# Patient Record
Sex: MALE | Race: WHITE | NOT HISPANIC OR LATINO | Employment: OTHER | URBAN - METROPOLITAN AREA
[De-identification: names, ages, dates, MRNs, and addresses within clinical notes are randomized per-mention and may not be internally consistent; named-entity substitution may affect disease eponyms.]

---

## 2017-01-13 ENCOUNTER — OFFICE VISIT (OUTPATIENT)
Dept: CARDIOLOGY | Facility: CLINIC | Age: 77
End: 2017-01-13
Payer: MEDICARE

## 2017-01-13 VITALS
OXYGEN SATURATION: 97 % | DIASTOLIC BLOOD PRESSURE: 75 MMHG | SYSTOLIC BLOOD PRESSURE: 146 MMHG | BODY MASS INDEX: 25.06 KG/M2 | HEART RATE: 79 BPM | WEIGHT: 150.63 LBS

## 2017-01-13 DIAGNOSIS — I25.10 CORONARY ARTERY DISEASE INVOLVING NATIVE CORONARY ARTERY OF NATIVE HEART WITHOUT ANGINA PECTORIS: ICD-10-CM

## 2017-01-13 DIAGNOSIS — R55 SYNCOPE AND COLLAPSE: Primary | ICD-10-CM

## 2017-01-13 DIAGNOSIS — Z95.0 PACEMAKER: ICD-10-CM

## 2017-01-13 DIAGNOSIS — R13.12 OROPHARYNGEAL DYSPHAGIA: ICD-10-CM

## 2017-01-13 DIAGNOSIS — Z95.1 HX OF CABG: ICD-10-CM

## 2017-01-13 DIAGNOSIS — E78.2 MIXED HYPERLIPIDEMIA: ICD-10-CM

## 2017-01-13 DIAGNOSIS — Z79.899 POLYPHARMACY: ICD-10-CM

## 2017-01-13 PROCEDURE — 1157F ADVNC CARE PLAN IN RCRD: CPT | Mod: S$GLB,,, | Performed by: INTERNAL MEDICINE

## 2017-01-13 PROCEDURE — 99214 OFFICE O/P EST MOD 30 MIN: CPT | Mod: S$GLB,,, | Performed by: INTERNAL MEDICINE

## 2017-01-13 PROCEDURE — 99999 PR PBB SHADOW E&M-EST. PATIENT-LVL III: CPT | Mod: PBBFAC,,, | Performed by: INTERNAL MEDICINE

## 2017-01-13 PROCEDURE — 1125F AMNT PAIN NOTED PAIN PRSNT: CPT | Mod: S$GLB,,, | Performed by: INTERNAL MEDICINE

## 2017-01-13 PROCEDURE — 3078F DIAST BP <80 MM HG: CPT | Mod: S$GLB,,, | Performed by: INTERNAL MEDICINE

## 2017-01-13 PROCEDURE — 1159F MED LIST DOCD IN RCRD: CPT | Mod: S$GLB,,, | Performed by: INTERNAL MEDICINE

## 2017-01-13 PROCEDURE — 3077F SYST BP >= 140 MM HG: CPT | Mod: S$GLB,,, | Performed by: INTERNAL MEDICINE

## 2017-01-13 PROCEDURE — 1160F RVW MEDS BY RX/DR IN RCRD: CPT | Mod: S$GLB,,, | Performed by: INTERNAL MEDICINE

## 2017-01-13 NOTE — PROGRESS NOTES
Subjective:   Patient ID:  Venu Lau is a 76 y.o. male who presents for follow-up of Follow-up      Problem List Items Addressed This Visit        Pulmonary    Oropharyngeal dysphagia       Cardiac    CAD (coronary artery disease)       Fluids/Electrolytes/Nutrition/GI    Mixed hyperlipidemia       Other    Hx of CABG    Pacemaker    Polypharmacy      Other Visit Diagnoses     Syncope and collapse    -  Primary          HPI: Patient with PMH of CAD with CABG, chronic pain on pain meds, Afib not on anticoagulation and SSS with a pacemaker. He is here secondary to recurrent LOC with injury. LOC is for 2-3 minutes. He is oriented and alert when he regains consciousness. He has had syncopal episodes in the past. He is uncertain as to when pacemaker was last interrogated.     Of note ER note mentioned patient tripped but he is now saying he lost consciousness.     Review of Systems   Constitution: Negative.   HENT: Negative.    Eyes: Negative.    Cardiovascular: Negative.    Respiratory: Negative.    Endocrine: Negative.    Hematologic/Lymphatic: Negative.    Skin: Negative.    Musculoskeletal: Negative.    Gastrointestinal: Negative.    Neurological: Negative.        Patient's Medications   New Prescriptions    No medications on file   Previous Medications    ASPIRIN (ECOTRIN) 81 MG EC TABLET    Take 81 mg by mouth once daily.    ATORVASTATIN (LIPITOR) 20 MG TABLET    Take 1 tablet by mouth Daily.    DULOXETINE 40 MG CPDR    Take 40 mg by mouth once daily.    GEMFIBROZIL (LOPID) 600 MG TABLET    Take 600 mg by mouth once daily.      METOPROLOL TARTRATE (LOPRESSOR) 25 MG TABLET    Take 0.5 tablets (12.5 mg total) by mouth 2 (two) times daily.    OXYCODONE (OXYCONTIN) 15 MG TR12 12 HR TABLET    Take 15 mg by mouth every 12 (twelve) hours as needed.    QUETIAPINE (SEROQUEL) 200 MG TAB    Take 1 tablet by mouth once daily.    SOLUS V2 LANCETS 30 GAUGE MISC        SOLUS V2 TEST STRIPS STRP        SUCRALFATE (CARAFATE)  1 GRAM TABLET    Take 1 g by mouth once daily.      TAMSULOSIN (FLOMAX) 0.4 MG CP24    Take 1 capsule (0.4 mg total) by mouth once daily.   Modified Medications    No medications on file   Discontinued Medications    No medications on file       Objective:   Physical Exam   Constitutional: He is oriented to person, place, and time. He appears well-developed and well-nourished. No distress.   Examination of the digits showed no clubbing or cyanosis   HENT:   Head: Normocephalic and atraumatic.   Eyes: Conjunctivae are normal. Pupils are equal, round, and reactive to light. Right eye exhibits no discharge.   Neck: Normal range of motion. Neck supple. No JVD present. No thyromegaly present.   No carotid bruits   Cardiovascular: Normal rate, regular rhythm, S1 normal, S2 normal, normal heart sounds, intact distal pulses and normal pulses.  PMI is not displaced.  Exam reveals no gallop, no friction rub and no opening snap.    No murmur heard.  Pulmonary/Chest: Effort normal and breath sounds normal. No respiratory distress. He has no wheezes. He has no rales. He exhibits no tenderness.   Abdominal: Soft. Bowel sounds are normal. He exhibits no distension and no mass. There is no tenderness. There is no guarding.   No hepatosplenomegaly   Musculoskeletal: Normal range of motion. He exhibits no edema or tenderness.   Lymphadenopathy:     He has no cervical adenopathy.   Neurological: He is alert and oriented to person, place, and time.   Skin: Skin is warm. No rash noted. He is not diaphoretic. No erythema.   Psychiatric: He has a normal mood and affect.   Nursing note and vitals reviewed.      ECGs reviewed- atrial paced and V paced rhythm  LABS reviewed  Imaging including Echoes reviewed- normal ef 60% with DD    Assessment:     1. Syncope and collapse    2. Hx of CABG    3. Pacemaker    4. Polypharmacy    5. Mixed hyperlipidemia    6. Coronary artery disease involving native coronary artery of native heart without  angina pectoris    7. Oropharyngeal dysphagia        Plan:     Syncope vs poly pharmacy and fall.  Will get Pacemaker interrogated  Continue current medications  F/u in 3 months

## 2017-01-13 NOTE — MR AVS SNAPSHOT
[facility] - Cardiology  [address] [place] [address] [address], [address]  [place] [address]  Phone: [phone]  Fax: [phone]                  Venu Lau   2017 3:00 PM   Office Visit    Description:  Male : 1940   Provider:  Breanna Tracy MD   Department:  [facility] - Cardiology           Reason for Visit     Follow-up           Diagnoses this Visit        Comments    Syncope and collapse    -  Primary     Hx of CABG         Pacemaker         Polypharmacy         Mixed hyperlipidemia         Coronary artery disease involving native coronary artery of native heart without angina pectoris         Oropharyngeal dysphagia                To Do List           Future Appointments        Provider Department Dept Phone    2017 10:15 [facility] Cardiology [phone]      Goals (5 Years of Data)     None      [facility]     [facility] Care [facility] -  Assistance  Registered nurses in the [facility] provide clinical advisement, health education, appointment booking, and other advisory services.  Call for this free service at [phone].             Medications                Verify that the below list of medications is an accurate representation of the medications you are currently taking.  If none reported, the list may be blank. If incorrect, please contact your healthcare provider. Carry this list with you in case of emergency.           Current Medications     aspirin (ECOTRIN) 81 MG EC tablet Take 81 mg by mouth once daily.    atorvastatin (LIPITOR) 20 MG tablet Take 1 tablet by mouth Daily.    duloxetine 40 mg CpDR Take 40 mg by mouth once daily.    gemfibrozil (LOPID) 600 MG tablet Take 600 mg by mouth once daily.      metoprolol tartrate (LOPRESSOR) 25 MG tablet Take 0.5 tablets (12.5 mg total) by mouth 2 (two) times daily.    oxycodone (OXYCONTIN) 15 mg TR12 12 hr tablet Take 15 mg by mouth every 12 (twelve) hours as needed.     quetiapine (SEROQUEL) 200 MG Tab Take 1 tablet by mouth once daily.    SOLUS V2 LANCETS 30 gauge Misc     SOLUS V2 TEST STRIPS Strp     sucralfate (CARAFATE) 1 gram tablet Take 1 g by mouth once daily.      tamsulosin (FLOMAX) 0.4 mg Cp24 Take 1 capsule (0.4 mg total) by mouth once daily.           Clinical Reference Information           Vital Signs - Last Recorded  Most recent update: 1/13/2017  2:57 PM by Adriane M Toussaint, LPN    BP Pulse Wt SpO2 BMI    (!) 146/75 79 68.3 kg (150 lb 9.6 oz) 97% 25.06 kg/m2      Blood Pressure          Most Recent Value    BP  (!)  146/75      Allergies as of 1/13/2017     No Known Allergies      Immunizations Administered on Date of Encounter - 1/13/2017     None      [facility] Sign-Up     Activating your [facility] account is as easy as 1-2-3!     1) Visit [email or web address], select Sign Up Now, enter this activation code and your date of birth, then select Next.  [email or web address]  Expires: 2/7/2017  3:17 PM      2) Create a username and password to use when you visit [facility] in the future and select a security question in case you lose your password and select Next.    3) Enter your e-mail address and click Sign Up!    Additional Information  If you have questions, please e-mail [email or web address] or call [phone] to talk to our [facility] staff. Remember, [facility] is NOT to be used for urgent needs. For medical emergencies, dial 911.

## 2017-02-07 ENCOUNTER — CLINICAL SUPPORT (OUTPATIENT)
Dept: CARDIOLOGY | Facility: CLINIC | Age: 77
End: 2017-02-07
Payer: MEDICARE

## 2017-02-07 DIAGNOSIS — Z95.0 PACEMAKER: Primary | ICD-10-CM

## 2017-02-07 PROCEDURE — 93288 INTERROG EVL PM/LDLS PM IP: CPT | Mod: S$GLB,,, | Performed by: INTERNAL MEDICINE

## 2017-02-08 ENCOUNTER — HOSPITAL ENCOUNTER (EMERGENCY)
Facility: HOSPITAL | Age: 77
Discharge: HOME | End: 2017-02-09
Attending: EMERGENCY MEDICINE
Payer: MEDICARE

## 2017-02-08 DIAGNOSIS — E87.1 HYPONATREMIA: ICD-10-CM

## 2017-02-08 DIAGNOSIS — F13.939 BENZODIAZEPINE WITHDRAWAL, WITH UNSPECIFIED COMPLICATION: Primary | ICD-10-CM

## 2017-02-08 DIAGNOSIS — E86.0 DEHYDRATION: ICD-10-CM

## 2017-02-08 DIAGNOSIS — R11.10 VOMITING, INTRACTABILITY OF VOMITING NOT SPECIFIED, PRESENCE OF NAUSEA NOT SPECIFIED, UNSPECIFIED VOMITING TYPE: ICD-10-CM

## 2017-02-08 LAB
ALBUMIN SERPL BCP-MCNC: 4.5 G/DL
ALP SERPL-CCNC: 166 U/L
ALT SERPL W/O P-5'-P-CCNC: 28 U/L
ANION GAP SERPL CALC-SCNC: 17 MMOL/L
ANISOCYTOSIS BLD QL SMEAR: SLIGHT
AST SERPL-CCNC: 44 U/L
BASOPHILS # BLD AUTO: ABNORMAL K/UL
BASOPHILS NFR BLD: 0 %
BILIRUB SERPL-MCNC: 2.1 MG/DL
BILIRUB UR QL STRIP: NEGATIVE
BUN SERPL-MCNC: 12 MG/DL
CALCIUM SERPL-MCNC: 9.4 MG/DL
CHLORIDE SERPL-SCNC: 98 MMOL/L
CLARITY UR: CLEAR
CO2 SERPL-SCNC: 14 MMOL/L
COLOR UR: YELLOW
CREAT SERPL-MCNC: 0.9 MG/DL
DACRYOCYTES BLD QL SMEAR: ABNORMAL
DIFFERENTIAL METHOD: ABNORMAL
EOSINOPHIL # BLD AUTO: ABNORMAL K/UL
EOSINOPHIL NFR BLD: 0 %
ERYTHROCYTE [DISTWIDTH] IN BLOOD BY AUTOMATED COUNT: 14.3 %
EST. GFR  (AFRICAN AMERICAN): >60 ML/MIN/1.73 M^2
EST. GFR  (NON AFRICAN AMERICAN): >60 ML/MIN/1.73 M^2
GLUCOSE SERPL-MCNC: 143 MG/DL
GLUCOSE UR QL STRIP: NEGATIVE
HCT VFR BLD AUTO: 41.3 %
HGB BLD-MCNC: 14.7 G/DL
HGB UR QL STRIP: ABNORMAL
KETONES UR QL STRIP: ABNORMAL
LEUKOCYTE ESTERASE UR QL STRIP: NEGATIVE
LYMPHOCYTES # BLD AUTO: ABNORMAL K/UL
LYMPHOCYTES NFR BLD: 25 %
MCH RBC QN AUTO: 30.2 PG
MCHC RBC AUTO-ENTMCNC: 35.6 %
MCV RBC AUTO: 85 FL
MONOCYTES # BLD AUTO: ABNORMAL K/UL
MONOCYTES NFR BLD: 2 %
NEUTROPHILS NFR BLD: 73 %
NITRITE UR QL STRIP: NEGATIVE
OVALOCYTES BLD QL SMEAR: ABNORMAL
PH UR STRIP: 7 [PH] (ref 5–8)
PLATELET # BLD AUTO: 137 K/UL
PLATELET BLD QL SMEAR: ABNORMAL
PMV BLD AUTO: 10 FL
POIKILOCYTOSIS BLD QL SMEAR: SLIGHT
POTASSIUM SERPL-SCNC: 3.7 MMOL/L
PROT SERPL-MCNC: 7.8 G/DL
PROT UR QL STRIP: NEGATIVE
RBC # BLD AUTO: 4.87 M/UL
SODIUM SERPL-SCNC: 129 MMOL/L
SP GR UR STRIP: <=1.005 (ref 1–1.03)
TROPONIN I SERPL DL<=0.01 NG/ML-MCNC: 0.07 NG/ML
URN SPEC COLLECT METH UR: ABNORMAL
UROBILINOGEN UR STRIP-ACNC: NEGATIVE EU/DL
WBC # BLD AUTO: 5.65 K/UL

## 2017-02-08 PROCEDURE — 85007 BL SMEAR W/DIFF WBC COUNT: CPT

## 2017-02-08 PROCEDURE — 99285 EMERGENCY DEPT VISIT HI MDM: CPT | Mod: 25

## 2017-02-08 PROCEDURE — 63600175 PHARM REV CODE 636 W HCPCS: Performed by: EMERGENCY MEDICINE

## 2017-02-08 PROCEDURE — 93010 ELECTROCARDIOGRAM REPORT: CPT | Mod: ,,, | Performed by: INTERNAL MEDICINE

## 2017-02-08 PROCEDURE — 96375 TX/PRO/DX INJ NEW DRUG ADDON: CPT

## 2017-02-08 PROCEDURE — 84484 ASSAY OF TROPONIN QUANT: CPT

## 2017-02-08 PROCEDURE — 93005 ELECTROCARDIOGRAM TRACING: CPT

## 2017-02-08 PROCEDURE — 80053 COMPREHEN METABOLIC PANEL: CPT

## 2017-02-08 PROCEDURE — 96374 THER/PROPH/DIAG INJ IV PUSH: CPT

## 2017-02-08 PROCEDURE — 81003 URINALYSIS AUTO W/O SCOPE: CPT

## 2017-02-08 PROCEDURE — 96361 HYDRATE IV INFUSION ADD-ON: CPT

## 2017-02-08 PROCEDURE — 85027 COMPLETE CBC AUTOMATED: CPT

## 2017-02-08 RX ORDER — ONDANSETRON 2 MG/ML
4 INJECTION INTRAMUSCULAR; INTRAVENOUS
Status: COMPLETED | OUTPATIENT
Start: 2017-02-08 | End: 2017-02-08

## 2017-02-08 RX ORDER — LORAZEPAM 2 MG/ML
1 INJECTION INTRAMUSCULAR
Status: COMPLETED | OUTPATIENT
Start: 2017-02-08 | End: 2017-02-08

## 2017-02-08 RX ADMIN — LORAZEPAM 1 MG: 2 INJECTION, SOLUTION INTRAMUSCULAR; INTRAVENOUS at 09:02

## 2017-02-08 RX ADMIN — ONDANSETRON 4 MG: 2 INJECTION INTRAMUSCULAR; INTRAVENOUS at 09:02

## 2017-02-08 NOTE — PROGRESS NOTES
Pacemaker Evaluation Report      Primary MD: Dr. Terrazas  Primary Cardiologist: Dr. Tracy     : Biotronik Model: Evia DR/Serial No.  [id number]  Implant date: Jan 13, 2011    Reason for evaluation:routine  Indication for pacemaker: unknown    Measurements  Atrial sensing - P wave: 1.7 mV  Atrial capture: Maintained: Afib  Atrial lead impedance: 429 ohms  Ventricular sensing - R wave: 11.1 mV  Ventricular capture: RV Maintained: 0.9 V @ 0.4 ms   Ventricular lead impedance: right - 487 ohms left -  ohms  Underlying rhythm: Aflutter      Diagnostic Data  Atrial paced: 50 % Ventricular paced: 30 %  Mode switch: on  Other: 10% Afib burden  Sensor response: CLS  Battery status: satisfactory Magnet rate: 90 bpm   55% remaining capacity  Est. Longevity 4.4 years      Final Parameters  Mode: DDD-CLS Lower rate: 60 bpm Upper rate: 130 bpm  Atrial - Amplitude: 4.0 V Pulse width: 0.5 ms   Polarity: Bipolar  Ventricular - Amplitude: 3.0 V Pulse width: 0.4 ms   Polarity: Bipolar  Changes made: NONE  Conclusions: normal pacemaker function and Afib burden of 10%, longest episode 12 hours    Follow up: 6 months        Colton Oh MD, FACC, CCDS  Interventional Cardiology  [facility]

## 2017-02-08 NOTE — ED AVS SNAPSHOT
[facility]  [address] [place] [address]               Venu Lau   2017  7:13 PM   ED    Description:  Male : 1940   Department:  [facility]           Your Care was Coordinated By:     Provider Role From To    Cruz Hanks MD Attending Provider 17 [id number] --      Reason for Visit     near syncope           Diagnoses this Visit        Comments    Benzodiazepine withdrawal, with unspecified complication    -  Primary     Vomiting, intractability of vomiting not specified, presence of nausea not specified, unspecified vomiting type         Dehydration         Hyponatremia           ED Disposition     None           To Do List           Follow-up Information     Follow up with [facility]. Call in 1 day.    Specialty:  Emergency Medicine    Contact information:    [address] [place] [address] [phone]      [facility]     [facility] Care [facility] -  Assistance  Registered nurses in the [facility] provide clinical advisement, health education, appointment booking, and other advisory services.  Call for this free service at [phone].             Medications           These medications were administered today        Dose Freq    lorazepam injection 1 mg 1 mg ED 1 Time    Sig: Inject 0.5 mLs (1 mg total) into the vein ED 1 Time.    Class: Normal    Route: Intravenous    ondansetron injection 4 mg 4 mg ED 1 Time    Sig: Inject 4 mg into the vein ED 1 Time.    Class: Normal    Route: Intravenous    sodium chloride 0.9% bolus 1,000 mL 1,000 mL ED 1 Time    Sig: Inject 1,000 mLs into the vein ED 1 Time.    Class: Normal    Route: Intravenous           Verify that the below list of medications is an accurate representation of the medications you are currently taking.  If none reported, the list may be blank. If incorrect, please contact your healthcare provider.  "Carry this list with you in case of emergency.           Current Medications     aspirin (ECOTRIN) 81 MG EC tablet Take 81 mg by mouth once daily.    atorvastatin (LIPITOR) 20 MG tablet Take 1 tablet by mouth Daily.    duloxetine 40 mg CpDR Take 40 mg by mouth once daily.    gemfibrozil (LOPID) 600 MG tablet Take 600 mg by mouth once daily.      metoprolol tartrate (LOPRESSOR) 25 MG tablet Take 0.5 tablets (12.5 mg total) by mouth 2 (two) times daily.    oxycodone (OXYCONTIN) 15 mg TR12 12 hr tablet Take 15 mg by mouth every 12 (twelve) hours as needed.    quetiapine (SEROQUEL) 200 MG Tab Take 1 tablet by mouth once daily.    SOLUS V2 LANCETS 30 gauge Misc     SOLUS V2 TEST STRIPS Strp     sucralfate (CARAFATE) 1 gram tablet Take 1 g by mouth once daily.      tamsulosin (FLOMAX) 0.4 mg Cp24 Take 1 capsule (0.4 mg total) by mouth once daily.           Clinical Reference Information           Your Vitals Were     BP Pulse Temp Resp Height Weight    167/92 (BP Location: Right arm, Patient Position: Lying, BP Method: Automatic) 103 98.9 °F (37.2 °C) (Oral) 18 5' 5" (1.651 m) 68 kg (150 lb)    SpO2 BMI             97% 24.96 kg/m2         Allergies as of 2/9/2017     No Known Allergies      Immunizations Administered on Date of Encounter - 2/9/2017     None      ED Micro, Lab, POCT     Start Ordered       Status Ordering Provider    02/08/17 2056 02/08/17 2055  Troponin I  STAT      Final result     02/08/17 2055 02/08/17 2055  Urinalysis  STAT      Final result     02/08/17 2054 02/08/17 2055  CBC auto differential  STAT      Final result     02/08/17 2054 02/08/17 2055  Comprehensive metabolic panel  STAT      Final result       ED Imaging Orders     Start Ordered       Status Ordering Provider    02/08/17 2041 02/08/17 2055  CT Head Without Contrast  1 time imaging      Final result       Discharge References/Attachments     DEHYDRATION (ADULT) (ENGLISH)    HYPONATREMIA (ENGLISH)    BENZODIAZEPINE WITHDRAWAL (ENGLISH) "      Your Scheduled Appointments     Feb 13, 2017  2:00 PM CST   Established Patient Visit with Breanna Tracy MD   [facility] - Cardiology ([place])    [address], [address]  [place] [address] [phone]            Apr 10, 2017  1:20 PM CDT   Established Patient Visit with MD [facility] - Cardiology ([place])    [address], [address]  [place] [id number] [phone]              [facility] Sign-Up     Activating your [facility] account is as easy as 1-2-3!     1) Visit [email or web address], select Sign Up Now, enter this activation code and your date of birth, then select Next.  [email or web address]  Expires: 3/26/2017 12:29 AM      2) Create a username and password to use when you visit [facility] in the future and select a security question in case you lose your password and select Next.    3) Enter your e-mail address and click Sign Up!    Additional Information  If you have questions, please e-mail [email or web address] or call [phone] to talk to our [facility] staff. Remember, [facility] is NOT to be used for urgent needs. For medical emergencies, dial 911.         Smoking Cessation     If you would like to quit smoking:   You may be eligible for free services if you are a [place] resident and started smoking cigarettes before September 1, 1988.  Call the Smoking Cessation Trust ([facility]) toll free at (765) 911-4908 or (475) 448-6075.   Call 8-800-QUIT-NOW if you do not meet the above criteria.             [facility] complies with applicable Federal civil rights laws and does not discriminate on the basis of race, color, national origin, age, disability, or sex.        Language Assistance Services     ATTENTION: Language assistance services are available, free of charge. Please call [phone].      ATENCIÓN: Si habla español, tiene a rojas disposición servicios gratuitos de asistencia lingüística. Llame al [phone].     CHÚ Ý: N?u b?n nói Ti?ng Vi?t, có  các d?ch v? h? tr? rashi ng? mi?n phí dành cho b?n. G?i s? [phone].

## 2017-02-09 VITALS
WEIGHT: 150 LBS | OXYGEN SATURATION: 100 % | HEART RATE: 98 BPM | HEIGHT: 65 IN | TEMPERATURE: 99 F | SYSTOLIC BLOOD PRESSURE: 148 MMHG | BODY MASS INDEX: 24.99 KG/M2 | RESPIRATION RATE: 18 BRPM | DIASTOLIC BLOOD PRESSURE: 99 MMHG

## 2017-02-09 PROCEDURE — 25000003 PHARM REV CODE 250: Performed by: EMERGENCY MEDICINE

## 2017-02-09 RX ADMIN — SODIUM CHLORIDE 1000 ML: 0.9 INJECTION, SOLUTION INTRAVENOUS at 12:02

## 2017-02-09 NOTE — ED TRIAGE NOTES
Pt. Care assumed, pt. Resting comfortably in bed, denies c/o pain or discomfort. Cardiac monitor shows NSR with HR-76. Skin PWD. Daughter at bedside.

## 2017-02-09 NOTE — ED NOTES
Pt presents to ED via EMS with c/o near syncopal episode with chronic anxiety. Dr. Hanks at bedside for initial assessment. Pt states he has been out of his xanax for a few days and has not gotten any sleep. Pt also c/o chronic back pain. Pt has multiple complaints on arrival but does not present in acute distress. Pt states he fell 2 weeks ago and hit his head. Pt placed on cardiac monitor, bp cuff and continuous pulse ox. Pt has pacemaker in place. Family at beside.

## 2017-02-09 NOTE — ED PROVIDER NOTES
Encounter Date: 2/8/2017       History     Chief Complaint   Patient presents with    near syncope     pt reports generalized weakness x 2 days with near syncopal episodes x months     Review of patient's allergies indicates:  No Known Allergies  HPI Comments: Patient is a 76-year-old male with a past medical history of tachybradycardia syndrome, sick sinus syndrome, GI bleed, gout, diabetes, hypertension, a flutter, liver cancer, coronary artery disease who presents to emergency room for evaluation of overall anxiety and nervousness for the past day.  The patient has been out of his Xanax for 3 days for which he normally takes 1 pill of unknown dosage in the morning and evening.  Patient states he wasn't able to eat yesterday and was very nauseous. .  He is a prior poor historian, but is able to deny any current chest pain palpitation shortness of breath abdominal pain focal weakness or numbness or dysuria.  The patient does have a slight headache and fell 2 weeks ago and hit his head.  He has no vision changes or syncope.  He states he's been nauseous today and when he tries to eat he vomits.  He has no other complaints at this time.  Apparently, family members called 911.  Unfortunately, I did not receive report from EMS.  I'm asking nurses for further information at this time.  Patient is awake alert and oriented    Past Medical History   Diagnosis Date    *Atrial flutter     Anemia     Coronary artery disease     Diabetes mellitus     Gout, unspecified     H/O: GI bleed     Hypertension     Liver cancer     PVD (peripheral vascular disease)     Sciatica     SSS (sick sinus syndrome)     Tachy-valentine syndrome      No past medical history pertinent negatives.  Past Surgical History   Procedure Laterality Date    Lumbar disc surgery      Coronary artery bypass graft      Insert / replace / remove pacemaker       Family History   Problem Relation Age of Onset    Heart disease Mother     Heart  disease Father      Social History   Substance Use Topics    Smoking status: Former Smoker     Types: Cigarettes     Quit date: 11/20/1992    Smokeless tobacco: Never Used    Alcohol use 1.2 oz/week     0 Standard drinks or equivalent, 2 Cans of beer per week      Comment: 2 cans of beer per day     Review of Systems   Constitutional: Positive for fatigue. Negative for activity change, appetite change, chills, diaphoresis and fever.   HENT: Negative for congestion and sore throat.    Respiratory: Negative for cough and shortness of breath.    Cardiovascular: Negative for chest pain, palpitations and leg swelling.   Gastrointestinal: Positive for nausea and vomiting. Negative for abdominal pain, blood in stool and diarrhea.   Genitourinary: Negative for dysuria and hematuria.   Musculoskeletal: Positive for arthralgias and myalgias.   Skin: Negative for rash.   Neurological: Positive for headaches. Negative for syncope, weakness and numbness.   Psychiatric/Behavioral: Negative for agitation and confusion.       Physical Exam   Initial Vitals   BP Pulse Resp Temp SpO2   02/08/17 1819 02/08/17 1819 02/08/17 1819 02/08/17 1819 02/08/17 1819   163/109 98 19 97.1 °F (36.2 °C) 99 %     Physical Exam    Vitals reviewed.  Constitutional: He appears well-developed and well-nourished. No distress.   HENT:   Head: Normocephalic and atraumatic.   Right Ear: External ear normal.   Left Ear: External ear normal.   Mouth/Throat: Oropharynx is clear and moist.   Eyes: Conjunctivae and EOM are normal. Pupils are equal, round, and reactive to light.   Neck: Normal range of motion. Neck supple. No JVD present.   Cardiovascular: Normal rate, regular rhythm and normal heart sounds. Exam reveals no gallop and no friction rub.    No murmur heard.  Pulmonary/Chest: Breath sounds normal. He has no wheezes. He has no rhonchi. He has no rales.   Prior cabg scar     Abdominal: Soft. There is no tenderness. There is no rebound and no  guarding.   Musculoskeletal: Normal range of motion. He exhibits no edema.   Neurological: He is alert and oriented to person, place, and time. He has normal strength. No sensory deficit.   Skin: Skin is warm and dry.   Psychiatric:   Appears somewhat tremulous and nervous         ED Course   Procedures  Labs Reviewed   CBC W/ AUTO DIFFERENTIAL   COMPREHENSIVE METABOLIC PANEL   URINALYSIS   TROPONIN I     EKG Readings: (Independently Interpreted)   Other EKG Interpretations: Rate 100, electronic ventricular pacemaker with no signs of acute ischemia and unchanged from prior.          Medical Decision Making:   Patient has mild hyponatremia on exam with dehydration.  I will hydrate him here in the ED.  He has no complaints other than feeling anxious because he doesn't have his Xanax.  He was Ativan and feels much better at this time.  No family members are here.    12:24 AM family members are here and can stay with the patient.  He feels much better after Ativan here in emergency room.  I will give him Xanax 0.5 mg twice a day and typically can follow-up with his primary care physician.  His family concur that he ran out.  The patient is able to tolerate by mouth.  He has no chest pain shortness breath abdominal pain or headache.  CT the head shows nothing acute.                       ED Course     Clinical Impression:   The primary encounter diagnosis was Benzodiazepine withdrawal, with unspecified complication. Diagnoses of Vomiting, intractability of vomiting not specified, presence of nausea not specified, unspecified vomiting type, Dehydration, and Hyponatremia were also pertinent to this visit.          Cruz Hanks MD  02/09/17 0030

## 2017-02-13 ENCOUNTER — OFFICE VISIT (OUTPATIENT)
Dept: CARDIOLOGY | Facility: CLINIC | Age: 77
End: 2017-02-13
Payer: MEDICARE

## 2017-02-13 VITALS
DIASTOLIC BLOOD PRESSURE: 65 MMHG | OXYGEN SATURATION: 97 % | HEART RATE: 69 BPM | BODY MASS INDEX: 24.5 KG/M2 | SYSTOLIC BLOOD PRESSURE: 112 MMHG | WEIGHT: 147.19 LBS

## 2017-02-13 DIAGNOSIS — I10 ESSENTIAL HYPERTENSION: Primary | ICD-10-CM

## 2017-02-13 DIAGNOSIS — Z95.1 HX OF CABG: ICD-10-CM

## 2017-02-13 DIAGNOSIS — Z95.0 PACEMAKER: ICD-10-CM

## 2017-02-13 DIAGNOSIS — E78.2 MIXED HYPERLIPIDEMIA: ICD-10-CM

## 2017-02-13 DIAGNOSIS — I48.0 PAF (PAROXYSMAL ATRIAL FIBRILLATION): ICD-10-CM

## 2017-02-13 DIAGNOSIS — I25.10 CORONARY ARTERY DISEASE INVOLVING NATIVE CORONARY ARTERY OF NATIVE HEART WITHOUT ANGINA PECTORIS: ICD-10-CM

## 2017-02-13 PROCEDURE — 99999 PR PBB SHADOW E&M-EST. PATIENT-LVL III: CPT | Mod: PBBFAC,,, | Performed by: INTERNAL MEDICINE

## 2017-02-13 PROCEDURE — 3074F SYST BP LT 130 MM HG: CPT | Mod: S$GLB,,, | Performed by: INTERNAL MEDICINE

## 2017-02-13 PROCEDURE — 1157F ADVNC CARE PLAN IN RCRD: CPT | Mod: S$GLB,,, | Performed by: INTERNAL MEDICINE

## 2017-02-13 PROCEDURE — 1160F RVW MEDS BY RX/DR IN RCRD: CPT | Mod: S$GLB,,, | Performed by: INTERNAL MEDICINE

## 2017-02-13 PROCEDURE — 1159F MED LIST DOCD IN RCRD: CPT | Mod: S$GLB,,, | Performed by: INTERNAL MEDICINE

## 2017-02-13 PROCEDURE — 1126F AMNT PAIN NOTED NONE PRSNT: CPT | Mod: S$GLB,,, | Performed by: INTERNAL MEDICINE

## 2017-02-13 PROCEDURE — 3078F DIAST BP <80 MM HG: CPT | Mod: S$GLB,,, | Performed by: INTERNAL MEDICINE

## 2017-02-13 PROCEDURE — 99214 OFFICE O/P EST MOD 30 MIN: CPT | Mod: S$GLB,,, | Performed by: INTERNAL MEDICINE

## 2017-02-13 NOTE — MR AVS SNAPSHOT
[facility] - Cardiology  [address] [place], [place] [address], [address]  [place] [address]  Phone: [phone]  Fax: [phone]                  Venu Lau   2017 2:00 PM   Office Visit    Description:  Male : 1940   Provider:  Breanna Tracy MD   Department:  [facility]           Reason for Visit     Follow-up           Diagnoses this Visit        Comments    Essential hypertension    -  Primary     PAF (paroxysmal atrial fibrillation)         Coronary artery disease involving native coronary artery of native heart without angina pectoris         Mixed hyperlipidemia         Pacemaker         Hx of CABG                To Do List           Future Appointments        Provider Department Dept Phone    4/10/2017 1:20 PM Breanna Tracy MD [facility] Cardiology [phone]      Goals (5 Years of Data)     None      [facility]     [facility] Line -  Assistance  Registered nurses in the [facility] Call Center provide clinical advisement, health education, appointment booking, and other advisory services.  Call for this free service at [phone].             Medications                Verify that the below list of medications is an accurate representation of the medications you are currently taking.  If none reported, the list may be blank. If incorrect, please contact your healthcare provider. Carry this list with you in case of emergency.           Current Medications     aspirin (ECOTRIN) 81 MG EC tablet Take 81 mg by mouth once daily.    atorvastatin (LIPITOR) 20 MG tablet Take 1 tablet by mouth Daily.    duloxetine 40 mg CpDR Take 40 mg by mouth once daily.    gemfibrozil (LOPID) 600 MG tablet Take 600 mg by mouth once daily.      metoprolol tartrate (LOPRESSOR) 25 MG tablet Take 0.5 tablets (12.5 mg total) by mouth 2 (two) times daily.    oxycodone (OXYCONTIN) 15 mg TR12 12 hr tablet Take 15 mg by mouth every 12 (twelve) hours as needed.    quetiapine (SEROQUEL) 200  MG Tab Take 1 tablet by mouth once daily.    SOLUS V2 LANCETS [facility] gauge [facility]     SOLUS V2 TEST STRIPS Strp     sucralfate (CARAFATE) 1 gram tablet Take 1 g by mouth once daily.      tamsulosin (FLOMAX) 0.4 mg Cp24 Take 1 capsule (0.4 mg total) by mouth once daily.           Clinical Reference Information           Your Vitals Were     BP Pulse Weight SpO2 BMI    112/65 69 66.8 kg (147 lb 3.2 oz) 97% 24.5 kg/m2      Blood Pressure          Most Recent Value    BP  112/65      Allergies as of 2/13/2017     No Known Allergies      Immunizations Administered on Date of Encounter - 2/13/2017     None      [facility] Sign-Up     Activating your [facility] account is as easy as 1-2-3!     1) Visit [email or web address], select Sign Up Now, enter this activation code and your date of birth, then select Next.  [email or web address]  Expires: 3/26/2017 12:29 AM      2) Create a username and password to use when you visit [facility] in the future and select a security question in case you lose your password and select Next.    3) Enter your e-mail address and click Sign Up!    Additional Information  If you have questions, please e-mail [email or web address] or call [phone] to talk to our [facility] staff. Remember, [facility] is NOT to be used for urgent needs. For medical emergencies, dial 911.         Language Assistance Services     ATTENTION: Language assistance services are available, free of charge. Please call [phone].      ATENCIÓN: Si habla español, tiene a rojas disposición servicios gratuitos de asistencia lingüística. Llame al [phone].     CHÚ Ý: N?u b?n nói Ti?ng Vi?t, có các d?ch v? h? tr? ngôn ng? mi?n phí dành cho b?n. G?i s? [phone].         [facility] complies with applicable Federal civil rights laws and does not discriminate on the basis of race, color, national origin, age, disability, or sex.

## 2017-02-13 NOTE — PROGRESS NOTES
Subjective:   Patient ID:  Venu Lau is a 76 y.o. male who presents for follow-up of Follow-up      Problem List Items Addressed This Visit        Cardiac    Coronary artery disease involving native coronary artery of native heart without angina pectoris    Essential hypertension - Primary    PAF (paroxysmal atrial fibrillation)       Fluids/Electrolytes/Nutrition/GI    Mixed hyperlipidemia       Other    Hx of CABG    Pacemaker          HPI: Patient is here for f/u of syncope. He is having pain in multiple joints. He had pacemaker interrogated and was found to have PAF. Daughter is concerned about occasion slurred speech and facial changes. He does have history of GI bleed but at the time he had colon cancer and liver cancer treated with surgery with no chemotherapy or radiation. No chest pain or SOB. No palpitations.     He has chronic pain and is seeing a pain specialist.     Review of Systems   Constitution: Negative.   HENT: Negative.    Eyes: Negative.    Cardiovascular: Negative.    Respiratory: Negative.    Endocrine: Negative.    Hematologic/Lymphatic: Negative.    Skin: Negative.    Musculoskeletal: Negative.    Gastrointestinal: Negative.    Neurological: Negative.    Psychiatric/Behavioral: Negative.        Patient's Medications   New Prescriptions    No medications on file   Previous Medications    ASPIRIN (ECOTRIN) 81 MG EC TABLET    Take 81 mg by mouth once daily.    ATORVASTATIN (LIPITOR) 20 MG TABLET    Take 1 tablet by mouth Daily.    DULOXETINE 40 MG CPDR    Take 40 mg by mouth once daily.    GEMFIBROZIL (LOPID) 600 MG TABLET    Take 600 mg by mouth once daily.      METOPROLOL TARTRATE (LOPRESSOR) 25 MG TABLET    Take 0.5 tablets (12.5 mg total) by mouth 2 (two) times daily.    OXYCODONE (OXYCONTIN) 15 MG TR12 12 HR TABLET    Take 15 mg by mouth every 12 (twelve) hours as needed.    QUETIAPINE (SEROQUEL) 200 MG TAB    Take 1 tablet by mouth once daily.    SOLUS V2 LANCETS 30 GAUGE [facility]         SOLUS V2 TEST STRIPS STRP        SUCRALFATE (CARAFATE) 1 GRAM TABLET    Take 1 g by mouth once daily.      TAMSULOSIN (FLOMAX) 0.4 MG CP24    Take 1 capsule (0.4 mg total) by mouth once daily.   Modified Medications    No medications on file   Discontinued Medications    No medications on file       Objective:   Physical Exam   Constitutional: He is oriented to person, place, and time. He appears well-developed and well-nourished. No distress.   Examination of the digits showed no clubbing or cyanosis   HENT:   Head: Normocephalic and atraumatic.   Eyes: Conjunctivae are normal. Pupils are equal, round, and reactive to light. Right eye exhibits no discharge.   Neck: Normal range of motion. Neck supple. No JVD present. No thyromegaly present.   No carotid bruits   Cardiovascular: Normal rate, regular rhythm, S1 normal, S2 normal, normal heart sounds, intact distal pulses and normal pulses.  PMI is not displaced.  Exam reveals no gallop, no friction rub and no opening snap.    No murmur heard.  Pulmonary/Chest: Effort normal and breath sounds normal. No respiratory distress. He has no wheezes. He has no rales. He exhibits no tenderness.   Abdominal: Soft. Bowel sounds are normal. He exhibits no distension and no mass. There is no tenderness. There is no guarding.   No hepatosplenomegaly   Musculoskeletal: Normal range of motion. He exhibits no edema or tenderness.   Lymphadenopathy:     He has no cervical adenopathy.   Neurological: He is alert and oriented to person, place, and time.   Skin: Skin is warm. No rash noted. He is not diaphoretic. No erythema.   Psychiatric: He has a normal mood and affect.   Nursing note and vitals reviewed.      ECGs reviewed-Ventricular paced rhythm  LABS reviewed  Imaging including Echoes reviewed    Assessment:     1. Essential hypertension    2. PAF (paroxysmal atrial fibrillation)    3. Coronary artery disease involving native coronary artery of native heart without angina  pectoris    4. Mixed hyperlipidemia    5. Pacemaker    6. Hx of CABG        Plan:      He is asymptomatic to PAF and most of his symptoms are secondary to pain or pain meds or pain meds withdrawal.  ChadsVasc=5 (age, HTN, DM, PAD). He has history of GI bleed and is having recurrent falls and recent syncope. He is always drousy on his pain meds. Will have him take 325 mg po daily  Continue current medications  Activity as tolerate  F/u with pain specialist  F/u in 4 months        Clinic time spent with this patient is 20 minutes.

## 2017-04-28 ENCOUNTER — OFFICE VISIT (OUTPATIENT)
Dept: CARDIOLOGY | Facility: CLINIC | Age: 77
End: 2017-04-28
Payer: MEDICARE

## 2017-04-28 VITALS
HEIGHT: 65 IN | WEIGHT: 154 LBS | DIASTOLIC BLOOD PRESSURE: 75 MMHG | OXYGEN SATURATION: 96 % | BODY MASS INDEX: 25.66 KG/M2 | SYSTOLIC BLOOD PRESSURE: 118 MMHG

## 2017-04-28 DIAGNOSIS — I48.92 ATRIAL FLUTTER, UNSPECIFIED TYPE: ICD-10-CM

## 2017-04-28 DIAGNOSIS — Z79.899 POLYPHARMACY: ICD-10-CM

## 2017-04-28 DIAGNOSIS — Z95.0 PACEMAKER: ICD-10-CM

## 2017-04-28 DIAGNOSIS — E78.2 MIXED HYPERLIPIDEMIA: ICD-10-CM

## 2017-04-28 DIAGNOSIS — Z95.1 HX OF CABG: ICD-10-CM

## 2017-04-28 DIAGNOSIS — I10 ESSENTIAL HYPERTENSION: ICD-10-CM

## 2017-04-28 DIAGNOSIS — I25.10 CORONARY ARTERY DISEASE INVOLVING NATIVE CORONARY ARTERY OF NATIVE HEART WITHOUT ANGINA PECTORIS: ICD-10-CM

## 2017-04-28 DIAGNOSIS — I48.0 PAF (PAROXYSMAL ATRIAL FIBRILLATION): Primary | ICD-10-CM

## 2017-04-28 PROCEDURE — 1160F RVW MEDS BY RX/DR IN RCRD: CPT | Mod: S$GLB,,, | Performed by: INTERNAL MEDICINE

## 2017-04-28 PROCEDURE — 99213 OFFICE O/P EST LOW 20 MIN: CPT | Mod: S$GLB,,, | Performed by: INTERNAL MEDICINE

## 2017-04-28 PROCEDURE — 3074F SYST BP LT 130 MM HG: CPT | Mod: S$GLB,,, | Performed by: INTERNAL MEDICINE

## 2017-04-28 PROCEDURE — 3078F DIAST BP <80 MM HG: CPT | Mod: S$GLB,,, | Performed by: INTERNAL MEDICINE

## 2017-04-28 PROCEDURE — 1159F MED LIST DOCD IN RCRD: CPT | Mod: S$GLB,,, | Performed by: INTERNAL MEDICINE

## 2017-04-28 PROCEDURE — 99999 PR PBB SHADOW E&M-EST. PATIENT-LVL III: CPT | Mod: PBBFAC,,, | Performed by: INTERNAL MEDICINE

## 2017-04-28 PROCEDURE — 1125F AMNT PAIN NOTED PAIN PRSNT: CPT | Mod: S$GLB,,, | Performed by: INTERNAL MEDICINE

## 2017-04-28 NOTE — PROGRESS NOTES
Subjective:   Patient ID:  Venu Lau is a 77 y.o. male who presents for follow-up of Follow-up      Problem List Items Addressed This Visit        Cardiac    Atrial flutter    Coronary artery disease involving native coronary artery of native heart without angina pectoris    Essential hypertension    PAF (paroxysmal atrial fibrillation) - Primary       Fluids/Electrolytes/Nutrition/GI    Mixed hyperlipidemia       Other    Hx of CABG    Pacemaker    Polypharmacy          HPI: Patient is here for f/u of PAF. No anticoagulation secondary to fall and polypharmacy. He denies chest pain or dyspnea. His only complaint is insufficient control of pain.     Review of Systems   Constitution: Negative.   HENT: Negative.    Eyes: Negative.    Cardiovascular: Negative.    Respiratory: Negative.    Endocrine: Negative.    Hematologic/Lymphatic: Negative.    Skin: Negative.    Musculoskeletal: Negative.    Gastrointestinal: Negative.    Neurological: Negative.    Psychiatric/Behavioral: Negative.        Patient's Medications   New Prescriptions    No medications on file   Previous Medications    ASPIRIN (ECOTRIN) 81 MG EC TABLET    Take 81 mg by mouth once daily.    ATORVASTATIN (LIPITOR) 20 MG TABLET    Take 1 tablet by mouth Daily.    DULOXETINE 40 MG CPDR    Take 40 mg by mouth once daily.    GEMFIBROZIL (LOPID) 600 MG TABLET    Take 600 mg by mouth once daily.      METOPROLOL TARTRATE (LOPRESSOR) 25 MG TABLET    Take 0.5 tablets (12.5 mg total) by mouth 2 (two) times daily.    OXYCODONE (OXYCONTIN) 15 MG TR12 12 HR TABLET    Take 15 mg by mouth every 12 (twelve) hours as needed.    QUETIAPINE (SEROQUEL) 200 MG TAB    Take 1 tablet by mouth once daily.    SOLUS V2 LANCETS 30 GAUGE MISC        SOLUS V2 TEST STRIPS STRP        SUCRALFATE (CARAFATE) 1 GRAM TABLET    Take 1 g by mouth once daily.      TAMSULOSIN (FLOMAX) 0.4 MG CP24    Take 1 capsule (0.4 mg total) by mouth once daily.   Modified Medications    No  medications on file   Discontinued Medications    No medications on file       Objective:   Physical Exam   Constitutional: He is oriented to person, place, and time. He appears well-developed and well-nourished. No distress.   Examination of the digits showed no clubbing or cyanosis   HENT:   Head: Normocephalic and atraumatic.   Eyes: Conjunctivae are normal. Pupils are equal, round, and reactive to light. Right eye exhibits no discharge.   Neck: Normal range of motion. Neck supple. No JVD present. No thyromegaly present.   No carotid bruits   Cardiovascular: Normal rate, S1 normal, S2 normal, normal heart sounds, intact distal pulses and normal pulses.  An irregularly irregular rhythm present. PMI is not displaced.  Exam reveals no gallop, no friction rub and no opening snap.    No murmur heard.  Pulmonary/Chest: Effort normal and breath sounds normal. No respiratory distress. He has no wheezes. He has no rales. He exhibits no tenderness.   Abdominal: Soft. Bowel sounds are normal. He exhibits no distension and no mass. There is no tenderness. There is no guarding.   No hepatosplenomegaly   Musculoskeletal: Normal range of motion. He exhibits no edema or tenderness.   Lymphadenopathy:     He has no cervical adenopathy.   Neurological: He is alert and oriented to person, place, and time.   Skin: Skin is warm. No rash noted. He is not diaphoretic. No erythema.   Psychiatric: He has a normal mood and affect.   Nursing note and vitals reviewed.      ECGs reviewed-Ventricular paced rhythm        Assessment:     1. PAF (paroxysmal atrial fibrillation)    2. Essential hypertension    3. Coronary artery disease involving native coronary artery of native heart without angina pectoris    4. Atrial flutter, unspecified type    5. Mixed hyperlipidemia    6. Hx of CABG    7. Pacemaker    8. Polypharmacy        Plan:      He is asymptomatic to PAF and most of his symptoms are secondary to pain or pain meds or pain meds  withdrawal.  ChadsVasc=5 (age, HTN, DM, PAD). He has history of GI bleed and is having recurrent falls and recent syncope. He is always drousy on his pain meds. Will have him take 325 mg po daily  Continue current medications  Activity as tolerate  F/u with pain specialist  F/u in 6 months

## 2017-05-04 DIAGNOSIS — M47.816 FACET HYPERTROPHY OF LUMBAR REGION: Primary | ICD-10-CM

## 2017-06-09 ENCOUNTER — HOSPITAL ENCOUNTER (OUTPATIENT)
Dept: RADIOLOGY | Facility: HOSPITAL | Age: 77
Discharge: HOME | End: 2017-06-09
Attending: ANESTHESIOLOGY
Payer: MEDICARE

## 2017-06-09 DIAGNOSIS — M47.816 FACET HYPERTROPHY OF LUMBAR REGION: ICD-10-CM

## 2017-06-09 PROCEDURE — 72131 CT LUMBAR SPINE W/O DYE: CPT | Mod: TC,PO

## 2017-07-29 ENCOUNTER — HOSPITAL ENCOUNTER (EMERGENCY)
Facility: HOSPITAL | Age: 77
Discharge: HOME | End: 2017-07-29
Attending: FAMILY MEDICINE
Payer: MEDICARE

## 2017-07-29 VITALS
OXYGEN SATURATION: 97 % | RESPIRATION RATE: 18 BRPM | HEIGHT: 65 IN | TEMPERATURE: 98 F | BODY MASS INDEX: 24.16 KG/M2 | HEART RATE: 78 BPM | SYSTOLIC BLOOD PRESSURE: 189 MMHG | WEIGHT: 145 LBS | DIASTOLIC BLOOD PRESSURE: 88 MMHG

## 2017-07-29 DIAGNOSIS — G89.29 CHRONIC BILATERAL BACK PAIN, UNSPECIFIED BACK LOCATION: Primary | ICD-10-CM

## 2017-07-29 DIAGNOSIS — M54.9 CHRONIC BILATERAL BACK PAIN, UNSPECIFIED BACK LOCATION: Primary | ICD-10-CM

## 2017-07-29 LAB
BILIRUB UR QL STRIP: NEGATIVE
CLARITY UR REFRACT.AUTO: CLEAR
COLOR UR AUTO: NORMAL
GLUCOSE UR QL STRIP: NEGATIVE
HGB UR QL STRIP: NEGATIVE
KETONES UR QL STRIP: NEGATIVE
LEUKOCYTE ESTERASE UR QL STRIP: NEGATIVE
NITRITE UR QL STRIP: NEGATIVE
PH UR STRIP: 7 [PH] (ref 5–8)
PROT UR QL STRIP: NEGATIVE
SP GR UR STRIP: 1.01 (ref 1–1.03)
URN SPEC COLLECT METH UR: NORMAL
UROBILINOGEN UR STRIP-ACNC: NEGATIVE EU/DL

## 2017-07-29 PROCEDURE — 99283 EMERGENCY DEPT VISIT LOW MDM: CPT | Mod: 25

## 2017-07-29 PROCEDURE — 96372 THER/PROPH/DIAG INJ SC/IM: CPT

## 2017-07-29 PROCEDURE — 25000003 PHARM REV CODE 250: Performed by: FAMILY MEDICINE

## 2017-07-29 PROCEDURE — 63600175 PHARM REV CODE 636 W HCPCS: Performed by: FAMILY MEDICINE

## 2017-07-29 PROCEDURE — 81003 URINALYSIS AUTO W/O SCOPE: CPT

## 2017-07-29 RX ORDER — ONDANSETRON 4 MG/1
4 TABLET, ORALLY DISINTEGRATING ORAL
Status: COMPLETED | OUTPATIENT
Start: 2017-07-29 | End: 2017-07-29

## 2017-07-29 RX ORDER — MORPHINE SULFATE 2 MG/ML
4 INJECTION, SOLUTION INTRAMUSCULAR; INTRAVENOUS
Status: COMPLETED | OUTPATIENT
Start: 2017-07-29 | End: 2017-07-29

## 2017-07-29 RX ORDER — OXYCODONE AND ACETAMINOPHEN 10; 325 MG/1; MG/1
1 TABLET ORAL EVERY 4 HOURS PRN
Status: ON HOLD | COMMUNITY
End: 2020-06-15 | Stop reason: SDUPTHER

## 2017-07-29 RX ORDER — OXYCODONE AND ACETAMINOPHEN 10; 325 MG/1; MG/1
1 TABLET ORAL EVERY 12 HOURS PRN
Qty: 10 TABLET | Refills: 0 | Status: SHIPPED | OUTPATIENT
Start: 2017-07-29 | End: 2020-02-03 | Stop reason: SDUPTHER

## 2017-07-29 RX ADMIN — MORPHINE SULFATE 4 MG: 2 INJECTION, SOLUTION INTRAMUSCULAR; INTRAVENOUS at 10:07

## 2017-07-29 RX ADMIN — ONDANSETRON 4 MG: 4 TABLET, ORALLY DISINTEGRATING ORAL at 10:07

## 2017-07-30 NOTE — ED TRIAGE NOTES
"Pt states "My back hurts on the left side. I think it's my kidney's. It started on Friday. I haven't been sleeping."   "
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
## 2017-08-08 ENCOUNTER — CLINICAL SUPPORT (OUTPATIENT)
Dept: CARDIOLOGY | Facility: CLINIC | Age: 77
End: 2017-08-08
Payer: MEDICARE

## 2017-08-08 PROCEDURE — 93288 INTERROG EVL PM/LDLS PM IP: CPT | Mod: S$GLB,,, | Performed by: INTERNAL MEDICINE

## 2017-10-16 RX ORDER — METOPROLOL TARTRATE 25 MG/1
12.5 TABLET, FILM COATED ORAL 2 TIMES DAILY
Qty: 90 TABLET | Refills: 3 | Status: SHIPPED | OUTPATIENT
Start: 2017-10-16 | End: 2017-10-17 | Stop reason: SDUPTHER

## 2017-10-16 RX ORDER — METOPROLOL TARTRATE 25 MG/1
12.5 TABLET, FILM COATED ORAL 2 TIMES DAILY
Qty: 90 TABLET | Refills: 3 | Status: SHIPPED | OUTPATIENT
Start: 2017-10-16 | End: 2017-10-16 | Stop reason: SDUPTHER

## 2017-10-17 RX ORDER — METOPROLOL TARTRATE 25 MG/1
12.5 TABLET, FILM COATED ORAL 2 TIMES DAILY
Qty: 90 TABLET | Refills: 3 | Status: CANCELLED | OUTPATIENT
Start: 2017-10-17 | End: 2018-10-17

## 2017-10-17 RX ORDER — METOPROLOL TARTRATE 25 MG/1
12.5 TABLET, FILM COATED ORAL 2 TIMES DAILY
Qty: 90 TABLET | Refills: 3 | Status: SHIPPED | OUTPATIENT
Start: 2017-10-17 | End: 2017-11-06 | Stop reason: SDUPTHER

## 2017-11-06 ENCOUNTER — OFFICE VISIT (OUTPATIENT)
Dept: CARDIOLOGY | Facility: CLINIC | Age: 77
End: 2017-11-06
Payer: MEDICARE

## 2017-11-06 VITALS
OXYGEN SATURATION: 98 % | SYSTOLIC BLOOD PRESSURE: 172 MMHG | BODY MASS INDEX: 26.04 KG/M2 | WEIGHT: 156.31 LBS | HEART RATE: 96 BPM | DIASTOLIC BLOOD PRESSURE: 93 MMHG | HEIGHT: 65 IN

## 2017-11-06 DIAGNOSIS — I25.10 CORONARY ARTERY DISEASE INVOLVING NATIVE CORONARY ARTERY OF NATIVE HEART WITHOUT ANGINA PECTORIS: ICD-10-CM

## 2017-11-06 DIAGNOSIS — E78.2 MIXED HYPERLIPIDEMIA: ICD-10-CM

## 2017-11-06 DIAGNOSIS — Z95.0 PACEMAKER: ICD-10-CM

## 2017-11-06 DIAGNOSIS — Z95.1 HX OF CABG: ICD-10-CM

## 2017-11-06 DIAGNOSIS — I48.0 PAF (PAROXYSMAL ATRIAL FIBRILLATION): Primary | ICD-10-CM

## 2017-11-06 DIAGNOSIS — I10 ESSENTIAL HYPERTENSION: ICD-10-CM

## 2017-11-06 PROCEDURE — 99999 PR PBB SHADOW E&M-EST. PATIENT-LVL III: CPT | Mod: PBBFAC,,, | Performed by: INTERNAL MEDICINE

## 2017-11-06 PROCEDURE — 99214 OFFICE O/P EST MOD 30 MIN: CPT | Mod: S$GLB,,, | Performed by: INTERNAL MEDICINE

## 2017-11-06 RX ORDER — METOPROLOL TARTRATE 25 MG/1
12.5 TABLET, FILM COATED ORAL 2 TIMES DAILY
Qty: 90 TABLET | Refills: 3 | Status: SHIPPED | OUTPATIENT
Start: 2017-11-06 | End: 2020-02-03

## 2017-11-06 NOTE — PROGRESS NOTES
Subjective:   Patient ID:  Venu Lau is a 77 y.o. male who presents for follow-up of Follow-up      Problem List Items Addressed This Visit        Cardiac/Vascular    CAD (coronary artery disease)    Hx of CABG    Pacemaker    Essential hypertension    Relevant Medications    metoprolol tartrate (LOPRESSOR) 25 MG tablet    Mixed hyperlipidemia    PAF (paroxysmal atrial fibrillation) - Primary    Relevant Medications    metoprolol tartrate (LOPRESSOR) 25 MG tablet          HPI: Patient with PMH of PAF, CAD s/p CABG, HTN, HLD and chronic pain present for f/u. His complaints today is pain, weakness and lack of sleep. His BP is elevated secondary to not taking medications for BP. He is unable to say when he had ran out. He denies chest pain or dyspnea.   No anticoagulation secondary to fall and polypharmacy.   Repeat /82    Review of Systems   Constitution: Negative.   HENT: Negative.    Eyes: Negative.    Cardiovascular: Negative.    Respiratory: Negative.    Endocrine: Negative.    Hematologic/Lymphatic: Negative.    Skin: Negative.    Musculoskeletal: Negative.    Gastrointestinal: Negative.    Neurological: Negative.    Psychiatric/Behavioral: Negative.        Patient's Medications   New Prescriptions    No medications on file   Previous Medications    ALPRAZOLAM (XANAX ORAL)    Take by mouth.    ASPIRIN (ECOTRIN) 81 MG EC TABLET    Take 81 mg by mouth once daily.    ATORVASTATIN (LIPITOR) 20 MG TABLET    Take 1 tablet by mouth Daily.    DULOXETINE 40 MG CPDR    Take 40 mg by mouth once daily.    GEMFIBROZIL (LOPID) 600 MG TABLET    Take 600 mg by mouth once daily.      OXYCODONE (OXYCONTIN) 15 MG TR12 12 HR TABLET    Take 15 mg by mouth every 12 (twelve) hours as needed.    OXYCODONE-ACETAMINOPHEN (PERCOCET)  MG PER TABLET    Take 1 tablet by mouth every 4 (four) hours as needed for Pain.    OXYCODONE-ACETAMINOPHEN (PERCOCET)  MG PER TABLET    Take 1 tablet by mouth every 12 (twelve) hours  as needed for Pain.    QUETIAPINE (SEROQUEL) 200 MG TAB    Take 1 tablet by mouth once daily.    SOLUS V2 LANCETS 30 GAUGE MISC        SOLUS V2 TEST STRIPS STRP        SUCRALFATE (CARAFATE) 1 GRAM TABLET    Take 1 g by mouth once daily.      TAMSULOSIN (FLOMAX) 0.4 MG CP24    Take 1 capsule (0.4 mg total) by mouth once daily.   Modified Medications    Modified Medication Previous Medication    METOPROLOL TARTRATE (LOPRESSOR) 25 MG TABLET metoprolol tartrate (LOPRESSOR) 25 MG tablet       Take 0.5 tablets (12.5 mg total) by mouth 2 (two) times daily.    Take 0.5 tablets (12.5 mg total) by mouth 2 (two) times daily.   Discontinued Medications    No medications on file       Objective:   Physical Exam   Constitutional: He is oriented to person, place, and time. He appears well-developed and well-nourished. No distress.   Examination of the digits showed no clubbing or cyanosis   HENT:   Head: Normocephalic and atraumatic.   Eyes: Conjunctivae are normal. Pupils are equal, round, and reactive to light. Right eye exhibits no discharge.   Neck: Normal range of motion. Neck supple. No JVD present. No thyromegaly present.   No carotid bruits   Cardiovascular: Normal rate, S1 normal, S2 normal, normal heart sounds, intact distal pulses and normal pulses.  An irregularly irregular rhythm present. PMI is not displaced.  Exam reveals no gallop, no friction rub and no opening snap.    No murmur heard.  Pulmonary/Chest: Effort normal and breath sounds normal. No respiratory distress. He has no wheezes. He has no rales. He exhibits no tenderness.   Abdominal: Soft. Bowel sounds are normal. He exhibits no distension and no mass. There is no tenderness. There is no guarding.   No hepatosplenomegaly   Musculoskeletal: Normal range of motion. He exhibits no edema or tenderness.   Lymphadenopathy:     He has no cervical adenopathy.   Neurological: He is alert and oriented to person, place, and time.   Skin: Skin is warm. No rash noted.  He is not diaphoretic. No erythema.   Psychiatric: He has a normal mood and affect.   Nursing note and vitals reviewed.      ECGs reviewed-Ventricular paced rhythm  Echo: normal ef with DD      Assessment:     1. PAF (paroxysmal atrial fibrillation)    2. Coronary artery disease involving native coronary artery of native heart without angina pectoris    3. Hx of CABG    4. Pacemaker    5. Essential hypertension    6. Mixed hyperlipidemia        Plan:      He is asymptomatic to PAF and most of his symptoms are secondary to pain or pain meds or pain meds withdrawal.  ChadsVasc=5 (age, HTN, DM, PAD). He has history of GI bleed and is having recurrent falls and recent syncope. He is always drousy on his pain meds. Will have him take 325 mg po daily  Continue current medications  Activity as tolerate  Low salt diet  F/u in 6 months

## 2017-12-16 ENCOUNTER — HOSPITAL ENCOUNTER (EMERGENCY)
Facility: HOSPITAL | Age: 77
Discharge: PSYCHIATRIC OR SUBSTANCE USE FACILITY | End: 2017-12-16
Attending: EMERGENCY MEDICINE
Payer: MEDICARE

## 2017-12-16 VITALS
DIASTOLIC BLOOD PRESSURE: 59 MMHG | WEIGHT: 160 LBS | OXYGEN SATURATION: 94 % | TEMPERATURE: 98 F | HEART RATE: 69 BPM | RESPIRATION RATE: 17 BRPM | BODY MASS INDEX: 26.66 KG/M2 | HEIGHT: 65 IN | SYSTOLIC BLOOD PRESSURE: 113 MMHG

## 2017-12-16 DIAGNOSIS — F99 PSYCHIATRIC DIAGNOSIS: ICD-10-CM

## 2017-12-16 DIAGNOSIS — R45.851 SUICIDAL IDEATION: Primary | ICD-10-CM

## 2017-12-16 DIAGNOSIS — F13.90 BENZODIAZEPINE MISUSE: ICD-10-CM

## 2017-12-16 LAB
ALBUMIN SERPL BCP-MCNC: 4.2 G/DL
ALP SERPL-CCNC: 127 U/L
ALT SERPL W/O P-5'-P-CCNC: 48 U/L
AMPHET+METHAMPHET UR QL: NEGATIVE
ANION GAP SERPL CALC-SCNC: 11 MMOL/L
APAP SERPL-MCNC: <10 UG/ML
AST SERPL-CCNC: 55 U/L
BARBITURATES UR QL SCN>200 NG/ML: NEGATIVE
BASOPHILS # BLD AUTO: 0.02 K/UL
BASOPHILS NFR BLD: 0.4 %
BENZODIAZ UR QL SCN>200 NG/ML: NORMAL
BILIRUB SERPL-MCNC: 0.8 MG/DL
BILIRUB UR QL STRIP: NEGATIVE
BUN SERPL-MCNC: 22 MG/DL
BZE UR QL SCN: NEGATIVE
CALCIUM SERPL-MCNC: 9 MG/DL
CANNABINOIDS UR QL SCN: NEGATIVE
CHLORIDE SERPL-SCNC: 110 MMOL/L
CLARITY UR REFRACT.AUTO: CLEAR
CO2 SERPL-SCNC: 20 MMOL/L
COLOR UR AUTO: YELLOW
CREAT SERPL-MCNC: 0.85 MG/DL
CREAT UR-MCNC: 64.6 MG/DL
DIFFERENTIAL METHOD: ABNORMAL
EOSINOPHIL # BLD AUTO: 0 K/UL
EOSINOPHIL NFR BLD: 0.4 %
ERYTHROCYTE [DISTWIDTH] IN BLOOD BY AUTOMATED COUNT: 14.1 %
EST. GFR  (AFRICAN AMERICAN): >60 ML/MIN/1.73 M^2
EST. GFR  (NON AFRICAN AMERICAN): >60 ML/MIN/1.73 M^2
ETHANOL SERPL-MCNC: <10 MG/DL
GLUCOSE SERPL-MCNC: 187 MG/DL
GLUCOSE UR QL STRIP: NEGATIVE
HCT VFR BLD AUTO: 38.9 %
HGB BLD-MCNC: 12.8 G/DL
HGB UR QL STRIP: NEGATIVE
KETONES UR QL STRIP: NEGATIVE
LEUKOCYTE ESTERASE UR QL STRIP: NEGATIVE
LYMPHOCYTES # BLD AUTO: 0.8 K/UL
LYMPHOCYTES NFR BLD: 16.8 %
MCH RBC QN AUTO: 30.3 PG
MCHC RBC AUTO-ENTMCNC: 32.9 G/DL
MCV RBC AUTO: 92 FL
METHADONE UR QL SCN>300 NG/ML: NEGATIVE
MONOCYTES # BLD AUTO: 0.6 K/UL
MONOCYTES NFR BLD: 13.1 %
NEUTROPHILS # BLD AUTO: 3.2 K/UL
NEUTROPHILS NFR BLD: 68.9 %
NITRITE UR QL STRIP: NEGATIVE
OPIATES UR QL SCN: NORMAL
PCP UR QL SCN>25 NG/ML: NEGATIVE
PH UR STRIP: 6 [PH] (ref 5–8)
PLATELET # BLD AUTO: 138 K/UL
PMV BLD AUTO: 10.3 FL
POTASSIUM SERPL-SCNC: 3.8 MMOL/L
PROT SERPL-MCNC: 7.4 G/DL
PROT UR QL STRIP: NEGATIVE
RBC # BLD AUTO: 4.22 M/UL
SODIUM SERPL-SCNC: 141 MMOL/L
SP GR UR STRIP: 1.01 (ref 1–1.03)
TOXICOLOGY INFORMATION: NORMAL
URN SPEC COLLECT METH UR: NORMAL
UROBILINOGEN UR STRIP-ACNC: NEGATIVE EU/DL
WBC # BLD AUTO: 4.65 K/UL

## 2017-12-16 PROCEDURE — 80053 COMPREHEN METABOLIC PANEL: CPT

## 2017-12-16 PROCEDURE — 85025 COMPLETE CBC W/AUTO DIFF WBC: CPT

## 2017-12-16 PROCEDURE — 80307 DRUG TEST PRSMV CHEM ANLYZR: CPT

## 2017-12-16 PROCEDURE — G0425 INPT/ED TELECONSULT30: HCPCS | Mod: GT,,, | Performed by: PSYCHIATRY & NEUROLOGY

## 2017-12-16 PROCEDURE — 81003 URINALYSIS AUTO W/O SCOPE: CPT

## 2017-12-16 PROCEDURE — 93005 ELECTROCARDIOGRAM TRACING: CPT

## 2017-12-16 PROCEDURE — 25000003 PHARM REV CODE 250: Performed by: EMERGENCY MEDICINE

## 2017-12-16 PROCEDURE — 80329 ANALGESICS NON-OPIOID 1 OR 2: CPT

## 2017-12-16 PROCEDURE — 93010 ELECTROCARDIOGRAM REPORT: CPT | Mod: ,,, | Performed by: INTERNAL MEDICINE

## 2017-12-16 PROCEDURE — 80320 DRUG SCREEN QUANTALCOHOLS: CPT

## 2017-12-16 PROCEDURE — 99285 EMERGENCY DEPT VISIT HI MDM: CPT | Mod: 25

## 2017-12-16 RX ORDER — HYDROCODONE BITARTRATE AND ACETAMINOPHEN 10; 325 MG/1; MG/1
1 TABLET ORAL
Status: COMPLETED | OUTPATIENT
Start: 2017-12-16 | End: 2017-12-16

## 2017-12-16 RX ADMIN — HYDROCODONE BITARTRATE AND ACETAMINOPHEN 1 TABLET: 10; 325 TABLET ORAL at 03:12

## 2017-12-16 NOTE — ED NOTES
Pt reports to RN that he took valium to relive pain because he is out of pain medicine. Pt denies attempting to commit suicide.

## 2017-12-16 NOTE — ED NOTES
Talia from [facility] called states pt accepted for transfer to [facility] by Dr Saeed Hoover.

## 2017-12-16 NOTE — ED NOTES
Pt belongings include one gold colored chain, $11.00, beige jacket , tan pants , one pair of shoes, keys,shirt, some home meds.. All stored in locked cabinet out of room. Pt in blue paper scrubs.

## 2017-12-16 NOTE — ED PROVIDER NOTES
"Encounter Date: 12/16/2017       History     Chief Complaint   Patient presents with    Drug Overdose     Pt states took "handfull of xanax" approx 2-3 hours ago.  Pt tearful, states "my wife left Sunday and she never came back, we been ......"  Pt presents with adult female friend, states "he was trying to do that."  Pt denies attempting to harm self.  Pt states "it was my nerves, I can't sleep or eat so I took them for my nerves."  Pt alert and cooperative during triage.      Pt presents with friend, who states patient has verbalized SI on several occasions to her and patient's exwife, by taking a bunch (20-30) of xanax.  Pt acknoweldges taking extra (2-3) but just to help calm down.  He is recently  from wife.  He endorses depression to friends.      The history is provided by the patient and a friend.   Mental Health Problem   The primary symptoms include dysphoric mood. The current episode started several weeks ago. This is a recurrent problem.   The onset of the illness is precipitated by stressful event and drug abuse. The degree of incapacity that he is experiencing as a consequence of his illness is severe. Sequelae of the illness include harmed interpersonal relations. Additional symptoms of the illness include fatigue. Associated symptoms comments: Suicidal ideation  . He admits to suicidal ideas. He does have a plan to commit suicide. He contemplates harming himself. He does not contemplate injuring another person. Risk factors that are present for mental illness include substance abuse.     Review of patient's allergies indicates:  No Known Allergies  Past Medical History:   Diagnosis Date    *Atrial flutter     Anemia     Bipolar 1 disorder     Coronary artery disease     Diabetes mellitus     Gout, unspecified     H/O: GI bleed     Hypertension     Liver cancer     PVD (peripheral vascular disease)     Sciatica     SSS (sick sinus syndrome)     Tachy-valentine syndrome  "     Past Surgical History:   Procedure Laterality Date    CORONARY ARTERY BYPASS GRAFT      INSERT / REPLACE / REMOVE PACEMAKER      LUMBAR DISC SURGERY       Family History   Problem Relation Age of Onset    Heart disease Mother     Heart disease Father      Social History   Substance Use Topics    Smoking status: Former Smoker     Types: Cigarettes     Quit date: 11/20/1992    Smokeless tobacco: Never Used    Alcohol use 1.2 oz/week     2 Cans of beer per week      Comment: 2 cans of beer per day     Review of Systems   Constitutional: Positive for activity change and fatigue. Negative for fever.   Respiratory: Negative for shortness of breath.    Cardiovascular: Negative for chest pain.   Psychiatric/Behavioral: Positive for dysphoric mood, sleep disturbance and suicidal ideas.   All other systems reviewed and are negative.      Physical Exam     Initial Vitals [12/16/17 1339]   BP Pulse Resp Temp SpO2   120/62 72 20 97.8 °F (36.6 °C) 98 %      MAP       81.33         Physical Exam    Nursing note and vitals reviewed.  Constitutional: He appears well-developed and well-nourished.   HENT:   Head: Normocephalic.   Mouth/Throat: Oropharynx is clear and moist.   Eyes: Conjunctivae and EOM are normal.   Neck: Normal range of motion.   Cardiovascular: Normal rate, regular rhythm and intact distal pulses.   Pulmonary/Chest: No respiratory distress.   Abdominal: There is no tenderness.   Musculoskeletal: Normal range of motion.   Neurological: He is alert and oriented to person, place, and time. He has normal strength.   Skin: Skin is warm and dry. Capillary refill takes less than 2 seconds.   Psychiatric: His speech is normal and behavior is normal. Cognition and memory are normal. He exhibits a depressed mood. He expresses suicidal ideation. He expresses suicidal plans.         ED Course   Procedures  Labs Reviewed   CBC W/ AUTO DIFFERENTIAL - Abnormal; Notable for the following:        Result Value    RBC 4.22  (*)     Hemoglobin 12.8 (*)     Hematocrit 38.9 (*)     Platelets 138 (*)     Lymph # 0.8 (*)     Lymph% 16.8 (*)     All other components within normal limits   COMPREHENSIVE METABOLIC PANEL   URINALYSIS   DRUG SCREEN PANEL, URINE EMERGENCY   ALCOHOL,MEDICAL (ETHANOL)   ACETAMINOPHEN LEVEL     EKG Readings: (Independently Interpreted)   Initial Reading: No STEMI. Rhythm: Paced Rhythm. Heart Rate: 69. Ectopy: Frequent PVCs.       X-Rays:   Independently Interpreted Readings:   Chest X-Ray: No acute abnormalities.     Medical Decision Making:   ED Management:  Patient does not appear to be overly sedated by BZD.  Will medically clear and place on PEC for psychiatric placement.                   ED Course      Clinical Impression:   The primary encounter diagnosis was Suicidal ideation. A diagnosis of Benzodiazepine misuse was also pertinent to this visit.    Disposition:   Disposition: Transferred  Condition: Stable                        Guy J. Lefort, MD  12/16/17 0970

## 2017-12-16 NOTE — CONSULTS
Consults                      Tele-Consultation to Emergency Department from Psychiatry    Please see previous notes:    Patient agreeable to consultation via telepsychiatry.    Consultation started: 12/16/2017 at 440 pm est  The chief complaint leading to psychiatric consultation is: overdose with intent  This consultation was requested by ed attending, the Emergency Department attending physician.  The location of the consulting psychiatrist is [facility] [place].  The patient location is [facility] ED.  The patient arrived at the ED at: this afternoon    Also present with the patient at the time of the consultation: alone    Patient Identification:  Venu Lau is a 77 y.o. male.    Patient information was obtained from patient.  Patient presented voluntarily to the Emergency Department by ambulance where the patient received unknown prior to arrival.    History of Present Illness:  Patient is a 76-year-old male with a past medical history of tachybradycardia syndrome, sick sinus syndrome, GI bleed, gout, diabetes, hypertension, a flutter, liver cancer, coronary artery disease who presents to emergency room for overdose.  Also noted is bipolar disorder listed in problem list.  Past medications listed include cymbalta 40mg daily, seroquel 200mg qhs and unknown dose of xanax.  On interview tita in on a gurney in hospital scrubs eating from a lunch tray.  States that his wife left him on Sunday.  'I can't take care of myself.  I need help.  I cna't even get food there'.  Wife is in [place].  Asked the first time why he took an overdose he states 'i was all by myself.  No one'.  He took xanax earlier today,  'quite a few'  'quite a bit more than 5'.  1 mg tabs.  Then states about 20.  Difficult to understand.  Refers to someone giving him a 5 mg and 1mg tablet.  (maybe 0.5mg tablet).  Gets side tracked talking about hospital bills.  Usually takes 1mg bid of xanax.  Also takes cymbalta.  Doesn't answer  question about whether or not he takes seroquel which is on the hisotric med list.  The nurse on site went through the rx he brought from home for me and there are no psych meds in his bad.  He later in the intervew states that he wasn't trying to kill himself by taking 20 tabs of xanxa but was trying to sleep and doesn't sleep at all.  Then talks about being irritable about various doctors providing poor care.  He states that It is good to be alive 'except all the politics'.  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  He doesn't know if his wife is returning and I could understand his answer to my question about why she left.        Psychiatric History:   Hospitalization: No  Medication Trials: yes but unknown  Suicide Attempts: denies  Violence: unknown  Depression: states bipolar  Gardenia: states bipolar.    AH's: not responding  Delusions: nothing overt    Review of Systems:  Basically negative but unreliable    Past Medical History:   Past Medical History:   Diagnosis Date    *Atrial flutter     Anemia     Bipolar 1 disorder     Coronary artery disease     Diabetes mellitus     Gout, unspecified     H/O: GI bleed     Hypertension     Liver cancer     PVD (peripheral vascular disease)     Sciatica     SSS (sick sinus syndrome)     Tachy-valentine syndrome         Seizures: unknown  Head trauma/l.o.c.: unknown  Wish to become pregnant[if female of childbearing age]: na    Allergies:reviewed in epic  Review of patient's allergies indicates:  No Known Allergies    Medications in ER:   Medications   hydrocodone-acetaminophen 10-325mg per tablet 1 tablet (1 tablet Oral Given 12/16/17 1517)       Medications at home: unknown     Substance Abuse History:   Alchohol:  3-4 beers not everyday.    Drug: denies     Legal History:   Past charges/incarcerations: unknwon  Pending charges: unknown    Family Psychiatric History: unknown    Social History:   Living alone for the last 6 days since wife left and stating he can't care  "for himself    Current Evaluation:     Constitutional  Vitals:  Vitals:    12/16/17 1339 12/16/17 1456   BP: 120/62 124/66   Pulse: 72 74   Resp: 20 18   Temp: 97.8 °F (36.6 °C) 97.5 °F (36.4 °C)   TempSrc: Oral Oral   SpO2: 98% 98%   Weight: 72.6 kg (160 lb)    Height: 5' 5" (1.651 m)       General:  unremarkable, age appropriate     Musculoskeletal  Muscle Strength/Tone:   moving arms normally   Gait & Station:   sitting on stretcher     Psychiatric  Level of Consciousness: alert  Orientation: oriented to person, place and time  Grooming: in hospital gown, clean appearing but mildly disheveled facial hair  Psychomotor Behavior: no agitation  Speech: normal in rate, rhythm and volume  Language: uses words appropriately  Mood: irritable  Affect: irritable  Thought Process: linear and organized and illogical  Associations: intact  Thought Content: denying SI unconvicingly, no HI, nothing overtly paranoid   Memory: poor for content of interview hisotry  Attention: distractable  Fund of Knowledge: appears adequate  Insight: very poor  Judgement:  Help seeking    Relevant Elements of Neurological Exam: no abnormality of posture noted    Assessment - Diagnosis - Goals:     Diagnosis/Impression: mood disorder present with suicidality in the context of life stressor - relationship loss.  Appears very likely intentional and if not regardless reflects dangerousness d/t lack of insight.  Not safe for discharge d/t dangerousness to self.  Once past overdose will need vital signs monitoring in case of xanax withdrawal    Rec:  Recommend involuntary hospitalization for dangerousness to self.  Low dose haldol if needed for agitation    Time with patient:35 minutes    More than 50% of the time was spent counseling/coordinating care    Laboratory Data:   Labs Reviewed   CBC W/ AUTO DIFFERENTIAL - Abnormal; Notable for the following:        Result Value    RBC 4.22 (*)     Hemoglobin 12.8 (*)     Hematocrit 38.9 (*)     Platelets " 138 (*)     Lymph # 0.8 (*)     Lymph% 16.8 (*)     All other components within normal limits   COMPREHENSIVE METABOLIC PANEL - Abnormal; Notable for the following:     CO2 20 (*)     Glucose 187 (*)     BUN, Bld 22 (*)     Alkaline Phosphatase 127 (*)     AST 55 (*)     ALT 48 (*)     All other components within normal limits   URINALYSIS   DRUG SCREEN PANEL, URINE EMERGENCY   ALCOHOL,MEDICAL (ETHANOL)   ACETAMINOPHEN LEVEL         Consulting clinician was informed of the encounter and consult note.    Consultation ended: 12/16/2017 at  514 pm est

## 2017-12-16 NOTE — ED NOTES
Pt has been accepted to [facility] via [facility]. Accepting MD is Dr. Tipton. Call report [phone].

## 2018-05-16 ENCOUNTER — TELEPHONE (OUTPATIENT)
Dept: CARDIOLOGY | Facility: CLINIC | Age: 78
End: 2018-05-16

## 2018-06-27 ENCOUNTER — CLINICAL SUPPORT (OUTPATIENT)
Dept: CARDIOLOGY | Facility: CLINIC | Age: 78
End: 2018-06-27
Payer: MEDICARE

## 2018-06-27 DIAGNOSIS — Z95.0 PACEMAKER: Primary | ICD-10-CM

## 2018-06-27 PROCEDURE — 93288 INTERROG EVL PM/LDLS PM IP: CPT | Mod: 26,,, | Performed by: INTERNAL MEDICINE

## 2018-06-27 PROCEDURE — 99212 OFFICE O/P EST SF 10 MIN: CPT | Mod: S$GLB,,, | Performed by: INTERNAL MEDICINE

## 2018-06-27 PROCEDURE — 99999 PR PBB SHADOW E&M-EST. PATIENT-LVL I: CPT | Mod: PBBFAC,,,

## 2018-11-29 NOTE — PROGRESS NOTES
Pacemaker Evaluation Report      Primary MD: Dr. Terrazas  Primary Cardiologist: Dr. Tracy     : Biotronik Model: Evia DR/Serial No.  [id number]  Implant date: Jan 13, 2011    Reason for evaluation:routine  Indication for pacemaker: unknown    Measurements  Atrial sensing - P wave: 1.8 mV  Atrial capture: Maintained: 0.5 V @ 0.5 msec  Atrial lead impedance: 429 ohms  Ventricular sensing - R wave: 11.1mV  Ventricular capture: RV Maintained: 1.1 V @ 0.4 ms   Ventricular lead impedance: right - 468 ohms   Underlying rhythm: Aflutter      Diagnostic Data  Atrial paced: 29 % Ventricular paced: 27 %  Mode switch: on  Other: 47% Afib burden - 80 days in MS  Sensor response: CLS  Battery status: satisfactory Magnet rate: 90 bpm   50% remaining capacity  Est. Longevity 5 years      Final Parameters  Mode: DDD-CLS Lower rate: 60 bpm Upper rate: 120 bpm  Atrial - Amplitude: 4.0 V Pulse width: 0.5 ms   Polarity: Bipolar  Ventricular - Amplitude: 1.9 V Pulse width: 0.4 ms   Polarity: Bipolar  Changes made: NONE  Conclusions: Normal device function.  Afib burden 47%.    Follow up: 6 months      Carlos Quiroga

## 2019-01-21 ENCOUNTER — TELEPHONE (OUTPATIENT)
Dept: CARDIOLOGY | Facility: CLINIC | Age: 79
End: 2019-01-21

## 2019-03-02 ENCOUNTER — HOSPITAL ENCOUNTER (EMERGENCY)
Facility: HOSPITAL | Age: 79
Discharge: HOME | End: 2019-03-02
Attending: SURGERY
Payer: MEDICARE

## 2019-03-02 VITALS
OXYGEN SATURATION: 99 % | BODY MASS INDEX: 27.66 KG/M2 | WEIGHT: 166 LBS | HEIGHT: 65 IN | SYSTOLIC BLOOD PRESSURE: 160 MMHG | RESPIRATION RATE: 17 BRPM | DIASTOLIC BLOOD PRESSURE: 80 MMHG | TEMPERATURE: 99 F | HEART RATE: 85 BPM

## 2019-03-02 DIAGNOSIS — S13.9XXA NECK SPRAIN, INITIAL ENCOUNTER: Primary | ICD-10-CM

## 2019-03-02 DIAGNOSIS — S33.5XXA LUMBAR SPRAIN, INITIAL ENCOUNTER: ICD-10-CM
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
## 2019-03-02 DIAGNOSIS — R51.9 ACUTE NONINTRACTABLE HEADACHE, UNSPECIFIED HEADACHE TYPE: ICD-10-CM

## 2019-03-02 LAB
AMPHET+METHAMPHET UR QL: NEGATIVE
BARBITURATES UR QL SCN>200 NG/ML: NEGATIVE
BENZODIAZ UR QL SCN>200 NG/ML: NEGATIVE
BILIRUB UR QL STRIP: NEGATIVE
BZE UR QL SCN: NEGATIVE
CANNABINOIDS UR QL SCN: NEGATIVE
CLARITY UR REFRACT.AUTO: CLEAR
COLOR UR AUTO: YELLOW
CREAT UR-MCNC: 16.2 MG/DL
GLUCOSE UR QL STRIP: NEGATIVE
HGB UR QL STRIP: NEGATIVE
KETONES UR QL STRIP: NEGATIVE
LEUKOCYTE ESTERASE UR QL STRIP: NEGATIVE
METHADONE UR QL SCN>300 NG/ML: NEGATIVE
NITRITE UR QL STRIP: NEGATIVE
OPIATES UR QL SCN: NEGATIVE
PCP UR QL SCN>25 NG/ML: NEGATIVE
PH UR STRIP: 6 [PH] (ref 5–8)
PROT UR QL STRIP: NEGATIVE
SP GR UR STRIP: 1.01 (ref 1–1.03)
TOXICOLOGY INFORMATION: ABNORMAL
URN SPEC COLLECT METH UR: NORMAL
UROBILINOGEN UR STRIP-ACNC: NEGATIVE EU/DL

## 2019-03-02 PROCEDURE — 80307 DRUG TEST PRSMV CHEM ANLYZR: CPT | Mod: ER

## 2019-03-02 PROCEDURE — 25000003 PHARM REV CODE 250: Mod: ER | Performed by: PHYSICIAN ASSISTANT

## 2019-03-02 PROCEDURE — 81003 URINALYSIS AUTO W/O SCOPE: CPT | Mod: ER,59

## 2019-03-02 PROCEDURE — 93010 ELECTROCARDIOGRAM REPORT: CPT | Mod: ,,, | Performed by: INTERNAL MEDICINE

## 2019-03-02 PROCEDURE — 99284 EMERGENCY DEPT VISIT MOD MDM: CPT | Mod: ER

## 2019-03-02 PROCEDURE — 93010 EKG 12-LEAD: ICD-10-PCS | Mod: ,,, | Performed by: INTERNAL MEDICINE

## 2019-03-02 PROCEDURE — 93005 ELECTROCARDIOGRAM TRACING: CPT | Mod: ER

## 2019-03-02 RX ORDER — OXYCODONE AND ACETAMINOPHEN 5; 325 MG/1; MG/1
2 TABLET ORAL
Status: COMPLETED | OUTPATIENT
Start: 2019-03-02 | End: 2019-03-02

## 2019-03-02 RX ORDER — METHOCARBAMOL 500 MG/1
1000 TABLET, FILM COATED ORAL
Status: COMPLETED | OUTPATIENT
Start: 2019-03-02 | End: 2019-03-02

## 2019-03-02 RX ORDER — METHOCARBAMOL 500 MG/1
500 TABLET, FILM COATED ORAL 3 TIMES DAILY
Qty: 30 TABLET | Refills: 0 | Status: SHIPPED | OUTPATIENT
Start: 2019-03-02 | End: 2019-03-12

## 2019-03-02 RX ADMIN — METHOCARBAMOL TABLETS 1000 MG: 500 TABLET, COATED ORAL at 06:03

## 2019-03-02 RX ADMIN — OXYCODONE HYDROCHLORIDE AND ACETAMINOPHEN 2 TABLET: 5; 325 TABLET ORAL at 06:03

## 2019-03-02 NOTE — ED PROVIDER NOTES
Encounter Date: 3/2/2019       History     Chief Complaint   Patient presents with    Fall     slipped on wet tiled floor at [employer]. Fell backwards hitting back of head on floor. Denies LOC, +headache and neck pain. Had two beers this evening and takes baby aspirin daily.      slipped on wet tiled floor at [employer]. Fell backwards hitting back of head on floor. Denies LOC, +headache and neck pain. Minimal lower back pain. Had two beers this evening and takes baby aspirin daily.          Review of patient's allergies indicates:  No Known Allergies  Past Medical History:   Diagnosis Date    *Atrial flutter     Anemia     Bipolar 1 disorder     Coronary artery disease     Diabetes mellitus     Gout, unspecified     H/O: GI bleed     Hypertension     Liver cancer     PVD (peripheral vascular disease)     Sciatica     SSS (sick sinus syndrome)     Tachy-valentine syndrome      Past Surgical History:   Procedure Laterality Date    CORONARY ARTERY BYPASS GRAFT      INSERT / REPLACE / REMOVE PACEMAKER      LUMBAR DISC SURGERY       Family History   Problem Relation Age of Onset    Heart disease Mother     Heart disease Father      Social History     Tobacco Use    Smoking status: Former Smoker     Types: Cigarettes     Last attempt to quit: 1992     Years since quittin.2    Smokeless tobacco: Never Used   Substance Use Topics    Alcohol use: Yes     Alcohol/week: 1.2 oz     Types: 2 Cans of beer per week     Comment: 2 cans of beer per day    Drug use: No     Review of Systems   Constitutional: Negative for diaphoresis, fatigue and fever.        Fourteen point review of systems completed and negative with the exception HPI and below.   HENT: Negative for congestion, postnasal drip, sore throat and trouble swallowing.    Eyes: Negative for pain, discharge and itching.   Respiratory: Negative for cough, chest tightness, shortness of breath and wheezing.    Cardiovascular:  Negative for chest pain, palpitations and leg swelling.   Gastrointestinal: Negative for abdominal distention, diarrhea, nausea and vomiting.   Endocrine: Negative for cold intolerance and heat intolerance.   Genitourinary: Negative for dysuria, enuresis and flank pain.   Musculoskeletal: Positive for back pain and neck pain. Negative for joint swelling.   Skin: Negative for color change, rash and wound.   Neurological: Positive for headaches. Negative for dizziness, facial asymmetry, weakness and light-headedness.   Hematological: Does not bruise/bleed easily.   Psychiatric/Behavioral: Negative for agitation, behavioral problems and confusion.   All other systems reviewed and are negative.      Physical Exam     Initial Vitals   BP Pulse Resp Temp SpO2   -- -- -- -- --      MAP       --         Physical Exam    Constitutional: He appears well-developed and well-nourished. He is not diaphoretic.   HENT:   Head: Normocephalic and atraumatic.   Nose: Nose normal.   Mouth/Throat: No oropharyngeal exudate.   Eyes: Conjunctivae and EOM are normal. Pupils are equal, round, and reactive to light. Right eye exhibits no discharge. Left eye exhibits no discharge.   Neck: Normal range of motion. Neck supple. No thyromegaly present. No tracheal deviation present. No JVD present.   Cardiovascular: Normal rate, regular rhythm, normal heart sounds and intact distal pulses.   No murmur heard.  Pulmonary/Chest: Breath sounds normal. No stridor. No respiratory distress. He has no wheezes. He has no rales.   Abdominal: Soft. Bowel sounds are normal. He exhibits no distension. There is no tenderness. There is no rebound.   Musculoskeletal: Normal range of motion. He exhibits tenderness. He exhibits no edema.   Tenderness noted to the back of his head.  The cervical spine there is pain on flexion and extension as well as side-to-side bending with tenderness noted to the cervical midline, the paraspinous musculature and the posterior  trapezius muscles.   Lumbar spine has mild tenderness midline in the paraspinous musculature   Neurological: He is alert and oriented to person, place, and time. He has normal strength. He displays normal reflexes. No cranial nerve deficit.   Skin: Skin is warm and dry. Capillary refill takes less than 2 seconds.   Psychiatric: He has a normal mood and affect. His behavior is normal. Thought content normal.         ED Course   Procedures  Labs Reviewed   URINALYSIS   DRUG SCREEN PANEL, URINE EMERGENCY     EKG Readings: (Independently Interpreted)   Heart Rate: 76. Other Impression: Per Dr. Dover   Atrial paced rhythm with premature supraventricular complexes.       Imaging Results    None          Medical Decision Making:   Initial Assessment:   Slipped on wet tiled floor at [employer]. Fell backwards hitting back of head on floor. Denies LOC, +headache and neck pain. Minimal lower back pain. Had two beers this evening and takes baby aspirin daily.    Tenderness noted to the back of his head.  The cervical spine there is pain on flexion and extension as well as side-to-side bending with tenderness noted to the cervical midline, the paraspinous musculature and the posterior trapezius muscles.   Lumbar spine has mild tenderness midline in the paraspinous musculature  Differential Diagnosis:   Cerebral bleed, concussion, cervical fracture, muscles sprain    ED Management:  CT head and neck, x-ray lumbar, UDS, no acute findings found on CT of head and neck.  He does have an old intact fusion and cervical spine and degenerative changes but no fracture.  Lumbar x-ray showed no acute findings.  Pain medication and anti-inflammatories given for comfort.  Patient does report feeling better and is ready for discharge home.  On reexamination his range of motion is much improved.  Denies any nausea, vomiting, dizziness or paresthesias.  Denies any bowel or bladder dysfunction.                      Clinical  Impression:       ICD-10-CM ICD-9-CM   1. Neck sprain, initial encounter S13.9XXA 847.0   2. Fall W19.XXXA E888.9   3. Lumbar sprain, initial encounter S33.5XXA 847.2   4. Acute nonintractable headache, unspecified headache type R51 784.0                                Guillermo Brian PA-C  03/02/19 1905

## 2019-03-03 NOTE — DISCHARGE INSTRUCTIONS
Patient verbalized understanding all discharge instructions including returning to the ED for any continued, returning, or worsening symptoms.  
negative...

## 2019-03-11 ENCOUNTER — OFFICE VISIT (OUTPATIENT)
Dept: CARDIOLOGY | Facility: CLINIC | Age: 79
End: 2019-03-11
Payer: MEDICARE

## 2019-03-11 VITALS
HEIGHT: 65 IN | HEART RATE: 76 BPM | DIASTOLIC BLOOD PRESSURE: 73 MMHG | SYSTOLIC BLOOD PRESSURE: 135 MMHG | WEIGHT: 159.88 LBS | OXYGEN SATURATION: 96 % | BODY MASS INDEX: 26.64 KG/M2

## 2019-03-11 DIAGNOSIS — I48.0 PAF (PAROXYSMAL ATRIAL FIBRILLATION): ICD-10-CM

## 2019-03-11 DIAGNOSIS — E78.2 MIXED HYPERLIPIDEMIA: ICD-10-CM

## 2019-03-11 DIAGNOSIS — Z95.0 PACEMAKER: ICD-10-CM

## 2019-03-11 DIAGNOSIS — Z95.1 HX OF CABG: ICD-10-CM

## 2019-03-11 DIAGNOSIS — I25.10 CORONARY ARTERY DISEASE INVOLVING NATIVE CORONARY ARTERY OF NATIVE HEART WITHOUT ANGINA PECTORIS: Primary | ICD-10-CM

## 2019-03-11 DIAGNOSIS — I48.92 ATRIAL FLUTTER, UNSPECIFIED TYPE: ICD-10-CM

## 2019-03-11 DIAGNOSIS — I10 ESSENTIAL HYPERTENSION: ICD-10-CM

## 2019-03-11 PROCEDURE — 3075F PR MOST RECENT SYSTOLIC BLOOD PRESS GE 130-139MM HG: ICD-10-PCS | Mod: CPTII,S$GLB,, | Performed by: INTERNAL MEDICINE

## 2019-03-11 PROCEDURE — 99214 PR OFFICE/OUTPT VISIT, EST, LEVL IV, 30-39 MIN: ICD-10-PCS | Mod: S$GLB,,, | Performed by: INTERNAL MEDICINE

## 2019-03-11 PROCEDURE — 3075F SYST BP GE 130 - 139MM HG: CPT | Mod: CPTII,S$GLB,, | Performed by: INTERNAL MEDICINE

## 2019-03-11 PROCEDURE — 1101F PT FALLS ASSESS-DOCD LE1/YR: CPT | Mod: CPTII,S$GLB,, | Performed by: INTERNAL MEDICINE

## 2019-03-11 PROCEDURE — 3078F DIAST BP <80 MM HG: CPT | Mod: CPTII,S$GLB,, | Performed by: INTERNAL MEDICINE

## 2019-03-11 PROCEDURE — 99999 PR PBB SHADOW E&M-EST. PATIENT-LVL III: ICD-10-PCS | Mod: PBBFAC,,, | Performed by: INTERNAL MEDICINE

## 2019-03-11 PROCEDURE — 3078F PR MOST RECENT DIASTOLIC BLOOD PRESSURE < 80 MM HG: ICD-10-PCS | Mod: CPTII,S$GLB,, | Performed by: INTERNAL MEDICINE

## 2019-03-11 PROCEDURE — 1101F PR PT FALLS ASSESS DOC 0-1 FALLS W/OUT INJ PAST YR: ICD-10-PCS | Mod: CPTII,S$GLB,, | Performed by: INTERNAL MEDICINE

## 2019-03-11 PROCEDURE — 99214 OFFICE O/P EST MOD 30 MIN: CPT | Mod: S$GLB,,, | Performed by: INTERNAL MEDICINE

## 2019-03-11 PROCEDURE — 99999 PR PBB SHADOW E&M-EST. PATIENT-LVL III: CPT | Mod: PBBFAC,,, | Performed by: INTERNAL MEDICINE

## 2019-03-11 NOTE — PROGRESS NOTES
Subjective:   Patient ID:  Venu Lau is a 79 y.o. male who presents for follow-up of No chief complaint on file.      Problem List Items Addressed This Visit        Cardiac/Vascular    CAD (coronary artery disease) - Primary    Hx of CABG    Atrial flutter    Pacemaker    Essential hypertension    Mixed hyperlipidemia    PAF (paroxysmal atrial fibrillation)          HPI: Patient with PMH of PAF, CAD s/p CABG, HTN, HLD and chronic pain present for f/u. He has been doing well except for recent fall after slipping and hitting his head. He went to ER and CT of head showed no acute pathology. No chest pain or dyspnea. No orthopnea or PND. BP is controlled.   Pain is controlled.     Review of Systems   Constitution: Negative.   HENT: Negative.    Eyes: Negative.    Cardiovascular: Negative.    Respiratory: Negative.    Endocrine: Negative.    Hematologic/Lymphatic: Negative.    Skin: Negative.    Musculoskeletal: Negative.    Gastrointestinal: Negative.    Neurological: Negative.    Psychiatric/Behavioral: Negative.        Patient's Medications   New Prescriptions    No medications on file   Previous Medications    ALPRAZOLAM (XANAX ORAL)    Take by mouth.    ASPIRIN (ECOTRIN) 81 MG EC TABLET    Take 81 mg by mouth once daily.    ATORVASTATIN (LIPITOR) 20 MG TABLET    Take 1 tablet by mouth Daily.    DULOXETINE 40 MG CPDR    Take 40 mg by mouth once daily.    GEMFIBROZIL (LOPID) 600 MG TABLET    Take 600 mg by mouth once daily.      METHOCARBAMOL (ROBAXIN) 500 MG TAB    Take 1 tablet (500 mg total) by mouth 3 (three) times daily. for 10 days    METOPROLOL TARTRATE (LOPRESSOR) 25 MG TABLET    Take 0.5 tablets (12.5 mg total) by mouth 2 (two) times daily.    OXYCODONE (OXYCONTIN) 15 MG TR12 12 HR TABLET    Take 15 mg by mouth every 12 (twelve) hours as needed.    OXYCODONE-ACETAMINOPHEN (PERCOCET)  MG PER TABLET    Take 1 tablet by mouth every 4 (four) hours as needed for Pain.    OXYCODONE-ACETAMINOPHEN  (PERCOCET)  MG PER TABLET    Take 1 tablet by mouth every 12 (twelve) hours as needed for Pain.    QUETIAPINE (SEROQUEL) 200 MG TAB    Take 1 tablet by mouth once daily.    SOLUS V2 LANCETS 30 GAUGE MISC        SOLUS V2 TEST STRIPS STRP        SUCRALFATE (CARAFATE) 1 GRAM TABLET    Take 1 g by mouth once daily.      TAMSULOSIN (FLOMAX) 0.4 MG CP24    Take 1 capsule (0.4 mg total) by mouth once daily.   Modified Medications    No medications on file   Discontinued Medications    No medications on file       Objective:   Physical Exam   Constitutional: He is oriented to person, place, and time. He appears well-developed and well-nourished. No distress.   Examination of the digits showed no clubbing or cyanosis   HENT:   Head: Normocephalic and atraumatic.   Eyes: Conjunctivae are normal. Pupils are equal, round, and reactive to light. Right eye exhibits no discharge.   Neck: Normal range of motion. Neck supple. No JVD present. No thyromegaly present.   No carotid bruits   Cardiovascular: Normal rate, S1 normal, S2 normal, normal heart sounds, intact distal pulses and normal pulses. An irregularly irregular rhythm present. PMI is not displaced. Exam reveals no gallop, no friction rub and no opening snap.   No murmur heard.  Pulmonary/Chest: Effort normal and breath sounds normal. No respiratory distress. He has no wheezes. He has no rales. He exhibits no tenderness.   Abdominal: Soft. Bowel sounds are normal. He exhibits no distension and no mass. There is no tenderness. There is no guarding.   No hepatosplenomegaly   Musculoskeletal: Normal range of motion. He exhibits no edema or tenderness.   Lymphadenopathy:     He has no cervical adenopathy.   Neurological: He is alert and oriented to person, place, and time.   Skin: Skin is warm. No rash noted. He is not diaphoretic. No erythema.   Psychiatric: He has a normal mood and affect.   Nursing note and vitals reviewed.      ECGs reviewed-Ventricular paced  rhythm  Echo: normal ef with DD      Assessment:     1. Coronary artery disease involving native coronary artery of native heart without angina pectoris    2. Hx of CABG    3. Atrial flutter, unspecified type    4. Pacemaker    5. Essential hypertension    6. Mixed hyperlipidemia    7. PAF (paroxysmal atrial fibrillation)        Plan:      Patient doing well with no complaints.   ChadsVasc=5 (age, HTN, DM, PAD). He has history of GI bleed and is having recurrent falls and recent syncope. He is always drousy on his pain meds. Will have him take 325 mg po daily  Continue current medications  Activity as tolerate  Low salt diet  F/u in 1 year

## 2019-04-10 ENCOUNTER — CLINICAL SUPPORT (OUTPATIENT)
Dept: CARDIOLOGY | Facility: CLINIC | Age: 79
End: 2019-04-10
Payer: MEDICARE

## 2019-04-10 DIAGNOSIS — Z95.0 PACEMAKER: Primary | ICD-10-CM

## 2019-04-10 PROCEDURE — 93288 INTERROG EVL PM/LDLS PM IP: CPT | Mod: 26,,, | Performed by: INTERNAL MEDICINE

## 2019-04-10 PROCEDURE — 93288 PR INTERROG EVAL, IN PERSON,PACEMAKER: ICD-10-PCS | Mod: 26,,, | Performed by: INTERNAL MEDICINE

## 2019-04-11 NOTE — PROGRESS NOTES
Pacemaker Evaluation Report      4/10/2019     Primary MD: Dr. Terrazas  Primary Cardiologist: Dr. Tracy     : IPexpertroniVisible Measures Model: Evia DR/Serial No.  [id number]  Implant date: Jan 13, 2011    Reason for evaluation:routine  Indication for pacemaker: unknown    Measurements  Atrial sensing - P wave: 1.7 mV  Atrial capture: Maintained: 0.5 V @ 0.5 msec  Atrial lead impedance: 429 ohms  Ventricular sensing - R wave: 10 mV  Ventricular capture: RV Maintained: 0.7 V @ 0.4 ms   Ventricular lead impedance: right - 507 ohms   Underlying rhythm: Aflutter      Diagnostic Data  Atrial paced: 22 % Ventricular paced: 46 %  Mode switch: on  Other: 34% Afib burden - 80 days in MS  Sensor response: CLS  Battery status: satisfactory Magnet rate: 90 bpm   50% remaining capacity  Est. Longevity 4 years      Final Parameters  Mode: DDD-CLS Lower rate: 60 bpm Upper rate: 130 bpm  Atrial - Amplitude: 1.6 V Pulse width: 0.5 ms sensitivity 0.8 ms  Polarity: Bipolar  Ventricular - Amplitude: 2.5 V Pulse width: 0.5 ms sensitivity 2.5 ms  Polarity: Bipolar  Changes made: NONE  Conclusions: Normal device function.  Afib burden 34 %.    Follow up: 6 months      Carlos Quiroga

## 2019-07-12 NOTE — ED NOTES
Pharmacy did not received Levothyroxine.  Advised we have confirmed receipt on 7/10.  Requesting to be resubmitted by pharmacy.   Pt. Resting comfortably. Denies c/o pain or discomfort.

## 2019-08-23 ENCOUNTER — OFFICE VISIT (OUTPATIENT)
Dept: CARDIOLOGY | Facility: CLINIC | Age: 79
End: 2019-08-23
Payer: MEDICARE

## 2019-08-23 VITALS
HEART RATE: 74 BPM | WEIGHT: 168 LBS | BODY MASS INDEX: 28.68 KG/M2 | HEIGHT: 64 IN | SYSTOLIC BLOOD PRESSURE: 130 MMHG | DIASTOLIC BLOOD PRESSURE: 70 MMHG

## 2019-08-23 DIAGNOSIS — Z72.0 TOBACCO ABUSE: ICD-10-CM

## 2019-08-23 DIAGNOSIS — I10 ESSENTIAL HYPERTENSION: ICD-10-CM

## 2019-08-23 DIAGNOSIS — Z95.1 HX OF CABG: ICD-10-CM

## 2019-08-23 DIAGNOSIS — I48.0 PAF (PAROXYSMAL ATRIAL FIBRILLATION): ICD-10-CM

## 2019-08-23 DIAGNOSIS — I25.10 CORONARY ARTERY DISEASE INVOLVING NATIVE CORONARY ARTERY OF NATIVE HEART WITHOUT ANGINA PECTORIS: Primary | ICD-10-CM

## 2019-08-23 DIAGNOSIS — E78.2 MIXED HYPERLIPIDEMIA: ICD-10-CM

## 2019-08-23 DIAGNOSIS — Z95.0 PACEMAKER: ICD-10-CM

## 2019-08-23 PROCEDURE — 99999 PR PBB SHADOW E&M-EST. PATIENT-LVL III: CPT | Mod: PBBFAC,,, | Performed by: INTERNAL MEDICINE

## 2019-08-23 PROCEDURE — 99214 OFFICE O/P EST MOD 30 MIN: CPT | Mod: S$GLB,,, | Performed by: INTERNAL MEDICINE

## 2019-08-23 PROCEDURE — 99214 PR OFFICE/OUTPT VISIT, EST, LEVL IV, 30-39 MIN: ICD-10-PCS | Mod: S$GLB,,, | Performed by: INTERNAL MEDICINE

## 2019-08-23 PROCEDURE — 1101F PR PT FALLS ASSESS DOC 0-1 FALLS W/OUT INJ PAST YR: ICD-10-PCS | Mod: CPTII,S$GLB,, | Performed by: INTERNAL MEDICINE

## 2019-08-23 PROCEDURE — 99999 PR PBB SHADOW E&M-EST. PATIENT-LVL III: ICD-10-PCS | Mod: PBBFAC,,, | Performed by: INTERNAL MEDICINE

## 2019-08-23 PROCEDURE — 3075F SYST BP GE 130 - 139MM HG: CPT | Mod: CPTII,S$GLB,, | Performed by: INTERNAL MEDICINE

## 2019-08-23 PROCEDURE — 3075F PR MOST RECENT SYSTOLIC BLOOD PRESS GE 130-139MM HG: ICD-10-PCS | Mod: CPTII,S$GLB,, | Performed by: INTERNAL MEDICINE

## 2019-08-23 PROCEDURE — 3078F DIAST BP <80 MM HG: CPT | Mod: CPTII,S$GLB,, | Performed by: INTERNAL MEDICINE

## 2019-08-23 PROCEDURE — 1101F PT FALLS ASSESS-DOCD LE1/YR: CPT | Mod: CPTII,S$GLB,, | Performed by: INTERNAL MEDICINE

## 2019-08-23 PROCEDURE — 3078F PR MOST RECENT DIASTOLIC BLOOD PRESSURE < 80 MM HG: ICD-10-PCS | Mod: CPTII,S$GLB,, | Performed by: INTERNAL MEDICINE

## 2019-08-23 NOTE — PROGRESS NOTES
Subjective:   Patient ID:  Venu Lau is a 79 y.o. male who presents for follow-up of No chief complaint on file.      Problem List Items Addressed This Visit        Cardiac/Vascular    CAD (coronary artery disease) - Primary    Hx of CABG    Pacemaker    Essential hypertension    Mixed hyperlipidemia    PAF (paroxysmal atrial fibrillation)      Other Visit Diagnoses     Tobacco abuse              HPI: Patient with PMH of PAF, CAD s/p CABG, HTN, HLD and chronic pain present for f/u.   Since previous visit he has been feeling better with no complaints. He denies chest pain or dyspnea. No orthopnea or PND. BP is controlled. He is compliant with and tolerating medications. He does have a pacemaker and PAF. CHadsVasc 5 but unable to place on blood thinners because of history of GI bleeding and high risk of falling. Currently advice to take Asa 324 mg po daily. Pain is controlled. He has started smoking again.     Review of Systems   Constitution: Negative.   HENT: Negative.    Eyes: Negative.    Cardiovascular: Negative.    Respiratory: Negative.    Endocrine: Negative.    Hematologic/Lymphatic: Negative.    Skin: Negative.    Musculoskeletal: Negative.    Gastrointestinal: Negative.    Neurological: Negative.    Psychiatric/Behavioral: Negative.        Patient's Medications   New Prescriptions    No medications on file   Previous Medications    ALPRAZOLAM (XANAX ORAL)    Take by mouth.    ASPIRIN (ECOTRIN) 81 MG EC TABLET    Take 81 mg by mouth once daily.    ATORVASTATIN (LIPITOR) 20 MG TABLET    Take 1 tablet by mouth Daily.    DULOXETINE 40 MG CPDR    Take 40 mg by mouth once daily.    GEMFIBROZIL (LOPID) 600 MG TABLET    Take 600 mg by mouth once daily.      METOPROLOL TARTRATE (LOPRESSOR) 25 MG TABLET    Take 0.5 tablets (12.5 mg total) by mouth 2 (two) times daily.    OXYCODONE (OXYCONTIN) 15 MG TR12 12 HR TABLET    Take 15 mg by mouth every 12 (twelve) hours as needed.    OXYCODONE-ACETAMINOPHEN (PERCOCET)   MG PER TABLET    Take 1 tablet by mouth every 4 (four) hours as needed for Pain.    OXYCODONE-ACETAMINOPHEN (PERCOCET)  MG PER TABLET    Take 1 tablet by mouth every 12 (twelve) hours as needed for Pain.    QUETIAPINE (SEROQUEL) 200 MG TAB    Take 1 tablet by mouth once daily.    SOLUS V2 LANCETS 30 GAUGE MISC        SOLUS V2 TEST STRIPS STRP        SUCRALFATE (CARAFATE) 1 GRAM TABLET    Take 1 g by mouth once daily.      TAMSULOSIN (FLOMAX) 0.4 MG CP24    Take 1 capsule (0.4 mg total) by mouth once daily.   Modified Medications    No medications on file   Discontinued Medications    No medications on file       Objective:   Physical Exam   Constitutional: He is oriented to person, place, and time. He appears well-developed and well-nourished. No distress.   Examination of the digits showed no clubbing or cyanosis   HENT:   Head: Normocephalic and atraumatic.   Eyes: Pupils are equal, round, and reactive to light. Conjunctivae are normal. Right eye exhibits no discharge.   Neck: Normal range of motion. Neck supple. No JVD present. No thyromegaly present.   No carotid bruits   Cardiovascular: Normal rate, S1 normal, S2 normal, normal heart sounds, intact distal pulses and normal pulses. An irregularly irregular rhythm present. PMI is not displaced. Exam reveals no gallop, no friction rub and no opening snap.   No murmur heard.  Pulmonary/Chest: Effort normal and breath sounds normal. No respiratory distress. He has no wheezes. He has no rales. He exhibits no tenderness.   Abdominal: Soft. Bowel sounds are normal. He exhibits no distension and no mass. There is no tenderness. There is no guarding.   No hepatosplenomegaly   Musculoskeletal: Normal range of motion. He exhibits no edema or tenderness.   Lymphadenopathy:     He has no cervical adenopathy.   Neurological: He is alert and oriented to person, place, and time.   Skin: Skin is warm. No rash noted. He is not diaphoretic. No erythema.   Psychiatric:  He has a normal mood and affect.   Nursing note and vitals reviewed.      ECGs reviewed-Ventricular paced rhythm  Echo: normal ef with DD      Assessment:     1. Coronary artery disease involving native coronary artery of native heart without angina pectoris    2. Hx of CABG    3. Essential hypertension    4. Mixed hyperlipidemia    5. Pacemaker    6. PAF (paroxysmal atrial fibrillation)    7. Tobacco abuse        Plan:      Patient doing well with no complaints.   ChadsVasc=5 (age, HTN, DM, PAD). He has history of GI bleed and is having recurrent falls and recent syncope. He is always drousy on his pain meds. Will have him take 325 mg po daily  Continue current medications  Activity as tolerate  Low salt diet  Smoking cessation  F/u in 1 year

## 2019-10-30 ENCOUNTER — HOSPITAL ENCOUNTER (EMERGENCY)
Facility: HOSPITAL | Age: 79
Discharge: HOME | End: 2019-10-30
Attending: FAMILY MEDICINE
Payer: MEDICARE

## 2019-10-30 VITALS
DIASTOLIC BLOOD PRESSURE: 74 MMHG | BODY MASS INDEX: 26.66 KG/M2 | RESPIRATION RATE: 20 BRPM | HEART RATE: 75 BPM | HEIGHT: 65 IN | SYSTOLIC BLOOD PRESSURE: 164 MMHG | OXYGEN SATURATION: 98 % | WEIGHT: 160 LBS | TEMPERATURE: 98 F

## 2019-10-30 DIAGNOSIS — R03.0 ELEVATED BLOOD PRESSURE READING: ICD-10-CM

## 2019-10-30 DIAGNOSIS — M10.9 ACUTE GOUT OF RIGHT ANKLE, UNSPECIFIED CAUSE: Primary | ICD-10-CM

## 2019-10-30 PROCEDURE — 99284 EMERGENCY DEPT VISIT MOD MDM: CPT | Mod: 25,ER

## 2019-10-30 PROCEDURE — 96372 THER/PROPH/DIAG INJ SC/IM: CPT | Mod: ER

## 2019-10-30 PROCEDURE — 63600175 PHARM REV CODE 636 W HCPCS: Mod: ER | Performed by: FAMILY MEDICINE

## 2019-10-30 RX ORDER — COLCHICINE 0.6 MG/1
0.6 TABLET ORAL DAILY
Qty: 20 TABLET | Refills: 0 | Status: SHIPPED | OUTPATIENT
Start: 2019-10-30 | End: 2020-02-03

## 2019-10-30 RX ORDER — KETOROLAC TROMETHAMINE 30 MG/ML
30 INJECTION, SOLUTION INTRAMUSCULAR; INTRAVENOUS
Status: COMPLETED | OUTPATIENT
Start: 2019-10-30 | End: 2019-10-30

## 2019-10-30 RX ADMIN — KETOROLAC TROMETHAMINE 30 MG: 30 INJECTION, SOLUTION INTRAMUSCULAR at 09:10

## 2019-10-30 NOTE — ED PROVIDER NOTES
Encounter Date: 10/30/2019       History     Chief Complaint   Patient presents with    Gout     Pt reports left ankle pain that started yesterday. Pt reports he thinks he having a gout attack     A 79-year-old male presents with chief complaint left ankle gout does started on yesterday.  Patient reports has long history of gout in the past but has not been taking any medication because after he moved here.  Having the frequent episodes.  Patient reports believes the symptoms related to the spaghetti he a on yesterday.  Patient denies any fever chills. Does have a history of diabetes does have a history of coronary artery disease.  Reports his primary care physician is Dr. Mcgrath in [place].  Patient does report took his blood pressure medication on today prior to coming in to be seen.         Review of patient's allergies indicates:  No Known Allergies  Past Medical History:   Diagnosis Date    *Atrial flutter     Anemia     Bipolar 1 disorder     Coronary artery disease     Diabetes mellitus     Gout, unspecified     H/O: GI bleed     Hypertension     Liver cancer     PVD (peripheral vascular disease)     Sciatica     SSS (sick sinus syndrome)     Tachy-valentine syndrome      Past Surgical History:   Procedure Laterality Date    CORONARY ARTERY BYPASS GRAFT      INSERT / REPLACE / REMOVE PACEMAKER      LUMBAR DISC SURGERY       Family History   Problem Relation Age of Onset    Heart disease Mother     Heart disease Father      Social History     Tobacco Use    Smoking status: Former Smoker     Types: Cigarettes     Last attempt to quit: 1992     Years since quittin.9    Smokeless tobacco: Never Used   Substance Use Topics    Alcohol use: Yes     Alcohol/week: 2.0 standard drinks     Types: 2 Cans of beer per week     Comment: 2 cans of beer per day    Drug use: No     Review of Systems   Constitutional: Negative for chills and fever.   Musculoskeletal: Positive for arthralgias. Negative  for back pain.   All other systems reviewed and are negative.      Physical Exam     Initial Vitals [10/30/19 0850]   BP Pulse Resp Temp SpO2   (!) 192/89 75 20 97.9 °F (36.6 °C) 98 %      MAP       --         Physical Exam    Nursing note and vitals reviewed.  Constitutional: He appears well-developed and well-nourished.   HENT:   Head: Normocephalic and atraumatic.   Eyes: EOM are normal. Pupils are equal, round, and reactive to light.   Neck: Normal range of motion. Neck supple.   Cardiovascular: Normal rate, regular rhythm and normal heart sounds.   Pulmonary/Chest: Breath sounds normal.   Abdominal: Soft.   Musculoskeletal: Normal range of motion.        Left ankle: He exhibits swelling.   Neurological: He is alert and oriented to person, place, and time. He has normal reflexes. GCS score is 15. GCS eye subscore is 4. GCS verbal subscore is 5. GCS motor subscore is 6.   Skin: Skin is warm.         ED Course   Procedures  Labs Reviewed - No data to display       Imaging Results    None                               Clinical Impression:       ICD-10-CM ICD-9-CM   1. Acute gout of right ankle, unspecified cause M10.9 274.01   2. Elevated blood pressure reading R03.0 796.2                                Devante Barker MD  10/30/19 1163

## 2020-01-16 ENCOUNTER — TELEPHONE (OUTPATIENT)
Dept: CARDIOLOGY | Facility: CLINIC | Age: 80
End: 2020-01-16

## 2020-01-21 ENCOUNTER — TELEPHONE (OUTPATIENT)
Dept: CARDIOLOGY | Facility: CLINIC | Age: 80
End: 2020-01-21

## 2020-01-21 DIAGNOSIS — I25.10 CORONARY ARTERY DISEASE, ANGINA PRESENCE UNSPECIFIED, UNSPECIFIED VESSEL OR LESION TYPE, UNSPECIFIED WHETHER NATIVE OR TRANSPLANTED HEART: ICD-10-CM

## 2020-01-21 DIAGNOSIS — E78.2 MIXED HYPERLIPIDEMIA: ICD-10-CM

## 2020-01-21 DIAGNOSIS — I48.0 PAF (PAROXYSMAL ATRIAL FIBRILLATION): Primary | ICD-10-CM

## 2020-01-27 ENCOUNTER — HOSPITAL ENCOUNTER (OUTPATIENT)
Dept: CARDIOLOGY | Facility: HOSPITAL | Age: 80
Discharge: HOME | End: 2020-01-27
Attending: INTERNAL MEDICINE
Payer: MEDICARE

## 2020-01-27 DIAGNOSIS — E78.2 MIXED HYPERLIPIDEMIA: ICD-10-CM

## 2020-01-27 DIAGNOSIS — I25.10 CORONARY ARTERY DISEASE, ANGINA PRESENCE UNSPECIFIED, UNSPECIFIED VESSEL OR LESION TYPE, UNSPECIFIED WHETHER NATIVE OR TRANSPLANTED HEART: ICD-10-CM

## 2020-01-27 DIAGNOSIS — I48.0 PAF (PAROXYSMAL ATRIAL FIBRILLATION): ICD-10-CM

## 2020-01-27 PROCEDURE — 93306 TTE W/DOPPLER COMPLETE: CPT | Mod: 26,,, | Performed by: STUDENT IN AN ORGANIZED HEALTH CARE EDUCATION/TRAINING PROGRAM

## 2020-01-27 PROCEDURE — 93306 TTE W/DOPPLER COMPLETE: CPT | Mod: PO

## 2020-01-27 PROCEDURE — 93306 ECHO (CUPID ONLY): ICD-10-PCS | Mod: 26,,, | Performed by: STUDENT IN AN ORGANIZED HEALTH CARE EDUCATION/TRAINING PROGRAM

## 2020-01-28 LAB
AORTIC ROOT ANNULUS: 2.57 CM
AORTIC VALVE CUSP SEPERATION: 0.97 CM
AV INDEX (PROSTH): 0.39
AV MEAN GRADIENT: 9 MMHG
AV PEAK GRADIENT: 14 MMHG
AV VALVE AREA: 1.68 CM2
AV VELOCITY RATIO: 0.38
CV ECHO LV RWT: 0.56 CM
DOP CALC AO PEAK VEL: 1.9 M/S
DOP CALC AO VTI: 49.06 CM
DOP CALC LVOT AREA: 4.3 CM2
DOP CALC LVOT DIAMETER: 2.35 CM
DOP CALC LVOT PEAK VEL: 0.72 M/S
DOP CALC LVOT STROKE VOLUME: 82.54 CM3
DOP CALC MV VTI: 35 CM
DOP CALCLVOT PEAK VEL VTI: 19.04 CM
E/E' RATIO: 10.21 M/S
ECHO LV POSTERIOR WALL: 1.26 CM (ref 0.6–1.1)
FRACTIONAL SHORTENING: 21 % (ref 28–44)
INTERVENTRICULAR SEPTUM: 1.25 CM (ref 0.6–1.1)
IVC PROX: 1.2 CM
LA MAJOR: 5.22 CM
LA MINOR: 5.07 CM
LA WIDTH: 4.1 CM
LEFT ATRIUM SIZE: 3.68 CM
LEFT ATRIUM VOLUME: 65.97 CM3
LEFT INTERNAL DIMENSION IN SYSTOLE: 3.57 CM (ref 2.1–4)
LEFT VENTRICLE DIASTOLIC VOLUME: 93.47 ML
LEFT VENTRICLE SYSTOLIC VOLUME: 53.18 ML
LEFT VENTRICULAR INTERNAL DIMENSION IN DIASTOLE: 4.52 CM (ref 3.5–6)
LEFT VENTRICULAR MASS: 212.83 G
LV LATERAL E/E' RATIO: 8.82 M/S
LV SEPTAL E/E' RATIO: 12.13 M/S
MV PEAK E VEL: 0.97 M/S
MV VALVE AREA BY CONTINUITY EQUATION: 2.36 CM2
PISA TR MAX VEL: 2.67 M/S
PULM VEIN S/D RATIO: 0.32
PV PEAK D VEL: 1.06 M/S
PV PEAK S VEL: 0.34 M/S
PV PEAK VELOCITY: 0.86 CM/S
RA MAJOR: 4.75 CM
RA PRESSURE: 3 MMHG
RA WIDTH: 3.62 CM
RIGHT VENTRICULAR END-DIASTOLIC DIMENSION: 3.06 CM
SINUS: 3.18 CM
TDI LATERAL: 0.11 M/S
TDI SEPTAL: 0.08 M/S
TDI: 0.1 M/S
TR MAX PG: 29 MMHG
TRICUSPID ANNULAR PLANE SYSTOLIC EXCURSION: 1.87 CM
TV REST PULMONARY ARTERY PRESSURE: 32 MMHG

## 2020-01-29 ENCOUNTER — CLINICAL SUPPORT (OUTPATIENT)
Dept: CARDIOLOGY | Facility: CLINIC | Age: 80
End: 2020-01-29
Payer: MEDICARE

## 2020-01-29 ENCOUNTER — OFFICE VISIT (OUTPATIENT)
Dept: CARDIOLOGY | Facility: CLINIC | Age: 80
End: 2020-01-29
Payer: MEDICARE

## 2020-01-29 VITALS
SYSTOLIC BLOOD PRESSURE: 144 MMHG | WEIGHT: 163 LBS | HEART RATE: 120 BPM | BODY MASS INDEX: 27.16 KG/M2 | HEIGHT: 65 IN | DIASTOLIC BLOOD PRESSURE: 94 MMHG

## 2020-01-29 DIAGNOSIS — Z72.0 TOBACCO ABUSE: Primary | ICD-10-CM

## 2020-01-29 DIAGNOSIS — I48.0 PAROXYSMAL ATRIAL FIBRILLATION: ICD-10-CM

## 2020-01-29 DIAGNOSIS — I25.10 CORONARY ARTERY DISEASE INVOLVING NATIVE CORONARY ARTERY OF NATIVE HEART WITHOUT ANGINA PECTORIS: ICD-10-CM

## 2020-01-29 DIAGNOSIS — Z95.0 PACEMAKER: ICD-10-CM

## 2020-01-29 DIAGNOSIS — I48.0 PAF (PAROXYSMAL ATRIAL FIBRILLATION): ICD-10-CM

## 2020-01-29 DIAGNOSIS — Z95.0 PACEMAKER: Primary | ICD-10-CM

## 2020-01-29 DIAGNOSIS — E78.2 MIXED HYPERLIPIDEMIA: ICD-10-CM

## 2020-01-29 DIAGNOSIS — I10 ESSENTIAL HYPERTENSION: ICD-10-CM

## 2020-01-29 PROCEDURE — 3077F SYST BP >= 140 MM HG: CPT | Mod: CPTII,S$GLB,, | Performed by: INTERNAL MEDICINE

## 2020-01-29 PROCEDURE — 1101F PR PT FALLS ASSESS DOC 0-1 FALLS W/OUT INJ PAST YR: ICD-10-PCS | Mod: CPTII,S$GLB,, | Performed by: INTERNAL MEDICINE

## 2020-01-29 PROCEDURE — 99999 PR PBB SHADOW E&M-EST. PATIENT-LVL IV: ICD-10-PCS | Mod: PBBFAC,,, | Performed by: INTERNAL MEDICINE

## 2020-01-29 PROCEDURE — 93281 PR PROGRAM EVAL (IN PERSON) IMPLANT DEVICE,PACEMAKER,MULT LEAD: ICD-10-PCS | Mod: S$GLB,,, | Performed by: INTERNAL MEDICINE

## 2020-01-29 PROCEDURE — 1126F PR PAIN SEVERITY QUANTIFIED, NO PAIN PRESENT: ICD-10-PCS | Mod: S$GLB,,, | Performed by: INTERNAL MEDICINE

## 2020-01-29 PROCEDURE — 93281 PM DEVICE PROGR EVAL MULTI: CPT | Mod: S$GLB,,, | Performed by: INTERNAL MEDICINE

## 2020-01-29 PROCEDURE — 99215 OFFICE O/P EST HI 40 MIN: CPT | Mod: S$GLB,,, | Performed by: INTERNAL MEDICINE

## 2020-01-29 PROCEDURE — 1159F MED LIST DOCD IN RCRD: CPT | Mod: S$GLB,,, | Performed by: INTERNAL MEDICINE

## 2020-01-29 PROCEDURE — 93000 EKG 12-LEAD: ICD-10-PCS | Mod: S$GLB,,, | Performed by: INTERNAL MEDICINE

## 2020-01-29 PROCEDURE — 1126F AMNT PAIN NOTED NONE PRSNT: CPT | Mod: S$GLB,,, | Performed by: INTERNAL MEDICINE

## 2020-01-29 PROCEDURE — 3077F PR MOST RECENT SYSTOLIC BLOOD PRESSURE >= 140 MM HG: ICD-10-PCS | Mod: CPTII,S$GLB,, | Performed by: INTERNAL MEDICINE

## 2020-01-29 PROCEDURE — 93000 ELECTROCARDIOGRAM COMPLETE: CPT | Mod: S$GLB,,, | Performed by: INTERNAL MEDICINE

## 2020-01-29 PROCEDURE — 99999 PR PBB SHADOW E&M-EST. PATIENT-LVL IV: CPT | Mod: PBBFAC,,, | Performed by: INTERNAL MEDICINE

## 2020-01-29 PROCEDURE — 1101F PT FALLS ASSESS-DOCD LE1/YR: CPT | Mod: CPTII,S$GLB,, | Performed by: INTERNAL MEDICINE

## 2020-01-29 PROCEDURE — 99215 PR OFFICE/OUTPT VISIT, EST, LEVL V, 40-54 MIN: ICD-10-PCS | Mod: S$GLB,,, | Performed by: INTERNAL MEDICINE

## 2020-01-29 PROCEDURE — 3080F DIAST BP >= 90 MM HG: CPT | Mod: CPTII,S$GLB,, | Performed by: INTERNAL MEDICINE

## 2020-01-29 PROCEDURE — 3080F PR MOST RECENT DIASTOLIC BLOOD PRESSURE >= 90 MM HG: ICD-10-PCS | Mod: CPTII,S$GLB,, | Performed by: INTERNAL MEDICINE

## 2020-01-29 PROCEDURE — 1159F PR MEDICATION LIST DOCUMENTED IN MEDICAL RECORD: ICD-10-PCS | Mod: S$GLB,,, | Performed by: INTERNAL MEDICINE

## 2020-01-29 RX ORDER — OLANZAPINE 2.5 MG/1
2.5 TABLET ORAL DAILY
Status: ON HOLD | COMMUNITY
Start: 2019-12-30 | End: 2023-05-30 | Stop reason: HOSPADM

## 2020-01-29 RX ORDER — GLIMEPIRIDE 2 MG/1
1 TABLET ORAL EVERY MORNING
COMMUNITY
Start: 2020-01-02

## 2020-01-29 RX ORDER — MIRTAZAPINE 15 MG/1
15 TABLET, FILM COATED ORAL DAILY
COMMUNITY
Start: 2020-01-02

## 2020-01-29 NOTE — PROGRESS NOTES
Subjective:   Patient ID:  Venu Lau is a 79 y.o. male who presents for follow up of Atrial Fibrillation; Coronary Artery Disease; Hyperlipidemia; Hypertension; and Nicotine Dependence      HPI:       Venu Lau 79 y.o. male is here follow up and feeling well without any new complaints. He has PMH of PAF, CAD s/p CABG, HTN, HLD and chronic pain. He is compliant with and tolerating medications. He does have a pacemaker and PAF. CHadsVasc 5 but unable to place on blood thinners because of history of GI bleeding and high risk of falling. He has started smoking again.      Echo 2020     Normal EF   Mild AS with MG 9 mmHg   PASP 32 mmHg    Mild LAE       ECG 2020:     AV paced with HR 94 bpm      PPM interrogation 2020     Normal functioning device   PAF with 21% burden   Longest episode 32 hours                         Patient Active Problem List    Diagnosis Date Noted    Tobacco abuse 2020    PAF (paroxysmal atrial fibrillation) 2017    Hypokalemia 10/31/2016    Oropharyngeal dysphagia 10/30/2016    Physical deconditioning 10/30/2016    Anemia, chronic disease 10/30/2016    Acute renal failure 10/29/2016    Hyperkalemia 10/29/2016    Metabolic acidosis 10/29/2016    Acute metabolic encephalopathy 10/29/2016    Uremia 10/29/2016    Polypharmacy 10/29/2016    Normocytic anemia 10/29/2016    Mixed hyperlipidemia 10/29/2016    AKSHAT (acute kidney injury) 10/29/2016    Toxic encephalopathy 10/29/2016    Coronary artery disease involving native coronary artery of native heart without angina pectoris     Diabetes     Essential hypertension     *Atrial flutter     CAD (coronary artery disease) 2012    Hx of CABG 2012    Atrial flutter 2012    Pacemaker 2012    Liver cancer 2012    Villous adenoma 2012           Right Arm BP - Sittin/90  Left Arm BP - Sittin/94        LABS    LAST HbA1c  Lab Results   Component Value  Date    HGBA1C 5.4 10/30/2016       Lipid panel  Lab Results   Component Value Date    CHOL 119 (L) 10/29/2016     Lab Results   Component Value Date    HDL 27 (L) 10/29/2016     Lab Results   Component Value Date    LDLCALC 73.0 10/29/2016     Lab Results   Component Value Date    TRIG 95 10/29/2016     Lab Results   Component Value Date    CHOLHDL 22.7 10/29/2016            Review of Systems   Constitution: Negative for diaphoresis, night sweats, weight gain and weight loss.   HENT: Negative for congestion.    Eyes: Negative for blurred vision, discharge and double vision.   Cardiovascular: Negative for chest pain, claudication, cyanosis, dyspnea on exertion, irregular heartbeat, leg swelling, near-syncope, orthopnea, palpitations, paroxysmal nocturnal dyspnea and syncope.   Respiratory: Negative for cough, shortness of breath and wheezing.    Endocrine: Negative for cold intolerance, heat intolerance and polyphagia.   Hematologic/Lymphatic: Negative for adenopathy and bleeding problem. Does not bruise/bleed easily.   Skin: Negative for dry skin and nail changes.   Musculoskeletal: Negative for arthritis, back pain, falls, joint pain, myalgias and neck pain.   Gastrointestinal: Negative for bloating, abdominal pain, change in bowel habit and constipation.   Genitourinary: Negative for bladder incontinence, dysuria, flank pain, genital sores and missed menses.   Neurological: Negative for aphonia, brief paralysis, difficulty with concentration, dizziness and weakness.   Psychiatric/Behavioral: Negative for altered mental status and memory loss. The patient does not have insomnia.    Allergic/Immunologic: Negative for environmental allergies.       Objective:   Physical Exam   Constitutional: He is oriented to person, place, and time. He appears well-developed and well-nourished. He is not intubated.   HENT:   Head: Normocephalic and atraumatic.   Right Ear: External ear normal.   Left Ear: External ear normal.    Mouth/Throat: Oropharynx is clear and moist.   Eyes: Pupils are equal, round, and reactive to light. Conjunctivae and EOM are normal. Right eye exhibits no discharge. Left eye exhibits no discharge. No scleral icterus.   Neck: Normal range of motion. Neck supple. Normal carotid pulses, no hepatojugular reflux and no JVD present. Carotid bruit is not present. No tracheal deviation present. No thyromegaly present.   Cardiovascular: Normal rate, regular rhythm, S1 normal and S2 normal.  No extrasystoles are present. PMI is not displaced. Exam reveals no gallop, no S3, no distant heart sounds, no friction rub and no midsystolic click.   No murmur heard.  Pulses:       Carotid pulses are 2+ on the right side, and 2+ on the left side.       Radial pulses are 2+ on the right side, and 2+ on the left side.        Femoral pulses are 2+ on the right side, and 2+ on the left side.       Popliteal pulses are 2+ on the right side, and 2+ on the left side.        Dorsalis pedis pulses are 2+ on the right side, and 2+ on the left side.        Posterior tibial pulses are 2+ on the right side, and 2+ on the left side.   Pulmonary/Chest: Effort normal and breath sounds normal. No accessory muscle usage or stridor. No apnea, no tachypnea and no bradypnea. He is not intubated. No respiratory distress. He has no decreased breath sounds. He has no wheezes. He has no rales. He exhibits no tenderness and no bony tenderness.   Abdominal: He exhibits no distension, no pulsatile liver, no abdominal bruit, no ascites, no pulsatile midline mass and no mass. There is no tenderness. There is no rebound and no guarding.   Musculoskeletal: Normal range of motion. He exhibits no edema or tenderness.   Lymphadenopathy:     He has no cervical adenopathy.   Neurological: He is alert and oriented to person, place, and time. He has normal reflexes. No cranial nerve deficit. Coordination normal.   Skin: Skin is warm. No rash noted. No erythema. No  pallor.   Psychiatric: He has a normal mood and affect. His behavior is normal. Judgment and thought content normal.       Assessment:     1. Tobacco abuse    2. Paroxysmal atrial fibrillation    3. Coronary artery disease involving native coronary artery of native heart without angina pectoris    4. Pacemaker    5. Essential hypertension    6. Mixed hyperlipidemia    7. PAF (paroxysmal atrial fibrillation)        Plan:     Smoking cessation  Target BP < 130/80 mmHg  Target LDL < 70  If he truly cannot tolerate anti-coagulation he will be evaluated for ANA LUISA closure        Restart lopressor for HTN + rate control             Continue with current medical plan and lifestyle changes.  Return sooner for concerns or questions. If symptoms persist go to the ED  I have reviewed all pertinent data on this patient       I have reviewed the patient's medical history in detail and updated the computerized patient record.    Orders Placed This Encounter   Procedures    Ambulatory Referral to Electrophysiology     Referral Priority:   Routine     Referral Type:   Consultation     Referral Reason:   Specialty Services Required     Referred to Provider:   Sung Briggs MD     Requested Specialty:   Electrophysiology     Number of Visits Requested:   1    Ambulatory referral to Smoking Cessation Program     Referral Priority:   Routine     Referral Type:   Consultation     Referral Reason:   Specialty Services Required     Requested Specialty:   CTTS     Number of Visits Requested:   1    IN OFFICE EKG 12-LEAD (to [facility])     Order Specific Question:   Diagnosis     Answer:   A-fib [[id number]]       Follow up as scheduled. Return sooner for concerns or questions            He expressed verbal understanding and agreed with the plan          Patient's Medications   New Prescriptions    APIXABAN (ELIQUIS) 5 MG TAB    Take 1 tablet (5 mg total) by mouth 2 (two) times daily.    METOPROLOL TARTRATE (LOPRESSOR) 50 MG TABLET    Take 0.5  tablets (25 mg total) by mouth 2 (two) times daily.    ROSUVASTATIN (CRESTOR) 40 MG TAB    Take 1 tablet (40 mg total) by mouth every evening.   Previous Medications    ASPIRIN (ECOTRIN) 81 MG EC TABLET    Take 81 mg by mouth once daily.    GLIMEPIRIDE (AMARYL) 2 MG TABLET    Take 2 mg by mouth every morning.    MIRTAZAPINE (REMERON) 15 MG TABLET    15 mg once daily.    OLANZAPINE (ZYPREXA) 2.5 MG TABLET    2.5 mg once daily.    OXYCODONE-ACETAMINOPHEN (PERCOCET)  MG PER TABLET    Take 1 tablet by mouth every 4 (four) hours as needed for Pain.    SOLUS V2 LANCETS 30 GAUGE MISC        SOLUS V2 TEST STRIPS STRP       Modified Medications    No medications on file   Discontinued Medications

## 2020-01-29 NOTE — PATIENT INSTRUCTIONS
Heart Disease Education    The heart beats 60 to 100 times per minute, 24 hours a day. This equals almost 1000,000 times a day. It pumps blood with oxygen and nutrients to the tissues and organs of the body. But the heart is a muscle and needs its own supply of blood. Blood flow to the heart is supplied by the coronary arteries. Coronary artery disease (atherosclerosis) is a result of cholesterol, saturated fat, and calcium deposits (plaques) that build up inside the walls. This causes inflammation within the coronary arteries. These plaques narrow the artery and reduce blood flow to the heart muscle. The reduction in blood flow to the heart muscle decreases oxygen supply to the heart. If the narrowing is significant enough, the oxygen supply to one or more regions of the heart can be temporarily or permanently shut down. This can cause chest pain, and possibly death of heart tissue (heart attack).  Types of chest pain  Angina is the name for pain in the heart muscle. Angina is a warning sign of serious heart disease. When untreated it can lead to a heart attack, also known as acute myocardial infarction, or AMI. Angina occurs when there is not enough blood and oxygen flowing to the heart for the amount of work it is doing. This most often happens during physical exertion, when the heart is working hardest. It is usually relieved by rest or nitroglycerin. Angina may also occur after a large meal when extra blood is sent to the digestive organs and less goes to the heart. In the case of advanced or unstable heart disease, angina can occur at rest or awaken you from sleep. Angina usually lasts from a few minutes up to 20 minutes or more. When treated early, the effects of angina can be reversed without permanent damage to the heart. Angina is a serious condition and needs to be evaluated by a medical professional immediately.  There are two types of angina -- stable and unstable:  · Stable angina usually occurs  with a predictable level of activity. Being stable, its character, severity, and occurrence do not change much over time. It usually starts with activity, and resolves with rest or taking your medicine as instructed by your doctor. The symptoms usually do not last long.  · Unstable angina changes or gets worse over time. It is different from whatever you are used to. It may feel different or worse, begin without cause, occur with exercise or exertion, wake you up from sleep, and last longer. It may not respond in the same way as it does when you take your usual medicines for an attack. This type of angina can be a warning sign of an impending heart attack.     A heart attack is usually the result of a blood clot that suddenly forms in a coronary artery that has been narrowed with plaque. When this occurs, blood flow may be cut off to a part of the heart muscle, causing the cells to die. This weakens the pumping action of the heart, which affects the delivery of blood to all the other organs in the body including the brain. This damage is not reversible. However, early treatment can limit the amount of damage.  The pain you feel with angina and a heart attack may have a similar quality. However, it is usually different in intensity and duration. Here are some typical descriptions of a heart attack:  · It is most often experienced as a squeezing, crushing, pressure-like sensation in the center of the chest.  · It is sometimes described as something heavy sitting on my chest.  · It may feel more like a bad case of indigestion.  · The pain may spread from the chest to the arm, shoulder, throat or jaw.  · Sometimes the pain is not felt in the chest at all, but only in the arm, shoulder, throat or jaw.  · There may also be nausea, vomiting, dizziness or light-headedness, sweating and trouble breathing.  · Palpitations, or your heart beating rapidly  · A new, irregular heart beat  · Unexplained weakness  You may not be  "able to tell the difference between "bad" angina and a heart attack at home. Seek help if your symptoms are different than usual. Do not be in denial or just try to "tough it out."  Call 911  This is the fastest and safest way to get to the emergency department. The paramedics can also start treatment on the way to the hospital, saving valuable time for your heart.  · If the angina gets worse, if it continues, or if it stops and returns, call 911 immediately. Do not delay. You may be having a heart attack.  · After you call 911, take a second tablet or spray unless instructed otherwise. When repeating doses, sit down if possible, because it can make you feel lightheaded or dizzy. Wait another 5 minutes. If the angina still does not go away, take a third tablet or spray. Do not take more than 3 tablets or sprays within 15 minutes. Stay on the phone with 911 for further instruction.  · Your healthcare provider may give you slightly different instructions than those above. If so, follow them carefully.  Do not wait until symptoms become severe to call 911.  Other reasons to call 911 include:  · Trouble breathing  · Feeling lightheaded, faint, or dizzy  · Rapid heart beat  · Slower than usual heart rate compared to your normal  · Angina with weakness, dizziness, fainting, heavy sweating, nausea, or vomiting  · Extreme drowsiness, confusion  · Weakness of an arm or leg or one side of the face  · Difficulty with speech or vision  When to seek medical care  Remember, the signs and symptoms of a heart attack are not always like they are on TV. Sometimes they are not so obvious. You may only feel weak, or just not right. If it is not clear or if you have any doubt, call for advice.  · Seek help if there is a change in the type of pain, if it feels different, or if your symptoms are mild.  · Do not drive yourself. Have someone else drive you. If no one can drive, call 911.  · Do not delay. Fast diagnosis and treatment can " "prevent or limit the amount of heart damage during a heart attack.  · Do not go to your doctor's office or a clinic as they may not be able to provide all the testing and treatment required for this condition.  · If your doctor has given you medicine to take when symptoms occur, take them but don't delay getting help trying to locate medicines.  What happens in the emergency department  The emergency department is connected to your local emergency medical system (EMS) through 911. That's why during a cardiac emergency, calling 911 is the fastest way to get help. The goal of the emergency department is to rapidly screen, evaluate, and treat people.  Once you are there, an electrocardiogram (ECG or heart tracing) will be done. Blood samples may be taken to look for the presence of heart enzymes that leak from damaged heart cells and show if a heart attack is occurring. You will often be evaluated by a heart specialist (cardiologist) who decides the best course of action. In the case of severe angina or early heart attack, and depending on the circumstances, powerful "clot busting" medicines can be used to dissolve blood clots in the coronary artery. In other cases, you may be taken to a cardiac catheterization lab. Here, a tiny balloon-tipped catheter is advanced through blood vessels to the heart. There the balloon is inflated pushing open the blood vessel restoring blood flow.  Risk factors for heart disease  Risk factors for heart disease are a combination of genetic and lifestyle. Many risk factors work by either directly or indirectly damaging the blood vessels of the heart, or by increasing the risk of forming blood or cholesterol clots, which then clog up and block the arteries.     Examples of physical lifestyle risk factors:  · Cigarette smoking  · High blood pressure  · High blood cholesterol  · Use of stimulant drugs such as cocaine, crack, and amphetamines  · Eating a high-fat, high-cholesterol " meal  · Diabetes   · Obesity which increases risk for diabetes and high blood pressure  · Lack of regular physical activity     Examples of emotional lifestyle factors:  · Chronic high stress levels release stress hormones. These raise blood pressure and cholesterol level and makes blood clot more easily.  · Held-in anger, hostile or cynical attitude  · Social and emotional isolation, lack of intimacy  · Loss of relationship  · Depression  Other factors that increase the risk of heart attack that you cannot control :  · Age. The older you get beyond 40, the greater is your risk of significant coronary artery disease.  · Gender. More men than women get heart disease; but once past menopause, women who are not taking estrogen replacement have the same risk as men for a heart attack.  · Family history. If your mother, father, brother or sister has coronary artery disease, your risk of having it is higher than a person your age without this family history.  What can you do to decrease your risk  To reduce your risk of heart disease:  · Get regular checkups with your doctor.  · Take your medicines for blood pressure, cholesterol or diabetes as directed.  · Watch your diet. Eat a heart healthy diet choosing fresh foods, less salt, cholesterol, and fat  · Stop smoking. Get help if needed.  · Get regular exercise.  · Manage stress.  · Carry a list of medicines and doses in your wallet.  Date Last Reviewed: 12/30/2015  © 3059-9582 [employer]. [address] [place] [address], [place] [address]. All rights reserved. This information is not intended as a substitute for professional medical care. Always follow your healthcare professional's instructions.

## 2020-01-30 ENCOUNTER — TELEPHONE (OUTPATIENT)
Dept: CARDIOLOGY | Facility: CLINIC | Age: 80
End: 2020-01-30

## 2020-01-31 ENCOUNTER — TELEPHONE (OUTPATIENT)
Dept: CARDIOLOGY | Facility: CLINIC | Age: 80
End: 2020-01-31

## 2020-01-31 NOTE — TELEPHONE ENCOUNTER
Current device MELA CAAL S/N [id number] not compatible with remote monitoring.  Needs every six month in office device checks.

## 2020-02-03 RX ORDER — METOPROLOL TARTRATE 50 MG/1
25 TABLET ORAL 2 TIMES DAILY
Qty: 180 TABLET | Refills: 3 | Status: SHIPPED | OUTPATIENT
Start: 2020-02-03

## 2020-02-03 RX ORDER — ROSUVASTATIN CALCIUM 40 MG/1
40 TABLET, COATED ORAL NIGHTLY
Qty: 90 TABLET | Refills: 3 | Status: SHIPPED | OUTPATIENT
Start: 2020-02-03 | End: 2020-10-04

## 2020-02-11 ENCOUNTER — TELEPHONE (OUTPATIENT)
Dept: CARDIOLOGY | Facility: CLINIC | Age: 80
End: 2020-02-11

## 2020-02-11 NOTE — TELEPHONE ENCOUNTER
MD Elli Correa MA                 Please       He can start eliquis, lopressor, and crestor       Thanks       ZN                 I called and spoke to patient about the 3 medications to start ,    Eliquis    Lopressor,    Crestor.    Patient stated he had already  [facility].      elli Dejesus (rita).

## 2020-03-12 NOTE — PROGRESS NOTES
Pacemaker Evaluation Report      1/29/2020    Primary MD: Dr. Terrazas  Primary Cardiologist: Dr. Tracy     : [employer] Model: Evia DR/Serial No.  [id number]  Implant date: Jan 13, 2011    Reason for evaluation:routine  Indication for pacemaker: unknown    Measurements  Atrial sensing - P wave: 1.7 mV  Atrial capture: Maintained: 0.7 V @ 0.5 msec  Atrial lead impedance: 429 ohms  Ventricular sensing - R wave: 10 mV  Ventricular capture: RV Maintained: 1.1 V @ 0.4 ms   Ventricular lead impedance: right - 448 ohms   Underlying rhythm: pacer dependent       Diagnostic Data  Atrial paced: 22 % Ventricular paced: 46 %  Mode switch: on  Other: 21% Afib burden - longest episode 32 days   Sensor response: CLS  Battery status: satisfactory Magnet rate: 90 bpm   50% remaining capacity  Est. Longevity 4 years      Final Parameters  Mode: DDD-CLS Lower rate: 60 bpm Upper rate: 130 bpm  Atrial - Amplitude: 1.6 V Pulse width: 0.5 ms sensitivity 0.8 ms  Polarity: Bipolar  Ventricular - Amplitude: 2.5 V Pulse width: 0.5 ms sensitivity 2.5 ms  Polarity: Bipolar  Changes made:  Restart statistics   Conclusions: Normal device function.  Afib burden 21%.    Follow up: 6 months      Carlos Quiroga

## 2020-06-12 ENCOUNTER — HOSPITAL ENCOUNTER (INPATIENT)
Facility: HOSPITAL | Age: 80
LOS: 3 days | Discharge: HOME | DRG: 438 | End: 2020-06-15
Attending: EMERGENCY MEDICINE | Admitting: INTERNAL MEDICINE
Payer: MEDICARE

## 2020-06-12 DIAGNOSIS — E83.39 HYPERPHOSPHATEMIA: ICD-10-CM

## 2020-06-12 DIAGNOSIS — R93.2 ABNORMAL FINDINGS ON IMAGING OF BILIARY TRACT: ICD-10-CM

## 2020-06-12 DIAGNOSIS — K85.10 ACUTE BILIARY PANCREATITIS WITHOUT INFECTION OR NECROSIS: Primary | ICD-10-CM

## 2020-06-12 DIAGNOSIS — R07.9 CHEST PAIN: ICD-10-CM

## 2020-06-12 DIAGNOSIS — E87.5 HYPERKALEMIA: ICD-10-CM

## 2020-06-12 DIAGNOSIS — R74.01 TRANSAMINITIS: ICD-10-CM

## 2020-06-12 DIAGNOSIS — E11.9 TYPE 2 DIABETES MELLITUS WITHOUT COMPLICATION, UNSPECIFIED WHETHER LONG TERM INSULIN USE: ICD-10-CM

## 2020-06-12 DIAGNOSIS — D69.6 THROMBOCYTOPENIA: ICD-10-CM

## 2020-06-12 DIAGNOSIS — R10.13 EPIGASTRIC PAIN: ICD-10-CM

## 2020-06-12 PROBLEM — E03.8 SUBCLINICAL HYPOTHYROIDISM: Status: ACTIVE | Noted: 2020-06-12

## 2020-06-12 LAB
ALBUMIN SERPL BCP-MCNC: 4.4 G/DL (ref 3.5–5.2)
ALP SERPL-CCNC: 151 U/L (ref 38–126)
ALT SERPL W/O P-5'-P-CCNC: 169 U/L (ref 10–44)
ANION GAP SERPL CALC-SCNC: 9 MMOL/L (ref 8–16)
AST SERPL-CCNC: 176 U/L (ref 15–46)
BASOPHILS # BLD AUTO: 0.01 K/UL (ref 0–0.2)
BASOPHILS # BLD AUTO: 0.02 K/UL (ref 0–0.2)
BASOPHILS NFR BLD: 0.2 % (ref 0–1.9)
BASOPHILS NFR BLD: 0.6 % (ref 0–1.9)
BILIRUB SERPL-MCNC: 2.6 MG/DL (ref 0.1–1)
BUN SERPL-MCNC: 20 MG/DL (ref 2–20)
CALCIUM SERPL-MCNC: 9.5 MG/DL (ref 8.7–10.5)
CHLORIDE SERPL-SCNC: 108 MMOL/L (ref 95–110)
CO2 SERPL-SCNC: 21 MMOL/L (ref 23–29)
CREAT SERPL-MCNC: 0.89 MG/DL (ref 0.5–1.4)
DIFFERENTIAL METHOD: ABNORMAL
DIFFERENTIAL METHOD: ABNORMAL
EOSINOPHIL # BLD AUTO: 0 K/UL (ref 0–0.5)
EOSINOPHIL # BLD AUTO: 0 K/UL (ref 0–0.5)
EOSINOPHIL NFR BLD: 0.2 % (ref 0–8)
EOSINOPHIL NFR BLD: 0.3 % (ref 0–8)
ERYTHROCYTE [DISTWIDTH] IN BLOOD BY AUTOMATED COUNT: 13.8 % (ref 11.5–14.5)
ERYTHROCYTE [DISTWIDTH] IN BLOOD BY AUTOMATED COUNT: 13.8 % (ref 11.5–14.5)
EST. GFR  (AFRICAN AMERICAN): >60 ML/MIN/1.73 M^2
EST. GFR  (NON AFRICAN AMERICAN): >60 ML/MIN/1.73 M^2
ESTIMATED AVG GLUCOSE: 114 MG/DL (ref 68–131)
GLUCOSE SERPL-MCNC: 177 MG/DL (ref 70–110)
HBA1C MFR BLD HPLC: 5.6 % (ref 4–5.6)
HCT VFR BLD AUTO: 40 % (ref 40–54)
HCT VFR BLD AUTO: 45.1 % (ref 40–54)
HGB BLD-MCNC: 13.2 G/DL (ref 14–18)
HGB BLD-MCNC: 14.3 G/DL (ref 14–18)
IMM GRANULOCYTES # BLD AUTO: 0.03 K/UL (ref 0–0.04)
IMM GRANULOCYTES # BLD AUTO: 0.05 K/UL (ref 0–0.04)
IMM GRANULOCYTES NFR BLD AUTO: 0.9 % (ref 0–0.5)
IMM GRANULOCYTES NFR BLD AUTO: 1 % (ref 0–0.5)
LIPASE SERPL-CCNC: 3398 U/L (ref 23–300)
LYMPHOCYTES # BLD AUTO: 0.6 K/UL (ref 1–4.8)
LYMPHOCYTES # BLD AUTO: 0.7 K/UL (ref 1–4.8)
LYMPHOCYTES NFR BLD: 13.4 % (ref 18–48)
LYMPHOCYTES NFR BLD: 17.6 % (ref 18–48)
MAGNESIUM SERPL-MCNC: 2.1 MG/DL (ref 1.6–2.6)
MCH RBC QN AUTO: 28.1 PG (ref 27–31)
MCH RBC QN AUTO: 28.8 PG (ref 27–31)
MCHC RBC AUTO-ENTMCNC: 31.7 G/DL (ref 32–36)
MCHC RBC AUTO-ENTMCNC: 33 G/DL (ref 32–36)
MCV RBC AUTO: 87 FL (ref 82–98)
MCV RBC AUTO: 89 FL (ref 82–98)
MONOCYTES # BLD AUTO: 0.4 K/UL (ref 0.3–1)
MONOCYTES # BLD AUTO: 0.6 K/UL (ref 0.3–1)
MONOCYTES NFR BLD: 10.9 % (ref 4–15)
MONOCYTES NFR BLD: 12.8 % (ref 4–15)
NEUTROPHILS # BLD AUTO: 2.3 K/UL (ref 1.8–7.7)
NEUTROPHILS # BLD AUTO: 3.6 K/UL (ref 1.8–7.7)
NEUTROPHILS NFR BLD: 69.7 % (ref 38–73)
NEUTROPHILS NFR BLD: 72.4 % (ref 38–73)
NRBC BLD-RTO: 0 /100 WBC
NRBC BLD-RTO: 0 /100 WBC
NT-PROBNP: 985 PG/ML (ref 5–1800)
PLATELET # BLD AUTO: 102 K/UL (ref 150–350)
PLATELET # BLD AUTO: 94 K/UL (ref 150–350)
PMV BLD AUTO: 9.7 FL (ref 9.2–12.9)
PMV BLD AUTO: 9.9 FL (ref 9.2–12.9)
POCT GLUCOSE: 76 MG/DL (ref 70–110)
POTASSIUM SERPL-SCNC: 4.2 MMOL/L (ref 3.5–5.1)
PROT SERPL-MCNC: 7.7 G/DL (ref 6–8.4)
RBC # BLD AUTO: 4.59 M/UL (ref 4.6–6.2)
RBC # BLD AUTO: 5.09 M/UL (ref 4.6–6.2)
SARS-COV-2 RDRP RESP QL NAA+PROBE: NEGATIVE
SODIUM SERPL-SCNC: 138 MMOL/L (ref 136–145)
T4 FREE SERPL-MCNC: 0.9 NG/DL (ref 0.71–1.51)
TROPONIN I SERPL DL<=0.01 NG/ML-MCNC: <0.012 NG/ML (ref 0.01–0.03)
TROPONIN I SERPL DL<=0.01 NG/ML-MCNC: <0.012 NG/ML (ref 0.01–0.03)
TSH SERPL DL<=0.005 MIU/L-ACNC: 7.32 UIU/ML (ref 0.4–4)
WBC # BLD AUTO: 3.3 K/UL (ref 3.9–12.7)
WBC # BLD AUTO: 5 K/UL (ref 3.9–12.7)

## 2020-06-12 PROCEDURE — 93010 EKG 12-LEAD: ICD-10-PCS | Mod: ,,, | Performed by: INTERNAL MEDICINE

## 2020-06-12 PROCEDURE — 25000003 PHARM REV CODE 250: Performed by: STUDENT IN AN ORGANIZED HEALTH CARE EDUCATION/TRAINING PROGRAM

## 2020-06-12 PROCEDURE — U0002 COVID-19 LAB TEST NON-CDC: HCPCS | Mod: ER

## 2020-06-12 PROCEDURE — 63600175 PHARM REV CODE 636 W HCPCS: Mod: ER | Performed by: EMERGENCY MEDICINE

## 2020-06-12 PROCEDURE — 96361 HYDRATE IV INFUSION ADD-ON: CPT | Mod: ER

## 2020-06-12 PROCEDURE — 93005 ELECTROCARDIOGRAM TRACING: CPT | Mod: ER

## 2020-06-12 PROCEDURE — 85025 COMPLETE CBC W/AUTO DIFF WBC: CPT | Mod: ER

## 2020-06-12 PROCEDURE — 83880 ASSAY OF NATRIURETIC PEPTIDE: CPT | Mod: ER

## 2020-06-12 PROCEDURE — 83735 ASSAY OF MAGNESIUM: CPT | Mod: ER

## 2020-06-12 PROCEDURE — 80053 COMPREHEN METABOLIC PANEL: CPT | Mod: ER

## 2020-06-12 PROCEDURE — 11000001 HC ACUTE MED/SURG PRIVATE ROOM

## 2020-06-12 PROCEDURE — 25000003 PHARM REV CODE 250: Mod: ER | Performed by: EMERGENCY MEDICINE

## 2020-06-12 PROCEDURE — 83690 ASSAY OF LIPASE: CPT | Mod: ER

## 2020-06-12 PROCEDURE — 96374 THER/PROPH/DIAG INJ IV PUSH: CPT | Mod: ER

## 2020-06-12 PROCEDURE — 99285 EMERGENCY DEPT VISIT HI MDM: CPT | Mod: 25,ER

## 2020-06-12 PROCEDURE — 83036 HEMOGLOBIN GLYCOSYLATED A1C: CPT

## 2020-06-12 PROCEDURE — 36415 COLL VENOUS BLD VENIPUNCTURE: CPT

## 2020-06-12 PROCEDURE — 84484 ASSAY OF TROPONIN QUANT: CPT | Mod: ER

## 2020-06-12 PROCEDURE — 63600175 PHARM REV CODE 636 W HCPCS: Performed by: STUDENT IN AN ORGANIZED HEALTH CARE EDUCATION/TRAINING PROGRAM

## 2020-06-12 PROCEDURE — 84443 ASSAY THYROID STIM HORMONE: CPT

## 2020-06-12 PROCEDURE — 93010 ELECTROCARDIOGRAM REPORT: CPT | Mod: ,,, | Performed by: INTERNAL MEDICINE

## 2020-06-12 PROCEDURE — 96376 TX/PRO/DX INJ SAME DRUG ADON: CPT | Mod: ER

## 2020-06-12 PROCEDURE — 85025 COMPLETE CBC W/AUTO DIFF WBC: CPT | Mod: 91

## 2020-06-12 PROCEDURE — 84439 ASSAY OF FREE THYROXINE: CPT

## 2020-06-12 PROCEDURE — 96375 TX/PRO/DX INJ NEW DRUG ADDON: CPT | Mod: ER

## 2020-06-12 PROCEDURE — 80307 DRUG TEST PRSMV CHEM ANLYZR: CPT

## 2020-06-12 RX ORDER — ASPIRIN 325 MG
325 TABLET ORAL
Status: COMPLETED | OUTPATIENT
Start: 2020-06-12 | End: 2020-06-12

## 2020-06-12 RX ORDER — GABAPENTIN 300 MG/1
300 CAPSULE ORAL 2 TIMES DAILY
Status: DISCONTINUED | OUTPATIENT
Start: 2020-06-12 | End: 2020-06-15 | Stop reason: HOSPADM

## 2020-06-12 RX ORDER — TALC
9 POWDER (GRAM) TOPICAL NIGHTLY
Status: DISCONTINUED | OUTPATIENT
Start: 2020-06-12 | End: 2020-06-15 | Stop reason: HOSPADM

## 2020-06-12 RX ORDER — MIRTAZAPINE 15 MG/1
15 TABLET, FILM COATED ORAL NIGHTLY
Status: DISCONTINUED | OUTPATIENT
Start: 2020-06-12 | End: 2020-06-12

## 2020-06-12 RX ORDER — GABAPENTIN 300 MG/1
300 CAPSULE ORAL 2 TIMES DAILY
COMMUNITY
End: 2022-10-19

## 2020-06-12 RX ORDER — KETOROLAC TROMETHAMINE 30 MG/ML
15 INJECTION, SOLUTION INTRAMUSCULAR; INTRAVENOUS
Status: COMPLETED | OUTPATIENT
Start: 2020-06-12 | End: 2020-06-12

## 2020-06-12 RX ORDER — METOPROLOL TARTRATE 25 MG/1
25 TABLET, FILM COATED ORAL 2 TIMES DAILY
Status: DISCONTINUED | OUTPATIENT
Start: 2020-06-12 | End: 2020-06-15 | Stop reason: HOSPADM

## 2020-06-12 RX ORDER — OLANZAPINE 2.5 MG/1
2.5 TABLET ORAL NIGHTLY
Status: DISCONTINUED | OUTPATIENT
Start: 2020-06-12 | End: 2020-06-15 | Stop reason: HOSPADM

## 2020-06-12 RX ORDER — ACETAMINOPHEN 325 MG/1
650 TABLET ORAL EVERY 6 HOURS
Status: DISCONTINUED | OUTPATIENT
Start: 2020-06-13 | End: 2020-06-15 | Stop reason: HOSPADM

## 2020-06-12 RX ORDER — MORPHINE SULFATE 4 MG/ML
4 INJECTION, SOLUTION INTRAMUSCULAR; INTRAVENOUS
Status: COMPLETED | OUTPATIENT
Start: 2020-06-12 | End: 2020-06-12

## 2020-06-12 RX ORDER — METOPROLOL TARTRATE 25 MG/1
25 TABLET, FILM COATED ORAL
Status: COMPLETED | OUTPATIENT
Start: 2020-06-12 | End: 2020-06-12

## 2020-06-12 RX ORDER — ASPIRIN 81 MG/1
81 TABLET ORAL DAILY
Status: DISCONTINUED | OUTPATIENT
Start: 2020-06-13 | End: 2020-06-15 | Stop reason: HOSPADM

## 2020-06-12 RX ORDER — PROCHLORPERAZINE EDISYLATE 5 MG/ML
10 INJECTION INTRAMUSCULAR; INTRAVENOUS
Status: COMPLETED | OUTPATIENT
Start: 2020-06-12 | End: 2020-06-12

## 2020-06-12 RX ORDER — MORPHINE SULFATE 2 MG/ML
2 INJECTION, SOLUTION INTRAMUSCULAR; INTRAVENOUS EVERY 4 HOURS PRN
Status: DISCONTINUED | OUTPATIENT
Start: 2020-06-12 | End: 2020-06-15 | Stop reason: HOSPADM

## 2020-06-12 RX ORDER — SODIUM CHLORIDE, SODIUM LACTATE, POTASSIUM CHLORIDE, CALCIUM CHLORIDE 600; 310; 30; 20 MG/100ML; MG/100ML; MG/100ML; MG/100ML
INJECTION, SOLUTION INTRAVENOUS CONTINUOUS
Status: DISCONTINUED | OUTPATIENT
Start: 2020-06-12 | End: 2020-06-14

## 2020-06-12 RX ADMIN — PROCHLORPERAZINE EDISYLATE 10 MG: 5 INJECTION INTRAMUSCULAR; INTRAVENOUS at 12:06

## 2020-06-12 RX ADMIN — ASPIRIN 325 MG ORAL TABLET 325 MG: 325 PILL ORAL at 11:06

## 2020-06-12 RX ADMIN — KETOROLAC TROMETHAMINE 15 MG: 30 INJECTION, SOLUTION INTRAMUSCULAR at 02:06

## 2020-06-12 RX ADMIN — GABAPENTIN 300 MG: 300 CAPSULE ORAL at 09:06

## 2020-06-12 RX ADMIN — MORPHINE SULFATE 2 MG: 2 INJECTION, SOLUTION INTRAMUSCULAR; INTRAVENOUS at 09:06

## 2020-06-12 RX ADMIN — SODIUM CHLORIDE 500 ML: 0.9 INJECTION, SOLUTION INTRAVENOUS at 12:06

## 2020-06-12 RX ADMIN — METOPROLOL TARTRATE 25 MG: 25 TABLET, FILM COATED ORAL at 06:06

## 2020-06-12 RX ADMIN — MORPHINE SULFATE 4 MG: 4 INJECTION INTRAVENOUS at 03:06

## 2020-06-12 RX ADMIN — SODIUM CHLORIDE, SODIUM LACTATE, POTASSIUM CHLORIDE, AND CALCIUM CHLORIDE: .6; .31; .03; .02 INJECTION, SOLUTION INTRAVENOUS at 09:06

## 2020-06-12 RX ADMIN — MORPHINE SULFATE 4 MG: 4 INJECTION INTRAVENOUS at 06:06

## 2020-06-12 RX ADMIN — OLANZAPINE 2.5 MG: 2.5 TABLET, FILM COATED ORAL at 09:06

## 2020-06-12 RX ADMIN — Medication 9 MG: at 09:06

## 2020-06-12 RX ADMIN — METOPROLOL TARTRATE 25 MG: 25 TABLET ORAL at 09:06

## 2020-06-12 RX ADMIN — APIXABAN 5 MG: 5 TABLET, FILM COATED ORAL at 09:06

## 2020-06-12 NOTE — ED NOTES
Pt sitting up on side of bed, no distress noted; resp even and unlabored; reports improvement of pain at this time; will continue to monitor closely.   narrow base of support/increased time in double stance/decreased henry/decreased step length/decreased weight-shifting ability

## 2020-06-12 NOTE — H&P
[facility] History and Physical - Resident Note    Admitting Team: [facility] Team B  Attending Physician: Brian  Resident: Ezequiel/ Shahida  Interns: Joseph/ Britta     Date of Admit: 6/12/2020    Chief Complaint     Chest Pain, midsternal chest pain, diaphoresis since this morning.     Subjective:      History of Present Illness:  Venu Lau is a 80 y.o. male who  has a past medical history of *Atrial flutter, Anemia, Bipolar 1 disorder, Coronary artery disease, Diabetes mellitus, Diabetes mellitus, type 2, GERD (gastroesophageal reflux disease), Gout, unspecified, H/O: GI bleed, Hypertension, Liver cancer, PVD (peripheral vascular disease), Sciatica, SSS (sick sinus syndrome), and Tachy-valentine syndrome.. The patient presented to [facility] on 6/12/2020 with a primary complaint of Chest Pain (Pt c/o midsternal chest pain, diaphoresis and productive cough with brown mucous since this morning. Deneis SOB, body aches or sore throat. )    Mr. Lau was in his normal state of health until this morning. He reports waking up feeling normal but after his morning cup of coffee he developed a constant low chest to epigastric aching pain. He waited for his pain to resolve but it worsened after a 2nd cup of coffee. At the time of evaluation it was 8/10, constant, and worsened with movement. He denies ever having any pain like this before. He denies any nausea, vomiting, diarrhea, constipation, SOB, fever. He recently had attempted to drink some Gatorade which made the pain worse. Pain improved with morphine in ED.     He denies any history of pancreas issues but does have a history of liver cancer s/p resection about 10 years ago which he is followed closely for. He has a significant cardiac history but this felt different than his normal cardiac pain. He has previous imaging showing biliary dilation.     Pertinent positives and negatives are listed in HPI.  All other systems are reviewed and  are negative.    Past Medical History:  Past Medical History:   Diagnosis Date    *Atrial flutter     Anemia     Bipolar 1 disorder     Coronary artery disease     Diabetes mellitus     Diabetes mellitus, type 2     GERD (gastroesophageal reflux disease)     Gout, unspecified     H/O: GI bleed     Hypertension     Liver cancer     PVD (peripheral vascular disease)     Sciatica     SSS (sick sinus syndrome)     Tachy-valentine syndrome        Past Surgical History:  Past Surgical History:   Procedure Laterality Date    APPENDECTOMY      CHOLECYSTECTOMY      CORONARY ARTERY BYPASS GRAFT      INSERT / REPLACE / REMOVE PACEMAKER      LIVER RESECTION      LUMBAR DISC SURGERY         Allergies:  Review of patient's allergies indicates:  No Known Allergies    Home Medications:  Prior to Admission medications    Medication Sig Start Date End Date Taking? Authorizing Provider   apixaban (ELIQUIS) 5 mg Tab Take 1 tablet (5 mg total) by mouth 2 (two) times daily. 2/3/20   Carlos Quiroga MD   aspirin (ECOTRIN) 81 MG EC tablet Take 81 mg by mouth once daily.    Historical Provider, MD   glimepiride (AMARYL) 2 MG tablet Take 2 mg by mouth every morning. 1/2/20   Historical Provider, MD   metoprolol tartrate (LOPRESSOR) 50 MG tablet Take 0.5 tablets (25 mg total) by mouth 2 (two) times daily. 2/3/20   Carlos Quiroga MD   mirtazapine (REMERON) 15 MG tablet 15 mg once daily. 1/2/20   Historical Provider, MD   OLANZapine (ZYPREXA) 2.5 MG tablet 2.5 mg once daily. 12/30/19   Historical Provider, MD   oxycodone-acetaminophen (PERCOCET)  mg per tablet Take 1 tablet by mouth every 4 (four) hours as needed for Pain.    Historical Provider, MD   rosuvastatin (CRESTOR) 40 MG Tab Take 1 tablet (40 mg total) by mouth every evening. 2/3/20 2/2/21  Carlos Quiroga MD   SOLUS V2 LANCETS 30 gauge Misc  11/6/13   Historical Provider, MD ROONEY V2 TEST STRIPS Strp  11/30/13   Historical Provider, MD       Family  "History:  Family History   Problem Relation Age of Onset    Heart disease Mother     Pancreatic cancer Mother     Heart disease Father        Social History:  Social History     Tobacco Use    Smoking status: Current Some Day Smoker     Packs/day: 0.25     Types: Cigarettes     Last attempt to quit: 1992     Years since quittin.5    Smokeless tobacco: Never Used   Substance Use Topics    Alcohol use: Yes     Alcohol/week: 5.0 standard drinks     Types: 5 Cans of beer per week    Drug use: No       Review of Systems:  Pertinent items are noted in HPI. All other systems are reviewed and are negative.    Health Maintaince :   Primary Care Physician: Dr. Miller   Immunizations:     TDap is up to date  Influenza is up to date  Pneumovax is up to date  Cancer Screening:  Colonoscopy: is not up to date. Never had.      Objective:   Last 24 Hour Vital Signs:  BP  Min: 137/77  Max: 202/98  Temp  Av.5 °F (36.9 °C)  Min: 98.3 °F (36.8 °C)  Max: 98.7 °F (37.1 °C)  Pulse  Av.3  Min: 69  Max: 71  Resp  Av.3  Min: 16  Max: 20  SpO2  Av.4 %  Min: 99 %  Max: 100 %  Height  Av' 5" (165.1 cm)  Min: 5' 5" (165.1 cm)  Max: 5' 5" (165.1 cm)  Weight  Av.8 kg (165 lb)  Min: 74.8 kg (165 lb)  Max: 74.8 kg (165 lb)  Body mass index is 27.46 kg/m².  I/O last 3 completed shifts:  In: 500 [IV Piggyback:500]  Out: -     Physical Examination:  BP (!) 189/98   Pulse 71   Temp 98.7 °F (37.1 °C)   Resp 20   Ht 5' 5" (1.651 m)   Wt 74.8 kg (165 lb)   SpO2 99%   BMI 27.46 kg/m²   General appearance: A&OX3, appears stated age and cooperative  HEENT: conjunctivae/corneas clear. L pupil oval in shape, pupils reactive to light, EOM's intact.   Neck: supple, no adenopathy, no carotid bruit, unable to appreciate JVP, trachea midline  Lungs: clear to auscultation bilaterally.No wheezes or crackles.   Heart: RRR, S1, S2 normal, 1/6 systolic mumur best heart at L upper sternal border   Abdomen: soft, TTP " diffusely in upper abdomen and periumbilical area; bowel sounds normal, transverse upper abdominal surgical scar, multiple laparoscopic insertion sites  Neuro: CN II-XII grossly normal. No focal motor or sensory deficit.   Extremities: Appears normal. 2+ pulse. FROM. No edema.  Skin: Normal turgor and color. No rashes or lesions      Laboratory:  Most Recent Data:  CBC:   Lab Results   Component Value Date    WBC 3.30 (L) 06/12/2020    HGB 13.2 (L) 06/12/2020    HCT 40.0 06/12/2020    PLT 94 (L) 06/12/2020    MCV 87 06/12/2020    RDW 13.8 06/12/2020     BMP:   Lab Results   Component Value Date     06/12/2020    K 4.2 06/12/2020     06/12/2020    CO2 21 (L) 06/12/2020    BUN 20 06/12/2020    CREATININE 0.89 06/12/2020     (H) 06/12/2020    CALCIUM 9.5 06/12/2020    MG 2.1 06/12/2020     LFTs:   Lab Results   Component Value Date    PROT 7.7 06/12/2020    ALBUMIN 4.4 06/12/2020    BILITOT 2.6 (H) 06/12/2020     (H) 06/12/2020    ALKPHOS 151 (H) 06/12/2020     (H) 06/12/2020     Coags:   Lab Results   Component Value Date    INR 1.0 10/29/2016     FLP:   Lab Results   Component Value Date    CHOL 119 (L) 10/29/2016    HDL 27 (L) 10/29/2016    LDLCALC 73.0 10/29/2016    TRIG 95 10/29/2016    CHOLHDL 22.7 10/29/2016     DM:   Lab Results   Component Value Date    HGBA1C 5.6 06/12/2020    HGBA1C 5.4 10/30/2016    HGBA1C 5.4 10/29/2016    LDLCALC 73.0 10/29/2016    CREATININE 0.89 06/12/2020     Thyroid:   Lab Results   Component Value Date    TSH 2.147 10/29/2016     Anemia:   Lab Results   Component Value Date    IRON 42 (L) 10/29/2016    TIBC 275 10/29/2016    FERRITIN 2,035 (H) 10/29/2016    ABMLSOXN60 610 10/29/2016    FOLATE 5.8 10/29/2016     Cardiac:   Lab Results   Component Value Date    TROPONINI <0.012 06/12/2020     (H) 10/29/2016     Urinalysis:   Lab Results   Component Value Date    LABURIN No growth 10/29/2016    COLORU Yellow 03/02/2019    SPECGRAV 1.010 03/02/2019     NITRITE Negative 03/02/2019    KETONESU Negative 03/02/2019    UROBILINOGEN Negative 03/02/2019       Trended Lab Data:  Recent Labs   Lab 06/12/20  1206   WBC 3.30*   HGB 13.2*   HCT 40.0   PLT 94*   MCV 87   RDW 13.8      K 4.2      CO2 21*   BUN 20   CREATININE 0.89   *   PROT 7.7   ALBUMIN 4.4   BILITOT 2.6*   *   ALKPHOS 151*   *       Trended Cardiac Data:  Recent Labs   Lab 06/12/20  1206 06/12/20  1407   TROPONINI <0.012 <0.012       Microbiology Data:  None    Other Laboratory Data:  Lipase 3398    Other Results:  EKG (my interpretation): Ventricular paced rhythm, L axis deviation -90, no significant ST changes     Radiology:  Imaging Results          CT Abdomen Pelvis  Without Contrast (Final result)  Result time 06/12/20 14:28:43    Final result by Kiran Paez MD (06/12/20 14:28:43)                 Impression:      Suggestion of mild intrahepatic biliary ductal dilation.  Recommend correlation with serum analysis.    Moderate large volume stool throughout the colon. Sigmoid diverticulosis without evidence of diverticulitis.      Electronically signed by: Kiran Paez  Date:    06/12/2020  Time:    14:28             Narrative:    EXAMINATION:  CT ABDOMEN PELVIS WITHOUT CONTRAST    CLINICAL HISTORY:  LLQ pain, suspect diverticulitis;    TECHNIQUE:  Low dose axial images, sagittal and coronal reformations were obtained from the lung bases to the pubic symphysis.  Contrast was not administered.    All CT scans at this location are performed using dose modulation techniques as appropriate to a performed exam including the following: Automated exposure control; adjustment of the mA and/or kV according to patient size.    COMPARISON:  10/29/2016    FINDINGS:  Heart: Normal in size. No pericardial effusion.    Lung Bases: Well aerated, without consolidation or pleural fluid.    Liver: Evidence of prior left hepatic resection.    Gallbladder: Surgically absent.    Bile Ducts:  Suggestion of mild intrahepatic biliary ductal dilation.  Recommend correlation with serum analysis.    Pancreas: No mass or peripancreatic fat stranding.    Spleen: Unremarkable.    Adrenals: Unremarkable.    Kidneys/ Ureters: Chronic perinephric fat stranding similar to prior exams.  No hydronephrosis.  No renal, ureteral, or bladder stones.    Bladder: Mild distention of the bladder with urine.    Reproductive organs: Unremarkable.    GI Tract/Mesentery: Distal esophagus, stomach, duodenum, small bowel unremarkable.  Evidence of prior appendectomy.  Moderate large volume stool throughout the colon.  Sigmoid diverticulosis without evidence of diverticulitis.    Peritoneal Space: No ascites. No free air.    Retroperitoneum: No significant adenopathy.    Abdominal wall: Small fat containing uncomplicated left inguinal hernia.    Vasculature: Aortoiliac  atherosclerotic calcification. No aneurysm.    Bones: Moderate severity multilevel discogenic degenerative change of the lumbar spine.                               US Abdomen Limited (Final result)  Result time 06/12/20 13:56:42    Final result by Josh Thacker MD (06/12/20 13:56:42)                 Impression:      Cholecystectomy with stable common bile duct dilatation, up to 1.1 cm, no change compared with October 2016.  No intrahepatic ductal dilatation.      Electronically signed by: Josh Thacker MD  Date:    06/12/2020  Time:    13:56             Narrative:    EXAMINATION:  US ABDOMEN LIMITED    CLINICAL HISTORY:  Generalized abdominal pain    FINDINGS:  Correlation exams abdominal and pelvic CT without contrast, renal ultrasound 10/29/2016.  The pancreas is unremarkable.  Liver measures 15 cm with normal echogenicity and echotexture.  There is a history of previous hepatectomy.  Cholecystectomy.  Again, the common bile duct is dilated, up to 1.1 cm, same as remote exam.  Right kidney, 9.6 cm, with stable hypoechoic mid pole pyramid.                                X-Ray Chest AP Portable (Final result)  Result time 06/12/20 12:09:29    Final result by Josh Thacker MD (06/12/20 12:09:29)                 Impression:      Stable chest x-ray.      Electronically signed by: Josh Thacker MD  Date:    06/12/2020  Time:    12:09             Narrative:    EXAMINATION:  XR CHEST AP PORTABLE    CLINICAL HISTORY:  Chest Pain;    FINDINGS:  Comparison study 12/16/2017.  No change.  Normal size heart status post CABG.  Left chest wall cardiac pacemaker with intact leads.  Aortic arch atherosclerosis.  No congestion.  Lungs are clear.                                 Assessment:     Venu Lau is a 80 y.o. male with PMHx of CAD s/p CABG, paroxismal a fib/flutter s/p 2 lead pacemaker in DDD-CLS mode, Biplar 1, HTN, HLD, chronic pain, GERD, and a history of GI bleed. He presented to [facility] ED on 6/12 with chest pain, abdominal pain which he noted after waking up the morning of admission. He was found to have biliary dialation on abd US as well as CT AP. Lipase was elevated to 3300 consistent with pancreatitis.     Patient Active Problem List    Diagnosis Date Noted    Acute biliary pancreatitis without infection or necrosis 06/12/2020    Tobacco abuse 01/29/2020    PAF (paroxysmal atrial fibrillation) 02/13/2017    Hypokalemia 10/31/2016    Oropharyngeal dysphagia 10/30/2016    Physical deconditioning 10/30/2016    Anemia, chronic disease 10/30/2016    Acute renal failure 10/29/2016    Hyperkalemia 10/29/2016    Metabolic acidosis 10/29/2016    Acute metabolic encephalopathy 10/29/2016    Uremia 10/29/2016    Polypharmacy 10/29/2016    Normocytic anemia 10/29/2016    Mixed hyperlipidemia 10/29/2016    AKSHAT (acute kidney injury) 10/29/2016    Toxic encephalopathy 10/29/2016    Coronary artery disease involving native coronary artery of native heart without angina pectoris     Diabetes     Essential hypertension     *Atrial flutter     CAD (coronary artery  disease) 11/20/2012    Hx of CABG 11/20/2012    Atrial flutter 11/20/2012    Pacemaker 11/20/2012    Liver cancer 11/20/2012    Villous adenoma 11/20/2012        Plan:     Pancreatitis  -Patient admitted after 1 day of consistent lower chest upper abdominal pain   -US and CT scan with evidence of biliary dilation, s/p cholecystectomy and appendectomy, diverticulosis    -Lipase elevated to 3300   -ALT, AST, alk phos, t bili elevated, consistent with obstructive picture  -ED discussed with GI plan for EUS tomorrow  -Will monitor overnight, plan to administer generous IVF, feed when able to tolerate   -Will check lipid panel, IgG4  -Will hold home meds associated with pancreatitis until more likely cause discovered mirtazipine (class 3), rosuvastatin (class 4)    Obstructive hepatology   -Imaging with biliary dilation, appears chronic  -ALT, AST, alk phos, t bili elevated, consistent with obstructive picture  -ED discussed with GI plan for EUS tomorrow  -Will monitor overnight, plan to administer generous IVF, feed when able to tolerate   -Will obtain acute hepatitis panel, HIV    Normocytic anemia  -H/H 13.2/40, MCV 87  -Will obtain iron panel  -Recommend outpatient colonoscopy but patient has previously refused     Thrombocytopenia  -Plt 94 on admission  -Chart review shows chronic low platelets but lower than normal   -Liver US shows normal hepatic texture, echogenicity, s/p partial hepatectomy   -Will repeat CBC in morning, obtain peripheral smear     CAD  -History of CAD s/p CABG  -TTE 1/27/2020 with LVEF 55%, LVH, mild LAE, mild AS, mild tricuspid regurg  -Continue home ASA    DM2  -On glimepiride at home  -Will order SSI while inpatient    Paroxysmal atrial flutter/ fib, sick sinus syndrome, tachy-valentine arrythmia  -S/p 2 lead pacemaker   -Recently seen in clinic, pacemaker interrogated DDD-CLS mode with normal function  -EKG with ventricular paced rhythm  -Continue home eliquis    HLD  -Patient on  rosuvastatin at home, will hold until more likely cause of pancreatitis    HCM  -HbA1c 5.6  -Hep panel in process  -HIV in process     DVT:Eliquis  Diet:Full liquids if able to tolerate NPO at midnight for procedure   Dispo: Pening GI evaluation EUS     Code Status:     Full     Gus Ruiz MD  [facility] HO-1  [facility]       \[facility]\"" Medicine Hospitalist Pager numbers:   \[facility]\"" Hospitalist Medicine Team A (Tereza/Flakita):          [phone]  \[facility]\"" Hospitalist Medicine Team B (Brian/Michaela):        [phone]

## 2020-06-12 NOTE — ED NOTES
Pt requesting something to eat or some milk, notified Dr Bruce.   Diet cranberry juice given to pt per MD neff. Pt instructed on need for clear liquids only at this time per Dr Bruce, pt verbalized understanding.

## 2020-06-13 LAB
25(OH)D3+25(OH)D2 SERPL-MCNC: 22 NG/ML (ref 30–96)
ALBUMIN SERPL BCP-MCNC: 3.5 G/DL (ref 3.5–5.2)
ALP SERPL-CCNC: 149 U/L (ref 55–135)
ALT SERPL W/O P-5'-P-CCNC: 149 U/L (ref 10–44)
AMPHET+METHAMPHET UR QL: NEGATIVE
ANION GAP SERPL CALC-SCNC: 8 MMOL/L (ref 8–16)
AST SERPL-CCNC: 195 U/L (ref 10–40)
BARBITURATES UR QL SCN>200 NG/ML: NEGATIVE
BASOPHILS # BLD AUTO: 0.01 K/UL (ref 0–0.2)
BASOPHILS NFR BLD: 0.2 % (ref 0–1.9)
BENZODIAZ UR QL SCN>200 NG/ML: NEGATIVE
BILIRUB SERPL-MCNC: 4.2 MG/DL (ref 0.1–1)
BILIRUB UR QL STRIP: ABNORMAL
BUN SERPL-MCNC: 13 MG/DL (ref 8–23)
BZE UR QL SCN: NEGATIVE
CALCIUM SERPL-MCNC: 8.7 MG/DL (ref 8.7–10.5)
CANNABINOIDS UR QL SCN: NORMAL
CHLORIDE SERPL-SCNC: 109 MMOL/L (ref 95–110)
CHOLEST SERPL-MCNC: 63 MG/DL (ref 120–199)
CHOLEST/HDLC SERPL: 2.5 {RATIO} (ref 2–5)
CLARITY UR: CLEAR
CO2 SERPL-SCNC: 20 MMOL/L (ref 23–29)
COLOR UR: YELLOW
CREAT SERPL-MCNC: 0.7 MG/DL (ref 0.5–1.4)
CREAT UR-MCNC: 97 MG/DL (ref 23–375)
DIFFERENTIAL METHOD: ABNORMAL
EOSINOPHIL # BLD AUTO: 0 K/UL (ref 0–0.5)
EOSINOPHIL NFR BLD: 0.2 % (ref 0–8)
ERYTHROCYTE [DISTWIDTH] IN BLOOD BY AUTOMATED COUNT: 13.9 % (ref 11.5–14.5)
EST. GFR  (AFRICAN AMERICAN): >60 ML/MIN/1.73 M^2
EST. GFR  (NON AFRICAN AMERICAN): >60 ML/MIN/1.73 M^2
ETHANOL SERPL-MCNC: <10 MG/DL
ETHANOL UR-MCNC: <10 MG/DL
FERRITIN SERPL-MCNC: 364 NG/ML (ref 20–300)
FOLATE SERPL-MCNC: 12.4 NG/ML (ref 4–24)
GLUCOSE SERPL-MCNC: 69 MG/DL (ref 70–110)
GLUCOSE UR QL STRIP: NEGATIVE
HCT VFR BLD AUTO: 37.8 % (ref 40–54)
HDLC SERPL-MCNC: 25 MG/DL (ref 40–75)
HDLC SERPL: 39.7 % (ref 20–50)
HGB BLD-MCNC: 12.2 G/DL (ref 14–18)
HGB UR QL STRIP: NEGATIVE
IMM GRANULOCYTES # BLD AUTO: 0.06 K/UL (ref 0–0.04)
IMM GRANULOCYTES NFR BLD AUTO: 1.4 % (ref 0–0.5)
IRON SERPL-MCNC: 41 UG/DL (ref 45–160)
KETONES UR QL STRIP: ABNORMAL
LDLC SERPL CALC-MCNC: 25.8 MG/DL (ref 63–159)
LEUKOCYTE ESTERASE UR QL STRIP: NEGATIVE
LIPASE SERPL-CCNC: 119 U/L (ref 4–60)
LYMPHOCYTES # BLD AUTO: 0.6 K/UL (ref 1–4.8)
LYMPHOCYTES NFR BLD: 14 % (ref 18–48)
MAGNESIUM SERPL-MCNC: 2.6 MG/DL (ref 1.6–2.6)
MCH RBC QN AUTO: 28.2 PG (ref 27–31)
MCHC RBC AUTO-ENTMCNC: 32.3 G/DL (ref 32–36)
MCV RBC AUTO: 88 FL (ref 82–98)
METHADONE UR QL SCN>300 NG/ML: NEGATIVE
MONOCYTES # BLD AUTO: 0.7 K/UL (ref 0.3–1)
MONOCYTES NFR BLD: 17.5 % (ref 4–15)
NEUTROPHILS # BLD AUTO: 2.8 K/UL (ref 1.8–7.7)
NEUTROPHILS NFR BLD: 66.7 % (ref 38–73)
NITRITE UR QL STRIP: NEGATIVE
NONHDLC SERPL-MCNC: 38 MG/DL
NRBC BLD-RTO: 0 /100 WBC
OPIATES UR QL SCN: NORMAL
PCP UR QL SCN>25 NG/ML: NEGATIVE
PH UR STRIP: 7 [PH] (ref 5–8)
PHOSPHATE SERPL-MCNC: 11.8 MG/DL (ref 2.7–4.5)
PLATELET # BLD AUTO: 81 K/UL (ref 150–350)
PMV BLD AUTO: 9.8 FL (ref 9.2–12.9)
POCT GLUCOSE: 102 MG/DL (ref 70–110)
POCT GLUCOSE: 140 MG/DL (ref 70–110)
POCT GLUCOSE: 155 MG/DL (ref 70–110)
POCT GLUCOSE: 75 MG/DL (ref 70–110)
POTASSIUM SERPL-SCNC: 4.1 MMOL/L (ref 3.5–5.1)
PROT SERPL-MCNC: 6.7 G/DL (ref 6–8.4)
PROT UR QL STRIP: NEGATIVE
PTH-INTACT SERPL-MCNC: 70.5 PG/ML (ref 9–77)
PTH-INTACT SERPL-MCNC: 90.3 PG/ML (ref 9–77)
RBC # BLD AUTO: 4.32 M/UL (ref 4.6–6.2)
SATURATED IRON: 12 % (ref 20–50)
SODIUM SERPL-SCNC: 137 MMOL/L (ref 136–145)
SP GR UR STRIP: 1.01 (ref 1–1.03)
TOTAL IRON BINDING CAPACITY: 337 UG/DL (ref 250–450)
TOXICOLOGY INFORMATION: NORMAL
TRANSFERRIN SERPL-MCNC: 228 MG/DL (ref 200–375)
TRIGL SERPL-MCNC: 61 MG/DL (ref 30–150)
URATE SERPL-MCNC: 4.3 MG/DL (ref 3.4–7)
URATE SERPL-MCNC: 4.4 MG/DL (ref 3.4–7)
URN SPEC COLLECT METH UR: ABNORMAL
UROBILINOGEN UR STRIP-ACNC: ABNORMAL EU/DL
VIT B12 SERPL-MCNC: 872 PG/ML (ref 210–950)
WBC # BLD AUTO: 4.22 K/UL (ref 3.9–12.7)

## 2020-06-13 PROCEDURE — 82746 ASSAY OF FOLIC ACID SERUM: CPT

## 2020-06-13 PROCEDURE — 84100 ASSAY OF PHOSPHORUS: CPT

## 2020-06-13 PROCEDURE — 82306 VITAMIN D 25 HYDROXY: CPT

## 2020-06-13 PROCEDURE — 85025 COMPLETE CBC W/AUTO DIFF WBC: CPT

## 2020-06-13 PROCEDURE — 80053 COMPREHEN METABOLIC PANEL: CPT

## 2020-06-13 PROCEDURE — 11000001 HC ACUTE MED/SURG PRIVATE ROOM

## 2020-06-13 PROCEDURE — 83540 ASSAY OF IRON: CPT

## 2020-06-13 PROCEDURE — 63600175 PHARM REV CODE 636 W HCPCS: Performed by: STUDENT IN AN ORGANIZED HEALTH CARE EDUCATION/TRAINING PROGRAM

## 2020-06-13 PROCEDURE — 80074 ACUTE HEPATITIS PANEL: CPT

## 2020-06-13 PROCEDURE — 82787 IGG 1 2 3 OR 4 EACH: CPT

## 2020-06-13 PROCEDURE — 25000003 PHARM REV CODE 250: Performed by: STUDENT IN AN ORGANIZED HEALTH CARE EDUCATION/TRAINING PROGRAM

## 2020-06-13 PROCEDURE — 84550 ASSAY OF BLOOD/URIC ACID: CPT | Mod: 91

## 2020-06-13 PROCEDURE — 36415 COLL VENOUS BLD VENIPUNCTURE: CPT

## 2020-06-13 PROCEDURE — 94761 N-INVAS EAR/PLS OXIMETRY MLT: CPT

## 2020-06-13 PROCEDURE — 83735 ASSAY OF MAGNESIUM: CPT

## 2020-06-13 PROCEDURE — 83690 ASSAY OF LIPASE: CPT

## 2020-06-13 PROCEDURE — 80320 DRUG SCREEN QUANTALCOHOLS: CPT

## 2020-06-13 PROCEDURE — 81003 URINALYSIS AUTO W/O SCOPE: CPT

## 2020-06-13 PROCEDURE — 86703 HIV-1/HIV-2 1 RESULT ANTBDY: CPT

## 2020-06-13 PROCEDURE — 84550 ASSAY OF BLOOD/URIC ACID: CPT

## 2020-06-13 PROCEDURE — 83970 ASSAY OF PARATHORMONE: CPT

## 2020-06-13 PROCEDURE — 82607 VITAMIN B-12: CPT

## 2020-06-13 PROCEDURE — 82728 ASSAY OF FERRITIN: CPT

## 2020-06-13 PROCEDURE — 80061 LIPID PANEL: CPT

## 2020-06-13 PROCEDURE — 83970 ASSAY OF PARATHORMONE: CPT | Mod: 91

## 2020-06-13 PROCEDURE — 99900035 HC TECH TIME PER 15 MIN (STAT)

## 2020-06-13 RX ORDER — DEXTROSE 50 % IN WATER (D50W) INTRAVENOUS SYRINGE
12.5
Status: DISCONTINUED | OUTPATIENT
Start: 2020-06-13 | End: 2020-06-15 | Stop reason: HOSPADM

## 2020-06-13 RX ORDER — INSULIN ASPART 100 [IU]/ML
0-5 INJECTION, SOLUTION INTRAVENOUS; SUBCUTANEOUS EVERY 6 HOURS PRN
Status: DISCONTINUED | OUTPATIENT
Start: 2020-06-13 | End: 2020-06-15 | Stop reason: HOSPADM

## 2020-06-13 RX ORDER — ERGOCALCIFEROL 1.25 MG/1
50000 CAPSULE ORAL
Status: DISCONTINUED | OUTPATIENT
Start: 2020-06-14 | End: 2020-06-15 | Stop reason: HOSPADM

## 2020-06-13 RX ORDER — GLUCAGON 1 MG
1 KIT INJECTION
Status: DISCONTINUED | OUTPATIENT
Start: 2020-06-13 | End: 2020-06-15 | Stop reason: HOSPADM

## 2020-06-13 RX ADMIN — ACETAMINOPHEN 650 MG: 325 TABLET ORAL at 06:06

## 2020-06-13 RX ADMIN — ACETAMINOPHEN 650 MG: 325 TABLET ORAL at 12:06

## 2020-06-13 RX ADMIN — MORPHINE SULFATE 2 MG: 2 INJECTION, SOLUTION INTRAMUSCULAR; INTRAVENOUS at 07:06

## 2020-06-13 RX ADMIN — APIXABAN 5 MG: 5 TABLET, FILM COATED ORAL at 08:06

## 2020-06-13 RX ADMIN — OLANZAPINE 2.5 MG: 2.5 TABLET, FILM COATED ORAL at 08:06

## 2020-06-13 RX ADMIN — APIXABAN 5 MG: 5 TABLET, FILM COATED ORAL at 10:06

## 2020-06-13 RX ADMIN — GABAPENTIN 300 MG: 300 CAPSULE ORAL at 10:06

## 2020-06-13 RX ADMIN — SODIUM CHLORIDE, SODIUM LACTATE, POTASSIUM CHLORIDE, AND CALCIUM CHLORIDE: .6; .31; .03; .02 INJECTION, SOLUTION INTRAVENOUS at 06:06

## 2020-06-13 RX ADMIN — PIPERACILLIN AND TAZOBACTAM 4.5 G: 4; .5 INJECTION, POWDER, LYOPHILIZED, FOR SOLUTION INTRAVENOUS; PARENTERAL at 06:06

## 2020-06-13 RX ADMIN — METOPROLOL TARTRATE 25 MG: 25 TABLET ORAL at 10:06

## 2020-06-13 RX ADMIN — Medication 9 MG: at 08:06

## 2020-06-13 RX ADMIN — SODIUM CHLORIDE, SODIUM LACTATE, POTASSIUM CHLORIDE, AND CALCIUM CHLORIDE: .6; .31; .03; .02 INJECTION, SOLUTION INTRAVENOUS at 12:06

## 2020-06-13 RX ADMIN — GABAPENTIN 300 MG: 300 CAPSULE ORAL at 08:06

## 2020-06-13 RX ADMIN — METOPROLOL TARTRATE 25 MG: 25 TABLET ORAL at 08:06

## 2020-06-13 RX ADMIN — ASPIRIN 81 MG: 81 TABLET, COATED ORAL at 10:06

## 2020-06-13 NOTE — PLAN OF CARE
[facility] Plan of Care  -GI recommendations noted. Started on Zosyn for abx coverage. Unable to get MRCP because of pacemaker. Plan for EUS on Monday. Will continue IVF until tomorrow morning and evaluate need at that time.    Justen Nieves, DO  [facility] HO-2  06/13/2020       Low sodium, low cholesterol

## 2020-06-13 NOTE — PROGRESS NOTES
"[facility] Res[facility] Progress Note    Subjective:      Venu Lau is a 80 y.o.  male who is being followed by the [facility] service at [facility] for acute pancreatitis.     Mr. Lau reports doing well overnight. He states his abdominal pain is significantly improved and only bothers him now occasionally. He denies any nausea, vomiting, chest pain, SOB, diarrhea at this time.      Objective:   Last 24 Hour Vital Signs:  BP  Min: 112/57  Max: 202/98  Temp  Av.7 °F (37.1 °C)  Min: 97.8 °F (36.6 °C)  Max: 99.5 °F (37.5 °C)  Pulse  Av.4  Min: 68  Max: 73  Resp  Av.6  Min: 16  Max: 20  SpO2  Av.2 %  Min: 95 %  Max: 100 %  Height  Av' 5" (165.1 cm)  Min: 5' 5" (165.1 cm)  Max: 5' 5" (165.1 cm)  Weight  Av.5 kg (164 lb 5.6 oz)  Min: 74 kg (163 lb 2.3 oz)  Max: 74.8 kg (165 lb)  I/O last 3 completed shifts:  In: 2446.7 [P.O.:150; I.V.:1796.7; IV Piggyback:500]  Out: 240 [Urine:240]    Physical Examination:  BP (!) 108/55 (BP Location: Right arm, Patient Position: Lying)   Pulse 70   Temp 98.5 °F (36.9 °C) (Oral)   Resp 20   Ht 5' 5" (1.651 m)   Wt 74 kg (163 lb 2.3 oz)   SpO2 95%   BMI 27.15 kg/m²   General appearance: A&OX3, appears stated age and cooperative  HEENT: conjunctivae/corneas clear. L pupil oval in shape, pupils reactive to light, EOM's intact.   Neck: supple, no adenopathy, no carotid bruit, unable to appreciate JVP, trachea midline  Lungs: clear to auscultation bilaterally.No wheezes or crackles.   Heart: RRR, S1, S2 normal, 1/6 systolic mumur best heart at L upper sternal border   Abdomen: soft, nontender to light and deep palpation; bowel sounds normal, transverse upper abdominal surgical scar, multiple laparoscopic insertion sites  Neuro: CN II-XII grossly normal. No focal motor or sensory deficit.   Extremities: Appears normal. 2+ pulse. FROM. No edema.  Skin: Normal turgor and color. No rashes or lesions        Laboratory:  Laboratory Data " Reviewed: yes  Pertinent Findings:  Phos 11.8     Microbiology Data Reviewed: yes  Pertinent Findings:  Reviewed     Other Results:  EKG (my interpretation): Ventricular paced rhythm, axis -90, wide QRS, no significant ST changes .    Radiology Data Reviewed: yes  Pertinent Findings:  X-ray Chest Ap Portable    Result Date: 6/12/2020  EXAMINATION: XR CHEST AP PORTABLE CLINICAL HISTORY: Chest Pain; FINDINGS: Comparison study 12/16/2017.  No change.  Normal size heart status post CABG.  Left chest wall cardiac pacemaker with intact leads.  Aortic arch atherosclerosis.  No congestion.  Lungs are clear.     Stable chest x-ray. Electronically signed by: Josh Thacker MD Date:    06/12/2020 Time:    12:09    Us Abdomen Limited    Result Date: 6/12/2020  EXAMINATION: US ABDOMEN LIMITED CLINICAL HISTORY: Generalized abdominal pain FINDINGS: Correlation exams abdominal and pelvic CT without contrast, renal ultrasound 10/29/2016.  The pancreas is unremarkable.  Liver measures 15 cm with normal echogenicity and echotexture.  There is a history of previous hepatectomy.  Cholecystectomy.  Again, the common bile duct is dilated, up to 1.1 cm, same as remote exam.  Right kidney, 9.6 cm, with stable hypoechoic mid pole pyramid.     Cholecystectomy with stable common bile duct dilatation, up to 1.1 cm, no change compared with October 2016.  No intrahepatic ductal dilatation. Electronically signed by: Josh Thacker MD Date:    06/12/2020 Time:    13:56    Ct Abdomen Pelvis  Without Contrast    Result Date: 6/12/2020  EXAMINATION: CT ABDOMEN PELVIS WITHOUT CONTRAST CLINICAL HISTORY: LLQ pain, suspect diverticulitis; TECHNIQUE: Low dose axial images, sagittal and coronal reformations were obtained from the lung bases to the pubic symphysis.  Contrast was not administered. All CT scans at this location are performed using dose modulation techniques as appropriate to a performed exam including the following: Automated exposure control;  adjustment of the mA and/or kV according to patient size. COMPARISON: 10/29/2016 FINDINGS: Heart: Normal in size. No pericardial effusion. Lung Bases: Well aerated, without consolidation or pleural fluid. Liver: Evidence of prior left hepatic resection. Gallbladder: Surgically absent. Bile Ducts: Suggestion of mild intrahepatic biliary ductal dilation.  Recommend correlation with serum analysis. Pancreas: No mass or peripancreatic fat stranding. Spleen: Unremarkable. Adrenals: Unremarkable. Kidneys/ Ureters: Chronic perinephric fat stranding similar to prior exams.  No hydronephrosis.  No renal, ureteral, or bladder stones. Bladder: Mild distention of the bladder with urine. Reproductive organs: Unremarkable. GI Tract/Mesentery: Distal esophagus, stomach, duodenum, small bowel unremarkable.  Evidence of prior appendectomy.  Moderate large volume stool throughout the colon.  Sigmoid diverticulosis without evidence of diverticulitis. Peritoneal Space: No ascites. No free air. Retroperitoneum: No significant adenopathy. Abdominal wall: Small fat containing uncomplicated left inguinal hernia. Vasculature: Aortoiliac  atherosclerotic calcification. No aneurysm. Bones: Moderate severity multilevel discogenic degenerative change of the lumbar spine.     Suggestion of mild intrahepatic biliary ductal dilation.  Recommend correlation with serum analysis. Moderate large volume stool throughout the colon. Sigmoid diverticulosis without evidence of diverticulitis. Electronically signed by: Kiran Paez Date:    06/12/2020 Time:    14:28      Current Medications:     Infusions:   lactated ringers 200 mL/hr at 06/13/20 0612        Scheduled:   acetaminophen  650 mg Oral Q6H    apixaban  5 mg Oral BID    aspirin  81 mg Oral Daily    gabapentin  300 mg Oral BID    melatonin  9 mg Oral Nightly    metoprolol tartrate  25 mg Oral BID    OLANZapine  2.5 mg Oral QHS        PRN:  dextrose 50%, glucagon (human recombinant), insulin  aspart U-100, morphine    Antibiotics and Day Number of Therapy:  None    Lines and Day Number of Therapy:  PIV     Assessment:     Venu Lau is a 80 y.o. male with PMHx of CAD s/p CABG, paroxismal a fib/flutter s/p 2 lead pacemaker in DDD-CLS mode, Biplar 1, HTN, HLD, chronic pain, GERD, and a history of GI bleed. He presented to [facility] ED on 6/12 with chest pain, abdominal pain which he noted after waking up the morning of admission. He was found to have biliary dialation on abd US as well as CT AP. Lipase was elevated to 3300 consistent with pancreatitis. LFTs consistent with obstructive pattern. GI consulted for pancreatitis due to suspected obstruction.     Patient Active Problem List    Diagnosis Date Noted    Acute biliary pancreatitis without infection or necrosis 06/12/2020    Subclinical hypothyroidism 06/12/2020    Tobacco abuse 01/29/2020    PAF (paroxysmal atrial fibrillation) 02/13/2017    Hypokalemia 10/31/2016    Oropharyngeal dysphagia 10/30/2016    Physical deconditioning 10/30/2016    Anemia, chronic disease 10/30/2016    Acute renal failure 10/29/2016    Hyperkalemia 10/29/2016    Metabolic acidosis 10/29/2016    Acute metabolic encephalopathy 10/29/2016    Uremia 10/29/2016    Polypharmacy 10/29/2016    Normocytic anemia 10/29/2016    Mixed hyperlipidemia 10/29/2016    AKSHAT (acute kidney injury) 10/29/2016    Toxic encephalopathy 10/29/2016    Coronary artery disease involving native coronary artery of native heart without angina pectoris     Diabetes     Essential hypertension     *Atrial flutter     CAD (coronary artery disease) 11/20/2012    Hx of CABG 11/20/2012    Atrial flutter 11/20/2012    Pacemaker 11/20/2012    Liver cancer 11/20/2012    Villous adenoma 11/20/2012        Plan:     Pancreatitis  -Patient admitted after 1 day of consistent lower chest upper abdominal pain   -US and CT scan with evidence of biliary dilation, s/p cholecystectomy and  appendectomy, diverticulosis    -Lipase elevated to 3300   -ALT, AST, alk phos, t bili elevated, consistent with obstructive picture  -ED discussed with GI plan for EUS tomorrow  -Will monitor overnight, plan to administer generous IVF, feed when able to tolerate   -Will check IgG4  -Will hold home meds associated with pancreatitis until more likely cause discovered mirtazipine (class 3), rosuvastatin (class 4)  -Triglycerides wnl      Obstructive hepatology   -Imaging with biliary dilation, appears chronic  -ALT, AST, alk phos, t bili elevated, consistent with obstructive picture  -ED discussed with GI plan for EUS tomorrow  -Will continue to monitor, continue IV LR at 200mL/hr, diabetic diet when able to tolerate   -GI consulted, planning EGD with EUS on Monday   -Will obtain acute hepatitis panel, HIV     Normocytic anemia  -H/H 13.2/40, MCV 87  -Will obtain iron panel  -Recommend outpatient colonoscopy but patient has previously refused      Thrombocytopenia  -Plt 94 on admission  -Chart review shows chronic low platelets but lower than normal   -Liver US shows normal hepatic texture, echogenicity, s/p partial hepatectomy   -Will repeat CBC in morning, obtain peripheral smear      CAD  -History of CAD s/p CABG  -TTE 1/27/2020 with LVEF 55%, LVH, mild LAE, mild AS, mild tricuspid regurg  -Continue home ASA     DM2  -On glimepiride at home  -Will order SSI while inpatient     Paroxysmal atrial flutter/ fib, sick sinus syndrome, tachy-valentine arrythmia  -S/p 2 lead pacemaker   -Recently seen in clinic, pacemaker interrogated DDD-CLS mode with normal function  -EKG with ventricular paced rhythm  -Continue home eliquis     HLD  -Patient on rosuvastatin at home, will hold until more likely cause of pancreatitis     HCM  -HbA1c 5.6  -Hep panel in process  -HIV in process      DVT:Eliquis  Diet:Diabetic diet, NPO midnight on 6/14  Dispo: Pening GI evaluation, possible EUS     Gus Ruiz  [facility]  IM Service Team B     [facility] [place] [facility] Hospitalist Pager numbers:   [facility] [place] [facility] Hospitalist Medicine Team A (Tereza/Flakita): [phone]  [facility] [place] Hospitalist Medicine Team B (Brian/Michaela):  [phone]

## 2020-06-13 NOTE — CONSULTS
[facility]    CC: abdominal pain    HPI 80 y.o. man with new, severe, diffuse, resolved abdominal pain not associated with nausea or vomiting. Pain started yesterday and he denies having this pain before. He still has his gallbladder. He occasionally smokes and does drink on the weekends. He reports that he has a history of liver cancer with resection about 10 years ago. He follows up for that every 6 months. His pain is resolved now with medications at the hospital. He denies weight loss or fever. He is on blood thinners for his bypass but he doesn't know the name of that.     Collateral obtained from nurse. Chart reviewed and summarized here.    Past Medical History   has a past medical history of *Atrial flutter, Anemia, Anticoagulant long-term use, Arthritis, Bipolar 1 disorder, Coronary artery disease, Diabetes mellitus, Diabetes mellitus, type 2, GERD (gastroesophageal reflux disease), Gout, unspecified, H/O: GI bleed, Hypertension, Liver cancer, PVD (peripheral vascular disease), Sciatica, SSS (sick sinus syndrome), Tachy-valentine syndrome, and Thyroid disease.    Past Surgical History   has a past surgical history that includes Lumbar disc surgery; Coronary artery bypass graft; Insert / replace / remove pacemaker; Appendectomy; Cholecystectomy; and Liver resection.    Social History  Social History     Tobacco Use    Smoking status: Current Some Day Smoker     Packs/day: 0.25     Types: Cigarettes     Last attempt to quit: 1992     Years since quittin.5    Smokeless tobacco: Never Used   Substance Use Topics    Alcohol use: Yes     Alcohol/week: 5.0 standard drinks     Types: 5 Cans of beer per week    Drug use: No       Family History  Mother with pancreatic cancer    Review of Systems  General ROS: negative for chills, fever or weight loss  Psychological ROS: negative for hallucination, depression or suicidal ideation  Ophthalmic ROS: negative for blurry vision, photophobia or eye  "pain  ENT ROS: negative for epistaxis, sore throat or rhinorrhea  Respiratory ROS: no cough, shortness of breath, or wheezing  Cardiovascular ROS: no chest pain or dyspnea on exertion  Gastrointestinal ROS: Positive for abdominal pain, no vomiting or bloody stools  Genito-Urinary ROS: no dysuria, trouble voiding, or hematuria  Musculoskeletal ROS: negative for gait disturbance or muscular weakness  Neurological ROS: no syncope or seizures; no ataxia  Dermatological ROS: negative for pruritis, rash and jaundice    Physical Examination  BP (!) 108/55 (BP Location: Right arm, Patient Position: Lying)   Pulse 70   Temp 98.5 °F (36.9 °C) (Oral)   Resp 20   Ht 5' 5" (1.651 m)   Wt 74 kg (163 lb 2.3 oz)   SpO2 95%   BMI 27.15 kg/m²   General appearance: alert, cooperative, no distress, overweight  HENT: Normocephalic, atraumatic, neck symmetrical, no nasal discharge   Eyes: conjunctivae/corneas clear, EOM's intact  Lungs: clear to auscultation in all fields, no dullness to percussion bilaterally, no wheeze  Heart: normal rate, regular rhythm without rub; palpable peripheral pulses  Abdomen: soft, NT, ND  Extremities: extremities symmetric; no clubbing, cyanosis, or edema  Integument: Skin color, texture, turgor normal; no rashes; hair distrubution normal  Neurologic: Alert and oriented X 3, normal strength, normal coordination  Psychiatric: no pressured speech; normal affect; no evidence of impaired cognition     Labs:  Lipase 119  Cr 0.7  TB 2.6 -> 4.2        WBC 4.2    Imaging:  CTAP with liver resection and cholecystectomy noted    Abd US with CBD 1.1cm    I have personally reviewed these images    Assessment:   80 year old man with ductal dilation and acute pancreatitis suspected to be from choledocholithiasis s/p cholecystectomy and partial liver resection for cancer 10 years prior. Does drink and smoke but ductal dilation is suspicious for primary ductal choledocholithiasis or sludge vs " pancreatic cancer with family history in mother.    Plan:  - can get MRCP over the weekend if available  - EUS +/- ERCP on Monday  - NPO at midnight on Sunday      Jeana Caro MD MPH  [facility] PGY 5  [phone] cell  [phone] pager

## 2020-06-13 NOTE — PLAN OF CARE
Patient is AAO X 4. Vital signs stable. Kept NPO with ice chips as per order and on IV fluids. Denies pain or nausea at present. Safety measures maintained. On tele monitor with paced Rhythm. Slept fairly well during night. Will continue to monitor patient.

## 2020-06-14 LAB
ALBUMIN SERPL BCP-MCNC: 3.1 G/DL (ref 3.5–5.2)
ALP SERPL-CCNC: 137 U/L (ref 55–135)
ALT SERPL W/O P-5'-P-CCNC: 89 U/L (ref 10–44)
ANION GAP SERPL CALC-SCNC: 8 MMOL/L (ref 8–16)
AST SERPL-CCNC: 98 U/L (ref 10–40)
BASOPHILS # BLD AUTO: 0.01 K/UL (ref 0–0.2)
BASOPHILS NFR BLD: 0.3 % (ref 0–1.9)
BILIRUB SERPL-MCNC: 3.6 MG/DL (ref 0.1–1)
BUN SERPL-MCNC: 14 MG/DL (ref 8–23)
CALCIUM SERPL-MCNC: 8.6 MG/DL (ref 8.7–10.5)
CHLORIDE SERPL-SCNC: 110 MMOL/L (ref 95–110)
CO2 SERPL-SCNC: 20 MMOL/L (ref 23–29)
CREAT SERPL-MCNC: 1.1 MG/DL (ref 0.5–1.4)
DIFFERENTIAL METHOD: ABNORMAL
EOSINOPHIL # BLD AUTO: 0 K/UL (ref 0–0.5)
EOSINOPHIL NFR BLD: 0.3 % (ref 0–8)
ERYTHROCYTE [DISTWIDTH] IN BLOOD BY AUTOMATED COUNT: 14.5 % (ref 11.5–14.5)
EST. GFR  (AFRICAN AMERICAN): >60 ML/MIN/1.73 M^2
EST. GFR  (NON AFRICAN AMERICAN): >60 ML/MIN/1.73 M^2
GLUCOSE SERPL-MCNC: 117 MG/DL (ref 70–110)
HCT VFR BLD AUTO: 34.6 % (ref 40–54)
HGB BLD-MCNC: 11 G/DL (ref 14–18)
IMM GRANULOCYTES # BLD AUTO: 0.04 K/UL (ref 0–0.04)
IMM GRANULOCYTES NFR BLD AUTO: 1.2 % (ref 0–0.5)
LYMPHOCYTES # BLD AUTO: 0.6 K/UL (ref 1–4.8)
LYMPHOCYTES NFR BLD: 17 % (ref 18–48)
MAGNESIUM SERPL-MCNC: 1.8 MG/DL (ref 1.6–2.6)
MCH RBC QN AUTO: 28.2 PG (ref 27–31)
MCHC RBC AUTO-ENTMCNC: 31.8 G/DL (ref 32–36)
MCV RBC AUTO: 89 FL (ref 82–98)
MONOCYTES # BLD AUTO: 0.5 K/UL (ref 0.3–1)
MONOCYTES NFR BLD: 15.4 % (ref 4–15)
NEUTROPHILS # BLD AUTO: 2.1 K/UL (ref 1.8–7.7)
NEUTROPHILS NFR BLD: 65.8 % (ref 38–73)
NRBC BLD-RTO: 0 /100 WBC
PHOSPHATE SERPL-MCNC: 2.5 MG/DL (ref 2.7–4.5)
PLATELET # BLD AUTO: 68 K/UL (ref 150–350)
PMV BLD AUTO: 10.1 FL (ref 9.2–12.9)
POCT GLUCOSE: 104 MG/DL (ref 70–110)
POCT GLUCOSE: 107 MG/DL (ref 70–110)
POCT GLUCOSE: 113 MG/DL (ref 70–110)
POCT GLUCOSE: 129 MG/DL (ref 70–110)
POTASSIUM SERPL-SCNC: 4.5 MMOL/L (ref 3.5–5.1)
PROT SERPL-MCNC: 6.3 G/DL (ref 6–8.4)
RBC # BLD AUTO: 3.9 M/UL (ref 4.6–6.2)
SODIUM SERPL-SCNC: 138 MMOL/L (ref 136–145)
WBC # BLD AUTO: 3.24 K/UL (ref 3.9–12.7)

## 2020-06-14 PROCEDURE — 63600175 PHARM REV CODE 636 W HCPCS: Performed by: STUDENT IN AN ORGANIZED HEALTH CARE EDUCATION/TRAINING PROGRAM

## 2020-06-14 PROCEDURE — 83735 ASSAY OF MAGNESIUM: CPT

## 2020-06-14 PROCEDURE — 25000003 PHARM REV CODE 250: Performed by: STUDENT IN AN ORGANIZED HEALTH CARE EDUCATION/TRAINING PROGRAM

## 2020-06-14 PROCEDURE — 84100 ASSAY OF PHOSPHORUS: CPT

## 2020-06-14 PROCEDURE — 11000001 HC ACUTE MED/SURG PRIVATE ROOM

## 2020-06-14 PROCEDURE — 94761 N-INVAS EAR/PLS OXIMETRY MLT: CPT

## 2020-06-14 PROCEDURE — 85025 COMPLETE CBC W/AUTO DIFF WBC: CPT

## 2020-06-14 PROCEDURE — 80053 COMPREHEN METABOLIC PANEL: CPT

## 2020-06-14 PROCEDURE — 99900035 HC TECH TIME PER 15 MIN (STAT)

## 2020-06-14 PROCEDURE — 36415 COLL VENOUS BLD VENIPUNCTURE: CPT

## 2020-06-14 RX ORDER — SODIUM CHLORIDE, SODIUM LACTATE, POTASSIUM CHLORIDE, CALCIUM CHLORIDE 600; 310; 30; 20 MG/100ML; MG/100ML; MG/100ML; MG/100ML
INJECTION, SOLUTION INTRAVENOUS CONTINUOUS
Status: ACTIVE | OUTPATIENT
Start: 2020-06-14 | End: 2020-06-14

## 2020-06-14 RX ADMIN — PIPERACILLIN AND TAZOBACTAM 4.5 G: 4; .5 INJECTION, POWDER, LYOPHILIZED, FOR SOLUTION INTRAVENOUS; PARENTERAL at 05:06

## 2020-06-14 RX ADMIN — ERGOCALCIFEROL 50000 UNITS: 1.25 CAPSULE ORAL at 08:06

## 2020-06-14 RX ADMIN — APIXABAN 5 MG: 5 TABLET, FILM COATED ORAL at 08:06

## 2020-06-14 RX ADMIN — PIPERACILLIN AND TAZOBACTAM 4.5 G: 4; .5 INJECTION, POWDER, LYOPHILIZED, FOR SOLUTION INTRAVENOUS; PARENTERAL at 02:06

## 2020-06-14 RX ADMIN — Medication 9 MG: at 08:06

## 2020-06-14 RX ADMIN — METOPROLOL TARTRATE 25 MG: 25 TABLET ORAL at 08:06

## 2020-06-14 RX ADMIN — ASPIRIN 81 MG: 81 TABLET, COATED ORAL at 08:06

## 2020-06-14 RX ADMIN — GABAPENTIN 300 MG: 300 CAPSULE ORAL at 08:06

## 2020-06-14 RX ADMIN — MORPHINE SULFATE 2 MG: 2 INJECTION, SOLUTION INTRAMUSCULAR; INTRAVENOUS at 03:06

## 2020-06-14 RX ADMIN — ACETAMINOPHEN 650 MG: 325 TABLET ORAL at 05:06

## 2020-06-14 RX ADMIN — OLANZAPINE 2.5 MG: 2.5 TABLET, FILM COATED ORAL at 08:06

## 2020-06-14 RX ADMIN — ACETAMINOPHEN 650 MG: 325 TABLET ORAL at 11:06

## 2020-06-14 RX ADMIN — SODIUM CHLORIDE, SODIUM LACTATE, POTASSIUM CHLORIDE, AND CALCIUM CHLORIDE: .6; .31; .03; .02 INJECTION, SOLUTION INTRAVENOUS at 12:06

## 2020-06-14 RX ADMIN — MORPHINE SULFATE 2 MG: 2 INJECTION, SOLUTION INTRAMUSCULAR; INTRAVENOUS at 09:06

## 2020-06-14 RX ADMIN — MORPHINE SULFATE 2 MG: 2 INJECTION, SOLUTION INTRAMUSCULAR; INTRAVENOUS at 08:06

## 2020-06-14 RX ADMIN — MORPHINE SULFATE 2 MG: 2 INJECTION, SOLUTION INTRAMUSCULAR; INTRAVENOUS at 04:06

## 2020-06-14 RX ADMIN — PIPERACILLIN AND TAZOBACTAM 4.5 G: 4; .5 INJECTION, POWDER, LYOPHILIZED, FOR SOLUTION INTRAVENOUS; PARENTERAL at 09:06

## 2020-06-14 NOTE — PLAN OF CARE
Pt received all medications per MAR. Pt pain managed with prn pain medications. Pt blood glucose monitored.   Vitals stable and safety maintained. Will continue monitored.

## 2020-06-14 NOTE — PROGRESS NOTES
"[facility]    CC: abdominal pain     HPI 80 y.o. man with new, severe, diffuse, resolved abdominal pain not associated with nausea or vomiting. Pain started yesterday and he denies having this pain before. He still has his gallbladder. He occasionally smokes and does drink on the weekends. He reports that he has a history of liver cancer with resection about 10 years ago. He follows up for that every 6 months. His pain is resolved now with medications at the hospital. He denies weight loss or fever. He is on blood thinners for his bypass but he doesn't know the name of that.     Interval History:  Overnight he had some mild abdominal pain in the upper abdomen that resolved this morning. He denies fever, nausea, vomiting, or bloody stools. He had two formed stools overnight.     Past Medical History    has a past medical history of *Atrial flutter, Anemia, Anticoagulant long-term use, Arthritis, Bipolar 1 disorder, Coronary artery disease, Diabetes mellitus, Diabetes mellitus, type 2, GERD (gastroesophageal reflux disease), Gout, unspecified, H/O: GI bleed, Hypertension, Liver cancer, PVD (peripheral vascular disease), Sciatica, SSS (sick sinus syndrome), Tachy-valentine syndrome, and Thyroid disease.      Review of Systems  General ROS: negative for - chills, fever or weight loss  Cardiovascular ROS: no chest pain or dyspnea on exertion  Gastrointestinal ROS: Positive for mild abdominal pain, no change in bowel habits, or black/ bloody stools    Physical Examination  BP (!) 153/67 (BP Location: Right arm, Patient Position: Lying)   Pulse 69   Temp 98 °F (36.7 °C) (Oral)   Resp 20   Ht 5' 5" (1.651 m)   Wt 78.7 kg (173 lb 8 oz)   SpO2 96%   BMI 28.87 kg/m²   General appearance: alert, cooperative, no distress, overweight  HENT: Normocephalic, atraumatic, neck symmetrical, no nasal discharge   Eyes: conjunctivae/corneas clear, EOM's intact  Lungs: clear to auscultation in all fields, no dullness to " percussion bilaterally, no wheeze  Heart: normal rate, regular rhythm without rub; palpable peripheral pulses  Abdomen: soft, NT, ND  Extremities: extremities symmetric; no clubbing, cyanosis, or edema  Integument: Skin color, texture, turgor normal; no rashes; hair distrubution normal  Neurologic: Alert and oriented X 3, normal strength, normal coordination  Psychiatric: no pressured speech; normal affect; no evidence of impaired cognition     Labs:  TB 2.6 -> 4.2 -> 3.6   -> 149 -> 137  AST 98  ALT 89    WBC 3.2  Hg 11    Imaging:  CTAP with liver resection and cholecystectomy noted     Abd US with CBD 1.1cm     I have personally reviewed these images    Assessment:   80 year old man with ductal dilation and acute pancreatitis suspected to be from choledocholithiasis s/p cholecystectomy and partial liver resection for cancer 10 years prior. Does drink and smoke but ductal dilation is suspicious for primary ductal choledocholithiasis or sludge vs pancreatic cancer with family history in mother.    Plan:  - MRCP today if available  - NPO at midnight for EUS +/- ERCP planned for hyperbilirubinemia and dilated CBD 1.1cm  - antibiotics to cover for biliary obstruction  - trend LFTs      Jeana Caro MD MPH  [facility] PGY 5  [phone] cell  [phone] pager

## 2020-06-14 NOTE — PROGRESS NOTES
[facility] Resident [facility]ALIA Progress Note    Subjective:      Venu Lau is a 80 y.o. male who is being followed by the [facility] service at [facility] for acute pancreatitis.     No acute events overnight. Reports some epigastric abdominal pain, but it has definitely improved since his admission. Denies F/C/N/V. EGD w U/S scheduled for Monday with GI.      Objective:   Last 24 Hour Vital Signs:  BP  Min: 89/50  Max: 153/67  Temp  Av.2 °F (36.8 °C)  Min: 97.9 °F (36.6 °C)  Max: 98.4 °F (36.9 °C)  Pulse  Av.1  Min: 68  Max: 70  Resp  Av.2  Min: 16  Max: 20  SpO2  Av.3 %  Min: 95 %  Max: 98 %  Weight  Av.7 kg (173 lb 8 oz)  Min: 78.7 kg (173 lb 8 oz)  Max: 78.7 kg (173 lb 8 oz)  I/O last 3 completed shifts:  In: 6.7 [P.O.:250; I.V.:1796.7]  Out: 1540 [Urine:1540]    Physical Examination:  General appearance: A&OX3, appears stated age and cooperative  HEENT: conjunctivae/corneas clear. L pupil oval in shape, pupils reactive to light, EOM's intact.   Neck: supple, no adenopathy, no carotid bruit, unable to appreciate JVP, trachea midline  Lungs: clear to auscultation bilaterally.No wheezes or crackles.   Heart: RRR, S1, S2 normal, 1/6 systolic mumur best heart at L upper sternal border   Abdomen: soft, nontender to light and deep palpation; bowel sounds normal, transverse upper abdominal surgical scar, multiple laparoscopic insertion sites  Neuro: CN II-XII grossly normal. No focal motor or sensory deficit.   Extremities: Appears normal. 2+ pulse. FROM. No edema.  Skin: Normal turgor and color. No rashes or lesions        Laboratory:  Laboratory Data Reviewed: yes  Pertinent Findings:  Phos 11.8  on , 2.5 on      Microbiology Data Reviewed: yes  Pertinent Findings:  Reviewed      Other Results:  EKG (my interpretation): Ventricular paced rhythm, axis -90, wide QRS, no significant ST changes .     Radiology Data Reviewed: yes  Pertinent Findings:  X-ray Chest Ap  Portable     Result Date: 6/12/2020  EXAMINATION: XR CHEST AP PORTABLE CLINICAL HISTORY: Chest Pain; FINDINGS: Comparison study 12/16/2017.  No change.  Normal size heart status post CABG.  Left chest wall cardiac pacemaker with intact leads.  Aortic arch atherosclerosis.  No congestion.  Lungs are clear.      Stable chest x-ray. Electronically signed by:  Josh Thacker MD Date:                                              06/12/2020 Time:                                      12:09     Us Abdomen Limited     Result Date: 6/12/2020  EXAMINATION: US ABDOMEN LIMITED CLINICAL HISTORY: Generalized abdominal pain FINDINGS: Correlation exams abdominal and pelvic CT without contrast, renal ultrasound 10/29/2016.  The pancreas is unremarkable.  Liver measures 15 cm with normal echogenicity and echotexture.  There is a history of previous hepatectomy.  Cholecystectomy.  Again, the common bile duct is dilated, up to 1.1 cm, same as remote exam.  Right kidney, 9.6 cm, with stable hypoechoic mid pole pyramid.      Cholecystectomy with stable common bile duct dilatation, up to 1.1 cm, no change compared with October 2016.  No intrahepatic ductal dilatation. Electronically signed by: Josh Thacker MD Date:                                           06/12/2020 Time:                                            13:56     Ct Abdomen Pelvis  Without Contrast     Result Date: 6/12/2020  EXAMINATION: CT ABDOMEN PELVIS WITHOUT CONTRAST CLINICAL HISTORY: LLQ pain, suspect diverticulitis; TECHNIQUE: Low dose axial images, sagittal and coronal reformations were obtained from the lung bases to the pubic symphysis.  Contrast was not administered. All CT scans at this location are performed using dose modulation techniques as appropriate to a performed exam including the following: Automated exposure control; adjustment of the mA and/or kV according to patient size. COMPARISON: 10/29/2016 FINDINGS: Heart: Normal in size. No pericardial effusion.  Lung Bases: Well aerated, without consolidation or pleural fluid. Liver: Evidence of prior left hepatic resection. Gallbladder: Surgically absent. Bile Ducts: Suggestion of mild intrahepatic biliary ductal dilation.  Recommend correlation with serum analysis. Pancreas: No mass or peripancreatic fat stranding. Spleen: Unremarkable. Adrenals: Unremarkable. Kidneys/ Ureters: Chronic perinephric fat stranding similar to prior exams.  No hydronephrosis.  No renal, ureteral, or bladder stones. Bladder: Mild distention of the bladder with urine. Reproductive organs: Unremarkable. GI Tract/Mesentery: Distal esophagus, stomach, duodenum, small bowel unremarkable.  Evidence of prior appendectomy.  Moderate large volume stool throughout the colon.  Sigmoid diverticulosis without evidence of diverticulitis. Peritoneal Space: No ascites. No free air. Retroperitoneum: No significant adenopathy. Abdominal wall: Small fat containing uncomplicated left inguinal hernia. Vasculature: Aortoiliac  atherosclerotic calcification. No aneurysm. Bones: Moderate severity multilevel discogenic degenerative change of the lumbar spine.      Suggestion of mild intrahepatic biliary ductal dilation.  Recommend correlation with serum analysis. Moderate large volume stool throughout the colon. Sigmoid diverticulosis without evidence of diverticulitis. Electronically signed by: Kiran Paez Date:                                     06/12/2020 Time:                                            14:28  Current Medications:     Infusions:   lactated ringers 200 mL/hr at 06/14/20 0045        Scheduled:   acetaminophen  650 mg Oral Q6H    apixaban  5 mg Oral BID    aspirin  81 mg Oral Daily    ergocalciferol  50,000 Units Oral Q7 Days    gabapentin  300 mg Oral BID    melatonin  9 mg Oral Nightly    metoprolol tartrate  25 mg Oral BID    OLANZapine  2.5 mg Oral QHS    piperacillin-tazobactam (ZOSYN) IVPB  4.5 g Intravenous Q8H        PRN:  dextrose  50 % in water (D50W), glucagon (human recombinant), insulin aspart U-100, morphine    Antibiotics and Day Number of Therapy:  Zosyn (4/13 - )     Lines and Day Number of Therapy:  PIV     Assessment:        Venu Lau is a 80 y.o. 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 He presented to [facility] ED on 6/12 with chest pain, abdominal pain which he noted after waking up the morning of admission. He was found to have biliary dialation on abd US as well as CT AP. Lipase was elevated to 3300 consistent with pancreatitis. LFTs consistent with obstructive pattern. GI consulted for pancreatitis due to suspected obstruction.       Plan:     Pancreatitis  -Patient admitted after 1 day of consistent lower chest upper abdominal pain   -US and CT scan with evidence of biliary dilation, s/p cholecystectomy and appendectomy, diverticulosis    -Lipase elevated to 3300, Lipase 6/13 was 119  -ALT, AST, alk phos, t bili elevated, consistent with obstructive picture  -ED discussed with GI plan for EUS Monday  -Will monitor overnight, plan to administer generous IVF, feed when able to tolerate   - IgG4 pending  -Will hold home meds associated with pancreatitis until more likely cause discovered mirtazipine (class 3), rosuvastatin (class 4)  -Triglycerides wnl      Obstructive hepatology   -Imaging with biliary dilation, appears chronic  -ALT, AST, alk phos, t bili elevated, consistent with obstructive picture  -ED discussed with GI plan for EUS Monday  -Will continue to monitor, continue IV LR at 200mL/hr, diabetic diet when able to tolerate   -GI consulted, planning EGD with EUS on Monday   -  acute hepatitis panel, HIV pending     Normocytic anemia  -H/H 13.2/40, MCV 87  -Will obtain iron panel  -Recommend outpatient colonoscopy but patient has previously refused      Thrombocytopenia  -Plt 94 on admission  -Chart  review shows chronic low platelets but lower than normal   -Liver US shows normal hepatic texture, echogenicity, s/p partial hepatectomy   - obtain peripheral smear      CAD  -History of CAD s/p CABG  -TTE 1/27/2020 with LVEF 55%, LVH, mild LAE, mild AS, mild tricuspid regurg  -Continue home ASA     DM2  -On glimepiride at home  -Will order SSI while inpatient     Paroxysmal atrial flutter/ fib, sick sinus syndrome, tachy-valentine arrythmia  -S/p 2 lead pacemaker   -Recently seen in clinic, pacemaker interrogated DDD-CLS mode with normal function  -EKG with ventricular paced rhythm  -Continue home eliquis     HLD  -Patient on rosuvastatin at home, will hold until more likely cause of pancreatitis     HCM  -HbA1c 5.6  -Hep panel in process  -HIV in process        Brice Callejas  [facility] Internal Medicine HO-1  [facility] IM Service Team B    [facility] Medicine Hospitalist Pager numbers:   [facility] Hospitalist Medicine Team A (Tereza/Flakita): [phone]  [facility] Hospitalist Medicine Team B (Brian/Michaela):  [phone]

## 2020-06-14 NOTE — PLAN OF CARE
VIRTUAL NURSE:  Cued into patient's room.  Permission received per patient to turn camera to view patient.  Introduced as VN for night shift that will be working with floor nurse and nursing assistant.  Educated patient on VN's role in patient care. Plan of care reviewed with patient. Education per flowsheet.  Opportunity given for questions and questions answered.  C/o lower back pain 8/10 but just received morphine 2mg iv within the last 15 minutes; instructed that he needs to let the medication work before we can try something else.  Verbalized understanding.  Instructed to call for assistance.  Will cont to monitor.    Labs, notes, and orders reviewed.

## 2020-06-15 ENCOUNTER — CLINICAL SUPPORT (OUTPATIENT)
Dept: SMOKING CESSATION | Facility: CLINIC | Age: 80
End: 2020-06-15

## 2020-06-15 ENCOUNTER — ANESTHESIA EVENT (OUTPATIENT)
Dept: MEDSURG UNIT | Facility: HOSPITAL | Age: 80
DRG: 438 | End: 2020-06-15
Payer: MEDICARE

## 2020-06-15 ENCOUNTER — ANESTHESIA (OUTPATIENT)
Dept: MEDSURG UNIT | Facility: HOSPITAL | Age: 80
DRG: 438 | End: 2020-06-15
Payer: MEDICARE

## 2020-06-15 VITALS
HEIGHT: 65 IN | HEART RATE: 70 BPM | TEMPERATURE: 98 F | OXYGEN SATURATION: 94 % | RESPIRATION RATE: 20 BRPM | SYSTOLIC BLOOD PRESSURE: 157 MMHG | BODY MASS INDEX: 29.38 KG/M2 | DIASTOLIC BLOOD PRESSURE: 74 MMHG | WEIGHT: 176.38 LBS

## 2020-06-15 DIAGNOSIS — F17.210 CIGARETTE SMOKER: Primary | ICD-10-CM

## 2020-06-15 LAB
ALBUMIN SERPL BCP-MCNC: 3.2 G/DL (ref 3.5–5.2)
ALP SERPL-CCNC: 164 U/L (ref 55–135)
ALT SERPL W/O P-5'-P-CCNC: 80 U/L (ref 10–44)
ANION GAP SERPL CALC-SCNC: 8 MMOL/L (ref 8–16)
AST SERPL-CCNC: 80 U/L (ref 10–40)
BASOPHILS # BLD AUTO: 0.01 K/UL (ref 0–0.2)
BASOPHILS NFR BLD: 0.3 % (ref 0–1.9)
BILIRUB SERPL-MCNC: 5.4 MG/DL (ref 0.1–1)
BUN SERPL-MCNC: 13 MG/DL (ref 8–23)
CALCIUM SERPL-MCNC: 9 MG/DL (ref 8.7–10.5)
CHLORIDE SERPL-SCNC: 107 MMOL/L (ref 95–110)
CO2 SERPL-SCNC: 21 MMOL/L (ref 23–29)
CREAT SERPL-MCNC: 1 MG/DL (ref 0.5–1.4)
DIFFERENTIAL METHOD: ABNORMAL
EOSINOPHIL # BLD AUTO: 0 K/UL (ref 0–0.5)
EOSINOPHIL NFR BLD: 0.3 % (ref 0–8)
ERYTHROCYTE [DISTWIDTH] IN BLOOD BY AUTOMATED COUNT: 14.5 % (ref 11.5–14.5)
EST. GFR  (AFRICAN AMERICAN): >60 ML/MIN/1.73 M^2
EST. GFR  (NON AFRICAN AMERICAN): >60 ML/MIN/1.73 M^2
GLUCOSE SERPL-MCNC: 116 MG/DL (ref 70–110)
HAV IGM SERPL QL IA: NEGATIVE
HBV CORE IGM SERPL QL IA: NEGATIVE
HBV SURFACE AG SERPL QL IA: NEGATIVE
HCT VFR BLD AUTO: 36 % (ref 40–54)
HCV AB SERPL QL IA: NEGATIVE
HGB BLD-MCNC: 11.5 G/DL (ref 14–18)
HIV 1+2 AB+HIV1 P24 AG SERPL QL IA: NEGATIVE
IMM GRANULOCYTES # BLD AUTO: 0.03 K/UL (ref 0–0.04)
IMM GRANULOCYTES NFR BLD AUTO: 0.8 % (ref 0–0.5)
LYMPHOCYTES # BLD AUTO: 0.5 K/UL (ref 1–4.8)
LYMPHOCYTES NFR BLD: 11.4 % (ref 18–48)
MAGNESIUM SERPL-MCNC: 1.6 MG/DL (ref 1.6–2.6)
MCH RBC QN AUTO: 28.5 PG (ref 27–31)
MCHC RBC AUTO-ENTMCNC: 31.9 G/DL (ref 32–36)
MCV RBC AUTO: 89 FL (ref 82–98)
MONOCYTES # BLD AUTO: 0.4 K/UL (ref 0.3–1)
MONOCYTES NFR BLD: 11.2 % (ref 4–15)
NEUTROPHILS # BLD AUTO: 3 K/UL (ref 1.8–7.7)
NEUTROPHILS NFR BLD: 76 % (ref 38–73)
NRBC BLD-RTO: 0 /100 WBC
PHOSPHATE SERPL-MCNC: 2.4 MG/DL (ref 2.7–4.5)
PLATELET # BLD AUTO: 73 K/UL (ref 150–350)
PMV BLD AUTO: 10.5 FL (ref 9.2–12.9)
POCT GLUCOSE: 111 MG/DL (ref 70–110)
POCT GLUCOSE: 113 MG/DL (ref 70–110)
POCT GLUCOSE: 98 MG/DL (ref 70–110)
POTASSIUM SERPL-SCNC: 3.6 MMOL/L (ref 3.5–5.1)
PROT SERPL-MCNC: 6.4 G/DL (ref 6–8.4)
RBC # BLD AUTO: 4.04 M/UL (ref 4.6–6.2)
SARS-COV-2 RDRP RESP QL NAA+PROBE: NEGATIVE
SODIUM SERPL-SCNC: 136 MMOL/L (ref 136–145)
WBC # BLD AUTO: 3.94 K/UL (ref 3.9–12.7)

## 2020-06-15 PROCEDURE — 85025 COMPLETE CBC W/AUTO DIFF WBC: CPT

## 2020-06-15 PROCEDURE — 99999 PR PBB SHADOW E&M-EST. PATIENT-LVL I: ICD-10-PCS | Mod: PBBFAC,,,

## 2020-06-15 PROCEDURE — 25000003 PHARM REV CODE 250: Performed by: STUDENT IN AN ORGANIZED HEALTH CARE EDUCATION/TRAINING PROGRAM

## 2020-06-15 PROCEDURE — 63600175 PHARM REV CODE 636 W HCPCS: Performed by: STUDENT IN AN ORGANIZED HEALTH CARE EDUCATION/TRAINING PROGRAM

## 2020-06-15 PROCEDURE — 36415 COLL VENOUS BLD VENIPUNCTURE: CPT

## 2020-06-15 PROCEDURE — 80053 COMPREHEN METABOLIC PANEL: CPT

## 2020-06-15 PROCEDURE — 99406 BEHAV CHNG SMOKING 3-10 MIN: CPT | Mod: S$GLB,,,

## 2020-06-15 PROCEDURE — 84100 ASSAY OF PHOSPHORUS: CPT

## 2020-06-15 PROCEDURE — U0002 COVID-19 LAB TEST NON-CDC: HCPCS

## 2020-06-15 PROCEDURE — 99999 PR PBB SHADOW E&M-EST. PATIENT-LVL I: CPT | Mod: PBBFAC,,,

## 2020-06-15 PROCEDURE — 83735 ASSAY OF MAGNESIUM: CPT

## 2020-06-15 PROCEDURE — 99406 PT REFUSED TOBACCO CESSATION: ICD-10-PCS | Mod: S$GLB,,,

## 2020-06-15 RX ORDER — POTASSIUM CHLORIDE 20 MEQ/1
20 TABLET, EXTENDED RELEASE ORAL
Status: COMPLETED | OUTPATIENT
Start: 2020-06-15 | End: 2020-06-15

## 2020-06-15 RX ORDER — METRONIDAZOLE 500 MG/1
500 TABLET ORAL 3 TIMES DAILY
Qty: 21 TABLET | Refills: 0 | Status: SHIPPED | OUTPATIENT
Start: 2020-06-15 | End: 2020-06-15 | Stop reason: SDUPTHER

## 2020-06-15 RX ORDER — ACETAMINOPHEN 325 MG/1
650 TABLET ORAL EVERY 6 HOURS
Qty: 56 TABLET | Refills: 0 | Status: ON HOLD | OUTPATIENT
Start: 2020-06-15 | End: 2020-06-22 | Stop reason: SDUPTHER

## 2020-06-15 RX ORDER — MAGNESIUM SULFATE 1 G/100ML
1 INJECTION INTRAVENOUS ONCE
Status: COMPLETED | OUTPATIENT
Start: 2020-06-15 | End: 2020-06-15

## 2020-06-15 RX ORDER — CIPROFLOXACIN 500 MG/1
500 TABLET ORAL 2 TIMES DAILY
Qty: 14 TABLET | Refills: 0 | Status: SHIPPED | OUTPATIENT
Start: 2020-06-15 | End: 2020-06-22

## 2020-06-15 RX ORDER — ERGOCALCIFEROL 1.25 MG/1
50000 CAPSULE ORAL
Qty: 4 CAPSULE | Refills: 5 | Status: SHIPPED | OUTPATIENT
Start: 2020-06-21 | End: 2020-12-18

## 2020-06-15 RX ORDER — SODIUM,POTASSIUM PHOSPHATES 280-250MG
2 POWDER IN PACKET (EA) ORAL
Status: COMPLETED | OUTPATIENT
Start: 2020-06-15 | End: 2020-06-15

## 2020-06-15 RX ORDER — METRONIDAZOLE 500 MG/1
500 TABLET ORAL 3 TIMES DAILY
Qty: 21 TABLET | Refills: 0 | Status: SHIPPED | OUTPATIENT
Start: 2020-06-15 | End: 2020-06-22

## 2020-06-15 RX ORDER — OXYCODONE AND ACETAMINOPHEN 10; 325 MG/1; MG/1
1 TABLET ORAL EVERY 6 HOURS PRN
Qty: 10 TABLET | Refills: 0 | Status: ON HOLD | OUTPATIENT
Start: 2020-06-15 | End: 2020-06-21 | Stop reason: HOSPADM

## 2020-06-15 RX ADMIN — APIXABAN 5 MG: 5 TABLET, FILM COATED ORAL at 08:06

## 2020-06-15 RX ADMIN — METOPROLOL TARTRATE 25 MG: 25 TABLET ORAL at 08:06

## 2020-06-15 RX ADMIN — POTASSIUM CHLORIDE 20 MEQ: 1500 TABLET, EXTENDED RELEASE ORAL at 02:06

## 2020-06-15 RX ADMIN — POTASSIUM & SODIUM PHOSPHATES POWDER PACK 280-160-250 MG 2 PACKET: 280-160-250 PACK at 02:06

## 2020-06-15 RX ADMIN — POTASSIUM CHLORIDE 20 MEQ: 1500 TABLET, EXTENDED RELEASE ORAL at 12:06

## 2020-06-15 RX ADMIN — MORPHINE SULFATE 2 MG: 2 INJECTION, SOLUTION INTRAMUSCULAR; INTRAVENOUS at 12:06

## 2020-06-15 RX ADMIN — ACETAMINOPHEN 650 MG: 325 TABLET ORAL at 12:06

## 2020-06-15 RX ADMIN — MORPHINE SULFATE 2 MG: 2 INJECTION, SOLUTION INTRAMUSCULAR; INTRAVENOUS at 08:06

## 2020-06-15 RX ADMIN — GABAPENTIN 300 MG: 300 CAPSULE ORAL at 08:06

## 2020-06-15 RX ADMIN — PIPERACILLIN AND TAZOBACTAM 4.5 G: 4; .5 INJECTION, POWDER, LYOPHILIZED, FOR SOLUTION INTRAVENOUS; PARENTERAL at 02:06

## 2020-06-15 RX ADMIN — PIPERACILLIN AND TAZOBACTAM 4.5 G: 4; .5 INJECTION, POWDER, LYOPHILIZED, FOR SOLUTION INTRAVENOUS; PARENTERAL at 11:06

## 2020-06-15 RX ADMIN — POTASSIUM & SODIUM PHOSPHATES POWDER PACK 280-160-250 MG 2 PACKET: 280-160-250 PACK at 12:06

## 2020-06-15 RX ADMIN — MAGNESIUM SULFATE 1 G: 1 INJECTION INTRAVENOUS at 12:06

## 2020-06-15 RX ADMIN — ASPIRIN 81 MG: 81 TABLET, COATED ORAL at 08:06

## 2020-06-15 NOTE — DISCHARGE SUMMARY
[facility] Discharge Summary    Primary Team: [facility] IM Team B  Attending Physician: Jamal Torres MD  Resident: Ezequiel  Intern: Joseph    Date of Admit: 6/12/2020  Date of Discharge: 6/15/2020    Discharge to: Home  Condition: Good    Discharge Diagnoses     Patient Active Problem List   Diagnosis    CAD (coronary artery disease)    Hx of CABG    Atrial flutter    Pacemaker    Liver cancer    Villous adenoma    Coronary artery disease involving native coronary artery of native heart without angina pectoris    Diabetes    Essential hypertension    *Atrial flutter    Acute renal failure    Hyperkalemia    Metabolic acidosis    Acute metabolic encephalopathy    Uremia    Polypharmacy    Normocytic anemia    Mixed hyperlipidemia    AKSHAT (acute kidney injury)    Toxic encephalopathy    Oropharyngeal dysphagia    Physical deconditioning    Anemia, chronic disease    Hypokalemia    PAF (paroxysmal atrial fibrillation)    Tobacco abuse    Acute biliary pancreatitis without infection or necrosis    Subclinical hypothyroidism    Epigastric pain    Transaminitis       Consultants and Procedures     Consultants:  GI     Procedures:   None    Brief History of Present Illness      Venu Lau is a 80 y.o. male who  has a past medical history of *Atrial flutter, Anemia, Anticoagulant long-term use, Arthritis, Bipolar 1 disorder, Coronary artery disease, Diabetes mellitus, Diabetes mellitus, type 2, GERD (gastroesophageal reflux disease), Gout, unspecified, H/O: GI bleed, Hypertension, Liver cancer, PVD (peripheral vascular disease), Sciatica, SSS (sick sinus syndrome), Tachy-valentine syndrome, and Thyroid disease.  The patient presented to [facility] on 6/12/2020 with a primary complaint of Chest Pain (Pt c/o midsternal chest pain, diaphoresis and productive cough with brown mucous since this morning. Deneis SOB, body aches or sore throat. )  .     Mr. Lau was in his normal state  of [facility] until the morning before admission. He reports waking up feeling normal but after his morning cup of coffee he developed a constant low chest to epigastric aching pain. He waited for his pain to resolve but it worsened after a 2nd cup of coffee. At the time of evaluation it was 8/10, constant, and worsened with movement. He denies ever having any pain like this before. He denies any nausea, vomiting, diarrhea, constipation, SOB, fever. He recently had attempted to drink some Gatorade which made the pain worse. Pain improved with morphine in ED.      He denies any history of pancreas issues but does have a history of liver cancer s/p resection about 10 years ago which he is followed closely for. He has a significant cardiac history but this felt different than his normal cardiac pain. He has previous imaging showing biliary dilation.     For the full HPI please refer to the History & Physical from this admission.    Hospital Course By Problem with Pertinent Findings     Pancreatitis  -Patient admitted after 1 day of consistent lower chest upper abdominal pain   -US and CT scan with evidence of biliary dilation, s/p cholecystectomy and appendectomy, diverticulosis    -Lipase elevated to 3300, Lipase 6/13 was 119  -ALT, AST, alk phos, t bili elevated, consistent with obstructive picture  -Will monitor overnight, plan to administer generous IVF, feed when able to tolerate   - IgG4 pending  -Held home meds associated with pancreatitis until more likely cause discovered mirtazipine (class 3), rosuvastatin (class 4) while inpatient   -Triglycerides wnl    -GI requesting 2 days off of eliquis before performing EGD with EUS with possible biopsy, will discharge patient and perform as outpatient      Obstructive hepatology   -Imaging with biliary dilation, appears chronic  -ALT, AST, alk phos, t bili elevated, consistent with obstructive picture  -ED discussed with GI plan for EUS Monday  -Will continue  to monitor, continue IV LR at 200mL/hr, diabetic diet when able to tolerate   -GI consulted, planning EGD with EUS on Monday   -  acute hepatitis panel, HIV pending     Normocytic anemia  -H/H 13.2/40, MCV 87  -Will obtain iron panel  -Recommend outpatient colonoscopy but patient has previously refused      Thrombocytopenia  -Plt 94 on admission  -Chart review shows chronic low platelets but lower than normal   -Liver US shows normal hepatic texture, echogenicity, s/p partial hepatectomy       CAD  -History of CAD s/p CABG  -TTE 1/27/2020 with LVEF 55%, LVH, mild LAE, mild AS, mild tricuspid regurg  -Continue home ASA     DM2  -On glimepiride at home  -Will order SSI while inpatient     Paroxysmal atrial flutter/ fib, sick sinus syndrome, tachy-valentine arrythmia  -S/p 2 lead pacemaker   -Recently seen in clinic, pacemaker interrogated DDD-CLS mode with normal function  -EKG with ventricular paced rhythm  -Will hold home eliquis for 2 days until patient able to undergo procedure with GI     HLD  -Patient on rosuvastatin at home, will hold until more likely cause of pancreatitis    Discharge Medications        Medication List      START taking these medications    acetaminophen 325 MG tablet  Commonly known as: TYLENOL  Take 2 tablets (650 mg total) by mouth every 6 (six) hours. for 7 days     ciprofloxacin HCl 500 MG tablet  Commonly known as: CIPRO  Take 1 tablet (500 mg total) by mouth 2 (two) times daily. for 7 days     ergocalciferol 50,000 unit Cap  Commonly known as: ERGOCALCIFEROL  Take 1 capsule (50,000 Units total) by mouth every 7 days.  Start taking on: June 21, 2020     metroNIDAZOLE 500 MG tablet  Commonly known as: FLAGYL  Take 1 tablet (500 mg total) by mouth 3 (three) times daily. for 7 days        CHANGE how you take these medications    apixaban 5 mg Tab  Commonly known as: ELIQUIS  Take 1 tablet (5 mg total) by mouth 2 (two) times daily.  Start taking on: June 18, 2020  What changed: These  instructions start on June 18, 2020. If you are unsure what to do until then, ask your doctor or other care provider.     oxyCODONE-acetaminophen  mg per tablet  Commonly known as: PERCOCET  Take 1 tablet by mouth every 6 (six) hours as needed for Pain.  What changed: when to take this        CONTINUE taking these medications    aspirin 81 MG EC tablet  Commonly known as: ECOTRIN     gabapentin 300 MG capsule  Commonly known as: NEURONTIN     glimepiride 2 MG tablet  Commonly known as: AMARYL     metoprolol tartrate 50 MG tablet  Commonly known as: LOPRESSOR  Take 0.5 tablets (25 mg total) by mouth 2 (two) times daily.     mirtazapine 15 MG tablet  Commonly known as: REMERON     OLANZapine 2.5 MG tablet  Commonly known as: ZyPREXA     rosuvastatin 40 MG Tab  Commonly known as: CRESTOR  Take 1 tablet (40 mg total) by mouth every evening.     SOLUS V2 LANCETS 30 gauge Misc  Generic drug: lancets     SOLUS V2 TEST STRIPS Strp  Generic drug: blood sugar diagnostic           Where to Get Your Medications      These medications were sent to [facility] [address], [place] [address]    Hours: Mon-Fri, 8a-5:30p Phone: [phone]   · apixaban 5 mg Tab  · metroNIDAZOLE 500 MG tablet  · oxyCODONE-acetaminophen  mg per tablet     You can get these medications from any pharmacy    Bring a paper prescription for each of these medications  · acetaminophen 325 MG tablet  · ciprofloxacin HCl 500 MG tablet  · ergocalciferol 50,000 unit Cap         Discharge Information:   Diet:  Regular    Physical Activity:  As tolerated    Instructions:  1. Take all medications as prescribed  2. Keep all follow-up appointments  3. Return to the hospital or call your primary care physicians if any worsening symptoms such as fever, nausea, vomiting, diarrea, inablility to tolerate fluid intake, severe uncontrolled pain occur.      Follow-Up Appointments:  Follow-up Information     Michaela Miller MD On  6/25/2020.    Specialty: Internal Medicine  Why: Time: 10:30am Hospital follow up  Contact information:  [address] [facility]  [address]  [facility] [place] [address] [phone]             Will arrange EGD with EUS and possible ERCP with GI  On 6/17/2020.                   Gus Ruiz  [facility] Internal Medicine, HO-I

## 2020-06-15 NOTE — PLAN OF CARE
TN met with pt for d/c planning. Pt will come back Wednesday as an outpatient for planned procedure. Pt states his wife Ela is his help at home. Pt uses a walker and a cane to assist with ADLS. Pt's wife to provide transportation upon d/c.       06/15/20 1130   Discharge Assessment   Assessment Type Discharge Planning Assessment   Confirmed/corrected address and phone number on facesheet? Yes   Assessment information obtained from? Patient   Expected Length of Stay (days) 3   Communicated expected length of stay with patient/caregiver yes   Prior to hospitilization cognitive status: Alert/Oriented   Prior to hospitalization functional status: Assistive Equipment   Current cognitive status: Alert/Oriented   Current Functional Status: Assistive Equipment   Lives With spouse   Able to Return to Prior Arrangements yes   Is patient able to care for self after discharge? Yes   Who are your caregiver(s) and their phone number(s)? Ela Lau (wife) [phone]   Patient's perception of discharge disposition home or selfcare   Readmission Within the Last 30 Days no previous admission in last 30 days   Patient currently being followed by outpatient case management? No   Patient currently receives any other outside agency services? No   Equipment Currently Used at Home walker, rolling;cane, straight   Do you have any problems affording any of your prescribed medications? No   Is the patient taking medications as prescribed? yes   Does the patient have transportation home? Yes   Transportation Anticipated family or friend will provide   Does the patient receive services at the [facility]mad[facility] Clinic? No   Discharge Plan A Home with family   Discharge Plan B Home Health   DME Needed Upon Discharge  none   Patient/Family in Agreement with Plan yes

## 2020-06-15 NOTE — PLAN OF CARE
Discharge order noted. Pt will come back to hospital on Wednesday for procedure. No DME noted.    Discharge rounds on patient. Discussed followup appointments, blue discharge folder, discharge nurse will go over home medications and reasons for medications and final discharge instructions. All patient/caregiver questions answered. Patient verbalized understanding.       Follow-up With  Details  Why  Contact Info   Michaela Miller MD  On 6/25/2020  Time: 10:30am Hospital follow up  [address] [facility]  [address]  [facility] [address]  [phone]   Will arrange EGD with EUS and possible ERCP with GI   On 6/17/2020              06/15/20 1506   Final Note   Assessment Type Final Discharge Note   Anticipated Discharge Disposition Home [place]   What phone number can be called within the next 1-3 days to see how you are doing after discharge? [phone]   Hospital Follow Up  Appt(s) scheduled? Yes   Discharge plans and expectations educations in teach back method with documentation complete? Yes   Right Care Referral Info   Post Acute Recommendation Home-care

## 2020-06-15 NOTE — PLAN OF CARE
Patient has received discharge instructions. Prescriptions received. Instructions reviewed with pt using teachback method. All questions answered to pt satisfaction. IV access and telemetry removed per floor nurse. Transport requested for discharge.  Pt and family member aware pt not to take his apixaban until after eus study has been done.  Also aware ergocalciferol to be started 6/21/2020

## 2020-06-15 NOTE — PROGRESS NOTES
Smoking cessation education provided. Pt states that he started smoking at the age of 15, smoked for several years and then quit for 28 years until he started smoking again last year. Pt states that he smokes 5 or 6 cigarettes per day and he denies nicotine withdrawal symptoms. He declines enrollment in the [employer] stating that he will quit on his own.  Handout provided for Ambulatory Smoking Cessation program in the event pt should require additional resources following hospital discharge.

## 2020-06-15 NOTE — PLAN OF CARE
Pt NPO since midnight. Pt received iv antibiotics per mar. Pt received all medications and prn pain medications per MAR. Pt is AAOX4. Pt vitals are stable and safety maintained. Will continue to monitor

## 2020-06-15 NOTE — PLAN OF CARE
GI Brief Plan of Care Note:    Outpatient procedure is requested by medical team, also noting compatibility issues with [facility] due to pacemaker. Arranged for outpatient EUS/ERCP on Wednesday, should continue holding apixaban pre-procedurally as previously outlined.    If the patient develops fevers, chills, abdominal pain, other signs of clinical cholangitis in the interim, he should return to ED.    Discussed with Dr. Humphrey.  Josh Panda MD PGY-V  GI Fellow

## 2020-06-15 NOTE — PROGRESS NOTES
[facility] IM Resident AMALIA Progress Note    Subjective:      Venu Lau is a 80 y.o. male who is being followed by the [facility] IM service at [facility] for acute pancreatitis.     Denies any nausea, vomiting, fever, SOB, chest pain overnight. Reports some increased epigastric abdominal pain, pain is also more diffuse this morning. States he is ready to go for his procedure today.      Objective:   Last 24 Hour Vital Signs:  BP  Min: 106/55  Max: 163/75  Temp  Av.7 °F (36.5 °C)  Min: 96.3 °F (35.7 °C)  Max: 98.1 °F (36.7 °C)  Pulse  Av.8  Min: 68  Max: 78  Resp  Av.7  Min: 17  Max: 20  SpO2  Av.4 %  Min: 93 %  Max: 97 %  Weight  Av kg (176 lb 5.9 oz)  Min: 80 kg (176 lb 5.9 oz)  Max: 80 kg (176 lb 5.9 oz)  I/O last 3 completed shifts:  In: 350 [P.O.:350]  Out: 2300 [Urine:2300]    Physical Examination:  General appearance: A&OX3, appears stated age and cooperative  HEENT: conjunctivae/corneas clear. L pupil oval in shape, pupils reactive to light, EOM's intact.   Neck: supple, no adenopathy, no carotid bruit, unable to appreciate JVP, trachea midline  Lungs: clear to auscultation bilaterally.No wheezes or crackles.   Heart: RRR, S1, S2 normal, 1/6 systolic mumur best heart at L upper sternal border   Abdomen: soft, nontender to light and deep palpation; bowel sounds normal, transverse upper abdominal surgical scar, multiple laparoscopic insertion sites  Neuro: CN II-XII grossly normal. No focal motor or sensory deficit.   Extremities: Appears normal. 2+ pulse. FROM. No edema.  Skin: Normal turgor and color. No rashes or lesions        Laboratory:  Laboratory Data Reviewed: yes  Pertinent Findings:  Phos 11.8  on , 2.5 on      Microbiology Data Reviewed: yes  Pertinent Findings:  Reviewed      Other Results:  EKG (my interpretation): Ventricular paced rhythm, axis -90, wide QRS, no significant ST changes .     Radiology Data Reviewed: yes  Pertinent Findings:  X-ray  Chest Ap Portable     Result Date: 6/12/2020  EXAMINATION: XR CHEST AP PORTABLE CLINICAL HISTORY: Chest Pain; FINDINGS: Comparison study 12/16/2017.  No change.  Normal size heart status post CABG.  Left chest wall cardiac pacemaker with intact leads.  Aortic arch atherosclerosis.  No congestion.  Lungs are clear.      Stable chest x-ray. Electronically signed by:  Josh Thacker MD Date:                                              06/12/2020 Time:                                      12:09     Us Abdomen Limited     Result Date: 6/12/2020  EXAMINATION: US ABDOMEN LIMITED CLINICAL HISTORY: Generalized abdominal pain FINDINGS: Correlation exams abdominal and pelvic CT without contrast, renal ultrasound 10/29/2016.  The pancreas is unremarkable.  Liver measures 15 cm with normal echogenicity and echotexture.  There is a history of previous hepatectomy.  Cholecystectomy.  Again, the common bile duct is dilated, up to 1.1 cm, same as remote exam.  Right kidney, 9.6 cm, with stable hypoechoic mid pole pyramid.      Cholecystectomy with stable common bile duct dilatation, up to 1.1 cm, no change compared with October 2016.  No intrahepatic ductal dilatation. Electronically signed by: Josh Thacker MD Date:                                           06/12/2020 Time:                                            13:56     Ct Abdomen Pelvis  Without Contrast     Result Date: 6/12/2020  EXAMINATION: CT ABDOMEN PELVIS WITHOUT CONTRAST CLINICAL HISTORY: LLQ pain, suspect diverticulitis; TECHNIQUE: Low dose axial images, sagittal and coronal reformations were obtained from the lung bases to the pubic symphysis.  Contrast was not administered. All CT scans at this location are performed using dose modulation techniques as appropriate to a performed exam including the following: Automated exposure control; adjustment of the mA and/or kV according to patient size. COMPARISON: 10/29/2016 FINDINGS: Heart: Normal in size. No pericardial  effusion. Lung Bases: Well aerated, without consolidation or pleural fluid. Liver: Evidence of prior left hepatic resection. Gallbladder: Surgically absent. Bile Ducts: Suggestion of mild intrahepatic biliary ductal dilation.  Recommend correlation with serum analysis. Pancreas: No mass or peripancreatic fat stranding. Spleen: Unremarkable. Adrenals: Unremarkable. Kidneys/ Ureters: Chronic perinephric fat stranding similar to prior exams.  No hydronephrosis.  No renal, ureteral, or bladder stones. Bladder: Mild distention of the bladder with urine. Reproductive organs: Unremarkable. GI Tract/Mesentery: Distal esophagus, stomach, duodenum, small bowel unremarkable.  Evidence of prior appendectomy.  Moderate large volume stool throughout the colon.  Sigmoid diverticulosis without evidence of diverticulitis. Peritoneal Space: No ascites. No free air. Retroperitoneum: No significant adenopathy. Abdominal wall: Small fat containing uncomplicated left inguinal hernia. Vasculature: Aortoiliac  atherosclerotic calcification. No aneurysm. Bones: Moderate severity multilevel discogenic degenerative change of the lumbar spine.      Suggestion of mild intrahepatic biliary ductal dilation.  Recommend correlation with serum analysis. Moderate large volume stool throughout the colon. Sigmoid diverticulosis without evidence of diverticulitis. Electronically signed by: Kiran Paez Date:                                     06/12/2020 Time:                                            14:28  Current Medications:     Infusions:       Scheduled:   acetaminophen  650 mg Oral Q6H    apixaban  5 mg Oral BID    aspirin  81 mg Oral Daily    ergocalciferol  50,000 Units Oral Q7 Days    gabapentin  300 mg Oral BID    melatonin  9 mg Oral Nightly    metoprolol tartrate  25 mg Oral BID    OLANZapine  2.5 mg Oral QHS    piperacillin-tazobactam (ZOSYN) IVPB  4.5 g Intravenous Q8H        PRN:  dextrose 50 % in water (D50W), glucagon (human  recombinant), insulin aspart U-100, morphine    Antibiotics and Day Number of Therapy:  Zosyn (4/13 - )     Lines and Day Number of Therapy:  PIV     Assessment:        Venu Lau is a 80 y.o. 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 He presented to [facility] ED on 6/12 with chest pain, abdominal pain which he noted after waking up the morning of admission. He was found to have biliary dialation on abd US as well as CT AP. Lipase was elevated to 3300 consistent with pancreatitis. LFTs consistent with obstructive pattern. GI consulted for pancreatitis due to suspected obstruction.       Plan:     Pancreatitis  -Patient admitted after 1 day of consistent lower chest upper abdominal pain   -US and CT scan with evidence of biliary dilation, s/p cholecystectomy and appendectomy, diverticulosis    -Lipase elevated to 3300, Lipase 6/13 was 119  -ALT, AST, alk phos, t bili elevated, consistent with obstructive picture  -ED discussed with GI plan for EUS Monday  -Will monitor overnight, plan to administer generous IVF, feed when able to tolerate   - IgG4 pending  -Will hold home meds associated with pancreatitis until more likely cause discovered mirtazipine (class 3), rosuvastatin (class 4)  -Triglycerides wnl      Obstructive hepatology   -Imaging with biliary dilation, appears chronic  -ALT, AST, alk phos, t bili elevated, consistent with obstructive picture  -ED discussed with GI plan for EUS Monday  -Will continue to monitor, continue IV LR at 200mL/hr, diabetic diet when able to tolerate   -GI consulted, planning EGD with EUS on Monday   - acute hepatitis panel negative, HIV pending  -Transamines improving, t bili continues to increase      Hyperphosphatemia  -Phosphate 11.8  -PTH 70.5 wnl, Vit D 20 low  -Will provide Vit D supplement  -Repeat lab draw with phos 2.6, wnl     Normocytic anemia  -H/H  13.2/40, MCV 87  -Will obtain iron panel  -Recommend outpatient colonoscopy but patient has previously refused      Thrombocytopenia  -Plt 94 on admission  -Chart review shows chronic low platelets but lower than normal   -Liver US shows normal hepatic texture, echogenicity, s/p partial hepatectomy   - obtain peripheral smear      CAD  -History of CAD s/p CABG  -TTE 1/27/2020 with LVEF 55%, LVH, mild LAE, mild AS, mild tricuspid regurg  -Continue home ASA     DM2  -On glimepiride at home  -Will order SSI while inpatient     Paroxysmal atrial flutter/ fib, sick sinus syndrome, tachy-valentine arrythmia  -S/p 2 lead pacemaker   -Recently seen in clinic, pacemaker interrogated DDD-CLS mode with normal function  -EKG with ventricular paced rhythm  -Continue home eliquis     HLD  -Patient on rosuvastatin at home, will hold until more likely cause of pancreatitis     HCM  -HbA1c 5.6  -Hep panel in process  -HIV in process        Gus Ruiz MD  [facility] Internal Medicine HO-1  [facility] IM Service Team B    [facility] Medicine Hospitalist Pager numbers:   [facility] Hospitalist Medicine Team A (Tereza/Flakita): [phone]  [facility] Hospitalist Medicine Team B (Brian/Michaela):  [phone]

## 2020-06-15 NOTE — ANESTHESIA PREPROCEDURE EVALUATION
06/15/2020  Venu Lau is a 80 y.o., male admitted with biliary pancreatitis for upper EUS and ERCP under GETA. Low platelets, labs pending 6/15/20    Past Medical History:   Diagnosis Date    *Atrial flutter     Anemia     Anticoagulant long-term use     Arthritis     Bipolar 1 disorder     Coronary artery disease     Diabetes mellitus     Diabetes mellitus, type 2     GERD (gastroesophageal reflux disease)     Gout, unspecified     H/O: GI bleed     Hypertension     Liver cancer     PVD (peripheral vascular disease)     Sciatica     SSS (sick sinus syndrome)     Tachy-valentine syndrome     Thyroid disease    smoker    Past Surgical History:   Procedure Laterality Date    APPENDECTOMY      CHOLECYSTECTOMY      CORONARY ARTERY BYPASS GRAFT      INSERT / REPLACE / REMOVE PACEMAKER      LIVER RESECTION      LUMBAR DISC SURGERY           Anesthesia Evaluation     I have reviewed the Nursing Notes. I have reviewed the NPO Status.   I have reviewed the Medications.     Review of Systems  Anesthesia Hx:   Denies Personal Hx of Anesthesia complications.   Social:  Smoker   Hematology/Oncology:         -- Anemia: Hematology Comments: thrombocytopenia Current/Recent Cancer. surgery    Cardiovascular:   Exercise tolerance: poor Pacemaker Hypertension CAD  CABG/stent Dysrhythmias (tachy-valentine syndrome)  PVD hyperlipidemia    Pulmonary:   COPD    Renal/:   Chronic Renal Disease    Hepatic/GI:   GERD Liver Disease,    Musculoskeletal:   Arthritis     Endocrine:   Diabetes Hypothyroidism    Psych:   Psychiatric History          Physical Exam  General:  Well nourished, Obesity    Airway/Jaw/Neck:  Airway Findings: Mouth Opening: Small, but > 3cm General Airway Assessment: Adult  Mallampati: III  TM Distance: Normal, at least 6 cm  Jaw/Neck Findings: (beard)  Neck ROM: Normal ROM       Dental:  Dental Findings: Edentulous   Chest/Lungs:  Chest/Lungs Findings: Normal Respiratory Rate, Decreased Breathe Sounds Left, Decreased Breathe Sounds Right     Heart/Vascular:  Heart Findings: Normal       Mental Status:  Mental Status Findings:  Alert and Oriented       Lab Results   Component Value Date    WBC 3.24 (L) 06/14/2020    HGB 11.0 (L) 06/14/2020    HCT 34.6 (L) 06/14/2020    PLT 68 (L) 06/14/2020    CHOL 63 (L) 06/13/2020    TRIG 61 06/13/2020    HDL 25 (L) 06/13/2020    ALT 89 (H) 06/14/2020    AST 98 (H) 06/14/2020     06/14/2020    K 4.5 06/14/2020     06/14/2020    CREATININE 1.1 06/14/2020    BUN 14 06/14/2020    CO2 20 (L) 06/14/2020    TSH 7.323 (H) 06/12/2020    INR 1.0 10/29/2016    HGBA1C 5.6 06/12/2020 1/2020  · Normal left ventricular systolic function. The estimated ejection fraction is 55%.  · Concentric left ventricular hypertrophy.  · Indeterminate left ventricular diastolic function.  · No wall motion abnormalities.  · Normal right ventricular systolic function.  · Mild left atrial enlargement.  · Mild aortic valve stenosis.  · Aortic valve area is 1.68 cm2; peak velocity is 1.90 m/s; mean gradient is 9 mmHg.  · Mild tricuspid regurgitation.  · The estimated PA systolic pressure is 32 mmHg.  · Normal central venous pressure (3 mmHg).    Anesthesia Plan  Type of Anesthesia, risks & benefits discussed:  Anesthesia Type:  general  Patient's Preference:   Intra-op Monitoring Plan: standard ASA monitors  Intra-op Monitoring Plan Comments:   Post Op Pain Control Plan:   Post Op Pain Control Plan Comments:   Induction:   IV  Beta Blocker:  Patient is on a Beta-Blocker and has received one dose within the past 24 hours (No further documentation required).       Informed Consent: Patient understands risks and agrees with Anesthesia plan.  Questions answered. Anesthesia consent signed with patient.  ASA Score: 4     Day of Surgery Review of History & Physical:             Ready For Surgery From Anesthesia Perspective.

## 2020-06-15 NOTE — PLAN OF CARE
GI Brief Plan of Care Note:    Patient has received Eliquis through morning dose today. Remains afebrile without leukocytosis. TBil rising.  Eliquis now held, plan ERCP after 48 hrs held (Wednesday) unless patient were to develop clinical cholangitis then would perform sooner.    In meantime, please obtain MRCP and trend daily chemistries. Following    Discssed with Dr. Humphrey.  Josh Panda MD PGY-V  [facility] Gastroenterology Fellow

## 2020-06-16 ENCOUNTER — TELEPHONE (OUTPATIENT)
Dept: ENDOSCOPY | Facility: HOSPITAL | Age: 80
End: 2020-06-16

## 2020-06-16 NOTE — TELEPHONE ENCOUNTER
Spoke with patient about arrival time @ 1200.     NPO status reviewed: Patient may eat until 7:00pm.  After 7pm, pt may have CLEAR liquids ONLY until completely NPO at Midnight.    Medications: Do not take Insulin or oral diabetic medications the day of the procedure.    Take as prescribed: heart, seizure and blood pressure medication in the morning with a sip of water (less than an ounce).  Take any breathing medications and bring inhalers to hospital with you.     Leave all valuables and jewelry at home. Wear comfortable clothes to procedure to change into hospital gown.   You cannot drive for 24 hours after your procedure because you will receive sedation for your procedure to make you comfortable.    A ride must be provided at discharge.

## 2020-06-16 NOTE — TELEPHONE ENCOUNTER
Left message instructing patient to call dept @ [phone] between 8am-4pm.    Arrival time to be given @ 1200  EUS/ERCP with Dr Humphrey  (Message sent via [facility] portal)

## 2020-06-17 ENCOUNTER — ANESTHESIA (OUTPATIENT)
Dept: ENDOSCOPY | Facility: HOSPITAL | Age: 80
DRG: 444 | End: 2020-06-17
Payer: MEDICARE

## 2020-06-17 ENCOUNTER — HOSPITAL ENCOUNTER (INPATIENT)
Facility: HOSPITAL | Age: 80
LOS: 3 days | Discharge: HOME | DRG: 444 | End: 2020-06-22
Attending: INTERNAL MEDICINE | Admitting: INTERNAL MEDICINE
Payer: MEDICARE

## 2020-06-17 ENCOUNTER — ANESTHESIA EVENT (OUTPATIENT)
Dept: ENDOSCOPY | Facility: HOSPITAL | Age: 80
DRG: 444 | End: 2020-06-17
Payer: MEDICARE

## 2020-06-17 DIAGNOSIS — R74.01 TRANSAMINITIS: Primary | ICD-10-CM

## 2020-06-17 DIAGNOSIS — E11.9 TYPE 2 DIABETES MELLITUS WITHOUT COMPLICATION, UNSPECIFIED WHETHER LONG TERM INSULIN USE: ICD-10-CM

## 2020-06-17 DIAGNOSIS — K83.8 HEMOBILIA: ICD-10-CM

## 2020-06-17 DIAGNOSIS — K74.69 OTHER CIRRHOSIS OF LIVER: ICD-10-CM

## 2020-06-17 DIAGNOSIS — K85.10 ACUTE BILIARY PANCREATITIS WITHOUT INFECTION OR NECROSIS: ICD-10-CM

## 2020-06-17 DIAGNOSIS — R79.89 ABNORMAL LFTS: ICD-10-CM

## 2020-06-17 DIAGNOSIS — E80.6 HYPERBILIRUBINEMIA: ICD-10-CM

## 2020-06-17 LAB
ABO GROUP BLD: NORMAL
ALBUMIN SERPL BCP-MCNC: 3.4 G/DL (ref 3.5–5.2)
ALP SERPL-CCNC: 231 U/L (ref 55–135)
ALT SERPL W/O P-5'-P-CCNC: 76 U/L (ref 10–44)
ANION GAP SERPL CALC-SCNC: 8 MMOL/L (ref 8–16)
APTT BLDCRRT: 35.2 SEC (ref 21–32)
AST SERPL-CCNC: 112 U/L (ref 10–40)
BASOPHILS # BLD AUTO: 0.01 K/UL (ref 0–0.2)
BASOPHILS # BLD AUTO: 0.02 K/UL (ref 0–0.2)
BASOPHILS NFR BLD: 0.2 % (ref 0–1.9)
BASOPHILS NFR BLD: 0.5 % (ref 0–1.9)
BILIRUB DIRECT SERPL-MCNC: 6.4 MG/DL (ref 0.1–0.3)
BILIRUB SERPL-MCNC: 8.3 MG/DL (ref 0.1–1)
BLD GP AB SCN CELLS X3 SERPL QL: NORMAL
BUN SERPL-MCNC: 18 MG/DL (ref 8–23)
CALCIUM SERPL-MCNC: 9.4 MG/DL (ref 8.7–10.5)
CHLORIDE SERPL-SCNC: 107 MMOL/L (ref 95–110)
CO2 SERPL-SCNC: 24 MMOL/L (ref 23–29)
CREAT SERPL-MCNC: 1.2 MG/DL (ref 0.5–1.4)
DIFFERENTIAL METHOD: ABNORMAL
DIFFERENTIAL METHOD: ABNORMAL
EOSINOPHIL # BLD AUTO: 0 K/UL (ref 0–0.5)
EOSINOPHIL # BLD AUTO: 0 K/UL (ref 0–0.5)
EOSINOPHIL NFR BLD: 0.2 % (ref 0–8)
EOSINOPHIL NFR BLD: 0.2 % (ref 0–8)
ERYTHROCYTE [DISTWIDTH] IN BLOOD BY AUTOMATED COUNT: 15.9 % (ref 11.5–14.5)
ERYTHROCYTE [DISTWIDTH] IN BLOOD BY AUTOMATED COUNT: 16 % (ref 11.5–14.5)
EST. GFR  (AFRICAN AMERICAN): >60 ML/MIN/1.73 M^2
EST. GFR  (NON AFRICAN AMERICAN): 57 ML/MIN/1.73 M^2
GGT SERPL-CCNC: 278 U/L (ref 8–55)
GLUCOSE SERPL-MCNC: 61 MG/DL (ref 70–110)
GLUCOSE SERPL-MCNC: 87 MG/DL (ref 70–110)
HCT VFR BLD AUTO: 30.1 % (ref 40–54)
HCT VFR BLD AUTO: 34 % (ref 40–54)
HGB BLD-MCNC: 10.8 G/DL (ref 14–18)
HGB BLD-MCNC: 9.9 G/DL (ref 14–18)
IGG4 SER-MCNC: 106 MG/DL (ref 4–86)
IMM GRANULOCYTES # BLD AUTO: 0.04 K/UL (ref 0–0.04)
IMM GRANULOCYTES # BLD AUTO: 0.07 K/UL (ref 0–0.04)
IMM GRANULOCYTES NFR BLD AUTO: 0.9 % (ref 0–0.5)
IMM GRANULOCYTES NFR BLD AUTO: 1.5 % (ref 0–0.5)
INR PPP: 1.2 (ref 0.8–1.2)
LYMPHOCYTES # BLD AUTO: 0.5 K/UL (ref 1–4.8)
LYMPHOCYTES # BLD AUTO: 0.7 K/UL (ref 1–4.8)
LYMPHOCYTES NFR BLD: 11.3 % (ref 18–48)
LYMPHOCYTES NFR BLD: 15.7 % (ref 18–48)
MCH RBC QN AUTO: 28.9 PG (ref 27–31)
MCH RBC QN AUTO: 28.9 PG (ref 27–31)
MCHC RBC AUTO-ENTMCNC: 31.8 G/DL (ref 32–36)
MCHC RBC AUTO-ENTMCNC: 32.9 G/DL (ref 32–36)
MCV RBC AUTO: 88 FL (ref 82–98)
MCV RBC AUTO: 91 FL (ref 82–98)
MONOCYTES # BLD AUTO: 0.6 K/UL (ref 0.3–1)
MONOCYTES # BLD AUTO: 0.7 K/UL (ref 0.3–1)
MONOCYTES NFR BLD: 14.1 % (ref 4–15)
MONOCYTES NFR BLD: 14.5 % (ref 4–15)
NEUTROPHILS # BLD AUTO: 3 K/UL (ref 1.8–7.7)
NEUTROPHILS # BLD AUTO: 3.3 K/UL (ref 1.8–7.7)
NEUTROPHILS NFR BLD: 68.2 % (ref 38–73)
NEUTROPHILS NFR BLD: 72.7 % (ref 38–73)
NRBC BLD-RTO: 0 /100 WBC
NRBC BLD-RTO: 0 /100 WBC
PLATELET # BLD AUTO: 79 K/UL (ref 150–350)
PLATELET # BLD AUTO: 83 K/UL (ref 150–350)
PMV BLD AUTO: 10.6 FL (ref 9.2–12.9)
PMV BLD AUTO: 11.5 FL (ref 9.2–12.9)
POCT GLUCOSE: 61 MG/DL (ref 70–110)
POCT GLUCOSE: 70 MG/DL (ref 70–110)
POCT GLUCOSE: 82 MG/DL (ref 70–110)
POCT GLUCOSE: 92 MG/DL (ref 70–110)
POTASSIUM SERPL-SCNC: 4.5 MMOL/L (ref 3.5–5.1)
PROT SERPL-MCNC: 7.1 G/DL (ref 6–8.4)
PROTHROMBIN TIME: 12.4 SEC (ref 9–12.5)
RBC # BLD AUTO: 3.43 M/UL (ref 4.6–6.2)
RBC # BLD AUTO: 3.74 M/UL (ref 4.6–6.2)
RH BLD: NORMAL
SODIUM SERPL-SCNC: 139 MMOL/L (ref 136–145)
WBC # BLD AUTO: 4.4 K/UL (ref 3.9–12.7)
WBC # BLD AUTO: 4.6 K/UL (ref 3.9–12.7)

## 2020-06-17 PROCEDURE — 27201674 HC SPHINCTERTOME: Performed by: INTERNAL MEDICINE

## 2020-06-17 PROCEDURE — 43259 PR ENDOSCOPIC ULTRASOUND EXAM: ICD-10-PCS | Mod: 51,,, | Performed by: INTERNAL MEDICINE

## 2020-06-17 PROCEDURE — 63600175 PHARM REV CODE 636 W HCPCS: Performed by: STUDENT IN AN ORGANIZED HEALTH CARE EDUCATION/TRAINING PROGRAM

## 2020-06-17 PROCEDURE — 27202125 HC BALLOON, EXTRACTION (ANY): Performed by: INTERNAL MEDICINE

## 2020-06-17 PROCEDURE — S5010 5% DEXTROSE AND 0.45% SALINE: HCPCS | Performed by: ANESTHESIOLOGY

## 2020-06-17 PROCEDURE — 27202127 HC STENT INTRODUCER: Performed by: INTERNAL MEDICINE

## 2020-06-17 PROCEDURE — G0378 HOSPITAL OBSERVATION PER HR: HCPCS

## 2020-06-17 PROCEDURE — 43259 EGD US EXAM DUODENUM/JEJUNUM: CPT | Mod: 51,,, | Performed by: INTERNAL MEDICINE

## 2020-06-17 PROCEDURE — 43274 ERCP DUCT STENT PLACEMENT: CPT | Performed by: INTERNAL MEDICINE

## 2020-06-17 PROCEDURE — 25000003 PHARM REV CODE 250: Performed by: NURSE ANESTHETIST, CERTIFIED REGISTERED

## 2020-06-17 PROCEDURE — 80053 COMPREHEN METABOLIC PANEL: CPT

## 2020-06-17 PROCEDURE — 37000009 HC ANESTHESIA EA ADD 15 MINS: Performed by: INTERNAL MEDICINE

## 2020-06-17 PROCEDURE — 74328 X-RAY BILE DUCT ENDOSCOPY: CPT | Mod: 26,,, | Performed by: INTERNAL MEDICINE

## 2020-06-17 PROCEDURE — 63600175 PHARM REV CODE 636 W HCPCS: Performed by: NURSE ANESTHETIST, CERTIFIED REGISTERED

## 2020-06-17 PROCEDURE — 43259 EGD US EXAM DUODENUM/JEJUNUM: CPT | Performed by: INTERNAL MEDICINE

## 2020-06-17 PROCEDURE — 36415 COLL VENOUS BLD VENIPUNCTURE: CPT

## 2020-06-17 PROCEDURE — 25000003 PHARM REV CODE 250: Performed by: STUDENT IN AN ORGANIZED HEALTH CARE EDUCATION/TRAINING PROGRAM

## 2020-06-17 PROCEDURE — 43274 ERCP DUCT STENT PLACEMENT: CPT | Mod: ,,, | Performed by: INTERNAL MEDICINE

## 2020-06-17 PROCEDURE — C2617 STENT, NON-COR, TEM W/O DEL: HCPCS | Performed by: INTERNAL MEDICINE

## 2020-06-17 PROCEDURE — 82962 GLUCOSE BLOOD TEST: CPT | Performed by: INTERNAL MEDICINE

## 2020-06-17 PROCEDURE — 86850 RBC ANTIBODY SCREEN: CPT

## 2020-06-17 PROCEDURE — 82977 ASSAY OF GGT: CPT

## 2020-06-17 PROCEDURE — 43264 ERCP REMOVE DUCT CALCULI: CPT | Performed by: INTERNAL MEDICINE

## 2020-06-17 PROCEDURE — 25000003 PHARM REV CODE 250: Performed by: ANESTHESIOLOGY

## 2020-06-17 PROCEDURE — 82248 BILIRUBIN DIRECT: CPT

## 2020-06-17 PROCEDURE — 43274 PR ERCP W/STENT PLCMNT BILIARY/PANCREATIC DUCT: ICD-10-PCS | Mod: ,,, | Performed by: INTERNAL MEDICINE

## 2020-06-17 PROCEDURE — 86901 BLOOD TYPING SEROLOGIC RH(D): CPT

## 2020-06-17 PROCEDURE — C1769 GUIDE WIRE: HCPCS | Performed by: INTERNAL MEDICINE

## 2020-06-17 PROCEDURE — 86900 BLOOD TYPING SEROLOGIC ABO: CPT

## 2020-06-17 PROCEDURE — 37000008 HC ANESTHESIA 1ST 15 MINUTES: Performed by: INTERNAL MEDICINE

## 2020-06-17 PROCEDURE — 85025 COMPLETE CBC W/AUTO DIFF WBC: CPT

## 2020-06-17 PROCEDURE — 85610 PROTHROMBIN TIME: CPT

## 2020-06-17 PROCEDURE — 25000003 PHARM REV CODE 250: Performed by: INTERNAL MEDICINE

## 2020-06-17 PROCEDURE — 74328 PR  X-RAY FOR BILE DUCT ENDOSCOPY: ICD-10-PCS | Mod: 26,,, | Performed by: INTERNAL MEDICINE

## 2020-06-17 PROCEDURE — 25500020 PHARM REV CODE 255: Performed by: INTERNAL MEDICINE

## 2020-06-17 PROCEDURE — 85730 THROMBOPLASTIN TIME PARTIAL: CPT

## 2020-06-17 DEVICE — BILIARY STENT
Type: IMPLANTABLE DEVICE | Site: BILE DUCT | Status: FUNCTIONAL
Brand: ADVANIX™ BILIARY

## 2020-06-17 RX ORDER — CIPROFLOXACIN 2 MG/ML
INJECTION, SOLUTION INTRAVENOUS
Status: DISCONTINUED | OUTPATIENT
Start: 2020-06-17 | End: 2020-06-17

## 2020-06-17 RX ORDER — IBUPROFEN 200 MG
24 TABLET ORAL
Status: DISCONTINUED | OUTPATIENT
Start: 2020-06-17 | End: 2020-06-22 | Stop reason: HOSPADM

## 2020-06-17 RX ORDER — LABETALOL HYDROCHLORIDE 5 MG/ML
5 INJECTION, SOLUTION INTRAVENOUS ONCE
Status: DISCONTINUED | OUTPATIENT
Start: 2020-06-17 | End: 2020-06-17

## 2020-06-17 RX ORDER — DEXTROSE 50 % IN WATER (D50W) INTRAVENOUS SYRINGE
12.5
Status: DISCONTINUED | OUTPATIENT
Start: 2020-06-17 | End: 2020-06-22 | Stop reason: HOSPADM

## 2020-06-17 RX ORDER — OXYCODONE AND ACETAMINOPHEN 5; 325 MG/1; MG/1
1 TABLET ORAL EVERY 6 HOURS PRN
Status: DISCONTINUED | OUTPATIENT
Start: 2020-06-17 | End: 2020-06-18

## 2020-06-17 RX ORDER — SODIUM CHLORIDE 0.9 % (FLUSH) 0.9 %
10 SYRINGE (ML) INJECTION
Status: DISCONTINUED | OUTPATIENT
Start: 2020-06-17 | End: 2020-06-17 | Stop reason: HOSPADM

## 2020-06-17 RX ORDER — INDOMETHACIN 50 MG/1
SUPPOSITORY RECTAL
Status: COMPLETED | OUTPATIENT
Start: 2020-06-17 | End: 2020-06-17

## 2020-06-17 RX ORDER — IBUPROFEN 200 MG
16 TABLET ORAL
Status: DISCONTINUED | OUTPATIENT
Start: 2020-06-17 | End: 2020-06-22 | Stop reason: HOSPADM

## 2020-06-17 RX ORDER — SODIUM CHLORIDE 0.9 % (FLUSH) 0.9 %
10 SYRINGE (ML) INJECTION
Status: DISCONTINUED | OUTPATIENT
Start: 2020-06-17 | End: 2020-06-22 | Stop reason: HOSPADM

## 2020-06-17 RX ORDER — PROPOFOL 10 MG/ML
VIAL (ML) INTRAVENOUS
Status: DISCONTINUED | OUTPATIENT
Start: 2020-06-17 | End: 2020-06-17

## 2020-06-17 RX ORDER — PROPOFOL 10 MG/ML
VIAL (ML) INTRAVENOUS CONTINUOUS PRN
Status: DISCONTINUED | OUTPATIENT
Start: 2020-06-17 | End: 2020-06-17

## 2020-06-17 RX ORDER — DEXTROSE 50 % IN WATER (D50W) INTRAVENOUS SYRINGE
25
Status: DISCONTINUED | OUTPATIENT
Start: 2020-06-17 | End: 2020-06-22 | Stop reason: HOSPADM

## 2020-06-17 RX ORDER — GABAPENTIN 300 MG/1
300 CAPSULE ORAL 2 TIMES DAILY
Status: DISCONTINUED | OUTPATIENT
Start: 2020-06-17 | End: 2020-06-22 | Stop reason: HOSPADM

## 2020-06-17 RX ORDER — LIDOCAINE HCL/PF 100 MG/5ML
SYRINGE (ML) INTRAVENOUS
Status: DISCONTINUED | OUTPATIENT
Start: 2020-06-17 | End: 2020-06-17

## 2020-06-17 RX ORDER — GLUCAGON 1 MG
1 KIT INJECTION
Status: DISCONTINUED | OUTPATIENT
Start: 2020-06-17 | End: 2020-06-22 | Stop reason: HOSPADM

## 2020-06-17 RX ADMIN — DEXTROSE AND SODIUM CHLORIDE 1000 ML: 5; .45 INJECTION, SOLUTION INTRAVENOUS at 01:06

## 2020-06-17 RX ADMIN — OXYCODONE HYDROCHLORIDE AND ACETAMINOPHEN 1 TABLET: 5; 325 TABLET ORAL at 07:06

## 2020-06-17 RX ADMIN — PIPERACILLIN AND TAZOBACTAM 4.5 G: 4; .5 INJECTION, POWDER, LYOPHILIZED, FOR SOLUTION INTRAVENOUS; PARENTERAL at 06:06

## 2020-06-17 RX ADMIN — CIPROFLOXACIN 400 MG: 2 INJECTION, SOLUTION INTRAVENOUS at 02:06

## 2020-06-17 RX ADMIN — GABAPENTIN 300 MG: 300 CAPSULE ORAL at 08:06

## 2020-06-17 RX ADMIN — INDOMETHACIN 100 MG: 50 SUPPOSITORY RECTAL at 02:06

## 2020-06-17 RX ADMIN — LIDOCAINE HYDROCHLORIDE 100 MG: 20 INJECTION, SOLUTION INTRAVENOUS at 01:06

## 2020-06-17 RX ADMIN — DEXTROSE AND SODIUM CHLORIDE 150 ML: 5; .45 INJECTION, SOLUTION INTRAVENOUS at 02:06

## 2020-06-17 RX ADMIN — TOPICAL ANESTHETIC 1 EACH: 200 SPRAY DENTAL; PERIODONTAL at 01:06

## 2020-06-17 RX ADMIN — PROPOFOL 10 MG: 10 INJECTION, EMULSION INTRAVENOUS at 01:06

## 2020-06-17 RX ADMIN — IOHEXOL 10 ML: 300 INJECTION, SOLUTION INTRAVENOUS at 02:06

## 2020-06-17 RX ADMIN — PROPOFOL 60 MG: 10 INJECTION, EMULSION INTRAVENOUS at 01:06

## 2020-06-17 RX ADMIN — PROPOFOL 120 MCG/KG/MIN: 10 INJECTION, EMULSION INTRAVENOUS at 01:06

## 2020-06-17 NOTE — [employer] PATIENT INSTRUCTIONS
Discharge Summary/Instructions after an Endoscopic Procedure  Patient Name: Venu Lau  Patient MRN: [id number]  Patient YOB: 1940 Wednesday, June 17, 2020  Rei Humphrey MD  Your health is very important to us during the Covid Crisis. Following your   procedure today, you will receive a daily text for 2 weeks asking about   signs or symptoms of Covid 19.  Please respond to this text when you   receive it so we can follow up and keep you as safe as possible.   RESTRICTIONS:  During your procedure today, you received medications for sedation.  These   medications may affect your judgment, balance and coordination.  Therefore,   for 24 hours, you have the following restrictions:   - DO NOT drive a car, operate machinery, make legal/financial decisions,   sign important papers or drink alcohol.    ACTIVITY:  Today: no heavy lifting, straining or running due to procedural   sedation/anesthesia.  The following day: return to full activity including work.  DIET:  Eat and drink normally unless instructed otherwise.     TREATMENT FOR COMMON SIDE EFFECTS:  - Mild abdominal pain, nausea, belching, bloating or excessive gas:  rest,   eat lightly and use a heating pad.  - Sore Throat: treat with throat lozenges and/or gargle with warm salt   water.  - Because air was used during the procedure, expelling large amounts of air   from your rectum or belching is normal.  - If a bowel prep was taken, you may not have a bowel movement for 1-3 days.    This is normal.  SYMPTOMS TO WATCH FOR AND REPORT TO YOUR PHYSICIAN:  1. Abdominal pain or bloating, other than gas cramps.  2. Chest pain.  3. Back pain.  4. Signs of infection such as: chills or fever occurring within 24 hours   after the procedure.  5. Rectal bleeding, which would show as bright red, maroon, or black stools.   (A tablespoon of blood from the rectum is not serious, especially if   hemorrhoids are present.)  6. Vomiting.  7. Weakness or dizziness.  GO  DIRECTLY TO THE NEAREST EMERGENCY ROOM IF YOU HAVE ANY OF THE FOLLOWING:      Difficulty breathing              Chills and/or fever over 101 F   Persistent vomiting and/or vomiting blood   Severe abdominal pain   Severe chest pain   Black, tarry stools   Bleeding- more than one tablespoon   Any other symptom or condition that you feel may need urgent attention  Your doctor recommends these additional instructions:  If any biopsies were taken, your doctors clinic will contact you in 1 to 2   weeks with any results.  - Discharge patient to home (ambulatory).   - Perform an ERCP today.  For questions, problems or results please call your physician - Rei Humphrey MD at Work:  [phone].  EMERGENCY PHONE NUMBER: [phone],  LAB RESULTS: [phone]  IF A COMPLICATION OR EMERGENCY SITUATION ARISES AND YOU ARE UNABLE TO REACH   YOUR PHYSICIAN - GO DIRECTLY TO THE EMERGENCY ROOM.  Rei Humphrey MD  6/17/2020 2:46:01 PM  This report has been verified and signed electronically.  [employer]

## 2020-06-17 NOTE — PROVATION PATIENT INSTRUCTIONS
Discharge Summary/Instructions after an Endoscopic Procedure  Patient Name: Venu Lau  Patient MRN: [id number]  Patient YOB: 1940 Wednesday, June 17, 2020  Rei Humphrey MD  Your health is very important to us during the Covid Crisis. Following your   procedure today, you will receive a daily text for 2 weeks asking about   signs or symptoms of Covid 19.  Please respond to this text when you   receive it so we can follow up and keep you as safe as possible.   RESTRICTIONS:  During your procedure today, you received medications for sedation.  These   medications may affect your judgment, balance and coordination.  Therefore,   for 24 hours, you have the following restrictions:   - DO NOT drive a car, operate machinery, make legal/financial decisions,   sign important papers or drink alcohol.    ACTIVITY:  Today: no heavy lifting, straining or running due to procedural   sedation/anesthesia.  The following day: return to full activity including work.  DIET:  Eat and drink normally unless instructed otherwise.     TREATMENT FOR COMMON SIDE EFFECTS:  - Mild abdominal pain, nausea, belching, bloating or excessive gas:  rest,   eat lightly and use a heating pad.  - Sore Throat: treat with throat lozenges and/or gargle with warm salt   water.  - Because air was used during the procedure, expelling large amounts of air   from your rectum or belching is normal.  - If a bowel prep was taken, you may not have a bowel movement for 1-3 days.    This is normal.  SYMPTOMS TO WATCH FOR AND REPORT TO YOUR PHYSICIAN:  1. Abdominal pain or bloating, other than gas cramps.  2. Chest pain.  3. Back pain.  4. Signs of infection such as: chills or fever occurring within 24 hours   after the procedure.  5. Rectal bleeding, which would show as bright red, maroon, or black stools.   (A tablespoon of blood from the rectum is not serious, especially if   hemorrhoids are present.)  6. Vomiting.  7. Weakness or dizziness.  GO  DIRECTLY TO THE NEAREST EMERGENCY ROOM IF YOU HAVE ANY OF THE FOLLOWING:      Difficulty breathing              Chills and/or fever over 101 F   Persistent vomiting and/or vomiting blood   Severe abdominal pain   Severe chest pain   Black, tarry stools   Bleeding- more than one tablespoon   Any other symptom or condition that you feel may need urgent attention  Your doctor recommends these additional instructions:  If any biopsies were taken, your doctors clinic will contact you in 1 to 2   weeks with any results.  - Avoid aspirin and nonsteroidal anti-inflammatory medicines.   - Admit the patient to hospital [facility] for ongoing care.   - Watch for pancreatitis, bleeding, perforation, and cholangitis.   - Consult IR and Surgery  - The findings and recommendations were discussed with the patient.   - The findings and recommendations were discussed with the patient's family.     - Broad spectrum antibiotics.  For questions, problems or results please call your physician - Rei Humphrey MD at Work:  [phone].  EMERGENCY PHONE NUMBER: [phone],  LAB RESULTS: [phone]  IF A COMPLICATION OR EMERGENCY SITUATION ARISES AND YOU ARE UNABLE TO REACH   YOUR PHYSICIAN - GO DIRECTLY TO THE EMERGENCY ROOM.  Rei Humphrey MD  6/17/2020 3:07:09 PM  This report has been verified and signed electronically.  [employer]

## 2020-06-17 NOTE — ANESTHESIA POSTPROCEDURE EVALUATION
Anesthesia Post Evaluation    Patient: Venu Lau    Procedure(s) Performed: Procedure(s) (LRB):  EUS (N/A)  ERCP (N/A)    Final Anesthesia Type: MAC    Patient location during evaluation: GI PACU  Patient participation: Yes- Able to Participate  Level of consciousness: awake and alert and oriented  Post-procedure vital signs: reviewed and stable  Pain management: adequate  Airway patency: patent  WILEY mitigation strategies: Multimodal analgesia  PONV status at discharge: No PONV  Anesthetic complications: no      Cardiovascular status: stable, hemodynamically stable and blood pressure returned to baseline  Respiratory status: room air, unassisted and spontaneous ventilation  Hydration status: euvolemic  Follow-up not needed.          Vitals Value Taken Time   /58 06/17/20 1440   Temp 97.2 06/17/20 1443   Pulse 69 06/17/20 1440   Resp 18 06/17/20 1440   SpO2 99 % 06/17/20 1440         No case tracking events are documented in the log.      Pain/Flory Score: Flory Score: 9 (6/17/2020  2:40 PM)

## 2020-06-17 NOTE — TRANSFER OF CARE
"Anesthesia Transfer of Care Note    Patient: Venu Lau    Procedure(s) Performed: Procedure(s) (LRB):  EUS (N/A)  ERCP (N/A)    Patient location: GI    Anesthesia Type: MAC    Transport from OR: Transported from OR on room air with adequate spontaneous ventilation    Post pain: adequate analgesia    Post assessment: no apparent anesthetic complications and tolerated procedure well    Post vital signs: stable    Level of consciousness: sedated    Nausea/Vomiting: no nausea/vomiting    Complications: none    Transfer of care protocol was followed      Last vitals:   Visit Vitals  BP (!) 114/58 (Patient Position: Lying)   Pulse 69   Temp 36.7 °C (98.1 °F) (Temporal)   Resp 18   Ht 5' 5" (1.651 m)   Wt 78.9 kg (174 lb)   SpO2 99%   BMI 28.96 kg/m²     "

## 2020-06-17 NOTE — ANESTHESIA PREPROCEDURE EVALUATION
06/17/2020  Venu Lau is a 80 y.o., male admitted with biliary pancreatitis for upper EUS and ERCP under GA/MAC.     Past Medical History:   Diagnosis Date    *Atrial flutter     Anemia     Anticoagulant long-term use     Arthritis     Bipolar 1 disorder     Coronary artery disease     Diabetes mellitus     Diabetes mellitus, type 2     GERD (gastroesophageal reflux disease)     Gout, unspecified     H/O: GI bleed     Hypertension     Liver cancer     PVD (peripheral vascular disease)     Sciatica     SSS (sick sinus syndrome)     Tachy-valentine syndrome     Thyroid disease    smoker    Past Surgical History:   Procedure Laterality Date    APPENDECTOMY      CHOLECYSTECTOMY      CORONARY ARTERY BYPASS GRAFT      INSERT / REPLACE / REMOVE PACEMAKER      LIVER RESECTION      LUMBAR DISC SURGERY           Pre-op Assessment    I have reviewed the Patient Summary Reports.     I have reviewed the Nursing Notes. I have reviewed the NPO Status.   I have reviewed the Medications.     Review of Systems  Anesthesia Hx:  No problems with previous Anesthesia   Denies Personal Hx of Anesthesia complications.   Social:  Smoker    Hematology/Oncology:         -- Anemia: Hematology Comments: thrombocytopenia Current/Recent Cancer. surgery    Cardiovascular:   Exercise tolerance: poor Pacemaker Hypertension CAD  CABG/stent Dysrhythmias (tachy-valentine syndrome)  PVD hyperlipidemia    Pulmonary:   COPD    Renal/:   Chronic Renal Disease    Hepatic/GI:   GERD Liver Disease,    Musculoskeletal:   Arthritis     Endocrine:   Diabetes Hypothyroidism    Psych:   Psychiatric History          Physical Exam  General:  Well nourished, Obesity    Airway/Jaw/Neck:  Airway Findings: Mouth Opening: Small, but > 3cm General Airway Assessment: Adult  Mallampati: III  TM Distance: Normal, at least 6 cm  Jaw/Neck  Findings: (beard)  Neck ROM: Normal ROM      Dental:  Dental Findings: Edentulous   Chest/Lungs:  Chest/Lungs Findings: Normal Respiratory Rate, Decreased Breathe Sounds Left, Decreased Breathe Sounds Right     Heart/Vascular:  Heart Findings: Normal       Mental Status:  Mental Status Findings:  Alert and Oriented       Lab Results   Component Value Date    WBC 3.94 06/15/2020    HGB 11.5 (L) 06/15/2020    HCT 36.0 (L) 06/15/2020    PLT 73 (L) 06/15/2020    CHOL 63 (L) 06/13/2020    TRIG 61 06/13/2020    HDL 25 (L) 06/13/2020    ALT 80 (H) 06/15/2020    AST 80 (H) 06/15/2020     06/15/2020    K 3.6 06/15/2020     06/15/2020    CREATININE 1.0 06/15/2020    BUN 13 06/15/2020    CO2 21 (L) 06/15/2020    TSH 7.323 (H) 06/12/2020    INR 1.0 10/29/2016    HGBA1C 5.6 06/12/2020 1/2020  · Normal left ventricular systolic function. The estimated ejection fraction is 55%.  · Concentric left ventricular hypertrophy.  · Indeterminate left ventricular diastolic function.  · No wall motion abnormalities.  · Normal right ventricular systolic function.  · Mild left atrial enlargement.  · Mild aortic valve stenosis.  · Aortic valve area is 1.68 cm2; peak velocity is 1.90 m/s; mean gradient is 9 mmHg.  · Mild tricuspid regurgitation.  · The estimated PA systolic pressure is 32 mmHg.  · Normal central venous pressure (3 mmHg).    Anesthesia Plan  Type of Anesthesia, risks & benefits discussed:  Anesthesia Type:  MAC, general  Patient's Preference:   Intra-op Monitoring Plan: standard ASA monitors  Intra-op Monitoring Plan Comments:   Post Op Pain Control Plan: multimodal analgesia and per primary service following discharge from PACU  Post Op Pain Control Plan Comments:   Induction:   IV  Beta Blocker:  Patient is on a Beta-Blocker and has received one dose within the past 24 hours (No further documentation required).       Informed Consent: Patient understands risks and agrees with Anesthesia plan.  Questions  answered. Anesthesia consent signed with patient.  ASA Score: 4     Day of Surgery Review of History & Physical:  There are no significant changes.          Ready For Surgery From Anesthesia Perspective.

## 2020-06-17 NOTE — CARE UPDATE
Discussed case with Dr. Robert. Upon review of CT 6/12/20, hyperdense material in the CBD suggests possibility of stones vs hemobilia at that time. Limited evaluation 2/2 lack of IV contrast. Given pt is currently stable, no intervention at this time. If pt decompensates or if there's concern for active bleeding, please obtain STAT CTA Abdomen and Pelvis with delayed imaging (Bleeding protocol) and call IR on call (Dr. Doll). Dr Doll is aware of case. Also recommend obtaining triple phase CT to further evaluate for causes of hemobilia.    Quentin Coleman M.D.  Diagnostic and Interventional Radiologist  Department of Radiology  Pager: [phone]

## 2020-06-17 NOTE — H&P
History & Physical - Short Stay  Gastroenterology      SUBJECTIVE:     Procedure: EUS and ERCP    Chief Complaint/Indication for Procedure: abnormal LFT's    History of Present Illness:  Patient is a 80 y.o. male presents with abnormal LFT's here for EUS and possible ERCP.     CT scan 6/12/2020    Suggestion of mild intrahepatic biliary ductal dilation.  Recommend correlation with serum analysis.     Moderate large volume stool throughout the colon. Sigmoid diverticulosis without evidence of diverticulitis.       PTA Medications   Medication Sig    acetaminophen (TYLENOL) 325 MG tablet Take 2 tablets (650 mg total) by mouth every 6 (six) hours. for 7 days    [START ON 6/18/2020] apixaban (ELIQUIS) 5 mg Tab Take 1 tablet (5 mg total) by mouth 2 (two) times daily.    aspirin (ECOTRIN) 81 MG EC tablet Take 81 mg by mouth once daily.    ciprofloxacin HCl (CIPRO) 500 MG tablet Take 1 tablet (500 mg total) by mouth 2 (two) times daily. for 7 days    [START ON 6/21/2020] ergocalciferol (ERGOCALCIFEROL) 50,000 unit Cap Take 1 capsule (50,000 Units total) by mouth every 7 days.    gabapentin (NEURONTIN) 300 MG capsule Take 300 mg by mouth 2 (two) times daily.    glimepiride (AMARYL) 2 MG tablet Take 2 mg by mouth every morning.    metoprolol tartrate (LOPRESSOR) 50 MG tablet Take 0.5 tablets (25 mg total) by mouth 2 (two) times daily.    metroNIDAZOLE (FLAGYL) 500 MG tablet Take 1 tablet (500 mg total) by mouth 3 (three) times daily. for 7 days    mirtazapine (REMERON) 15 MG tablet 15 mg once daily.    OLANZapine (ZYPREXA) 2.5 MG tablet 2.5 mg once daily.    oxyCODONE-acetaminophen (PERCOCET)  mg per tablet Take 1 tablet by mouth every 6 (six) hours as needed for Pain.    rosuvastatin (CRESTOR) 40 MG Tab Take 1 tablet (40 mg total) by mouth every evening.    SOLUS V2 LANCETS 30 gauge Misc     SOLUS V2 TEST STRIPS Strp        Review of patient's allergies indicates:  No Known Allergies     Past Medical  History:   Diagnosis Date    *Atrial flutter     Anemia     Anticoagulant long-term use     Arthritis     Bipolar 1 disorder     Coronary artery disease     Diabetes mellitus     Diabetes mellitus, type 2     GERD (gastroesophageal reflux disease)     Gout, unspecified     H/O: GI bleed     Hypertension     Liver cancer     PVD (peripheral vascular disease)     Sciatica     SSS (sick sinus syndrome)     Tachy-valentine syndrome     Thyroid disease      Past Surgical History:   Procedure Laterality Date    APPENDECTOMY      CHOLECYSTECTOMY      CORONARY ARTERY BYPASS GRAFT      INSERT / REPLACE / REMOVE PACEMAKER      LIVER RESECTION      LUMBAR DISC SURGERY       Family History   Problem Relation Age of Onset    Heart disease Mother     Pancreatic cancer Mother     Heart disease Father      Social History     Tobacco Use    Smoking status: Current Some Day Smoker     Packs/day: 0.25     Years: 37.00     Pack years: 9.25     Types: Cigarettes     Last attempt to quit: 1992     Years since quittin.5    Smokeless tobacco: Never Used    Tobacco comment: Pt declines enrollment in the [employer]. Handout provided for Ambulatory Smoking Cessation program.    Substance Use Topics    Alcohol use: Yes     Alcohol/week: 5.0 standard drinks     Types: 5 Cans of beer per week    Drug use: No       Review of Systems:  Constitutional: no fever or chills  Gastrointestinal: negative for abdominal pain    OBJECTIVE:     Vital Signs (Most Recent)       Physical Exam:  General: well developed, well nourished  Lungs:  normal respiratory effort  Heart: positive murmur    Laboratory  CBC:   Recent Labs   Lab 06/15/20  0718   WBC 3.94   RBC 4.04*   HGB 11.5*   HCT 36.0*   PLT 73*   MCV 89   MCH 28.5   MCHC 31.9*     CMP:   Recent Labs   Lab 06/15/20  0718   *   CALCIUM 9.0   ALBUMIN 3.2*   PROT 6.4      K 3.6   CO2 21*      BUN 13   CREATININE 1.0   ALKPHOS 164*   ALT 80*   AST  80*   BILITOT 5.4*     Coagulation: No results for input(s): LABPROT, INR, APTT in the last 168 hours.      Diagnostic Results:      ASSESSMENT/PLAN:     Abnormal LFT's    Plan: EUS and ERCP    Anesthesia Plan: MAC    ASA Grade: ASA 3 - Patient with moderate systemic disease with functional limitations     The impression and plan was discussed in detail with the patient. All questions have been answered and the patient voices understanding of our plan at this point. The risk of the procedure was discussed in detail which includes but not limited to bleeding, infection, perforation in some cases requiring surgery with its spectrum of complications.

## 2020-06-17 NOTE — H&P
\[facility]\"" Internal Medicine History and Physical Resident Note    Admitting Team: \[facility]\"" Internal Medicine Team B  Attending Physician: Dr. Jennings  Resident: Ezequiel/Shahida  Interns: Jsoeph/Faye    Date of Admit: 6/17/2020    Chief Complaint     Hemobilia    Subjective:      History of Present Illness:  Venu Lau is a 80 y.o.  male who  has a past medical history of *Atrial flutter, Anemia, Anticoagulant long-term use, Arthritis, Bipolar 1 disorder, Coronary artery disease, Diabetes mellitus, Diabetes mellitus, type 2, GERD (gastroesophageal reflux disease), Gout, unspecified, H/O: GI bleed, Hypertension, Liver cancer, PVD (peripheral vascular disease), Sciatica, SSS (sick sinus syndrome), Tachy-valentine syndrome, and Thyroid disease.. The patient presented to [facility] on 6/17/2020 with a primary complaint of hemobilia.     Mr. Lau was recently admitted to [facility] from 6/12 to 6/15 after he presented for abdominal pain. He has a hx of hepatocellular carcinoma s/p left lobectomy and cholecystectomy >10 years ago. Upon arrival for his recent admission, his LFTs were elevated with a tbili of 4.2. His CTabd/pelvis upon arrival on 6/12 for that admission demonstrated mild intrahepatic biliary ductal dilation.Pt was evaluated by Gastroenterology during that admission. A MRCP was unable to be obtained due to presence of a pacemaker. An ERCP was planned for Monday, 6/15, but pt had received is a.m. dose of Eliquis. His hyperbilirubinemia improved and he was discharged to home on Monday, 6/15 on metronidazole and ciprofloxacin with an outpatient ERCP scheduled for today.      Patient presented to [facility] today for his scheduled EUS and  ERCP. The procedure was performed by Dr. Humphrey; he noticed hemobilia with blood oozing in biliary system; one stent was placed. \[facility]\"" Internal Medicine was consulted for admission for observation for continued bleeding.     Upon our evaluation, no H/H was available  for comparison to 6/15's labs. Pt was awake, alert with no complaints with family member at bedside. They report that pt continued to hold his Eliquis after discharge to home on 6/15 but he admits that he had continued to take his daily ASA; last dose was this morning just prior to procedure. He denied CP, SOB, n/v, hematemesis, hematochezia/melena, abd pain, dysuria, fever and chills. Requested something to drink.     Past Medical History:  Past Medical History:   Diagnosis Date    *Atrial flutter     Anemia     Anticoagulant long-term use     Arthritis     Bipolar 1 disorder     Coronary artery disease     Diabetes mellitus     Diabetes mellitus, type 2     GERD (gastroesophageal reflux disease)     Gout, unspecified     H/O: GI bleed     Hypertension     Liver cancer     PVD (peripheral vascular disease)     Sciatica     SSS (sick sinus syndrome)     Tachy-valentine syndrome     Thyroid disease        Past Surgical History:  Past Surgical History:   Procedure Laterality Date    APPENDECTOMY      CHOLECYSTECTOMY      CORONARY ARTERY BYPASS GRAFT      INSERT / REPLACE / REMOVE PACEMAKER      LIVER RESECTION      LUMBAR DISC SURGERY         Allergies:  NKDA    Home Medications:  Prior to Admission medications    Medication Sig Start Date End Date Taking? Authorizing Provider   glimepiride (AMARYL) 2 MG tablet Take 2 mg by mouth every morning. 1/2/20  Yes Historical Provider, MD   metoprolol tartrate (LOPRESSOR) 50 MG tablet Take 0.5 tablets (25 mg total) by mouth 2 (two) times daily. 2/3/20  Yes Carlos Quiroga MD   mirtazapine (REMERON) 15 MG tablet 15 mg once daily. 1/2/20  Yes Historical Provider, MD   OLANZapine (ZYPREXA) 2.5 MG tablet 2.5 mg once daily. 12/30/19  Yes Historical Provider, MD   acetaminophen (TYLENOL) 325 MG tablet Take 2 tablets (650 mg total) by mouth every 6 (six) hours. for 7 days 6/15/20 6/22/20  Gus Ruiz MD   apixaban (ELIQUIS) 5 mg Tab Take 1 tablet (5 mg total) by  mouth 2 (two) times daily. 20   Gus Ruiz MD   aspirin (ECOTRIN) 81 MG EC tablet Take 81 mg by mouth once daily.    Historical Provider, MD   ciprofloxacin HCl (CIPRO) 500 MG tablet Take 1 tablet (500 mg total) by mouth 2 (two) times daily. for 7 days 6/15/20 6/22/20  Gus Ruiz MD   ergocalciferol (ERGOCALCIFEROL) 50,000 unit Cap Take 1 capsule (50,000 Units total) by mouth every 7 days. 20  Gus Ruiz MD   gabapentin (NEURONTIN) 300 MG capsule Take 300 mg by mouth 2 (two) times daily.    Historical Provider, MD   metroNIDAZOLE (FLAGYL) 500 MG tablet Take 1 tablet (500 mg total) by mouth 3 (three) times daily. for 7 days 6/15/20 6/22/20  Gus Ruiz MD   oxyCODONE-acetaminophen (PERCOCET)  mg per tablet Take 1 tablet by mouth every 6 (six) hours as needed for Pain. 6/15/20 6/18/20  Jamal Torres MD   rosuvastatin (CRESTOR) 40 MG Tab Take 1 tablet (40 mg total) by mouth every evening. 2/3/20 2/2/21  Carlos Quiroga MD   SOLUS V2 LANCETS 30 gauge Misc  13   Historical Provider, MD   SOLUS V2 TEST STRIPS Strp  13   Historical Provider, MD       Family History:  Family History   Problem Relation Age of Onset    Heart disease Mother     Pancreatic cancer Mother     Heart disease Father        Social History:  Social History     Tobacco Use    Smoking status: Current Some Day Smoker     Packs/day: 0.25     Years: 37.00     Pack years: 9.25     Types: Cigarettes     Last attempt to quit: 1992     Years since quittin.5    Smokeless tobacco: Never Used    Tobacco comment: Pt declines enrollment in the [employer]. Handout provided for Ambulatory Smoking Cessation program.    Substance Use Topics    Alcohol use: Yes     Alcohol/week: 5.0 standard drinks     Types: 5 Cans of beer per week    Drug use: No       Review of Systems:  All systems were reviewed and found to be negative except as mentioned in the HPI.     Health Maintaince :   Primary Care  "Physician: Dr. Miller   Immunizations:      TDap is up to date  Influenza is up to date  Pneumovax is up to date  Cancer Screening:  Colonoscopy: is not up to date. Never had.    Objective:   Last 24 Hour Vital Signs:  BP  Min: 114/58  Max: 169/87  Temp  Av.1 °F (36.7 °C)  Min: 98.1 °F (36.7 °C)  Max: 98.1 °F (36.7 °C)  Pulse  Av.8  Min: 69  Max: 95  Resp  Av  Min: 18  Max: 18  SpO2  Av.3 %  Min: 98 %  Max: 100 %  Height  Av' 5" (165.1 cm)  Min: 5' 5" (165.1 cm)  Max: 5' 5" (165.1 cm)  Weight  Av.9 kg (174 lb)  Min: 78.9 kg (174 lb)  Max: 78.9 kg (174 lb)  Body mass index is 28.96 kg/m².  No intake/output data recorded.    Physical Examination:  GEN:  Elderly CM in NAD, lying in bed in endoscopy suite, AAOx3, daughter at bedside  HEENT: NC, AT, PERRL, EOMI, no scleral icterus, no discharge or congestion, somewhat hard of hearing, moist oral mucosa, neck is supple  CV: RRR, No MGR, +2 distal pulses, no pedal edema  RESP: Lungs CTAB, No WRR, non labored breathing  ABD: soft, NT, ND, normoactive BS, no guarding or rebound  NEURO: AAO x3, CN II-XII grossly intact, Normal strength in b/l UE and LE, sensation intact to light tough in b/l LE and UE  EXTR: No joint swelling or tenderness, no cyanosis  INTEG: No rash noted    Laboratory:  None    Trended Lab Data:  Recent Labs   Lab 20  0433 20  0523 06/15/20  0718   WBC 4.22 3.24* 3.94   HGB 12.2* 11.0* 11.5*   HCT 37.8* 34.6* 36.0*   PLT 81* 68* 73*   MCV 88 89 89   RDW 13.9 14.5 14.5    138 136   K 4.1 4.5 3.6    110 107   CO2 20* 20* 21*   BUN 13 14 13   CREATININE 0.7 1.1 1.0   GLU 69* 117* 116*   PROT 6.7 6.3 6.4   ALBUMIN 3.5 3.1* 3.2*   BILITOT 4.2* 3.6* 5.4*   * 98* 80*   ALKPHOS 149* 137* 164*   * 89* 80*     Microbiology Data:  None    Other Results:  EKG (my interpretation): None    Radiology:  None new    Abdominal ultrasound (2020)  CLINICAL HISTORY:  Generalized abdominal " pain     FINDINGS:  Correlation exams abdominal and pelvic CT without contrast, renal ultrasound 10/29/2016.  The pancreas is unremarkable.  Liver measures 15 cm with normal echogenicity and echotexture.  There is a history of previous hepatectomy.  Cholecystectomy.  Again, the common bile duct is dilated, up to 1.1 cm, same as remote exam.  Right kidney, 9.6 cm, with stable hypoechoic mid pole pyramid.     Impression:     Cholecystectomy with stable common bile duct dilatation, up to 1.1 cm, no change compared with October 2016.  No intrahepatic ductal dilatation.        Electronically signed by: Josh Thacker MD  Date:                                            06/12/2020  Time:                                           13:56        CT ABDOMEN PELVIS WITHOUT CONTRAST (61/12/2020)  Impression:     Suggestion of mild intrahepatic biliary ductal dilation.  Recommend correlation with serum analysis.     Moderate large volume stool throughout the colon. Sigmoid diverticulosis without evidence of diverticulitis.        Electronically signed by: Kiran Paez  Date:                                            06/12/2020  Time:                                           14:28       Assessment:     Venu Lau is a 80 y.o. male with:  Patient Active Problem List    Diagnosis Date Noted    Abnormal LFTs 06/17/2020    Hemobilia 06/17/2020    Epigastric pain     Transaminitis     Acute biliary pancreatitis without infection or necrosis 06/12/2020    Subclinical hypothyroidism 06/12/2020    Tobacco abuse 01/29/2020    PAF (paroxysmal atrial fibrillation) 02/13/2017    Hypokalemia 10/31/2016    Oropharyngeal dysphagia 10/30/2016    Physical deconditioning 10/30/2016    Anemia, chronic disease 10/30/2016    Acute renal failure 10/29/2016    Hyperkalemia 10/29/2016    Metabolic acidosis 10/29/2016    Acute metabolic encephalopathy 10/29/2016    Uremia 10/29/2016    Polypharmacy 10/29/2016    Normocytic anemia  10/29/2016    Mixed hyperlipidemia 10/29/2016    AKSHAT (acute kidney injury) 10/29/2016    Toxic encephalopathy 10/29/2016    Coronary artery disease involving native coronary artery of native heart without angina pectoris     Diabetes     Essential hypertension     *Atrial flutter     CAD (coronary artery disease) 11/20/2012    Hx of CABG 11/20/2012    Atrial flutter 11/20/2012    Pacemaker 11/20/2012    Liver cancer 11/20/2012    Villous adenoma 11/20/2012       79yo M with multiple co morbidities including paroxymal afib on chronic anticoagulation, CAD s/p CABG on chronic antiplatelets, hx of HCC s/p lobectomy and cholecystectomy >10yrs ago with recent hx of pancreatitis and hyperbilirubinemia being admitted for observation to monitor for bleeding s/p outpatient ERCP today with hemobilia.      Plan:       Hemobila  - Pt recently admitted from 6/12 - 6/15 with elevated LFTs and hyperbilirubinemia, evaluated by GI during that admission, MRCP unable to be obtained due to presence of pacemaker, ERCP deferred until today  - Pt presented today for outpatient ERCP with GI, Dr. Humphrey. Hemobilia with clots and stent placement today  - Admitted to [facility] for observation  - Last H/H 11.5/36 on day of discharge on 6/15, pt denies upper and lower GI bleeding  - Last dose of Eliquis: 6/15a.m., Last dose of ASA this morning 6/17 just prior to procedure  - Will obtain stat labs, FOBT, type/crossmatch, trend H/H q6h  - Will consult IR, Gen Surg, GI, and continue abx per GI recommendations.  - STAT CTA abd/pelvis prn if decompensates, PRBC prn, Goal Hbg > 8 (hx of CAD w/p CABG)     Normocytic anemia  -H/H 14.3/45.1, MCV 89 on 6/12 upon admit during recent admission, it was 11.5/36 with MCV 89 on discharge on 6/15  - Fe panel from last admission consistent with AOCD; Pt has never had a colonoscopy, it was recommend prior to recent discharge but patient refused   - Will obtain FOBT and stat CBC, trend  H/H     CAD s/p CABG  -TTE 1/27/2020 with LVEF 55%, LVH, mild LAE, mild AS, mild tricuspid regurg  - last dose of ASA this morning just prior to ERCP  - Will ASA 2/2 to hemobila     Paroxysmal atrial flutter/ fib, sick sinus syndrome, tachy-valentine arrythmia s/p pacemaker placement  - Recently seen in clinic, pacemaker interrogated DDD-CLS mode with normal function  - Last dose of eliquis: 6/15 a.m.  - Will continue to hold home eliquis     DM2  - On glimepiride at home; ISS while inpatient    Thrombocytopenia, chronic  - Plt 73 at time of recent discharge on 6/15  - Recent abdominal ultrasound on on 6/12 demonstrated normal hepatic texture, echogenicity, s/p partial hepatectomy    -  Will monitor, platelet transfusion prn     HLD  -Patient on rosuvastatin at home, holding for now    DVT ppx: COOKIE/SCDs, holding antiplatelets and anticoagulation 2/2 to hemobila  Diet: Currently NPO, will start clears  Code: Full      Abby Trinh MD, PhD  U Med-Peds Resident, [facility] Internal Medicine/[facility] Team A      [facility] Medicine Hospitalist Pager numbers:   [facility] Hospitalist Medicine Team A (Tereza/Flakita): [phone]  [facility] Hospitalist Medicine Team B (Brian/Michaela):  [phone]

## 2020-06-17 NOTE — OR NURSING
[facility], semi prone position.

## 2020-06-18 ENCOUNTER — CLINICAL SUPPORT (OUTPATIENT)
Dept: SMOKING CESSATION | Facility: CLINIC | Age: 80
End: 2020-06-18

## 2020-06-18 DIAGNOSIS — F17.210 CIGARETTE SMOKER: Primary | ICD-10-CM

## 2020-06-18 LAB
ALBUMIN SERPL BCP-MCNC: 3 G/DL (ref 3.5–5.2)
ALP SERPL-CCNC: 219 U/L (ref 55–135)
ALT SERPL W/O P-5'-P-CCNC: 67 U/L (ref 10–44)
ANION GAP SERPL CALC-SCNC: 10 MMOL/L (ref 8–16)
APTT BLDCRRT: 28.1 SEC (ref 21–32)
AST SERPL-CCNC: 112 U/L (ref 10–40)
BASOPHILS # BLD AUTO: 0.01 K/UL (ref 0–0.2)
BASOPHILS # BLD AUTO: 0.02 K/UL (ref 0–0.2)
BASOPHILS NFR BLD: 0.3 % (ref 0–1.9)
BASOPHILS NFR BLD: 0.6 % (ref 0–1.9)
BILIRUB SERPL-MCNC: 8.4 MG/DL (ref 0.1–1)
BUN SERPL-MCNC: 25 MG/DL (ref 8–23)
CALCIUM SERPL-MCNC: 8.7 MG/DL (ref 8.7–10.5)
CHLORIDE SERPL-SCNC: 108 MMOL/L (ref 95–110)
CO2 SERPL-SCNC: 19 MMOL/L (ref 23–29)
CREAT SERPL-MCNC: 1.3 MG/DL (ref 0.5–1.4)
DIFFERENTIAL METHOD: ABNORMAL
EOSINOPHIL # BLD AUTO: 0 K/UL (ref 0–0.5)
EOSINOPHIL NFR BLD: 0.3 % (ref 0–8)
EOSINOPHIL NFR BLD: 0.3 % (ref 0–8)
EOSINOPHIL NFR BLD: 0.6 % (ref 0–8)
EOSINOPHIL NFR BLD: 0.6 % (ref 0–8)
ERYTHROCYTE [DISTWIDTH] IN BLOOD BY AUTOMATED COUNT: 16 % (ref 11.5–14.5)
ERYTHROCYTE [DISTWIDTH] IN BLOOD BY AUTOMATED COUNT: 16.2 % (ref 11.5–14.5)
ERYTHROCYTE [DISTWIDTH] IN BLOOD BY AUTOMATED COUNT: 16.3 % (ref 11.5–14.5)
ERYTHROCYTE [DISTWIDTH] IN BLOOD BY AUTOMATED COUNT: 16.5 % (ref 11.5–14.5)
EST. GFR  (AFRICAN AMERICAN): 60 ML/MIN/1.73 M^2
EST. GFR  (NON AFRICAN AMERICAN): 52 ML/MIN/1.73 M^2
GLUCOSE SERPL-MCNC: 39 MG/DL (ref 70–110)
HCT VFR BLD AUTO: 31.3 % (ref 40–54)
HCT VFR BLD AUTO: 31.7 % (ref 40–54)
HCT VFR BLD AUTO: 31.9 % (ref 40–54)
HCT VFR BLD AUTO: 31.9 % (ref 40–54)
HGB BLD-MCNC: 10.1 G/DL (ref 14–18)
HGB BLD-MCNC: 10.1 G/DL (ref 14–18)
HGB BLD-MCNC: 10.2 G/DL (ref 14–18)
HGB BLD-MCNC: 9.8 G/DL (ref 14–18)
IMM GRANULOCYTES # BLD AUTO: 0.03 K/UL (ref 0–0.04)
IMM GRANULOCYTES # BLD AUTO: 0.03 K/UL (ref 0–0.04)
IMM GRANULOCYTES # BLD AUTO: 0.04 K/UL (ref 0–0.04)
IMM GRANULOCYTES # BLD AUTO: 0.05 K/UL (ref 0–0.04)
IMM GRANULOCYTES NFR BLD AUTO: 0.8 % (ref 0–0.5)
IMM GRANULOCYTES NFR BLD AUTO: 0.9 % (ref 0–0.5)
IMM GRANULOCYTES NFR BLD AUTO: 1.1 % (ref 0–0.5)
IMM GRANULOCYTES NFR BLD AUTO: 1.5 % (ref 0–0.5)
INR PPP: 1.2 (ref 0.8–1.2)
LYMPHOCYTES # BLD AUTO: 0.4 K/UL (ref 1–4.8)
LYMPHOCYTES # BLD AUTO: 0.4 K/UL (ref 1–4.8)
LYMPHOCYTES # BLD AUTO: 0.6 K/UL (ref 1–4.8)
LYMPHOCYTES # BLD AUTO: 0.6 K/UL (ref 1–4.8)
LYMPHOCYTES NFR BLD: 10.6 % (ref 18–48)
LYMPHOCYTES NFR BLD: 13.2 % (ref 18–48)
LYMPHOCYTES NFR BLD: 16 % (ref 18–48)
LYMPHOCYTES NFR BLD: 16.5 % (ref 18–48)
MCH RBC QN AUTO: 28.2 PG (ref 27–31)
MCH RBC QN AUTO: 28.4 PG (ref 27–31)
MCH RBC QN AUTO: 28.6 PG (ref 27–31)
MCH RBC QN AUTO: 28.8 PG (ref 27–31)
MCHC RBC AUTO-ENTMCNC: 31.3 G/DL (ref 32–36)
MCHC RBC AUTO-ENTMCNC: 31.7 G/DL (ref 32–36)
MCHC RBC AUTO-ENTMCNC: 31.9 G/DL (ref 32–36)
MCHC RBC AUTO-ENTMCNC: 32 G/DL (ref 32–36)
MCV RBC AUTO: 89 FL (ref 82–98)
MCV RBC AUTO: 90 FL (ref 82–98)
MCV RBC AUTO: 90 FL (ref 82–98)
MCV RBC AUTO: 91 FL (ref 82–98)
MONOCYTES # BLD AUTO: 0.4 K/UL (ref 0.3–1)
MONOCYTES # BLD AUTO: 0.5 K/UL (ref 0.3–1)
MONOCYTES NFR BLD: 11.7 % (ref 4–15)
MONOCYTES NFR BLD: 12.9 % (ref 4–15)
MONOCYTES NFR BLD: 13.2 % (ref 4–15)
MONOCYTES NFR BLD: 13.6 % (ref 4–15)
NEUTROPHILS # BLD AUTO: 2.4 K/UL (ref 1.8–7.7)
NEUTROPHILS # BLD AUTO: 3 K/UL (ref 1.8–7.7)
NEUTROPHILS NFR BLD: 67.8 % (ref 38–73)
NEUTROPHILS NFR BLD: 69.1 % (ref 38–73)
NEUTROPHILS NFR BLD: 73 % (ref 38–73)
NEUTROPHILS NFR BLD: 74.8 % (ref 38–73)
NRBC BLD-RTO: 0 /100 WBC
OB PNL STL: POSITIVE
PLATELET # BLD AUTO: 75 K/UL (ref 150–350)
PLATELET # BLD AUTO: 78 K/UL (ref 150–350)
PLATELET BLD QL SMEAR: ABNORMAL
PMV BLD AUTO: 10.6 FL (ref 9.2–12.9)
PMV BLD AUTO: 10.8 FL (ref 9.2–12.9)
PMV BLD AUTO: 11 FL (ref 9.2–12.9)
PMV BLD AUTO: 11.7 FL (ref 9.2–12.9)
POCT GLUCOSE: 137 MG/DL (ref 70–110)
POCT GLUCOSE: 39 MG/DL (ref 70–110)
POCT GLUCOSE: 53 MG/DL (ref 70–110)
POCT GLUCOSE: 65 MG/DL (ref 70–110)
POCT GLUCOSE: 95 MG/DL (ref 70–110)
POTASSIUM SERPL-SCNC: 3.6 MMOL/L (ref 3.5–5.1)
PROT SERPL-MCNC: 6.3 G/DL (ref 6–8.4)
PROTHROMBIN TIME: 12.4 SEC (ref 9–12.5)
RBC # BLD AUTO: 3.47 M/UL (ref 4.6–6.2)
RBC # BLD AUTO: 3.51 M/UL (ref 4.6–6.2)
RBC # BLD AUTO: 3.53 M/UL (ref 4.6–6.2)
RBC # BLD AUTO: 3.59 M/UL (ref 4.6–6.2)
SODIUM SERPL-SCNC: 137 MMOL/L (ref 136–145)
WBC # BLD AUTO: 3.33 K/UL (ref 3.9–12.7)
WBC # BLD AUTO: 3.49 K/UL (ref 3.9–12.7)
WBC # BLD AUTO: 3.52 K/UL (ref 3.9–12.7)
WBC # BLD AUTO: 3.95 K/UL (ref 3.9–12.7)

## 2020-06-18 PROCEDURE — 99406 BEHAV CHNG SMOKING 3-10 MIN: CPT | Mod: S$GLB,,,

## 2020-06-18 PROCEDURE — 25500020 PHARM REV CODE 255: Performed by: INTERNAL MEDICINE

## 2020-06-18 PROCEDURE — 96376 TX/PRO/DX INJ SAME DRUG ADON: CPT

## 2020-06-18 PROCEDURE — A9698 NON-RAD CONTRAST MATERIALNOC: HCPCS | Performed by: INTERNAL MEDICINE

## 2020-06-18 PROCEDURE — 25000003 PHARM REV CODE 250: Performed by: STUDENT IN AN ORGANIZED HEALTH CARE EDUCATION/TRAINING PROGRAM

## 2020-06-18 PROCEDURE — G0378 HOSPITAL OBSERVATION PER HR: HCPCS

## 2020-06-18 PROCEDURE — 85610 PROTHROMBIN TIME: CPT

## 2020-06-18 PROCEDURE — 85730 THROMBOPLASTIN TIME PARTIAL: CPT

## 2020-06-18 PROCEDURE — 85025 COMPLETE CBC W/AUTO DIFF WBC: CPT | Mod: 91

## 2020-06-18 PROCEDURE — 80053 COMPREHEN METABOLIC PANEL: CPT

## 2020-06-18 PROCEDURE — 99406 PT REFUSED TOBACCO CESSATION: ICD-10-PCS | Mod: S$GLB,,,

## 2020-06-18 PROCEDURE — 96374 THER/PROPH/DIAG INJ IV PUSH: CPT

## 2020-06-18 PROCEDURE — 36415 COLL VENOUS BLD VENIPUNCTURE: CPT

## 2020-06-18 PROCEDURE — 63600175 PHARM REV CODE 636 W HCPCS: Performed by: STUDENT IN AN ORGANIZED HEALTH CARE EDUCATION/TRAINING PROGRAM

## 2020-06-18 PROCEDURE — 82272 OCCULT BLD FECES 1-3 TESTS: CPT

## 2020-06-18 RX ORDER — OXYCODONE AND ACETAMINOPHEN 7.5; 325 MG/1; MG/1
1 TABLET ORAL EVERY 6 HOURS PRN
Status: DISCONTINUED | OUTPATIENT
Start: 2020-06-18 | End: 2020-06-22 | Stop reason: HOSPADM

## 2020-06-18 RX ORDER — URSODIOL 300 MG/1
300 CAPSULE ORAL 2 TIMES DAILY
Status: DISCONTINUED | OUTPATIENT
Start: 2020-06-18 | End: 2020-06-22 | Stop reason: HOSPADM

## 2020-06-18 RX ORDER — METOPROLOL TARTRATE 25 MG/1
25 TABLET, FILM COATED ORAL 2 TIMES DAILY
Status: DISCONTINUED | OUTPATIENT
Start: 2020-06-18 | End: 2020-06-22 | Stop reason: HOSPADM

## 2020-06-18 RX ADMIN — PIPERACILLIN AND TAZOBACTAM 4.5 G: 4; .5 INJECTION, POWDER, LYOPHILIZED, FOR SOLUTION INTRAVENOUS; PARENTERAL at 09:06

## 2020-06-18 RX ADMIN — OXYCODONE HYDROCHLORIDE AND ACETAMINOPHEN 1 TABLET: 5; 325 TABLET ORAL at 04:06

## 2020-06-18 RX ADMIN — OXYCODONE HYDROCHLORIDE AND ACETAMINOPHEN 1 TABLET: 5; 325 TABLET ORAL at 09:06

## 2020-06-18 RX ADMIN — URSODIOL 300 MG: 300 CAPSULE ORAL at 09:06

## 2020-06-18 RX ADMIN — PIPERACILLIN AND TAZOBACTAM 4.5 G: 4; .5 INJECTION, POWDER, LYOPHILIZED, FOR SOLUTION INTRAVENOUS; PARENTERAL at 01:06

## 2020-06-18 RX ADMIN — GABAPENTIN 300 MG: 300 CAPSULE ORAL at 09:06

## 2020-06-18 RX ADMIN — URSODIOL 300 MG: 300 CAPSULE ORAL at 11:06

## 2020-06-18 RX ADMIN — OXYCODONE AND ACETAMINOPHEN 1 TABLET: 7.5; 325 TABLET ORAL at 09:06

## 2020-06-18 RX ADMIN — DEXTROSE MONOHYDRATE 12.5 G: 25 INJECTION, SOLUTION INTRAVENOUS at 09:06

## 2020-06-18 RX ADMIN — IOHEXOL 1000 ML: 9 SOLUTION ORAL at 01:06

## 2020-06-18 RX ADMIN — PIPERACILLIN AND TAZOBACTAM 4.5 G: 4; .5 INJECTION, POWDER, LYOPHILIZED, FOR SOLUTION INTRAVENOUS; PARENTERAL at 04:06

## 2020-06-18 RX ADMIN — METOPROLOL TARTRATE 25 MG: 25 TABLET, FILM COATED ORAL at 09:06

## 2020-06-18 RX ADMIN — DEXTROSE MONOHYDRATE 12.5 G: 25 INJECTION, SOLUTION INTRAVENOUS at 05:06

## 2020-06-18 RX ADMIN — IOHEXOL 75 ML: 350 INJECTION, SOLUTION INTRAVENOUS at 03:06

## 2020-06-18 RX ADMIN — DEXTROSE MONOHYDRATE 25 G: 25 INJECTION, SOLUTION INTRAVENOUS at 06:06

## 2020-06-18 NOTE — PLAN OF CARE
Mr. Lau is resting and medications were given per orders. PRN medication given for back PAIN. IV antibiotics infused. No complaints of SOB,NV. Safety maintained.

## 2020-06-18 NOTE — PROGRESS NOTES
Surgery  Briefgf Note:    Chart/events reviewed.    Hx of HCC  I performed a L liver resection on him in 2005 at [facility].  Lost to follow up.  New onset hemobilia/jaundice    REcommend :  actigall  Close monitoring H/H INR  IR for embolization  Diagnostic and therapeutic if this is recurrent HCC    Full consult aftrwards  Will follow

## 2020-06-18 NOTE — PROGRESS NOTES
"[facility] Resident HO-I Progress Note    Subjective:      Venu Lau is a 80 y.o.  male who is being followed by the [facility] service at [facility] for hemobilia, obstructive jaundice.     Mr. Lau reports doing well overnight. He denies any significant abdominal discomfort. He did note having a large, dark black bowel movement early this morning. He denies any fever, nausea, vomiting, diarrhea.      Objective:   Last 24 Hour Vital Signs:  BP  Min: 114/58  Max: 185/77  Temp  Av.7 °F (36.5 °C)  Min: 97.4 °F (36.3 °C)  Max: 98.1 °F (36.7 °C)  Pulse  Av.1  Min: 52  Max: 95  Resp  Av.6  Min: 18  Max: 20  SpO2  Av.9 %  Min: 97 %  Max: 100 %  Height  Av' 5" (165.1 cm)  Min: 5' 5" (165.1 cm)  Max: 5' 5" (165.1 cm)  Weight  Av.3 kg (170 lb 5.8 oz)  Min: 74 kg (163 lb 2.3 oz)  Max: 78.9 kg (174 lb)  I/O last 3 completed shifts:  In: 475 [P.O.:375; IV Piggyback:100]  Out: 250 [Urine:250]    Physical Examination:  BP (!) 193/88 (Patient Position: Lying)   Pulse 77   Temp 97.6 °F (36.4 °C) (Oral)   Resp 20   Ht 5' 5" (1.651 m)   Wt 74 kg (163 lb 2.3 oz)   SpO2 96%   BMI 27.15 kg/m²   GEN:  Elderly CM in NAD, lying in bed in endoscopy suite, AAOx3, daughter at bedside  HEENT: NC, AT, PERRL, EOMI, no scleral icterus, no discharge or congestion, somewhat hard of hearing, moist oral mucosa, neck is supple  CV: RRR, No MGR, +2 distal pulses, no pedal edema  RESP: Lungs CTAB, No WRR, non labored breathing  ABD: soft, NT, ND, normoactive BS, no guarding or rebound  NEURO: AAO x3, CN II-XII grossly intact, Normal strength in b/l UE and LE, sensation intact to light tough in b/l LE and UE  EXTR: No joint swelling or tenderness, no cyanosis  INTEG: No rash noted      Laboratory:  Laboratory Data Reviewed: yes  Pertinent Findings:  Reviewed     Microbiology Data Reviewed: yes  Pertinent Findings:  Reviewed     Other Results:  EKG (my interpretation): Ventricular paced rhythm, " wide QRS, Hardin -90     Radiology Data Reviewed: yes  Pertinent Findings:  X-ray Chest Ap Portable    Result Date: 6/12/2020  EXAMINATION: XR CHEST AP PORTABLE CLINICAL HISTORY: Chest Pain; FINDINGS: Comparison study 12/16/2017.  No change.  Normal size heart status post CABG.  Left chest wall cardiac pacemaker with intact leads.  Aortic arch atherosclerosis.  No congestion.  Lungs are clear.     Stable chest x-ray. Electronically signed by: Josh Thacker MD Date:    06/12/2020 Time:    12:09    Us Abdomen Limited    Result Date: 6/12/2020  EXAMINATION: US ABDOMEN LIMITED CLINICAL HISTORY: Generalized abdominal pain FINDINGS: Correlation exams abdominal and pelvic CT without contrast, renal ultrasound 10/29/2016.  The pancreas is unremarkable.  Liver measures 15 cm with normal echogenicity and echotexture.  There is a history of previous hepatectomy.  Cholecystectomy.  Again, the common bile duct is dilated, up to 1.1 cm, same as remote exam.  Right kidney, 9.6 cm, with stable hypoechoic mid pole pyramid.     Cholecystectomy with stable common bile duct dilatation, up to 1.1 cm, no change compared with October 2016.  No intrahepatic ductal dilatation. Electronically signed by: Josh Thacker MD Date:    06/12/2020 Time:    13:56    Ct Abdomen Pelvis  Without Contrast    Result Date: 6/12/2020  EXAMINATION: CT ABDOMEN PELVIS WITHOUT CONTRAST CLINICAL HISTORY: LLQ pain, suspect diverticulitis; TECHNIQUE: Low dose axial images, sagittal and coronal reformations were obtained from the lung bases to the pubic symphysis.  Contrast was not administered. All CT scans at this location are performed using dose modulation techniques as appropriate to a performed exam including the following: Automated exposure control; adjustment of the mA and/or kV according to patient size. COMPARISON: 10/29/2016 FINDINGS: Heart: Normal in size. No pericardial effusion. Lung Bases: Well aerated, without consolidation or pleural fluid. Liver:  Evidence of prior left hepatic resection. Gallbladder: Surgically absent. Bile Ducts: Suggestion of mild intrahepatic biliary ductal dilation.  Recommend correlation with serum analysis. Pancreas: No mass or peripancreatic fat stranding. Spleen: Unremarkable. Adrenals: Unremarkable. Kidneys/ Ureters: Chronic perinephric fat stranding similar to prior exams.  No hydronephrosis.  No renal, ureteral, or bladder stones. Bladder: Mild distention of the bladder with urine. Reproductive organs: Unremarkable. GI Tract/Mesentery: Distal esophagus, stomach, duodenum, small bowel unremarkable.  Evidence of prior appendectomy.  Moderate large volume stool throughout the colon.  Sigmoid diverticulosis without evidence of diverticulitis. Peritoneal Space: No ascites. No free air. Retroperitoneum: No significant adenopathy. Abdominal wall: Small fat containing uncomplicated left inguinal hernia. Vasculature: Aortoiliac  atherosclerotic calcification. No aneurysm. Bones: Moderate severity multilevel discogenic degenerative change of the lumbar spine.     Suggestion of mild intrahepatic biliary ductal dilation.  Recommend correlation with serum analysis. Moderate large volume stool throughout the colon. Sigmoid diverticulosis without evidence of diverticulitis. Electronically signed by: Kiran Paez Date:    06/12/2020 Time:    14:28      Current Medications:     Infusions:       Scheduled:   gabapentin  300 mg Oral BID    piperacillin-tazobactam (ZOSYN) IVPB  4.5 g Intravenous Q8H        PRN:  dextrose 50 % in water (D50W), dextrose 50 % in water (D50W), glucagon (human recombinant), glucose, glucose, oxyCODONE-acetaminophen, sodium chloride 0.9%    Antibiotics and Day Number of Therapy:  Zosyn 6/16-present     Lines and Day Number of Therapy:  PIV     Assessment:     Venu Lau is a 80 y.o. 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 Patient admitted after undergoing EGD with EUS and ERCP, found to have hemobilia. Stent placed and admitted for close monitoring for bleeding.      Patient Active Problem List    Diagnosis Date Noted    Abnormal LFTs 06/17/2020    Hemobilia 06/17/2020    Hyperbilirubinemia 06/17/2020    Epigastric pain     Transaminitis     Acute biliary pancreatitis without infection or necrosis 06/12/2020    Subclinical hypothyroidism 06/12/2020    Tobacco abuse 01/29/2020    PAF (paroxysmal atrial fibrillation) 02/13/2017    Hypokalemia 10/31/2016    Oropharyngeal dysphagia 10/30/2016    Physical deconditioning 10/30/2016    Anemia, chronic disease 10/30/2016    Acute renal failure 10/29/2016    Hyperkalemia 10/29/2016    Metabolic acidosis 10/29/2016    Acute metabolic encephalopathy 10/29/2016    Uremia 10/29/2016    Polypharmacy 10/29/2016    Normocytic anemia 10/29/2016    Mixed hyperlipidemia 10/29/2016    AKSHAT (acute kidney injury) 10/29/2016    Toxic encephalopathy 10/29/2016    Coronary artery disease involving native coronary artery of native heart without angina pectoris     Diabetes     Essential hypertension     *Atrial flutter     CAD (coronary artery disease) 11/20/2012    Hx of CABG 11/20/2012    Atrial flutter 11/20/2012    Pacemaker 11/20/2012    Villous adenoma 11/20/2012        Plan:     Hemobila  - Pt recently admitted from 6/12 - 6/15 with elevated LFTs and hyperbilirubinemia, evaluated by GI during that admission, MRCP unable to be obtained due to presence of pacemaker, ERCP deferred until today  - Pt presented today for outpatient ERCP with GI, Dr. Humphrey. Hemobilia with clots and stent placement today  - Admitted to [facility] for observation  - Last H/H 11.5/36 on day of discharge on 6/15, pt denies upper and lower GI bleeding  - Last dose of Eliquis: 6/15a.m., Last dose of ASA this morning 6/17 just prior to procedure  - H/H post  procedure 10.8/34  - Will consult IR, Gen Surg, GI, and continue abx per GI recommendations.  - STAT CTA abd/pelvis prn if decompensates, PRBC prn, Goal Hbg > 8 (hx of CAD w/p CABG)  - H/H stable overnight     Hypoglycemia  -Blood glucose on 6/18 39, asymptomatic   -Given 1amp D50  -Patient has been maintained NPO for extended period  -Will continue to monitor     Normocytic anemia  -H/H 14.3/45.1, MCV 89 on 6/12 upon admit during recent admission, it was 11.5/36 with MCV 89 on discharge on 6/15  - Fe panel from last admission consistent with AOCD; Pt has never had a colonoscopy, it was recommend prior to recent discharge but patient refused   - Trend H/H     CAD s/p CABG  -TTE 1/27/2020 with LVEF 55%, LVH, mild LAE, mild AS, mild tricuspid regurg  - last dose of ASA this morning just prior to ERCP  - Will ASA 2/2 to hemobila     Paroxysmal atrial flutter/ fib, sick sinus syndrome, tachy-valentine arrythmia s/p pacemaker placement  - Recently seen in clinic, pacemaker interrogated DDD-CLS mode with normal function  - Last dose of eliquis: 6/15 a.m.  - Will continue to hold home eliquis     DM2  - On glimepiride at home; ISS while inpatient     Thrombocytopenia, chronic  - Plt 73 at time of recent discharge on 6/15  - Recent abdominal ultrasound on on 6/12 demonstrated normal hepatic texture, echogenicity, s/p partial hepatectomy    -  Will monitor, platelet transfusion prn     HLD  -Patient on rosuvastatin at home, holding for now     DVT ppx: COOKIE/SCDs, holding antiplatelets and anticoagulation 2/2 to hemobila  Diet: Clear liquid  Code: Full    Gus Ruiz  [facility] HO-I  [facility] IM Service Team B    [facility] Hospitalist Pager numbers:   [facility] Hospitalist Medicine Team A (Tereza/Flakita): [phone]  [facility] Hospitalist Medicine Team B (Brian/Michaela):  [phone]

## 2020-06-18 NOTE — PROGRESS NOTES
"[facility]    CC: hemobilia    Interval History: no acute issues overnight, feeling okay this morning tolerated some diet, reports has had on and off some small amounts of black stool.    Past Medical History  Past Medical History:   Diagnosis Date    *Atrial flutter     Anemia     Anticoagulant long-term use     Arthritis     Bipolar 1 disorder     Coronary artery disease     Diabetes mellitus     Diabetes mellitus, type 2     GERD (gastroesophageal reflux disease)     Gout, unspecified     H/O: GI bleed     Hypertension     Liver cancer     PVD (peripheral vascular disease)     Sciatica     SSS (sick sinus syndrome)     Tachy-valentine syndrome     Thyroid disease      Review of Systems  General ROS: negative for chills, fever  Cardiovascular ROS: no chest pain or dyspnea on exertion  Gastrointestinal ROS: no abdominal pain. Small amount of black stool.    Physical Examination  BP (!) 193/88 (Patient Position: Lying)   Pulse 77   Temp 97.6 °F (36.4 °C) (Oral)   Resp 20   Ht 5' 5" (1.651 m)   Wt 74 kg (163 lb 2.3 oz)   SpO2 96%   BMI 27.15 kg/m²   General appearance: Overweight. alert, cooperative, no distress  HENT: Normocephalic, atraumatic, neck symmetrical, no nasal discharge   Lungs: clear to auscultation anteriorly, symmetric chest expansion and in no acute respiratory distress  Heart: regular rate and rhythm without rub; no palpated displacement of the PMI. Device pocket intact.  Abdomen: soft, non-tender; bowel sounds normoactive; no organomegaly  Extremities: extremities symmetric; no  cyanosis, or edema  Neurologic: Alert and oriented X 3, normal coordination    Labs:  Lab Results   Component Value Date    WBC 3.49 (L) 06/18/2020    WBC 3.52 (L) 06/18/2020    HGB 9.8 (L) 06/18/2020    HGB 10.1 (L) 06/18/2020    HCT 31.3 (L) 06/18/2020    HCT 31.9 (L) 06/18/2020    MCV 90 06/18/2020    MCV 91 06/18/2020    PLT 75 (L) 06/18/2020    PLT 75 (L) 06/18/2020     Lab Results "   Component Value Date    ALT 67 (H) 06/18/2020     (H) 06/18/2020     (H) 06/17/2020    ALKPHOS 219 (H) 06/18/2020    BILITOT 8.4 (H) 06/18/2020     Assessment:   80 y.o. male patient who enderwent EUS and ERCP 6/17 for abnormal LCTs, mild ductal dilatation on prior imaging, and history of pancreatitis history of Eliquis use for AF held prior to procedure. Reported history of liver cancer with resection about 10 years ago by patient. EUS showed diffuse abnormal echotexture of liver; dilated CBD to 12.7mm; heterogenous material in the bile duct with clotted blood found in the ampulla. ERCP then performed with noted bulging major papilla, hemobilia found then biliary sphincterotomy was performed and the biliary tree was swept with clots removed. A 10 Fr x 7 cm plastic biliary stent was placed in CBD.  Hemodynamically stable. No leukocytosis or fevers.    Recommendation:  - IR and Surgery evaluations as previously outlined are underway, noted IR recs for triple phase CT and monitoring

## 2020-06-18 NOTE — PLAN OF CARE
TN met with pt for d/c planning. Pt lives with wife Ela who is his help at home. Pt requires assistance at home with use of a cane and RW. Pt voices no d/c needs at this time. Pt's wife to provide transportation upon discharge. GI and Surgery Recs pending.    Follow-up With  Details  Why  Contact Info   Michaela Miller MD  Go on 6/25/2020  time: 10:30am  [address]  [address]  [facility] [address]  [phone]          06/18/20 1135   Discharge Assessment   Assessment Type Discharge Planning Assessment   Confirmed/corrected address and phone number on facesheet? Yes   Assessment information obtained from? Patient   Expected Length of Stay (days) 2   Communicated expected length of stay with patient/caregiver yes   Prior to hospitilization cognitive status: Alert/Oriented   Prior to hospitalization functional status: Assistive Equipment   Current cognitive status: Alert/Oriented   Current Functional Status: Assistive Equipment   Lives With spouse   Able to Return to Prior Arrangements yes   Is patient able to care for self after discharge? Yes   Who are your caregiver(s) and their phone number(s)? Ela Lau (wife) [phone]   Patient's perception of discharge disposition home or selfcare   Readmission Within the Last 30 Days planned readmission   If yes, most recent facility name: [facility]   Patient currently being followed by outpatient case management? No   Patient currently receives any other outside agency services? No   Equipment Currently Used at Home cane, straight;walker, rolling   Do you have any problems affording any of your prescribed medications? No   Is the patient taking medications as prescribed? yes   Does the patient have transportation home? Yes   Transportation Anticipated family or friend will provide   Does the patient receive services at the [facility]? No   Discharge Plan A Home with family   Discharge Plan B Home Health   DME Needed Upon  Discharge  none   Patient/Family in Agreement with Plan yes

## 2020-06-18 NOTE — CONSULTS
Interventional Radiology Consult Note      Chief Complaint/Reason for Consult: Hemobilia    History of Present Illness:  Venu Lau is a 80 y.o. male with the PMH listed below who presents with hemobilia in the setting of HCC s/p left hepatic resection in 2005. IR consulted for eval and possible embolization.    Was recently admitted to [facility] for abd pain and hyperbilirubinemia. Mild biliary ductal dilation on noncontrast CT. TB trended down and he was dc'd with plans for outpt ERCP. This was performed 1 day ago with sphincterotomy and stent placement. Admitted afterwards for obs. On ASA and recently on eliquis for Afib.    Admission H&P reviewed.    Past Medical History:   Diagnosis Date    *Atrial flutter     Anemia     Anticoagulant long-term use     Arthritis     Bipolar 1 disorder     Coronary artery disease     Diabetes mellitus     Diabetes mellitus, type 2     GERD (gastroesophageal reflux disease)     Gout, unspecified     H/O: GI bleed     Hypertension     Liver cancer     PVD (peripheral vascular disease)     Sciatica     SSS (sick sinus syndrome)     Tachy-valentine syndrome     Thyroid disease      Past Surgical History:   Procedure Laterality Date    APPENDECTOMY      CHOLECYSTECTOMY      CORONARY ARTERY BYPASS GRAFT      ENDOSCOPIC ULTRASOUND OF UPPER GASTROINTESTINAL TRACT N/A 6/17/2020    Procedure: EUS;  Surgeon: Rei Humphrey MD;  Location: [facility] ENDO;  Service: Endoscopy;  Laterality: N/A;    ERCP N/A 6/17/2020    Procedure: ERCP;  Surgeon: Rei Humphrey MD;  Location: [facility] ENDO;  Service: Endoscopy;  Laterality: N/A;    INSERT / REPLACE / REMOVE PACEMAKER      LIVER RESECTION      LUMBAR DISC SURGERY         Allergies:   Review of patient's allergies indicates:  No Known Allergies    Scheduled Meds:    gabapentin  300 mg Oral BID    piperacillin-tazobactam (ZOSYN) IVPB  4.5 g Intravenous Q8H    ursodioL  300 mg Oral BID     Continuous Infusions:   PRN  Meds:dextrose 50 % in water (D50W), dextrose 50 % in water (D50W), glucagon (human recombinant), glucose, glucose, oxyCODONE-acetaminophen, sodium chloride 0.9%    Anticoagulation/Antiplatelet Meds: ASA until 1 day ago, eliquis since .    Review of Systems:   As documented in admission H&P.    Physical Exam:  Temp: 97.6 °F (36.4 °C) (06/18/20 0809)  Pulse: 77 (06/18/20 0809)  Resp: 20 (06/18/20 0809)  BP: (!) 193/88 (06/18/20 0809)  SpO2: 96 % (06/18/20 0809)     Labs:  Recent Labs   Lab 06/18/20  0534   INR 1.2       Recent Labs   Lab 06/18/20  0534   WBC 3.49*  3.52*   HGB 9.8*  10.1*   HCT 31.3*  31.9*   MCV 90  91   PLT 75*  75*      Recent Labs   Lab 06/15/20  0718 06/17/20  1558 06/18/20  0534   * 87 39*    139 137   K 3.6 4.5 3.6    107 108   CO2 21* 24 19*   BUN 13 18 25*   CREATININE 1.0 1.2 1.3   CALCIUM 9.0 9.4 8.7   MG 1.6  --   --    ALT 80* 76* 67*   AST 80* 112* 112*   ALBUMIN 3.2* 3.4* 3.0*   BILITOT 5.4* 8.3* 8.4*   BILIDIR  --  6.4*  --      Imaging:  CT without contrast and RUQ US 6/12/20 reviewed by me. Mild scott dil with hyperattenuating material in the duct, presumably blood, and post-op changes of left hepatectomy.    Assessment/Plan:   Hemobilia in the setting of prior HCC. HDS. HH stable.     1. No indication for intervention (and no clear target for embolization).   2. Pt needs multiphase CT without and with contrast to eval cor causes of hemobilia (recurrent HCC?)  3. If pt decompensates or if there is concern for active bleeding, recommend STAT CTA abdomen and pelvis with delayed imaging (bleeding protocol) and contact IR.      Andrew Marsala MD [facility]  Pager [phone]

## 2020-06-18 NOTE — CONSULTS
[facility]/General Surgery  Consultation Note    SUBJECTIVE:     Reason for Consultation:   Hemobilia     History of Present Illness:  Mr. Venu Lau is an 79 yo male with history of HCC s/p left hepatic resection in 2005 currently admitted to hospital medicine for hemobilia. He was recently discharged from hospital after acute pancreatitis and discharged with outpatient EUS and ERCP with Dr. Humphrey. At the time of the EUS and ERCP there was concern for blood oozing in the biliary system and was admitted for close observation. At time of exam he is doing well, denies pain, nausea, vomiting or changes in bowel habits.     Allergies:  Review of patient's allergies indicates:  No Known Allergies    Home Medications:  No current facility-administered medications on file prior to encounter.      Current Outpatient Medications on File Prior to Encounter   Medication Sig    acetaminophen (TYLENOL) 325 MG tablet Take 2 tablets (650 mg total) by mouth every 6 (six) hours. for 7 days    apixaban (ELIQUIS) 5 mg Tab Take 1 tablet (5 mg total) by mouth 2 (two) times daily.    ciprofloxacin HCl (CIPRO) 500 MG tablet Take 1 tablet (500 mg total) by mouth 2 (two) times daily. for 7 days    [START ON 6/21/2020] ergocalciferol (ERGOCALCIFEROL) 50,000 unit Cap Take 1 capsule (50,000 Units total) by mouth every 7 days.    gabapentin (NEURONTIN) 300 MG capsule Take 300 mg by mouth 2 (two) times daily.    glimepiride (AMARYL) 2 MG tablet Take 2 mg by mouth every morning.    metoprolol tartrate (LOPRESSOR) 50 MG tablet Take 0.5 tablets (25 mg total) by mouth 2 (two) times daily.    metroNIDAZOLE (FLAGYL) 500 MG tablet Take 1 tablet (500 mg total) by mouth 3 (three) times daily. for 7 days    mirtazapine (REMERON) 15 MG tablet 15 mg once daily.    OLANZapine (ZYPREXA) 2.5 MG tablet 2.5 mg once daily.    oxyCODONE-acetaminophen (PERCOCET)  mg per tablet Take 1 tablet by mouth every 6 (six) hours as  needed for Pain.    rosuvastatin (CRESTOR) 40 MG Tab Take 1 tablet (40 mg total) by mouth every evening.    SOLUS V2 LANCETS 30 gauge Misc     SOLUS V2 TEST STRIPS Strp     aspirin (ECOTRIN) 81 MG EC tablet Take 81 mg by mouth once daily.       Past Medical History:   Diagnosis Date    *Atrial flutter     Anemia     Anticoagulant long-term use     Arthritis     Bipolar 1 disorder     Coronary artery disease     Diabetes mellitus     Diabetes mellitus, type 2     GERD (gastroesophageal reflux disease)     Gout, unspecified     H/O: GI bleed     Hypertension     Liver cancer     PVD (peripheral vascular disease)     Sciatica     SSS (sick sinus syndrome)     Tachy-valentine syndrome     Thyroid disease      Past Surgical History:   Procedure Laterality Date    APPENDECTOMY      CHOLECYSTECTOMY      CORONARY ARTERY BYPASS GRAFT      ENDOSCOPIC ULTRASOUND OF UPPER GASTROINTESTINAL TRACT N/A 2020    Procedure: EUS;  Surgeon: Rei Humphrey MD;  Location: [facility] ENDO;  Service: Endoscopy;  Laterality: N/A;    ERCP N/A 2020    Procedure: ERCP;  Surgeon: Rei Humphrey MD;  Location: [facility] ENDO;  Service: Endoscopy;  Laterality: N/A;    INSERT / REPLACE / REMOVE PACEMAKER      LIVER RESECTION      LUMBAR DISC SURGERY       Family History   Problem Relation Age of Onset    Heart disease Mother     Pancreatic cancer Mother     Heart disease Father      Social History     Tobacco Use    Smoking status: Current Some Day Smoker     Packs/day: 0.25     Years: 37.00     Pack years: 9.25     Types: Cigarettes     Last attempt to quit: 1992     Years since quittin.5    Smokeless tobacco: Never Used    Tobacco comment: Pt declines enrollment in the Tobacco Trust. Handout provided for Ambulatory Smoking Cessation program.    Substance Use Topics    Alcohol use: Yes     Alcohol/week: 5.0 standard drinks     Types: 5 Cans of beer per week    Drug use: No        Review of  Systems:  Constitutional: no fever or chills  ENT: no nasal congestion or sore throat  Respiratory: no cough or shortness of breath  Cardiovascular: no chest pain or palpitations  Gastrointestinal: no nausea, vomiting, abdominal pain, or change in bowel habits  Genitourinary: no hematuria or dysuria  Integument/Breast: no rash or pruritis  Hematologic/Lymphatic: no easy bruising or lymphadenopathy  Musculoskeletal: no arthralgias or myalgias  Neurological: no seizures or tremors  Behavioral/Psych: no auditory or visual hallucinations    OBJECTIVE:     Vital Signs:  Temp: 98.2 °F (36.8 °C) (06/18/20 1208)  Pulse: 98 (06/18/20 1208)  Resp: 20 (06/18/20 1208)  BP: (!) 181/78 (06/18/20 1208)  SpO2: 96 % (06/18/20 0809)    Physical Exam:  General: well developed, well nourished, no distress  HEENT: normocephalic, atraumatic, EOMI, trachea midline  Lungs:  normal respiratory effort on room air  Cardiovascular: regular rate and rhythm  Abdomen: soft, non-tender to palpation, no distention, no rebound, no guarding  Extremities: no cyanosis, edema, or clubbing  Skin:  no rashes or lesions, multiple home made tattoos on arms and legs  Psych/Behavioral:  alert and oriented, appropriate affect    Laboratory:  CBC:   Recent Labs   Lab 06/18/20  1138   WBC 3.95   RBC 3.59*   HGB 10.2*   HCT 31.9*   PLT 75*   MCV 89   MCH 28.4   MCHC 32.0     CMP:   Recent Labs   Lab 06/18/20  0534   GLU 39*   CALCIUM 8.7   ALBUMIN 3.0*   PROT 6.3      K 3.6   CO2 19*      BUN 25*   CREATININE 1.3   ALKPHOS 219*   ALT 67*   *   BILITOT 8.4*       Diagnostic Results:      ASSESSMENT:     79 yo male with hx of HCC with left hepatic resection in 2005 here for hemobilia     PLAN:     - No surgical intervention necessary at this time   -H/H stable, liver enzymes elevated, Tbili 8.4  -Needs hepatitis work up   -Close monitoring of H/H and INR   -Low threshold to consult IR for embolization  -Recommend actigall 300 mg BID for elevated  bilirubin     Thank you for the consult, we will continue to follow       Case discussed with staff, MD Cristina Melgar MD

## 2020-06-18 NOTE — PLAN OF CARE
VN rounds:  VN cued into pt's room with pt's permission, pt resting in bed in low position with bed alarm in place, call bell within reach.  Pt denies pain, anxiety or nausea.   No acute distress noted.  Pt's chart, labs and vital signs reviewed.  Allowed time for questions.  Will continue to be available and intervene as needed.

## 2020-06-18 NOTE — PROGRESS NOTES
Patient seen this morning by tobacco treatment specialist for education and possible enrollment in tobacco trust/referral to ambulatory smoking cessation clinic. He stated that he just started smoking again about 1 year ago after not smoking for 28 years. Denied need for nicotine replacement patch, saying that he does not need it. Patient also stated he does not want to enroll in tobacco trust at this time because he has too much going on in live right now that would make it too hard for him to quit smoking. Patient provided with handout and number for ambulatory smoking cessation clinic.

## 2020-06-18 NOTE — NURSING
Pt brought up to floor and oriented to room.  Updated on plan of care and verbalized understanding. VS taken w/ BP of 183/99.  MD JAQUELIN Boggs notified and verbalized understanding; no further orders placed.  Complaints of pain; MD notified; Order put into place. Oxy given upon pt request.  Safety maintained.

## 2020-06-19 LAB
ALBUMIN SERPL BCP-MCNC: 2.9 G/DL (ref 3.5–5.2)
ALP SERPL-CCNC: 244 U/L (ref 55–135)
ALT SERPL W/O P-5'-P-CCNC: 70 U/L (ref 10–44)
ANION GAP SERPL CALC-SCNC: 8 MMOL/L (ref 8–16)
APTT BLDCRRT: 36.5 SEC (ref 21–32)
AST SERPL-CCNC: 119 U/L (ref 10–40)
BASOPHILS # BLD AUTO: 0.01 K/UL (ref 0–0.2)
BASOPHILS # BLD AUTO: 0.01 K/UL (ref 0–0.2)
BASOPHILS NFR BLD: 0.2 % (ref 0–1.9)
BASOPHILS NFR BLD: 0.3 % (ref 0–1.9)
BILIRUB SERPL-MCNC: 8.8 MG/DL (ref 0.1–1)
BUN SERPL-MCNC: 19 MG/DL (ref 8–23)
CALCIUM SERPL-MCNC: 8.5 MG/DL (ref 8.7–10.5)
CHLORIDE SERPL-SCNC: 106 MMOL/L (ref 95–110)
CO2 SERPL-SCNC: 22 MMOL/L (ref 23–29)
CREAT SERPL-MCNC: 1.3 MG/DL (ref 0.5–1.4)
DIFFERENTIAL METHOD: ABNORMAL
DIFFERENTIAL METHOD: ABNORMAL
EOSINOPHIL # BLD AUTO: 0 K/UL (ref 0–0.5)
EOSINOPHIL # BLD AUTO: 0 K/UL (ref 0–0.5)
EOSINOPHIL NFR BLD: 0.2 % (ref 0–8)
EOSINOPHIL NFR BLD: 0.8 % (ref 0–8)
ERYTHROCYTE [DISTWIDTH] IN BLOOD BY AUTOMATED COUNT: 16.7 % (ref 11.5–14.5)
ERYTHROCYTE [DISTWIDTH] IN BLOOD BY AUTOMATED COUNT: 16.8 % (ref 11.5–14.5)
EST. GFR  (AFRICAN AMERICAN): 60 ML/MIN/1.73 M^2
EST. GFR  (NON AFRICAN AMERICAN): 52 ML/MIN/1.73 M^2
GLUCOSE SERPL-MCNC: 115 MG/DL (ref 70–110)
HCT VFR BLD AUTO: 32.2 % (ref 40–54)
HCT VFR BLD AUTO: 32.5 % (ref 40–54)
HGB BLD-MCNC: 10.2 G/DL (ref 14–18)
HGB BLD-MCNC: 10.3 G/DL (ref 14–18)
IMM GRANULOCYTES # BLD AUTO: 0.02 K/UL (ref 0–0.04)
IMM GRANULOCYTES # BLD AUTO: 0.05 K/UL (ref 0–0.04)
IMM GRANULOCYTES NFR BLD AUTO: 0.5 % (ref 0–0.5)
IMM GRANULOCYTES NFR BLD AUTO: 1.2 % (ref 0–0.5)
INR PPP: 1.2 (ref 0.8–1.2)
LYMPHOCYTES # BLD AUTO: 0.5 K/UL (ref 1–4.8)
LYMPHOCYTES # BLD AUTO: 0.6 K/UL (ref 1–4.8)
LYMPHOCYTES NFR BLD: 13.8 % (ref 18–48)
LYMPHOCYTES NFR BLD: 14.6 % (ref 18–48)
MCH RBC QN AUTO: 28.4 PG (ref 27–31)
MCH RBC QN AUTO: 28.6 PG (ref 27–31)
MCHC RBC AUTO-ENTMCNC: 31.7 G/DL (ref 32–36)
MCHC RBC AUTO-ENTMCNC: 31.7 G/DL (ref 32–36)
MCV RBC AUTO: 90 FL (ref 82–98)
MCV RBC AUTO: 90 FL (ref 82–98)
MONOCYTES # BLD AUTO: 0.5 K/UL (ref 0.3–1)
MONOCYTES # BLD AUTO: 0.6 K/UL (ref 0.3–1)
MONOCYTES NFR BLD: 11 % (ref 4–15)
MONOCYTES NFR BLD: 16.9 % (ref 4–15)
NEUTROPHILS # BLD AUTO: 2.6 K/UL (ref 1.8–7.7)
NEUTROPHILS # BLD AUTO: 3 K/UL (ref 1.8–7.7)
NEUTROPHILS NFR BLD: 67.7 % (ref 38–73)
NEUTROPHILS NFR BLD: 72.8 % (ref 38–73)
NRBC BLD-RTO: 0 /100 WBC
NRBC BLD-RTO: 0 /100 WBC
PLATELET # BLD AUTO: 87 K/UL (ref 150–350)
PLATELET # BLD AUTO: 90 K/UL (ref 150–350)
PMV BLD AUTO: 10.4 FL (ref 9.2–12.9)
PMV BLD AUTO: 10.8 FL (ref 9.2–12.9)
POCT GLUCOSE: 104 MG/DL (ref 70–110)
POCT GLUCOSE: 109 MG/DL (ref 70–110)
POCT GLUCOSE: 129 MG/DL (ref 70–110)
POCT GLUCOSE: 145 MG/DL (ref 70–110)
POCT GLUCOSE: 43 MG/DL (ref 70–110)
POCT GLUCOSE: 88 MG/DL (ref 70–110)
POTASSIUM SERPL-SCNC: 3.4 MMOL/L (ref 3.5–5.1)
PROT SERPL-MCNC: 6.2 G/DL (ref 6–8.4)
PROTHROMBIN TIME: 12.6 SEC (ref 9–12.5)
RBC # BLD AUTO: 3.57 M/UL (ref 4.6–6.2)
RBC # BLD AUTO: 3.63 M/UL (ref 4.6–6.2)
SODIUM SERPL-SCNC: 136 MMOL/L (ref 136–145)
WBC # BLD AUTO: 3.78 K/UL (ref 3.9–12.7)
WBC # BLD AUTO: 4.17 K/UL (ref 3.9–12.7)

## 2020-06-19 PROCEDURE — 25000003 PHARM REV CODE 250: Performed by: STUDENT IN AN ORGANIZED HEALTH CARE EDUCATION/TRAINING PROGRAM

## 2020-06-19 PROCEDURE — 80053 COMPREHEN METABOLIC PANEL: CPT

## 2020-06-19 PROCEDURE — 85025 COMPLETE CBC W/AUTO DIFF WBC: CPT | Mod: 91

## 2020-06-19 PROCEDURE — 11000001 HC ACUTE MED/SURG PRIVATE ROOM

## 2020-06-19 PROCEDURE — 36415 COLL VENOUS BLD VENIPUNCTURE: CPT

## 2020-06-19 PROCEDURE — 96376 TX/PRO/DX INJ SAME DRUG ADON: CPT

## 2020-06-19 PROCEDURE — 85610 PROTHROMBIN TIME: CPT

## 2020-06-19 PROCEDURE — 63600175 PHARM REV CODE 636 W HCPCS: Performed by: STUDENT IN AN ORGANIZED HEALTH CARE EDUCATION/TRAINING PROGRAM

## 2020-06-19 PROCEDURE — 85730 THROMBOPLASTIN TIME PARTIAL: CPT

## 2020-06-19 RX ORDER — DEXTROSE MONOHYDRATE AND SODIUM CHLORIDE 5; .9 G/100ML; G/100ML
INJECTION, SOLUTION INTRAVENOUS CONTINUOUS
Status: ACTIVE | OUTPATIENT
Start: 2020-06-19 | End: 2020-06-19

## 2020-06-19 RX ORDER — DEXTROSE MONOHYDRATE AND SODIUM CHLORIDE 5; .9 G/100ML; G/100ML
INJECTION, SOLUTION INTRAVENOUS CONTINUOUS
Status: DISCONTINUED | OUTPATIENT
Start: 2020-06-19 | End: 2020-06-19

## 2020-06-19 RX ORDER — POTASSIUM CHLORIDE 20 MEQ/1
20 TABLET, EXTENDED RELEASE ORAL
Status: COMPLETED | OUTPATIENT
Start: 2020-06-19 | End: 2020-06-19

## 2020-06-19 RX ORDER — OXYCODONE AND ACETAMINOPHEN 10; 325 MG/1; MG/1
1 TABLET ORAL EVERY 8 HOURS PRN
Status: DISCONTINUED | OUTPATIENT
Start: 2020-06-19 | End: 2020-06-22 | Stop reason: HOSPADM

## 2020-06-19 RX ADMIN — PIPERACILLIN AND TAZOBACTAM 4.5 G: 4; .5 INJECTION, POWDER, LYOPHILIZED, FOR SOLUTION INTRAVENOUS; PARENTERAL at 01:06

## 2020-06-19 RX ADMIN — DEXTROSE AND SODIUM CHLORIDE: 5; .9 INJECTION, SOLUTION INTRAVENOUS at 01:06

## 2020-06-19 RX ADMIN — PIPERACILLIN AND TAZOBACTAM 4.5 G: 4; .5 INJECTION, POWDER, LYOPHILIZED, FOR SOLUTION INTRAVENOUS; PARENTERAL at 08:06

## 2020-06-19 RX ADMIN — OXYCODONE AND ACETAMINOPHEN 1 TABLET: 7.5; 325 TABLET ORAL at 09:06

## 2020-06-19 RX ADMIN — GABAPENTIN 300 MG: 300 CAPSULE ORAL at 08:06

## 2020-06-19 RX ADMIN — METOPROLOL TARTRATE 25 MG: 25 TABLET, FILM COATED ORAL at 08:06

## 2020-06-19 RX ADMIN — METOPROLOL TARTRATE 25 MG: 25 TABLET, FILM COATED ORAL at 09:06

## 2020-06-19 RX ADMIN — OXYCODONE AND ACETAMINOPHEN 1 TABLET: 7.5; 325 TABLET ORAL at 05:06

## 2020-06-19 RX ADMIN — URSODIOL 300 MG: 300 CAPSULE ORAL at 08:06

## 2020-06-19 RX ADMIN — POTASSIUM CHLORIDE 20 MEQ: 1500 TABLET, EXTENDED RELEASE ORAL at 09:06

## 2020-06-19 RX ADMIN — POTASSIUM CHLORIDE 20 MEQ: 1500 TABLET, EXTENDED RELEASE ORAL at 05:06

## 2020-06-19 RX ADMIN — OXYCODONE HYDROCHLORIDE AND ACETAMINOPHEN 1 TABLET: 10; 325 TABLET ORAL at 12:06

## 2020-06-19 RX ADMIN — URSODIOL 300 MG: 300 CAPSULE ORAL at 09:06

## 2020-06-19 RX ADMIN — GABAPENTIN 300 MG: 300 CAPSULE ORAL at 09:06

## 2020-06-19 RX ADMIN — PIPERACILLIN AND TAZOBACTAM 4.5 G: 4; .5 INJECTION, POWDER, LYOPHILIZED, FOR SOLUTION INTRAVENOUS; PARENTERAL at 05:06

## 2020-06-19 RX ADMIN — POTASSIUM CHLORIDE 20 MEQ: 1500 TABLET, EXTENDED RELEASE ORAL at 12:06

## 2020-06-19 RX ADMIN — DEXTROSE MONOHYDRATE 25 G: 25 INJECTION, SOLUTION INTRAVENOUS at 01:06

## 2020-06-19 NOTE — NURSING
Procedure complete. Patient tolerated well. No complications. 5 fr exo seal deployed to right groin at 1610. Pressure held for 5 min. Hemostasis achieved at 1615. Gauze and Tegaderm applied. No bleeding or hematoma noted. VSS. Patient is to remain flat without moving right leg until 1810. Report called to TALI Saleem. Orders placed for groin checks.

## 2020-06-19 NOTE — PLAN OF CARE
Mr. Lau is resting and medications were given per orders. PRN medication updated and given for BACK PAIN and given for blood sugar. Ambulated to bathroom. See previous noted about patient's blood sugar. VSS. IV fluids initiated and continued. Call light within reach and bed alarm set. Safety maintained.

## 2020-06-19 NOTE — NURSING
Spoke with Ashley MILES about patient blood sugars running low, 1946 6[birth year]18 being 65 and 6/19: 1230AM being 43. PRN medications was given per orders. MD stated to given PRN dose again and new order will be put in for drip. Will continue to monitor.

## 2020-06-19 NOTE — PROGRESS NOTES
GENERAL SURGERY PROGRESS NOTE    Overnight hypoglycemic, started d50 drip with improvement   Patient is feeling better asking to go home   Bilirubin continues to rise, started actigall     Inpatient Data      Vitals:   Temp:  [96.8 °F (36 °C)-98.2 °F (36.8 °C)]   Pulse:  [68-98]   Resp:  [19-20]   BP: (120-181)/(65-94)   SpO2:  [96 %]     Diet NPO   Intake/Output Summary (Last 24 hours) at 6/19/2020 0935  Last data filed at 6/19/2020 0107  Gross per 24 hour   Intake 275 ml   Output 300 ml   Net -25 ml          Physical Exam:  AFVSS  Gen - NAD  Chest - RRR, NWOB on RA  Abd - soft, ND, no signs of infxn or break down  Skin- jaundice     Laboratory:  CBC:   Recent Labs   Lab 06/19/20  0616   WBC 3.78*   RBC 3.63*   HGB 10.3*   HCT 32.5*   PLT 90*   MCV 90   MCH 28.4   MCHC 31.7*     CMP:   Recent Labs   Lab 06/19/20  0616   *   CALCIUM 8.5*   ALBUMIN 2.9*   PROT 6.2      K 3.4*   CO2 22*      BUN 19   CREATININE 1.3   ALKPHOS 244*   ALT 70*   *   BILITOT 8.8*       ASSESSMENT/PLAN:   81 yo male with history of HCC s/p L liver resection in 2005 presents with hemobilia     No surgical intervention necessary at this time   -H/H stable, liver enzymes elevated but stable, Tbili continues to rise to 8.8   -Needs hepatitis work up   -Close monitoring of H/H and INR   -Low threshold to consult IR for embolization  -He may need CTA abdomen for further evaluation of hemobilia, IR following   -Continue actigall 300 mg BID for elevated bilirubin      Thank you for the consult, we will continue to follow     Case discussed with MD Cristina Kyle MD

## 2020-06-19 NOTE — CONSULTS
Interventional Radiology Consult/Pre-Procedure Note      Chief Complaint/Reason for Consult: Hemobilia    History of Present Illness:  Venu Lau is a 80 y.o. male with the PMH listed below who presents with hyperbilirubinemia and hemobilia in the setting of HCC s/p left hepatic resection in 2005. Underwent ERCP on 6/17 with sphincterotomy and placement of an internal biliary stent. Clots in the ducts removed. Continues to have elevated TB.     D/w Dr. Osorio this am. CT with contrast done yesterday demonstrates a curvilinear enhancing structure around what may be the stump of the left hepatic duct. Requests diagnostic angio and possible treatment.    Admission H&P reviewed.    Past Medical History:   Diagnosis Date    *Atrial flutter     Anemia     Anticoagulant long-term use     Arthritis     Bipolar 1 disorder     Coronary artery disease     Diabetes mellitus     Diabetes mellitus, type 2     GERD (gastroesophageal reflux disease)     Gout, unspecified     H/O: GI bleed     Hypertension     Liver cancer     PVD (peripheral vascular disease)     Sciatica     SSS (sick sinus syndrome)     Tachy-valentine syndrome     Thyroid disease      Past Surgical History:   Procedure Laterality Date    APPENDECTOMY      CHOLECYSTECTOMY      CORONARY ARTERY BYPASS GRAFT      ENDOSCOPIC ULTRASOUND OF UPPER GASTROINTESTINAL TRACT N/A 6/17/2020    Procedure: EUS;  Surgeon: Rei Humphrey MD;  Location: [facility] ENDO;  Service: Endoscopy;  Laterality: N/A;    ERCP N/A 6/17/2020    Procedure: ERCP;  Surgeon: Rei Humphrey MD;  Location: [facility] ENDO;  Service: Endoscopy;  Laterality: N/A;    INSERT / REPLACE / REMOVE PACEMAKER      LIVER RESECTION      LUMBAR DISC SURGERY         Allergies:   Review of patient's allergies indicates:  No Known Allergies    Scheduled Meds:    gabapentin  300 mg Oral BID    metoprolol tartrate  25 mg Oral BID    piperacillin-tazobactam (ZOSYN) IVPB  4.5 g Intravenous  Q8H    potassium chloride  20 mEq Oral Q2H    ursodioL  300 mg Oral BID     Continuous Infusions:   PRN Meds:dextrose 50 % in water (D50W), dextrose 50 % in water (D50W), glucagon (human recombinant), glucose, glucose, oxyCODONE-acetaminophen, oxyCODONE-acetaminophen, sodium chloride 0.9%    Anticoagulation/Antiplatelet Meds: no anticoagulation    Review of Systems:   As documented in admission H&P.    Physical Exam:  Temp: 97.8 °F (36.6 °C) (06/19/20 0752)  Pulse: 94 (06/19/20 0752)  Resp: 20 (06/19/20 0752)  BP: (!) 150/70 (06/19/20 0752)  SpO2: 96 % (06/19/20 0752)     General: NAD  HEENT: Normocephalic, sclera anicteric, oropharynx clear  Heart: RRR  Lungs: Symmetric excursions, breathing unlabored  Abd: NTND, soft  Extremities: No CCE  Pulses: 1+ DP on the right, nonpalpable DP on the left, 1+ PT bilaterally  Neuro: Gross nonfocal     Labs:  Recent Labs   Lab 06/19/20  0616   INR 1.2       Recent Labs   Lab 06/19/20  0616   WBC 3.78*   HGB 10.3*   HCT 32.5*   MCV 90   PLT 90*      Recent Labs   Lab 06/15/20  0718 06/17/20  1558  06/19/20  0616   * 87   < > 115*    139   < > 136   K 3.6 4.5   < > 3.4*    107   < > 106   CO2 21* 24   < > 22*   BUN 13 18   < > 19   CREATININE 1.0 1.2   < > 1.3   CALCIUM 9.0 9.4   < > 8.5*   MG 1.6  --   --   --    ALT 80* 76*   < > 70*   AST 80* 112*   < > 119*   ALBUMIN 3.2* 3.4*   < > 2.9*   BILITOT 5.4* 8.3*   < > 8.8*   BILIDIR  --  6.4*  --   --     < > = values in this interval not displayed.     CT abd 6/18/20 and 6/8/07 and CT /20 reviewed by me.    Assessment/Plan:   Hemobilia and persistently elevated TB. HH stable but persistent oozing into duct could cause obstruction by clot. There is a structure on CT which may represent enhancement of the left hepatic duct wall, but PSA is a possibility and would explain his current presentation. This structure is quite small and I'm not sure CTA would add anything to the arterial phase CT we already have.  Will perform diagnostic angio today and may treat if felt indicated and safe to do so.    He ate this morning. Case tentatively scheduled for 1600 hrs.     Sedation:  Sedation history: have not been any systemic reactions  ASA: 3 / Mallampati: 3  Sedation plan: Up to moderate (Versed, fentanyl)     Risks (including, but not limited to, pain, bleeding, infection, damage to nearby structures, nontarget embolization, liver injury up to and including failure, renal injury injury up to and including failure, diagnostic/treatment failure, the need for additional procedures, death), benefits, and alternatives were discussed with the patient. All questions were answered to the best of my abilities. The patient wishes to proceed. Written informed consent was obtained.      Phil Stuart MD  [facility]  Pager [phone]

## 2020-06-19 NOTE — PROGRESS NOTES
"[facility]    CC: hemobilia    Interval History: no acute issues overnight. Feeling well, labs not improved but completed CT yesterday. Frustrated at situation.    Past Medical History  Past Medical History:   Diagnosis Date    *Atrial flutter     Anemia     Anticoagulant long-term use     Arthritis     Bipolar 1 disorder     Coronary artery disease     Diabetes mellitus     Diabetes mellitus, type 2     GERD (gastroesophageal reflux disease)     Gout, unspecified     H/O: GI bleed     Hypertension     Liver cancer     PVD (peripheral vascular disease)     Sciatica     SSS (sick sinus syndrome)     Tachy-valentine syndrome     Thyroid disease      Review of Systems  General ROS: negative for chills, fever  Cardiovascular ROS: no chest pain or dyspnea on exertion  Gastrointestinal ROS: no abdominal pain. Small amount of black stool.    Physical Examination  /73 (Patient Position: Lying)   Pulse 68   Temp 96.4 °F (35.8 °C) (Oral)   Resp 20   Ht 5' 5" (1.651 m)   Wt 76.1 kg (167 lb 12.3 oz)   SpO2 96%   BMI 27.92 kg/m²   General appearance: Mild jaundice. Overweight. alert, cooperative, no distress  HENT: Normocephalic, atraumatic, neck symmetrical, no nasal discharge   Lungs: clear to auscultation anteriorly, symmetric chest expansion and in no acute respiratory distress  Heart: regular rate and rhythm without rub; no palpated displacement of the PMI. Device pocket intact.  Abdomen: soft, non-tender; bowel sounds normoactive; no organomegaly  Extremities: extremities symmetric; no  cyanosis, or edema  Neurologic: Alert and oriented X 3, normal coordination    Labs:  Lab Results   Component Value Date    WBC 3.78 (L) 06/19/2020    HGB 10.3 (L) 06/19/2020    HCT 32.5 (L) 06/19/2020    MCV 90 06/19/2020    PLT 90 (L) 06/19/2020     Lab Results   Component Value Date    ALT 70 (H) 06/19/2020     (H) 06/19/2020     (H) 06/17/2020    ALKPHOS 244 (H) 06/19/2020    BILITOT " 8.8 (H) 06/19/2020     Acute hep panel negative 6/13    Imaging:  CT w/wo contrast 6/18  Impression:  1. Mild intra and extrahepatic bile duct dilatation with scattered intraluminal high attenuation material extending into the remnant cystic duct, likely representing blood products given findings on recent ERCP.  Biliary stent in appropriate position with expected pneumobilia.  2. Curvilinear area of enhancement along the presumed left hepatic duct stump with enhancement pattern that follows the adjacent blood pool.  Finding is new when compared to CT dated 06/08/2007 and is most suggestive of a prominent vascular structure.  A surgical scar is possible but felt to be less likely.  An enhancing neoplasm is felt to be unlikely.  3. Status post left hepatectomy.  4. Bilateral pleural effusions with associated compressive atelectasis, right greater than left.  5. Diffuse mesenteric edema with trace ascites.  6. Additional findings as detailed in the body of the report.  This report was flagged in Epic as abnormal    Assessment:   80 y.o. male patient who enderwent EUS and ERCP 6/17 for abnormal LCTs, mild ductal dilatation on prior imaging, and history of pancreatitis history of Eliquis use for AF held prior to procedure. Reported history of liver cancer with resection about 10 years ago by patient. EUS showed diffuse abnormal echotexture of liver; dilated CBD to 12.7mm; heterogenous material in the bile duct with clotted blood found in the ampulla. ERCP then performed with noted bulging major papilla, hemobilia found then biliary sphincterotomy was performed and the biliary tree was swept with clots removed. A 10 Fr x 7 cm plastic biliary stent was placed in CBD.  Hemodynamically stable. No leukocytosis or fevers.    Plan/Recommendation:  - IR and Surgery evaluations noted  - CT with noted stent in place, though no improvement in chemistries yet  - Solicit IR input regarding Curvilinear area of enhancement along the  presumed left hepatic duct stump with enhancement pattern that follows the adjacent blood pool.  Finding is new when compared to CT dated 06/08/2007 and is most suggestive of a prominent vascular structure.

## 2020-06-19 NOTE — PLAN OF CARE
Dr. Ramirez notified that the patient wasn't getting pain relief with his current medication.  New order noted.

## 2020-06-19 NOTE — PT/OT/SLP PROGRESS
"Physical Therapy  Initiated eval    Patient Name:  Venu Lau   MRN:  [id number]  [id number]  Received orders, chart reviewed; initiated eval with interview during which he challenged at almost every question -"why are you asking this?... why do you want to know that?" and partial observation of bed mobility in bed but not to EOB, reports that he has been getting up and going to the bathroom- pt with limited cooperation- frustrated over not being able to eat for test and having to stay in hospital- pt reports that he lives with his wife in a mobile home, 4 NORA with one rail, has WIS; has RW, shower chair, std cane but only uses the cane when he feels he needs it 2/2 chronic back pain which he states is an 8.5/10; checked with nurse-unable to get next round of pain meds yet; pt is going for a test at 3pm and wanted water/ice chips but nurse instructed pt is not to have before his test; then pt declined to cooperate further; will bed back tomorrow.    Yamini Mendoza, PT   6/19/2020      "

## 2020-06-19 NOTE — PROCEDURES
Interventional Radiology Post-Procedure Note    Pre Op Diagnosis: Hemobilia  Post Op Diagnosis: Same    Procedure: Hepatic angiography    Procedure performed by: Sade    Written Informed Consent Obtained: Yes  Specimen Sent: No  Estimated Blood Loss: Minimal    Findings:   Area of hyperemia along the left hepatic resection margin which correlates with the CT findings but is not in and of itself definitely abnormal.  No angiographic abnormality which would definitely account for hemobilia.  No intervention was performed. Righ CFA Closed with EXOSEAL followed by 10 min manual compression.    No immediate complications. Patient tolerated procedure well. Please see full dictated procedure report for additional details and recommendations.      Phil Stuart MD  [facility]  Pager [phone]

## 2020-06-19 NOTE — PROGRESS NOTES
"[facility] Resident HO-I Progress Note    Subjective:      Venu Lau is a 80 y.o.  male who is being followed by the [facility] service at [facility] for hemobilia, obstructive jaundice.     Mr. aLu reports no issues overnight. He has had no abdominal pain, nausea, vomiting, diarrhea, fever. He does complain of back pain which is an old complaint. Asking when he can be discharged today.      Objective:   Last 24 Hour Vital Signs:  BP  Min: 120/65  Max: 193/88  Temp  Av.6 °F (36.4 °C)  Min: 96.8 °F (36 °C)  Max: 98.2 °F (36.8 °C)  Pulse  Av  Min: 68  Max: 98  Resp  Av.7  Min: 19  Max: 20  SpO2  Av %  Min: 96 %  Max: 96 %  Weight  Av.1 kg (167 lb 12.3 oz)  Min: 76.1 kg (167 lb 12.3 oz)  Max: 76.1 kg (167 lb 12.3 oz)  I/O last 3 completed shifts:  In: 750 [P.O.:500; I.V.:50; IV Piggyback:200]  Out: 550 [Urine:250; Stool:300]    Physical Examination:  BP (!) 150/70 (Patient Position: Lying)   Pulse 94   Temp 97.8 °F (36.6 °C) (Oral)   Resp 20   Ht 5' 5" (1.651 m)   Wt 76.1 kg (167 lb 12.3 oz)   SpO2 96%   BMI 27.92 kg/m²   GEN:  Elderly CM in NAD, lying in bed in endoscopy suite, AAOx3, daughter at bedside  HEENT: NC, AT, PERRL, EOMI, no scleral icterus, no discharge or congestion, somewhat hard of hearing, moist oral mucosa, neck is supple  CV: RRR, No MGR, +2 distal pulses, no pedal edema  RESP: Lungs CTAB, No WRR, non labored breathing  ABD: soft, NT, ND, normoactive BS, no guarding or rebound  NEURO: AAO x3, CN II-XII grossly intact, Normal strength in b/l UE and LE, sensation intact to light tough in b/l LE and UE  EXTR: No joint swelling or tenderness, no cyanosis  INTEG: No rash noted      Laboratory:  Laboratory Data Reviewed: yes  Pertinent Findings:  Reviewed     Microbiology Data Reviewed: yes  Pertinent Findings:  Reviewed     Other Results:  EKG (my interpretation): Ventricular paced rhythm, wide QRS, [place] -90     Radiology Data Reviewed: " yes  Pertinent Findings:  X-ray Chest Ap Portable    Result Date: 6/12/2020  EXAMINATION: XR CHEST AP PORTABLE CLINICAL HISTORY: Chest Pain; FINDINGS: Comparison study 12/16/2017.  No change.  Normal size heart status post CABG.  Left chest wall cardiac pacemaker with intact leads.  Aortic arch atherosclerosis.  No congestion.  Lungs are clear.     Stable chest x-ray. Electronically signed by: Josh Thacker MD Date:    06/12/2020 Time:    12:09    Us Abdomen Limited    Result Date: 6/12/2020  EXAMINATION: US ABDOMEN LIMITED CLINICAL HISTORY: Generalized abdominal pain FINDINGS: Correlation exams abdominal and pelvic CT without contrast, renal ultrasound 10/29/2016.  The pancreas is unremarkable.  Liver measures 15 cm with normal echogenicity and echotexture.  There is a history of previous hepatectomy.  Cholecystectomy.  Again, the common bile duct is dilated, up to 1.1 cm, same as remote exam.  Right kidney, 9.6 cm, with stable hypoechoic mid pole pyramid.     Cholecystectomy with stable common bile duct dilatation, up to 1.1 cm, no change compared with October 2016.  No intrahepatic ductal dilatation. Electronically signed by: Josh Thacker MD Date:    06/12/2020 Time:    13:56    Ct Abdomen Pelvis  Without Contrast    Result Date: 6/12/2020  EXAMINATION: CT ABDOMEN PELVIS WITHOUT CONTRAST CLINICAL HISTORY: LLQ pain, suspect diverticulitis; TECHNIQUE: Low dose axial images, sagittal and coronal reformations were obtained from the lung bases to the pubic symphysis.  Contrast was not administered. All CT scans at this location are performed using dose modulation techniques as appropriate to a performed exam including the following: Automated exposure control; adjustment of the mA and/or kV according to patient size. COMPARISON: 10/29/2016 FINDINGS: Heart: Normal in size. No pericardial effusion. Lung Bases: Well aerated, without consolidation or pleural fluid. Liver: Evidence of prior left hepatic resection.  Gallbladder: Surgically absent. Bile Ducts: Suggestion of mild intrahepatic biliary ductal dilation.  Recommend correlation with serum analysis. Pancreas: No mass or peripancreatic fat stranding. Spleen: Unremarkable. Adrenals: Unremarkable. Kidneys/ Ureters: Chronic perinephric fat stranding similar to prior exams.  No hydronephrosis.  No renal, ureteral, or bladder stones. Bladder: Mild distention of the bladder with urine. Reproductive organs: Unremarkable. GI Tract/Mesentery: Distal esophagus, stomach, duodenum, small bowel unremarkable.  Evidence of prior appendectomy.  Moderate large volume stool throughout the colon.  Sigmoid diverticulosis without evidence of diverticulitis. Peritoneal Space: No ascites. No free air. Retroperitoneum: No significant adenopathy. Abdominal wall: Small fat containing uncomplicated left inguinal hernia. Vasculature: Aortoiliac  atherosclerotic calcification. No aneurysm. Bones: Moderate severity multilevel discogenic degenerative change of the lumbar spine.     Suggestion of mild intrahepatic biliary ductal dilation.  Recommend correlation with serum analysis. Moderate large volume stool throughout the colon. Sigmoid diverticulosis without evidence of diverticulitis. Electronically signed by: Kiran Paez Date:    06/12/2020 Time:    14:28      Current Medications:     Infusions:       Scheduled:   gabapentin  300 mg Oral BID    metoprolol tartrate  25 mg Oral BID    piperacillin-tazobactam (ZOSYN) IVPB  4.5 g Intravenous Q8H    potassium chloride  20 mEq Oral Q2H    ursodioL  300 mg Oral BID        PRN:  dextrose 50 % in water (D50W), dextrose 50 % in water (D50W), glucagon (human recombinant), glucose, glucose, oxyCODONE-acetaminophen, sodium chloride 0.9%    Antibiotics and Day Number of Therapy:  Zosyn 6/16-present     Lines and Day Number of Therapy:  PIV     Assessment:     Venu Lau is a 80 y.o. male with PMHx of CAD s/p CABG, paroxismal a fib/flutter s/p 2 lead  pacemaker in DDD-CLS mode, Biplar 1, HTN, HLD, chronic pain, GERD, and a history of GI bleed. Patient admitted after undergoing EGD with EUS and ERCP, found to have hemobilia. Stent placed and admitted for close monitoring for bleeding.      Patient Active Problem List    Diagnosis Date Noted    Other cirrhosis of liver     Abnormal LFTs 06/17/2020    Hemobilia 06/17/2020    Hyperbilirubinemia 06/17/2020    Epigastric pain     Transaminitis     Acute biliary pancreatitis without infection or necrosis 06/12/2020    Subclinical hypothyroidism 06/12/2020    Tobacco abuse 01/29/2020    PAF (paroxysmal atrial fibrillation) 02/13/2017    Hypokalemia 10/31/2016    Oropharyngeal dysphagia 10/30/2016    Physical deconditioning 10/30/2016    Anemia, chronic disease 10/30/2016    Acute renal failure 10/29/2016    Hyperkalemia 10/29/2016    Metabolic acidosis 10/29/2016    Acute metabolic encephalopathy 10/29/2016    Uremia 10/29/2016    Polypharmacy 10/29/2016    Normocytic anemia 10/29/2016    Mixed hyperlipidemia 10/29/2016    AKSHAT (acute kidney injury) 10/29/2016    Toxic encephalopathy 10/29/2016    Coronary artery disease involving native coronary artery of native heart without angina pectoris     Diabetes     Essential hypertension     *Atrial flutter     CAD (coronary artery disease) 11/20/2012    Hx of CABG 11/20/2012    Atrial flutter 11/20/2012    Pacemaker 11/20/2012    Villous adenoma 11/20/2012        Plan:     Hemobila  - Pt recently admitted from 6/12 - 6/15 with elevated LFTs and hyperbilirubinemia, evaluated by GI during that admission, MRCP unable to be obtained due to presence of pacemaker, ERCP deferred until today  - Pt presented today for outpatient ERCP with GI, Dr. Humphrey. Hemobilia with clots and stent placement today  - Admitted to [facility] for observation  - Last H/H 11.5/36 on day of discharge on 6/15, pt denies upper and lower GI bleeding  - Last dose of  Eliquis: 6/15a.m., Last dose of ASA this morning 6/17 just prior to procedure  - H/H post procedure 10.8/34  - Will consult IR, Gen Surg, GI, and continue abx per GI recommendations.  - STAT CTA abd/pelvis prn if decompensates, PRBC prn, Goal Hbg > 8 (hx of CAD w/p CABG)  - CT liver protocol with intra and extrahepatic bile duct dilatation with scattered intraluminal high attenuation material likely representing blood products given findings on recent ERCP.  Biliary stent in appropriate position with expected pneumobilia. Curvilinear area of enhancement along the presumed left hepatic duct stump with enhancement pattern that follows the adjacent blood pool. Finding is new when compared to CT dated 06/08/2007 and is most suggestive of a prominent vascular structure.  A surgical scar is possible but felt to be less likely.  An enhancing neoplasm is felt to be unlikely.  - H/H stable      Hypoglycemia  -Blood glucose on 6/18 39, asymptomatic   -Given 1amp D50  -Patient has been maintained NPO for extended period  -Regular diet this morning   -Will continue to monitor     Normocytic anemia  -H/H 14.3/45.1, MCV 89 on 6/12 upon admit during recent admission, it was 11.5/36 with MCV 89 on discharge on 6/15  - Fe panel from last admission consistent with AOCD; Pt has never had a colonoscopy, it was recommend prior to recent discharge but patient refused   - Trend H/H     CAD s/p CABG  -TTE 1/27/2020 with LVEF 55%, LVH, mild LAE, mild AS, mild tricuspid regurg  - last dose of ASA this morning just prior to ERCP  - Will ASA 2/2 to hemobila     Paroxysmal atrial flutter/ fib, sick sinus syndrome, tachy-valentine arrythmia s/p pacemaker placement  - Recently seen in clinic, pacemaker interrogated DDD-CLS mode with normal function  - Last dose of eliquis: 6/15 a.m.  - Continue to hold home eliquis      DM2  - On glimepiride at home; ISS while inpatient     Thrombocytopenia, chronic  - Plt 73 at time of recent discharge on 6/15  -  Recent abdominal ultrasound on on 6/12 demonstrated normal hepatic texture, echogenicity, s/p partial hepatectomy    -  Will monitor, platelet transfusion prn     HLD  -Patient on rosuvastatin at home, holding for now     DVT ppx: COOKIE/SCDs, holding antiplatelets and anticoagulation 2/2 to hemobila  Diet: Clear liquid  Dispo: Will follow up recs from GI, surgery   Code: Magda Ruiz  [facility] HO-I  [facility] IM Service Team B    [facility] Medicine Hospitalist Pager numbers:   [facility] Hospitalist Medicine Team A (Tereza/Flakita): [phone]  [facility] Hospitalist Medicine Team B (Brian/Michaela):  [phone]

## 2020-06-20 LAB
ALBUMIN SERPL BCP-MCNC: 2.7 G/DL (ref 3.5–5.2)
ALP SERPL-CCNC: 227 U/L (ref 55–135)
ALT SERPL W/O P-5'-P-CCNC: 61 U/L (ref 10–44)
ANION GAP SERPL CALC-SCNC: 8 MMOL/L (ref 8–16)
APTT BLDCRRT: 34.8 SEC (ref 21–32)
AST SERPL-CCNC: 104 U/L (ref 10–40)
BASOPHILS # BLD AUTO: 0.02 K/UL (ref 0–0.2)
BASOPHILS NFR BLD: 0.5 % (ref 0–1.9)
BILIRUB SERPL-MCNC: 7.6 MG/DL (ref 0.1–1)
BUN SERPL-MCNC: 17 MG/DL (ref 8–23)
CALCIUM SERPL-MCNC: 8.5 MG/DL (ref 8.7–10.5)
CHLORIDE SERPL-SCNC: 111 MMOL/L (ref 95–110)
CO2 SERPL-SCNC: 18 MMOL/L (ref 23–29)
CREAT SERPL-MCNC: 1.1 MG/DL (ref 0.5–1.4)
DIFFERENTIAL METHOD: ABNORMAL
EOSINOPHIL # BLD AUTO: 0 K/UL (ref 0–0.5)
EOSINOPHIL NFR BLD: 0.5 % (ref 0–8)
ERYTHROCYTE [DISTWIDTH] IN BLOOD BY AUTOMATED COUNT: 17.4 % (ref 11.5–14.5)
EST. GFR  (AFRICAN AMERICAN): >60 ML/MIN/1.73 M^2
EST. GFR  (NON AFRICAN AMERICAN): >60 ML/MIN/1.73 M^2
GLUCOSE SERPL-MCNC: 145 MG/DL (ref 70–110)
HCT VFR BLD AUTO: 30.5 % (ref 40–54)
HGB BLD-MCNC: 9.6 G/DL (ref 14–18)
IMM GRANULOCYTES # BLD AUTO: 0.06 K/UL (ref 0–0.04)
IMM GRANULOCYTES NFR BLD AUTO: 1.6 % (ref 0–0.5)
INR PPP: 1.2 (ref 0.8–1.2)
LYMPHOCYTES # BLD AUTO: 0.6 K/UL (ref 1–4.8)
LYMPHOCYTES NFR BLD: 17 % (ref 18–48)
MCH RBC QN AUTO: 28.4 PG (ref 27–31)
MCHC RBC AUTO-ENTMCNC: 31.5 G/DL (ref 32–36)
MCV RBC AUTO: 90 FL (ref 82–98)
MONOCYTES # BLD AUTO: 0.7 K/UL (ref 0.3–1)
MONOCYTES NFR BLD: 17.8 % (ref 4–15)
NEUTROPHILS # BLD AUTO: 2.4 K/UL (ref 1.8–7.7)
NEUTROPHILS NFR BLD: 62.6 % (ref 38–73)
NRBC BLD-RTO: 0 /100 WBC
PLATELET # BLD AUTO: 101 K/UL (ref 150–350)
PMV BLD AUTO: 10.8 FL (ref 9.2–12.9)
POCT GLUCOSE: 162 MG/DL (ref 70–110)
POCT GLUCOSE: 177 MG/DL (ref 70–110)
POCT GLUCOSE: 186 MG/DL (ref 70–110)
POCT GLUCOSE: 197 MG/DL (ref 70–110)
POTASSIUM SERPL-SCNC: 4.6 MMOL/L (ref 3.5–5.1)
PROT SERPL-MCNC: 6.3 G/DL (ref 6–8.4)
PROTHROMBIN TIME: 12.4 SEC (ref 9–12.5)
RBC # BLD AUTO: 3.38 M/UL (ref 4.6–6.2)
SODIUM SERPL-SCNC: 137 MMOL/L (ref 136–145)
WBC # BLD AUTO: 3.76 K/UL (ref 3.9–12.7)

## 2020-06-20 PROCEDURE — 97161 PT EVAL LOW COMPLEX 20 MIN: CPT

## 2020-06-20 PROCEDURE — 85025 COMPLETE CBC W/AUTO DIFF WBC: CPT

## 2020-06-20 PROCEDURE — 80053 COMPREHEN METABOLIC PANEL: CPT

## 2020-06-20 PROCEDURE — 99232 SBSQ HOSP IP/OBS MODERATE 35: CPT | Mod: ,,, | Performed by: INTERNAL MEDICINE

## 2020-06-20 PROCEDURE — 11000001 HC ACUTE MED/SURG PRIVATE ROOM

## 2020-06-20 PROCEDURE — 25000003 PHARM REV CODE 250: Performed by: STUDENT IN AN ORGANIZED HEALTH CARE EDUCATION/TRAINING PROGRAM

## 2020-06-20 PROCEDURE — 97165 OT EVAL LOW COMPLEX 30 MIN: CPT

## 2020-06-20 PROCEDURE — 63600175 PHARM REV CODE 636 W HCPCS: Performed by: STUDENT IN AN ORGANIZED HEALTH CARE EDUCATION/TRAINING PROGRAM

## 2020-06-20 PROCEDURE — 85610 PROTHROMBIN TIME: CPT

## 2020-06-20 PROCEDURE — 99232 PR SUBSEQUENT HOSPITAL CARE,LEVL II: ICD-10-PCS | Mod: ,,, | Performed by: INTERNAL MEDICINE

## 2020-06-20 PROCEDURE — 85730 THROMBOPLASTIN TIME PARTIAL: CPT

## 2020-06-20 PROCEDURE — 36415 COLL VENOUS BLD VENIPUNCTURE: CPT

## 2020-06-20 RX ORDER — INSULIN ASPART 100 [IU]/ML
0-5 INJECTION, SOLUTION INTRAVENOUS; SUBCUTANEOUS
Status: DISCONTINUED | OUTPATIENT
Start: 2020-06-20 | End: 2020-06-21

## 2020-06-20 RX ORDER — IBUPROFEN 200 MG
16 TABLET ORAL
Status: DISCONTINUED | OUTPATIENT
Start: 2020-06-20 | End: 2020-06-20 | Stop reason: SDUPTHER

## 2020-06-20 RX ORDER — GLUCAGON 1 MG
1 KIT INJECTION
Status: DISCONTINUED | OUTPATIENT
Start: 2020-06-20 | End: 2020-06-20 | Stop reason: SDUPTHER

## 2020-06-20 RX ORDER — IBUPROFEN 200 MG
24 TABLET ORAL
Status: DISCONTINUED | OUTPATIENT
Start: 2020-06-20 | End: 2020-06-20 | Stop reason: SDUPTHER

## 2020-06-20 RX ADMIN — GABAPENTIN 300 MG: 300 CAPSULE ORAL at 09:06

## 2020-06-20 RX ADMIN — METOPROLOL TARTRATE 25 MG: 25 TABLET, FILM COATED ORAL at 09:06

## 2020-06-20 RX ADMIN — METOPROLOL TARTRATE 25 MG: 25 TABLET, FILM COATED ORAL at 08:06

## 2020-06-20 RX ADMIN — OXYCODONE AND ACETAMINOPHEN 1 TABLET: 7.5; 325 TABLET ORAL at 06:06

## 2020-06-20 RX ADMIN — PIPERACILLIN AND TAZOBACTAM 4.5 G: 4; .5 INJECTION, POWDER, LYOPHILIZED, FOR SOLUTION INTRAVENOUS; PARENTERAL at 08:06

## 2020-06-20 RX ADMIN — URSODIOL 300 MG: 300 CAPSULE ORAL at 08:06

## 2020-06-20 RX ADMIN — PIPERACILLIN AND TAZOBACTAM 4.5 G: 4; .5 INJECTION, POWDER, LYOPHILIZED, FOR SOLUTION INTRAVENOUS; PARENTERAL at 02:06

## 2020-06-20 RX ADMIN — URSODIOL 300 MG: 300 CAPSULE ORAL at 09:06

## 2020-06-20 RX ADMIN — GABAPENTIN 300 MG: 300 CAPSULE ORAL at 08:06

## 2020-06-20 RX ADMIN — OXYCODONE AND ACETAMINOPHEN 1 TABLET: 7.5; 325 TABLET ORAL at 01:06

## 2020-06-20 RX ADMIN — PIPERACILLIN AND TAZOBACTAM 4.5 G: 4; .5 INJECTION, POWDER, LYOPHILIZED, FOR SOLUTION INTRAVENOUS; PARENTERAL at 05:06

## 2020-06-20 RX ADMIN — OXYCODONE HYDROCHLORIDE AND ACETAMINOPHEN 1 TABLET: 10; 325 TABLET ORAL at 09:06

## 2020-06-20 NOTE — PROGRESS NOTES
GENERAL SURGERY PROGRESS NOTE    NAEON  Patient is feeling better and frustrated by being in hospital    Bilirubin beginning to decline     Inpatient Data      Vitals:   Temp:  [96.4 °F (35.8 °C)-98.7 °F (37.1 °C)]   Pulse:  [68-94]   Resp:  [16-20]   BP: (127-162)/(61-80)   SpO2:  [96 %-99 %]     Diet Cardiac   Intake/Output Summary (Last 24 hours) at 6/20/2020 0717  Last data filed at 6/20/2020 0433  Gross per 24 hour   Intake --   Output 1100 ml   Net -1100 ml          Physical Exam:  AFVSS  Gen - NAD  Chest - RRR, NWOB on RA  Abd - soft, ND, no signs of infxn or break down  Skin- jaundice     Laboratory:  CBC:   Recent Labs   Lab 06/20/20  0524   WBC 3.76*   RBC 3.38*   HGB 9.6*   HCT 30.5*   *   MCV 90   MCH 28.4   MCHC 31.5*     CMP:   Recent Labs   Lab 06/20/20  0524   *   CALCIUM 8.5*   ALBUMIN 2.7*   PROT 6.3      K 4.6   CO2 18*   *   BUN 17   CREATININE 1.1   ALKPHOS 227*   ALT 61*   *   BILITOT 7.6*       ASSESSMENT/PLAN:   79 yo male with history of HCC s/p L liver resection in 2005 presents with hemobilia     No surgical intervention necessary at this time   -H/H stable, liver enzymes elevated but imrpoving Tbili continues declined 8.8 -> 7.6  -Hepatitis work up negative   -Close monitoring of H/H and INR   -Low threshold to consult IR for embolization if patient declines acutely   -IR performed hepatic angiography on 6/19, no intervention   -Continue actigall 300 mg BID for elevated bilirubin      Thank you for the consult, we will continue to follow     Case discussed with MD Cristina Thomas MD

## 2020-06-20 NOTE — PLAN OF CARE
Pt vitals were maintained. Pt had moderate pain. Pt gait is unsteady. Pt bg was in range ,awaiting on am labs. Pt tolerated iv antibiotics. Pt had one occurrence of black loose stool. WCTM

## 2020-06-20 NOTE — PLAN OF CARE
Patient is awake and alert.  Family member visited.  Continues to receive antibiotics.  Has complaints of pain and pain medications given as per requests.  Safety is maintained with bed low, wheels locked and side rails up.  Call light within reach.  Bed alarm on.  Will continue to monitor.

## 2020-06-20 NOTE — PT/OT/SLP EVAL
"Occupational Therapy   Evaluation and Discharge Note    Name: Venu Lau  MRN: [id number]  Admitting Diagnosis:  Hemobilia 3 Days Post-Op    Recommendations:     Discharge Recommendations: home  Discharge Equipment Recommendations:  none  Barriers to discharge:  None    Assessment:     Venu Lau is a 80 y.o. male with a medical diagnosis of Hemobilia. At this time, patient is functioning at their prior level of function and does not require further acute OT services. Pt was adamant that he did not want or need therapy services at this time.     Plan:     During this hospitalization, patient does not require further acute OT services.  Please re-consult if situation changes.    · Plan of Care Reviewed with: patient    Subjective     Chief Complaint: "I just need a new back brace, that's all. I don't need any therapy from you!"  Patient/Family Comments/goals: wants to return home    Occupational Profile:  Living Environment: lives with wife in mobile home, 4 NORA with 1 HR, WIS  Previous level of function: pt reports mod I with increased time for ADLs  Roles and Routines: retired; pt's wife provides transportation and does most household IADLs  Equipment Used at home:  walker, rolling, shower chair, cane, straight  Assistance upon Discharge: wife    Pain/Comfort:  · Pain Rating 1: 9/10  · Location - Side 1: Bilateral  · Location - Orientation 1: lower  · Location 1: back  · Pain Addressed 1: Reposition, Distraction, Cessation of Activity  · Pain Rating Post-Intervention 1: 9/10    Patients cultural, spiritual, Cheondoism conflicts given the current situation: no    Objective:     Communicated with: Nurse prior to session.  Patient found supine with peripheral IV upon OT entry to room.    General Precautions: Standard,     Orthopedic Precautions:N/A   Braces: N/A     Occupational Performance:    Bed Mobility:    · Patient completed Supine to Sit with supervision  · Patient completed Sit to Supine with " "supervision    Functional Mobility/Transfers:  · Pt refused any further functional mobility     Activities of Daily Living:  · Pt refused to perform any ADL tasks, stating "I don't need your help to do these things, why are you even in here?"    Cognitive/Visual Perceptual:  Cognitive/Psychosocial Skills:     -       Oriented to: Person, Place, Time and Situation   -       Mood/Affect/Coping skills/emotional control: Blunted and Agitated    Physical Exam:  -Fair to Fair+ sitting balance at EOB  -Pt refused to complete ROM/MMT exam on BUE, but BUE appear WFL based on functional observation  -Gross  strength WFL for BUE, R>L    AMPAC 6 Click ADL:  AMPAC Total Score: 24    Treatment & Education:  Pt educated on role of OT/POC, however pt adamant that he does not need any therapy services at this time. Pt refused to perform any further bed mobility or functional mobility besides supine<>sit.   Education:    Patient left supine with all lines intact, call button in reach and bed alarm on    GOALS:   Multidisciplinary Problems     Occupational Therapy Goals     Not on file                History:     Past Medical History:   Diagnosis Date    *Atrial flutter     Anemia     Anticoagulant long-term use     Arthritis     Bipolar 1 disorder     Coronary artery disease     Diabetes mellitus     Diabetes mellitus, type 2     GERD (gastroesophageal reflux disease)     Gout, unspecified     H/O: GI bleed     Hypertension     Liver cancer     PVD (peripheral vascular disease)     Sciatica     SSS (sick sinus syndrome)     Tachy-valentine syndrome     Thyroid disease        Past Surgical History:   Procedure Laterality Date    APPENDECTOMY      CHOLECYSTECTOMY      CORONARY ARTERY BYPASS GRAFT      ENDOSCOPIC ULTRASOUND OF UPPER GASTROINTESTINAL TRACT N/A 6/17/2020    Procedure: EUS;  Surgeon: Rei Humphrey MD;  Location[facility];  Service: Endoscopy;  Laterality: N/A;    ERCP N/A 6/17/2020    " Procedure: ERCP;  Surgeon: Rei Humphrey MD;  Location: [facility];  Service: Endoscopy;  Laterality: N/A;    INSERT / REPLACE / REMOVE PACEMAKER      LIVER RESECTION      LUMBAR DISC SURGERY         Time Tracking:     OT Date of Treatment: 06/20/20  OT Start Time: 1020  OT Stop Time: 1030  OT Total Time (min): 10 min    Billable Minutes:Evaluation 10    Pastora Silva OT  6/20/2020

## 2020-06-20 NOTE — SUBJECTIVE & OBJECTIVE
Subjective:     Interval History:   Feels 'great' no complaints.  Had BM this morning, dark black followed by lighter stool. Good appetite. No complaints.      Review of Systems   Constitutional: Negative for activity change, appetite change and fever.   Gastrointestinal: Negative for abdominal distention, abdominal pain and vomiting.   Musculoskeletal: Positive for back pain and myalgias.   Neurological: Negative for dizziness and seizures.     Objective:     Vital Signs (Most Recent):  Temp: 97.7 °F (36.5 °C) (06/20/20 1136)  Pulse: 69 (06/20/20 1136)  Resp: 19 (06/20/20 0757)  BP: 107/67 (06/20/20 1136)  SpO2: 98 % (06/20/20 1136) Vital Signs (24h Range):  Temp:  [97.7 °F (36.5 °C)-98.7 °F (37.1 °C)] 97.7 °F (36.5 °C)  Pulse:  [68-82] 69  Resp:  [16-20] 19  SpO2:  [97 %-99 %] 98 %  BP: (107-162)/(61-80) 107/67     Weight: 79.2 kg (174 lb 9.7 oz) (06/20/20 0433)  Body mass index is 29.06 kg/m².      Intake/Output Summary (Last 24 hours) at 6/20/2020 1224  Last data filed at 6/20/2020 0433  Gross per 24 hour   Intake --   Output 1100 ml   Net -1100 ml       Lines/Drains/Airways     Peripheral Intravenous Line                 Peripheral IV - Single Lumen 06/17/20 1242 20 G Right Antecubital 2 days                Physical Exam  Vitals signs reviewed.   Constitutional:       Appearance: Normal appearance. He is not toxic-appearing.   HENT:      Head: Normocephalic and atraumatic.   Eyes:      General: Scleral icterus present.      Conjunctiva/sclera: Conjunctivae normal.   Abdominal:      Palpations: Abdomen is soft. There is no mass.      Tenderness: There is no abdominal tenderness.   Psychiatric:         Mood and Affect: Mood normal.         Behavior: Behavior normal.         Significant Labs:  Blood Culture: No results for input(s): LABBLOO in the last 48 hours.  CBC:   Recent Labs   Lab 06/19/20  0010 06/19/20  0616 06/20/20  0524   WBC 4.17 3.78* 3.76*   HGB 10.2* 10.3* 9.6*   HCT 32.2* 32.5* 30.5*   PLT 87*  90* 101*     CMP:   Recent Labs   Lab 06/20/20  0524   *   CALCIUM 8.5*   ALBUMIN 2.7*   PROT 6.3      K 4.6   CO2 18*   *   BUN 17   CREATININE 1.1   ALKPHOS 227*   ALT 61*   *   BILITOT 7.6*         Significant Imaging:  Imaging results within the past 24 hours have been reviewed.

## 2020-06-20 NOTE — PROGRESS NOTES
"[facility] IM Resident HO-I Progress Note    Subjective:      Venu Lau is a 80 y.o.  male who is being followed by the [facility] IM service at [facility] for hemobilia, obstructive jaundice.     No problems overnight. Denies abdominal pain, nausea, vomiting, diarrhea, fever. Was able to sleep well overnight. Appears to be in good mood.       Objective:   Last 24 Hour Vital Signs:  BP  Min: 127/67  Max: 162/80  Temp  Av.8 °F (36.6 °C)  Min: 96.4 °F (35.8 °C)  Max: 98.7 °F (37.1 °C)  Pulse  Av.6  Min: 68  Max: 82  Resp  Av.4  Min: 16  Max: 20  SpO2  Av.3 %  Min: 97 %  Max: 99 %  Weight  Av.2 kg (174 lb 9.7 oz)  Min: 79.2 kg (174 lb 9.7 oz)  Max: 79.2 kg (174 lb 9.7 oz)  I/O last 3 completed shifts:  In: 275 [P.O.:125; I.V.:50; IV Piggyback:100]  Out: 1100 [Urine:1100]    Physical Examination:  BP (!) 140/71 (BP Location: Right arm, Patient Position: Sitting)   Pulse 82   Temp 97.8 °F (36.6 °C) (Oral)   Resp 19   Ht 5' 5" (1.651 m)   Wt 79.2 kg (174 lb 9.7 oz)   SpO2 97%   BMI 29.06 kg/m²   GEN:  Elderly CM in NAD, lying in bed, AAOx3  HEENT: NC, AT, PERRL, EOMI, mild scleral icterus, no discharge or congestion, somewhat hard of hearing, moist oral mucosa, neck is supple  CV: RRR, No MGR, +2 distal pulses, no pedal edema  RESP: Lungs CTAB, No WRR, non labored breathing  ABD: soft, NT, ND, normoactive BS, no guarding or rebound  NEURO: AAO x3, CN II-XII grossly intact, Normal strength in b/l UE and LE, sensation intact to light tough in b/l LE and UE  EXTR: No joint swelling or tenderness, no cyanosis  INTEG: No rash noted      Laboratory:  Laboratory Data Reviewed: yes  Pertinent Findings:  Reviewed     Microbiology Data Reviewed: yes  Pertinent Findings:  Reviewed     Other Results:  EKG (my interpretation): Ventricular paced rhythm, wide QRS, [place] -90     Radiology Data Reviewed: yes  Pertinent Findings:  X-ray Chest Ap Portable    Result Date: 2020  EXAMINATION: XR " CHEST AP PORTABLE CLINICAL HISTORY: Chest Pain; FINDINGS: Comparison study 12/16/2017.  No change.  Normal size heart status post CABG.  Left chest wall cardiac pacemaker with intact leads.  Aortic arch atherosclerosis.  No congestion.  Lungs are clear.     Stable chest x-ray. Electronically signed by: Josh Thacker MD Date:    06/12/2020 Time:    12:09    Us Abdomen Limited    Result Date: 6/12/2020  EXAMINATION: US ABDOMEN LIMITED CLINICAL HISTORY: Generalized abdominal pain FINDINGS: Correlation exams abdominal and pelvic CT without contrast, renal ultrasound 10/29/2016.  The pancreas is unremarkable.  Liver measures 15 cm with normal echogenicity and echotexture.  There is a history of previous hepatectomy.  Cholecystectomy.  Again, the common bile duct is dilated, up to 1.1 cm, same as remote exam.  Right kidney, 9.6 cm, with stable hypoechoic mid pole pyramid.     Cholecystectomy with stable common bile duct dilatation, up to 1.1 cm, no change compared with October 2016.  No intrahepatic ductal dilatation. Electronically signed by: Josh Thacker MD Date:    06/12/2020 Time:    13:56    Ct Abdomen Pelvis  Without Contrast    Result Date: 6/12/2020  EXAMINATION: CT ABDOMEN PELVIS WITHOUT CONTRAST CLINICAL HISTORY: LLQ pain, suspect diverticulitis; TECHNIQUE: Low dose axial images, sagittal and coronal reformations were obtained from the lung bases to the pubic symphysis.  Contrast was not administered. All CT scans at this location are performed using dose modulation techniques as appropriate to a performed exam including the following: Automated exposure control; adjustment of the mA and/or kV according to patient size. COMPARISON: 10/29/2016 FINDINGS: Heart: Normal in size. No pericardial effusion. Lung Bases: Well aerated, without consolidation or pleural fluid. Liver: Evidence of prior left hepatic resection. Gallbladder: Surgically absent. Bile Ducts: Suggestion of mild intrahepatic biliary ductal dilation.   Recommend correlation with serum analysis. Pancreas: No mass or peripancreatic fat stranding. Spleen: Unremarkable. Adrenals: Unremarkable. Kidneys/ Ureters: Chronic perinephric fat stranding similar to prior exams.  No hydronephrosis.  No renal, ureteral, or bladder stones. Bladder: Mild distention of the bladder with urine. Reproductive organs: Unremarkable. GI Tract/Mesentery: Distal esophagus, stomach, duodenum, small bowel unremarkable.  Evidence of prior appendectomy.  Moderate large volume stool throughout the colon.  Sigmoid diverticulosis without evidence of diverticulitis. Peritoneal Space: No ascites. No free air. Retroperitoneum: No significant adenopathy. Abdominal wall: Small fat containing uncomplicated left inguinal hernia. Vasculature: Aortoiliac  atherosclerotic calcification. No aneurysm. Bones: Moderate severity multilevel discogenic degenerative change of the lumbar spine.     Suggestion of mild intrahepatic biliary ductal dilation.  Recommend correlation with serum analysis. Moderate large volume stool throughout the colon. Sigmoid diverticulosis without evidence of diverticulitis. Electronically signed by: Kiran Paez Date:    06/12/2020 Time:    14:28      Current Medications:     Infusions:       Scheduled:   gabapentin  300 mg Oral BID    metoprolol tartrate  25 mg Oral BID    piperacillin-tazobactam (ZOSYN) IVPB  4.5 g Intravenous Q8H    ursodioL  300 mg Oral BID        PRN:  dextrose 50 % in water (D50W), dextrose 50 % in water (D50W), glucagon (human recombinant), glucose, glucose, insulin aspart U-100, oxyCODONE-acetaminophen, oxyCODONE-acetaminophen, sodium chloride 0.9%    Antibiotics and Day Number of Therapy:  Zosyn 6/16-present     Lines and Day Number of Therapy:  PIV     Assessment:     Venu Lau is a 80 y.o. 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  Patient admitted after undergoing EGD with EUS and ERCP, found to have hemobilia. Stent placed and admitted for close monitoring for bleeding.      Patient Active Problem List    Diagnosis Date Noted    Other cirrhosis of liver     Abnormal LFTs 06/17/2020    Hemobilia 06/17/2020    Hyperbilirubinemia 06/17/2020    Epigastric pain     Transaminitis     Acute biliary pancreatitis without infection or necrosis 06/12/2020    Subclinical hypothyroidism 06/12/2020    Tobacco abuse 01/29/2020    PAF (paroxysmal atrial fibrillation) 02/13/2017    Hypokalemia 10/31/2016    Oropharyngeal dysphagia 10/30/2016    Physical deconditioning 10/30/2016    Anemia, chronic disease 10/30/2016    Acute renal failure 10/29/2016    Hyperkalemia 10/29/2016    Metabolic acidosis 10/29/2016    Acute metabolic encephalopathy 10/29/2016    Uremia 10/29/2016    Polypharmacy 10/29/2016    Normocytic anemia 10/29/2016    Mixed hyperlipidemia 10/29/2016    AKSHAT (acute kidney injury) 10/29/2016    Toxic encephalopathy 10/29/2016    Coronary artery disease involving native coronary artery of native heart without angina pectoris     Diabetes     Essential hypertension     *Atrial flutter     CAD (coronary artery disease) 11/20/2012    Hx of CABG 11/20/2012    Atrial flutter 11/20/2012    Pacemaker 11/20/2012    Villous adenoma 11/20/2012        Plan:     Hemobila  - Pt recently admitted from 6/12 - 6/15 with elevated LFTs and hyperbilirubinemia, evaluated by GI during that admission, MRCP unable to be obtained due to presence of pacemaker, ERCP deferred until today  - Pt presented today for outpatient ERCP with GI, Dr. Humphrey. Hemobilia with clots and stent placement today  - Admitted to [facility] for observation  - Last H/H 11.5/36 on day of discharge on 6/15, pt denies upper and lower GI bleeding  - Last dose of Eliquis: 6/15a.m., Last dose of ASA this morning 6/17 just prior to procedure  - H/H post  procedure 10.8/34  - Will consult IR, Gen Surg, GI, and continue abx per GI recommendations.  - STAT CTA abd/pelvis prn if decompensates, PRBC prn, Goal Hbg > 8 (hx of CAD w/p CABG)  - CT liver protocol with intra and extrahepatic bile duct dilatation with scattered intraluminal high attenuation material likely representing blood products given findings on recent ERCP.  Biliary stent in appropriate position with expected pneumobilia. Curvilinear area of enhancement along the presumed left hepatic duct stump with enhancement pattern that follows the adjacent blood pool. Finding is new when compared to CT dated 06/08/2007 and is most suggestive of a prominent vascular structure.  A surgical scar is possible but felt to be less likely.  An enhancing neoplasm is felt to be unlikely.  - Angiogram of liver without evidence of active extravisation, no angiographic abnormality to account for hemobilia   - H/H stable      Obstructive jaundice  -Initially admitted with pancreatitis with evidence of dilated biliary tree on imaging  -LFTs with obstructive picture  -EGD with evidence of hemobilia on 6/18  -S/P stent placement on 6/18  -T bili/ LFTs initially trended up on 6/19, concern for blood clot in stent  -Labs appear to be down trending today, T bili 8.8-> 7.6  -Will continue to monitor     Hypoglycemia  -Blood glucose on 6/18 39, asymptomatic   -Given 1amp D50  -Patient has been maintained NPO for extended period  -Regular diet this morning   -Will continue to monitor     Normocytic anemia  -H/H 14.3/45.1, MCV 89 on 6/12 upon admit during recent admission, it was 11.5/36 with MCV 89 on discharge on 6/15  - Fe panel from last admission consistent with AOCD; Pt has never had a colonoscopy, it was recommend prior to recent discharge but patient refused   - Trend H/H     CAD s/p CABG  -TTE 1/27/2020 with LVEF 55%, LVH, mild LAE, mild AS, mild tricuspid regurg  - last dose of ASA this morning just prior to ERCP  - Will ASA 2/2  to hemobila     Paroxysmal atrial flutter/ fib, sick sinus syndrome, tachy-valentine arrythmia s/p pacemaker placement  - Recently seen in clinic, pacemaker interrogated DDD-CLS mode with normal function  - Last dose of eliquis: 6/15 a.m.  - Continue to hold home eliquis      DM2  - On glimepiride at home; ISS while inpatient     Thrombocytopenia, chronic  - Plt 73 at time of recent discharge on 6/15  - Recent abdominal ultrasound on on 6/12 demonstrated normal hepatic texture, echogenicity, s/p partial hepatectomy    -  Will monitor, platelet transfusion prn     HLD  -Patient on rosuvastatin at home, holding for now     DVT ppx: COOKIE/SCDs, holding antiplatelets and anticoagulation 2/2 to hemobila  Diet: Cardiac   Dispo: Will follow up recs from GI, surgery   Code: Magda Ruiz  [facility] Internal Medicine HO-I  U IM Service Team B    [facility] Medicine Hospitalist Pager numbers:   [facility] Hospitalist Medicine Team A (Tereza/Flakita): [phone]  [facility] Hospitalist Medicine Team B (Brian/Michaela):  [phone]

## 2020-06-20 NOTE — PROGRESS NOTES
[facility]  Gastroenterology  Progress Note    Patient Name: Venu Lau  MRN: [id number]  Admission Date: 6/17/2020  Hospital Length of Stay: 1 days  Code Status: Full Code   Attending Provider: Tim Jennings MD  Consulting Provider: Michel Ch MD  Primary Care Physician: Michaela Miller MD  Principal Problem: Hemobilia      Subjective:     Interval History:   Feels 'great' no complaints.  Had BM this morning, dark black followed by lighter stool. Good appetite. No complaints.      Review of Systems   Constitutional: Negative for activity change, appetite change and fever.   Gastrointestinal: Negative for abdominal distention, abdominal pain and vomiting.   Musculoskeletal: Positive for back pain and myalgias.   Neurological: Negative for dizziness and seizures.     Objective:     Vital Signs (Most Recent):  Temp: 97.7 °F (36.5 °C) (06/20/20 1136)  Pulse: 69 (06/20/20 1136)  Resp: 19 (06/20/20 0757)  BP: 107/67 (06/20/20 1136)  SpO2: 98 % (06/20/20 1136) Vital Signs (24h Range):  Temp:  [97.7 °F (36.5 °C)-98.7 °F (37.1 °C)] 97.7 °F (36.5 °C)  Pulse:  [68-82] 69  Resp:  [16-20] 19  SpO2:  [97 %-99 %] 98 %  BP: (107-162)/(61-80) 107/67     Weight: 79.2 kg (174 lb 9.7 oz) (06/20/20 0433)  Body mass index is 29.06 kg/m².      Intake/Output Summary (Last 24 hours) at 6/20/2020 1224  Last data filed at 6/20/2020 0433  Gross per 24 hour   Intake --   Output 1100 ml   Net -1100 ml       Lines/Drains/Airways     Peripheral Intravenous Line                 Peripheral IV - Single Lumen 06/17/20 1242 20 G Right Antecubital 2 days                Physical Exam  Vitals signs reviewed.   Constitutional:       Appearance: Normal appearance. He is not toxic-appearing.   HENT:      Head: Normocephalic and atraumatic.   Eyes:      General: Scleral icterus present.      Conjunctiva/sclera: Conjunctivae normal.   Abdominal:      Palpations: Abdomen is soft. There is no mass.      Tenderness: There is no  abdominal tenderness.   Psychiatric:         Mood and Affect: Mood normal.         Behavior: Behavior normal.         Significant Labs:  Blood Culture: No results for input(s): LABBLOO in the last 48 hours.  CBC:   Recent Labs   Lab 06/19/20  0010 06/19/20  0616 06/20/20  0524   WBC 4.17 3.78* 3.76*   HGB 10.2* 10.3* 9.6*   HCT 32.2* 32.5* 30.5*   PLT 87* 90* 101*     CMP:   Recent Labs   Lab 06/20/20  0524   *   CALCIUM 8.5*   ALBUMIN 2.7*   PROT 6.3      K 4.6   CO2 18*   *   BUN 17   CREATININE 1.1   ALKPHOS 227*   ALT 61*   *   BILITOT 7.6*         Significant Imaging:  Imaging results within the past 24 hours have been reviewed.    Assessment/Plan:     * Hemobilia  80 y.o. male patient who enderwent EUS and ERCP 6/17 for abnormal LCTs, mild ductal dilatation on prior imaging, and history of pancreatitis history of Eliquis use for AF held prior to procedure. Reported history of liver cancer with resection about 10 years ago by patient. EUS showed diffuse abnormal echotexture of liver; dilated CBD to 12.7mm; heterogenous material in the bile duct with clotted blood found in the ampulla. ERCP then performed with noted bulging major papilla, hemobilia found then biliary sphincterotomy was performed and the biliary tree was swept with clots removed. A 10 Fr x 7 cm plastic biliary stent was placed in CBD.  IR visceral angio negative for active bleeding and thus no intervention done.  LFTs starting to downtrend. Hgb stable.    Recs:  Follow LFTs and H/H          Thank you for your consult. I will follow-up with patient. Please contact us if you have any additional questions.    Michel Ch MD  Gastroenterology  [facility]

## 2020-06-20 NOTE — PT/OT/SLP EVAL
Physical Therapy Evaluation and Discharge Note    Patient Name:  Venu Lau   MRN:  [id number]    Recommendations:     Discharge Recommendations:  home   Discharge Equipment Recommendations: none   Barriers to discharge: None    Assessment:     Venu Lau is a 80 y.o. male admitted with a medical diagnosis of Hemobilia. .  At this time, patient is functioning at their prior level of function and does not require further acute PT services.     Recent Surgery: Procedure(s) (LRB):  EUS (N/A)  ERCP (N/A) 3 Days Post-Op    Plan:     During this hospitalization, patient does not require further acute PT services. Patient requests to not have PT services at this time.  Please re-consult if situation changes.      Subjective     Chief Complaint: wanting to go home  Patient/Family Comments/goals: go home  Pain/Comfort:  · Pain Rating 1: 0/10  · Pain Rating Post-Intervention 1: 0/10    Patients cultural, spiritual, Jehovah's witness conflicts given the current situation: no    Living Environment:  Patient lives with his wife in a  with 4 steps to enter and one rail.   Prior to admission, patients level of function was mod I.  Equipment used at home: cane, straight, walker, rolling.  DME owned (not currently used): shower chair.  Upon discharge, patient will have assistance from family.    Objective:     Communicated with TALI Fields prior to session.  Patient found HOB elevated with peripheral IV, bed alarm upon PT entry to room.    General Precautions: Standard, fall   Orthopedic Precautions:N/A   Braces: N/A     Exams:  · Sensation:    · -       Intact  · RLE Strength: WFL  · LLE Strength: WFL    Functional Mobility:  · Bed Mobility:     · Rolling Left:  supervision  · Rolling Right: supervision  · Supine to Sit: supervision  · Sit to Supine: supervision  · Transfers:     · Sit to Stand:  supervision with no AD  · Gait: Patient ambulated 20 ft twice with SBA and no AD.     AM-PAC 6 CLICK MOBILITY  Total  Score:18       Therapeutic Activities and Exercises:   Patient transfers and ambulates at S/SBA level and requests no PT at this time.     AM-PAC 6 CLICK MOBILITY  Total Score:18     Patient left HOB elevated with all lines intact, call button in reach and bed alarm on.    GOALS:   Multidisciplinary Problems     Physical Therapy Goals     Not on file                History:     Past Medical History:   Diagnosis Date    *Atrial flutter     Anemia     Anticoagulant long-term use     Arthritis     Bipolar 1 disorder     Coronary artery disease     Diabetes mellitus     Diabetes mellitus, type 2     GERD (gastroesophageal reflux disease)     Gout, unspecified     H/O: GI bleed     Hypertension     Liver cancer     PVD (peripheral vascular disease)     Sciatica     SSS (sick sinus syndrome)     Tachy-valentine syndrome     Thyroid disease        Past Surgical History:   Procedure Laterality Date    APPENDECTOMY      CHOLECYSTECTOMY      CORONARY ARTERY BYPASS GRAFT      ENDOSCOPIC ULTRASOUND OF UPPER GASTROINTESTINAL TRACT N/A 6/17/2020    Procedure: EUS;  Surgeon: Rei Humphrey MD;  Location: [facility] ENDO;  Service: Endoscopy;  Laterality: N/A;    ERCP N/A 6/17/2020    Procedure: ERCP;  Surgeon: Rei Humphrey MD;  Location: [facility] ENDO;  Service: Endoscopy;  Laterality: N/A;    INSERT / REPLACE / REMOVE PACEMAKER      LIVER RESECTION      LUMBAR DISC SURGERY         Time Tracking:     PT Received On: 06/20/20  PT Start Time: 0940     PT Stop Time: 0955  PT Total Time (min): 15 min     Billable Minutes: Evaluation 15      Vira Brown, PT  06/20/2020

## 2020-06-20 NOTE — ASSESSMENT & PLAN NOTE
80 y.o. male patient who enderwent EUS and ERCP 6/17 for abnormal LCTs, mild ductal dilatation on prior imaging, and history of pancreatitis history of Eliquis use for AF held prior to procedure. Reported history of liver cancer with resection about 10 years ago by patient. EUS showed diffuse abnormal echotexture of liver; dilated CBD to 12.7mm; heterogenous material in the bile duct with clotted blood found in the ampulla. ERCP then performed with noted bulging major papilla, hemobilia found then biliary sphincterotomy was performed and the biliary tree was swept with clots removed. A 10 Fr x 7 cm plastic biliary stent was placed in CBD.  IR visceral angio negative for active bleeding and thus no intervention done.  LFTs starting to downtrend. Hgb stable.    Recs:  Follow LFTs and H/H

## 2020-06-20 NOTE — PLAN OF CARE
VN cued into room.  Pt resting comfortably in bed with call light and personal belongings within reach.  NADN.  Family at bedside.  Pt reports no pain.  No other needs or complaints at this time.  Reminded pt to use call light as needed.  VN will continue to follow and be available as needed.

## 2020-06-21 LAB
ALBUMIN SERPL BCP-MCNC: 2.8 G/DL (ref 3.5–5.2)
ALP SERPL-CCNC: 216 U/L (ref 55–135)
ALT SERPL W/O P-5'-P-CCNC: 55 U/L (ref 10–44)
ANION GAP SERPL CALC-SCNC: 9 MMOL/L (ref 8–16)
ANISOCYTOSIS BLD QL SMEAR: SLIGHT
APTT BLDCRRT: 34.1 SEC (ref 21–32)
AST SERPL-CCNC: 82 U/L (ref 10–40)
BASO STIPL BLD QL SMEAR: ABNORMAL
BASOPHILS NFR BLD: 0 % (ref 0–1.9)
BILIRUB SERPL-MCNC: 7.2 MG/DL (ref 0.1–1)
BUN SERPL-MCNC: 15 MG/DL (ref 8–23)
CALCIUM SERPL-MCNC: 8.7 MG/DL (ref 8.7–10.5)
CHLORIDE SERPL-SCNC: 110 MMOL/L (ref 95–110)
CO2 SERPL-SCNC: 20 MMOL/L (ref 23–29)
CREAT SERPL-MCNC: 1.2 MG/DL (ref 0.5–1.4)
DIFFERENTIAL METHOD: ABNORMAL
EOSINOPHIL NFR BLD: 2 % (ref 0–8)
ERYTHROCYTE [DISTWIDTH] IN BLOOD BY AUTOMATED COUNT: 17.9 % (ref 11.5–14.5)
EST. GFR  (AFRICAN AMERICAN): >60 ML/MIN/1.73 M^2
EST. GFR  (NON AFRICAN AMERICAN): 57 ML/MIN/1.73 M^2
GLUCOSE SERPL-MCNC: 78 MG/DL (ref 70–110)
HCT VFR BLD AUTO: 31.1 % (ref 40–54)
HGB BLD-MCNC: 9.7 G/DL (ref 14–18)
HYPOCHROMIA BLD QL SMEAR: ABNORMAL
IMM GRANULOCYTES # BLD AUTO: ABNORMAL K/UL (ref 0–0.04)
IMM GRANULOCYTES NFR BLD AUTO: ABNORMAL % (ref 0–0.5)
INR PPP: 1.2 (ref 0.8–1.2)
LYMPHOCYTES NFR BLD: 8 % (ref 18–48)
MCH RBC QN AUTO: 28.4 PG (ref 27–31)
MCHC RBC AUTO-ENTMCNC: 31.2 G/DL (ref 32–36)
MCV RBC AUTO: 91 FL (ref 82–98)
MONOCYTES NFR BLD: 6 % (ref 4–15)
NEUTROPHILS NFR BLD: 82 % (ref 38–73)
NEUTS BAND NFR BLD MANUAL: 2 %
NRBC BLD-RTO: 0 /100 WBC
PLATELET # BLD AUTO: 112 K/UL (ref 150–350)
PLATELET BLD QL SMEAR: ABNORMAL
PMV BLD AUTO: 10.3 FL (ref 9.2–12.9)
POCT GLUCOSE: 138 MG/DL (ref 70–110)
POCT GLUCOSE: 258 MG/DL (ref 70–110)
POCT GLUCOSE: 75 MG/DL (ref 70–110)
POLYCHROMASIA BLD QL SMEAR: ABNORMAL
POTASSIUM SERPL-SCNC: 4.2 MMOL/L (ref 3.5–5.1)
PROT SERPL-MCNC: 6.4 G/DL (ref 6–8.4)
PROTHROMBIN TIME: 12.4 SEC (ref 9–12.5)
RBC # BLD AUTO: 3.42 M/UL (ref 4.6–6.2)
SODIUM SERPL-SCNC: 139 MMOL/L (ref 136–145)
WBC # BLD AUTO: 4.63 K/UL (ref 3.9–12.7)

## 2020-06-21 PROCEDURE — 25000003 PHARM REV CODE 250: Performed by: STUDENT IN AN ORGANIZED HEALTH CARE EDUCATION/TRAINING PROGRAM

## 2020-06-21 PROCEDURE — 11000001 HC ACUTE MED/SURG PRIVATE ROOM

## 2020-06-21 PROCEDURE — 85007 BL SMEAR W/DIFF WBC COUNT: CPT

## 2020-06-21 PROCEDURE — 80053 COMPREHEN METABOLIC PANEL: CPT

## 2020-06-21 PROCEDURE — 85730 THROMBOPLASTIN TIME PARTIAL: CPT

## 2020-06-21 PROCEDURE — 85610 PROTHROMBIN TIME: CPT

## 2020-06-21 PROCEDURE — 85027 COMPLETE CBC AUTOMATED: CPT

## 2020-06-21 PROCEDURE — 63600175 PHARM REV CODE 636 W HCPCS: Performed by: STUDENT IN AN ORGANIZED HEALTH CARE EDUCATION/TRAINING PROGRAM

## 2020-06-21 PROCEDURE — 36415 COLL VENOUS BLD VENIPUNCTURE: CPT

## 2020-06-21 RX ORDER — URSODIOL 300 MG/1
300 CAPSULE ORAL 2 TIMES DAILY
Qty: 60 CAPSULE | Refills: 2 | Status: SHIPPED | OUTPATIENT
Start: 2020-06-21 | End: 2020-06-21

## 2020-06-21 RX ORDER — URSODIOL 300 MG/1
300 CAPSULE ORAL 2 TIMES DAILY
Qty: 60 CAPSULE | Refills: 2 | Status: ON HOLD | OUTPATIENT
Start: 2020-06-21 | End: 2021-10-15 | Stop reason: HOSPADM

## 2020-06-21 RX ADMIN — GABAPENTIN 300 MG: 300 CAPSULE ORAL at 08:06

## 2020-06-21 RX ADMIN — OXYCODONE AND ACETAMINOPHEN 1 TABLET: 7.5; 325 TABLET ORAL at 04:06

## 2020-06-21 RX ADMIN — METOPROLOL TARTRATE 25 MG: 25 TABLET, FILM COATED ORAL at 08:06

## 2020-06-21 RX ADMIN — PIPERACILLIN AND TAZOBACTAM 4.5 G: 4; .5 INJECTION, POWDER, LYOPHILIZED, FOR SOLUTION INTRAVENOUS; PARENTERAL at 04:06

## 2020-06-21 RX ADMIN — OXYCODONE AND ACETAMINOPHEN 1 TABLET: 7.5; 325 TABLET ORAL at 09:06

## 2020-06-21 RX ADMIN — URSODIOL 300 MG: 300 CAPSULE ORAL at 08:06

## 2020-06-21 RX ADMIN — PIPERACILLIN AND TAZOBACTAM 4.5 G: 4; .5 INJECTION, POWDER, LYOPHILIZED, FOR SOLUTION INTRAVENOUS; PARENTERAL at 09:06

## 2020-06-21 RX ADMIN — OXYCODONE HYDROCHLORIDE AND ACETAMINOPHEN 1 TABLET: 10; 325 TABLET ORAL at 08:06

## 2020-06-21 RX ADMIN — PIPERACILLIN AND TAZOBACTAM 4.5 G: 4; .5 INJECTION, POWDER, LYOPHILIZED, FOR SOLUTION INTRAVENOUS; PARENTERAL at 01:06

## 2020-06-21 NOTE — PROGRESS NOTES
GENERAL SURGERY PROGRESS NOTE    Overnight blood sugar was low 75, asymptomatic, improved with D50   Patient is feeling better asking to go home    Bilirubin continues to decline     Inpatient Data      Vitals:   Temp:  [97.7 °F (36.5 °C)-98 °F (36.7 °C)]   Pulse:  [68-82]   Resp:  [17-19]   BP: (107-155)/(67-86)   SpO2:  [97 %-98 %]     Diet Cardiac   Intake/Output Summary (Last 24 hours) at 6/21/2020 0736  Last data filed at 6/21/2020 0410  Gross per 24 hour   Intake 500 ml   Output 900 ml   Net -400 ml          Physical Exam:  AFVSS  Gen - NAD  Chest - RRR, NWOB on RA  Abd - soft, ND, no signs of infxn or break down  Skin- jaundice     Laboratory:  CBC:   Recent Labs   Lab 06/21/20  0526   WBC 4.63   RBC 3.42*   HGB 9.7*   HCT 31.1*   *   MCV 91   MCH 28.4   MCHC 31.2*     CMP:   Recent Labs   Lab 06/21/20  0526   GLU 78   CALCIUM 8.7   ALBUMIN 2.8*   PROT 6.4      K 4.2   CO2 20*      BUN 15   CREATININE 1.2   ALKPHOS 216*   ALT 55*   AST 82*   BILITOT 7.2*       ASSESSMENT/PLAN:   79 yo male with history of HCC s/p L liver resection in 2005 presents with hemobilia     No surgical intervention necessary at this time   -H/H stable, liver enzymes elevated but imrpoving Tbili continues declined 8.8 -> 7.6->7.2  -Patient again hypoglycemic overnight 75, asymptomatic given d50   -Hepatitis work up negative   -Close monitoring of H/H and INR   -Low threshold to consult IR for embolization if patient declines acutely   -IR performed hepatic angiography on 6/19, no intervention   -Continue actigall 300 mg BID for elevated bilirubin      Thank you for the consult, we will continue to follow     Case discussed with MD Cristina Thomas MD

## 2020-06-21 NOTE — PLAN OF CARE
Patient is awake and alert.  Continues to receive antibiotics.  Voids per urinal.  Has complaints of pain and pain medications given as per requests.  Safety is maintained with bed low, wheels locked and side rails up.  Call light within reach.  Bed alarm on.  Will continue to monitor.

## 2020-06-21 NOTE — PROGRESS NOTES
"[facility] Resident HO-I Progress Note    Subjective:      Venu Lau is a 80 y.o.  male who is being followed by the [facility] service at [facility] for hemobilia, obstructive jaundice.     No issues overnight. Denies abdominal pain, nausea, vomiting, diarrhea, fever. Had x2 small bowel movements this morning, did not appear black. Tolerating diet. Wishes to go home to wife.      Objective:   Last 24 Hour Vital Signs:  BP  Min: 107/67  Max: 155/86  Temp  Av.9 °F (36.6 °C)  Min: 97.7 °F (36.5 °C)  Max: 98 °F (36.7 °C)  Pulse  Av  Min: 68  Max: 82  Resp  Av  Min: 17  Max: 19  SpO2  Av.8 %  Min: 97 %  Max: 98 %  Weight  Av kg (174 lb 2.6 oz)  Min: 79 kg (174 lb 2.6 oz)  Max: 79 kg (174 lb 2.6 oz)  I/O last 3 completed shifts:  In: 500 [P.O.:200; IV Piggyback:300]  Out: 1100 [Urine:1100]    Physical Examination:  /76   Pulse 69   Temp 97.8 °F (36.6 °C) (Oral)   Resp 18   Ht 5' 5" (1.651 m)   Wt 79 kg (174 lb 2.6 oz)   SpO2 98%   BMI 28.98 kg/m²   GEN:  Elderly CM in NAD, lying in bed, AAOx3  HEENT: NC, AT, PERRL, EOMI, mild scleral icterus, no discharge or congestion, somewhat hard of hearing, moist oral mucosa, neck is supple  CV: RRR, No MGR, +2 distal pulses, no pedal edema  RESP: Lungs CTAB, No WRR, non labored breathing  ABD: soft, NT, ND, normoactive BS, no guarding or rebound  NEURO: AAO x3, CN II-XII grossly intact, Normal strength in b/l UE and LE, sensation intact to light tough in b/l LE and UE  EXTR: No joint swelling or tenderness, no cyanosis  INTEG: No rash noted      Laboratory:  Laboratory Data Reviewed: yes  Pertinent Findings:  Reviewed     Microbiology Data Reviewed: yes  Pertinent Findings:  Reviewed     Other Results:  EKG (my interpretation): Ventricular paced rhythm, wide QRS, [place] -90     Radiology Data Reviewed: yes  Pertinent Findings:  X-ray Chest Ap Portable    Result Date: 2020  EXAMINATION: XR CHEST AP PORTABLE CLINICAL HISTORY: " Chest Pain; FINDINGS: Comparison study 12/16/2017.  No change.  Normal size heart status post CABG.  Left chest wall cardiac pacemaker with intact leads.  Aortic arch atherosclerosis.  No congestion.  Lungs are clear.     Stable chest x-ray. Electronically signed by: Josh Thacker MD Date:    06/12/2020 Time:    12:09    Us Abdomen Limited    Result Date: 6/12/2020  EXAMINATION: US ABDOMEN LIMITED CLINICAL HISTORY: Generalized abdominal pain FINDINGS: Correlation exams abdominal and pelvic CT without contrast, renal ultrasound 10/29/2016.  The pancreas is unremarkable.  Liver measures 15 cm with normal echogenicity and echotexture.  There is a history of previous hepatectomy.  Cholecystectomy.  Again, the common bile duct is dilated, up to 1.1 cm, same as remote exam.  Right kidney, 9.6 cm, with stable hypoechoic mid pole pyramid.     Cholecystectomy with stable common bile duct dilatation, up to 1.1 cm, no change compared with October 2016.  No intrahepatic ductal dilatation. Electronically signed by: Josh Thacker MD Date:    06/12/2020 Time:    13:56    Ct Abdomen Pelvis  Without Contrast    Result Date: 6/12/2020  EXAMINATION: CT ABDOMEN PELVIS WITHOUT CONTRAST CLINICAL HISTORY: LLQ pain, suspect diverticulitis; TECHNIQUE: Low dose axial images, sagittal and coronal reformations were obtained from the lung bases to the pubic symphysis.  Contrast was not administered. All CT scans at this location are performed using dose modulation techniques as appropriate to a performed exam including the following: Automated exposure control; adjustment of the mA and/or kV according to patient size. COMPARISON: 10/29/2016 FINDINGS: Heart: Normal in size. No pericardial effusion. Lung Bases: Well aerated, without consolidation or pleural fluid. Liver: Evidence of prior left hepatic resection. Gallbladder: Surgically absent. Bile Ducts: Suggestion of mild intrahepatic biliary ductal dilation.  Recommend correlation with serum  analysis. Pancreas: No mass or peripancreatic fat stranding. Spleen: Unremarkable. Adrenals: Unremarkable. Kidneys/ Ureters: Chronic perinephric fat stranding similar to prior exams.  No hydronephrosis.  No renal, ureteral, or bladder stones. Bladder: Mild distention of the bladder with urine. Reproductive organs: Unremarkable. GI Tract/Mesentery: Distal esophagus, stomach, duodenum, small bowel unremarkable.  Evidence of prior appendectomy.  Moderate large volume stool throughout the colon.  Sigmoid diverticulosis without evidence of diverticulitis. Peritoneal Space: No ascites. No free air. Retroperitoneum: No significant adenopathy. Abdominal wall: Small fat containing uncomplicated left inguinal hernia. Vasculature: Aortoiliac  atherosclerotic calcification. No aneurysm. Bones: Moderate severity multilevel discogenic degenerative change of the lumbar spine.     Suggestion of mild intrahepatic biliary ductal dilation.  Recommend correlation with serum analysis. Moderate large volume stool throughout the colon. Sigmoid diverticulosis without evidence of diverticulitis. Electronically signed by: Kiran Paez Date:    06/12/2020 Time:    14:28      Current Medications:     Infusions:       Scheduled:   gabapentin  300 mg Oral BID    metoprolol tartrate  25 mg Oral BID    piperacillin-tazobactam (ZOSYN) IVPB  4.5 g Intravenous Q8H    ursodioL  300 mg Oral BID        PRN:  dextrose 50 % in water (D50W), dextrose 50 % in water (D50W), glucagon (human recombinant), glucose, glucose, insulin aspart U-100, oxyCODONE-acetaminophen, oxyCODONE-acetaminophen, sodium chloride 0.9%    Antibiotics and Day Number of Therapy:  Zosyn 6/16-present     Lines and Day Number of Therapy:  PIV     Assessment:     Venu Lau is a 80 y.o. 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 Patient admitted after undergoing  EGD with EUS and ERCP, found to have hemobilia. Stent placed and admitted for close monitoring for bleeding.      Patient Active Problem List    Diagnosis Date Noted    Other cirrhosis of liver     Abnormal LFTs 06/17/2020    Hemobilia 06/17/2020    Hyperbilirubinemia 06/17/2020    Epigastric pain     Transaminitis     Acute biliary pancreatitis without infection or necrosis 06/12/2020    Subclinical hypothyroidism 06/12/2020    Tobacco abuse 01/29/2020    PAF (paroxysmal atrial fibrillation) 02/13/2017    Hypokalemia 10/31/2016    Oropharyngeal dysphagia 10/30/2016    Physical deconditioning 10/30/2016    Anemia, chronic disease 10/30/2016    Acute renal failure 10/29/2016    Hyperkalemia 10/29/2016    Metabolic acidosis 10/29/2016    Acute metabolic encephalopathy 10/29/2016    Uremia 10/29/2016    Polypharmacy 10/29/2016    Normocytic anemia 10/29/2016    Mixed hyperlipidemia 10/29/2016    AKSHAT (acute kidney injury) 10/29/2016    Toxic encephalopathy 10/29/2016    Coronary artery disease involving native coronary artery of native heart without angina pectoris     Diabetes     Essential hypertension     *Atrial flutter     CAD (coronary artery disease) 11/20/2012    Hx of CABG 11/20/2012    Atrial flutter 11/20/2012    Pacemaker 11/20/2012    Villous adenoma 11/20/2012        Plan:     Hemobila  - Pt recently admitted from 6/12 - 6/15 with elevated LFTs and hyperbilirubinemia, evaluated by GI during that admission, MRCP unable to be obtained due to presence of pacemaker, ERCP deferred until today  - Pt presented today for outpatient ERCP with GI, Dr. Humphrey. Hemobilia with clots and stent placement today  - Admitted to [facility] for observation  - Last H/H 11.5/36 on day of discharge on 6/15, pt denies upper and lower GI bleeding  - Last dose of Eliquis: 6/15a.m., Last dose of ASA this morning 6/17 just prior to procedure  - H/H post procedure 10.8/34  - Will consult IR,  Gen Surg, GI, and continue abx per GI recommendations.  - STAT CTA abd/pelvis prn if decompensates, PRBC prn, Goal Hbg > 8 (hx of CAD w/p CABG)  - CT liver protocol with intra and extrahepatic bile duct dilatation with scattered intraluminal high attenuation material likely representing blood products given findings on recent ERCP.  Biliary stent in appropriate position with expected pneumobilia. Curvilinear area of enhancement along the presumed left hepatic duct stump with enhancement pattern that follows the adjacent blood pool. Finding is new when compared to CT dated 06/08/2007 and is most suggestive of a prominent vascular structure.  A surgical scar is possible but felt to be less likely.  An enhancing neoplasm is felt to be unlikely.  - Angiogram of liver without evidence of active extravisation, no angiographic abnormality to account for hemobilia   - H/H stable      Obstructive jaundice  -Initially admitted with pancreatitis with evidence of dilated biliary tree on imaging  -LFTs with obstructive picture  -EGD with evidence of hemobilia on 6/18  -S/P stent placement on 6/18  -T bili/ LFTs initially trended up on 6/19, concern for blood clot in stent  -Labs appear to be down trending today, T bili 8.8-> 7.6->7.2, LFTs slowly decreasing as well  -Will continue to monitor     Hypoglycemia  -Blood glucose on 6/18 39, asymptomatic   -Given 1amp D50  -Patient has been maintained NPO for extended period  -Regular diet this morning   -Will continue to monitor     Normocytic anemia  -H/H 14.3/45.1, MCV 89 on 6/12 upon admit during recent admission, it was 11.5/36 with MCV 89 on discharge on 6/15  - Fe panel from last admission consistent with AOCD; Pt has never had a colonoscopy, it was recommend prior to recent discharge but patient refused   - Trend H/H     CAD s/p CABG  -TTE 1/27/2020 with LVEF 55%, LVH, mild LAE, mild AS, mild tricuspid regurg  - last dose of ASA this morning just prior to ERCP  - Will ASA 2/2  to hemobila     Paroxysmal atrial flutter/ fib, sick sinus syndrome, tachy-valentine arrythmia s/p pacemaker placement  - Recently seen in clinic, pacemaker interrogated DDD-CLS mode with normal function  - Last dose of eliquis: 6/15 a.m.  - Continue to hold home eliquis      DM2  - On glimepiride at home; ISS while inpatient     Thrombocytopenia, chronic  - Plt 73 at time of recent discharge on 6/15  - Recent abdominal ultrasound on on 6/12 demonstrated normal hepatic texture, echogenicity, s/p partial hepatectomy    -  Will monitor, platelet transfusion prn     HLD  -Patient on rosuvastatin at home, holding for now     DVT ppx: COOKIE/SCDs, holding antiplatelets and anticoagulation 2/2 to hemobila  Diet: Cardiac   Dispo: Will follow up recs from GI, surgery   Code: Magda Ruiz  [facility] Medicine HO-I  U IM Service Team B    [facility] Medicine Hospitalist Pager numbers:   [facility] Hospitalist Medicine Team A (Tereza/Flakita): [phone]  [facility] Hospitalist Medicine Team B (Brian/Michaela):  [phone]

## 2020-06-21 NOTE — PLAN OF CARE
Patient is AAO x 4. Vital signs stable. Blood glucose 75mg/dl. Patient is Asymptomatic- given orange juice and a pot of vanilla custard. Had Percocet around 4:30 am for back pain score of 9/10. At times he disoriented to time and person. Slept fairly well during night. Will continue to monitor patient.

## 2020-06-22 ENCOUNTER — TELEPHONE (OUTPATIENT)
Dept: GASTROENTEROLOGY | Facility: CLINIC | Age: 80
End: 2020-06-22

## 2020-06-22 VITALS
OXYGEN SATURATION: 98 % | DIASTOLIC BLOOD PRESSURE: 72 MMHG | HEART RATE: 69 BPM | RESPIRATION RATE: 20 BRPM | HEIGHT: 65 IN | SYSTOLIC BLOOD PRESSURE: 142 MMHG | WEIGHT: 174.63 LBS | TEMPERATURE: 98 F | BODY MASS INDEX: 29.09 KG/M2

## 2020-06-22 LAB
AFP SERPL-MCNC: 3.3 NG/ML (ref 0–8.4)
ALBUMIN SERPL BCP-MCNC: 2.6 G/DL (ref 3.5–5.2)
ALP SERPL-CCNC: 198 U/L (ref 55–135)
ALT SERPL W/O P-5'-P-CCNC: 48 U/L (ref 10–44)
ANION GAP SERPL CALC-SCNC: 7 MMOL/L (ref 8–16)
APTT BLDCRRT: 33.7 SEC (ref 21–32)
AST SERPL-CCNC: 79 U/L (ref 10–40)
BASOPHILS # BLD AUTO: 0.02 K/UL (ref 0–0.2)
BASOPHILS NFR BLD: 0.4 % (ref 0–1.9)
BILIRUB SERPL-MCNC: 6.6 MG/DL (ref 0.1–1)
BUN SERPL-MCNC: 13 MG/DL (ref 8–23)
CALCIUM SERPL-MCNC: 8.6 MG/DL (ref 8.7–10.5)
CHLORIDE SERPL-SCNC: 110 MMOL/L (ref 95–110)
CO2 SERPL-SCNC: 21 MMOL/L (ref 23–29)
CREAT SERPL-MCNC: 1.1 MG/DL (ref 0.5–1.4)
DIFFERENTIAL METHOD: ABNORMAL
EOSINOPHIL # BLD AUTO: 0 K/UL (ref 0–0.5)
EOSINOPHIL NFR BLD: 0.4 % (ref 0–8)
ERYTHROCYTE [DISTWIDTH] IN BLOOD BY AUTOMATED COUNT: 17.9 % (ref 11.5–14.5)
EST. GFR  (AFRICAN AMERICAN): >60 ML/MIN/1.73 M^2
EST. GFR  (NON AFRICAN AMERICAN): >60 ML/MIN/1.73 M^2
GLUCOSE SERPL-MCNC: 70 MG/DL (ref 70–110)
HCT VFR BLD AUTO: 32.3 % (ref 40–54)
HGB BLD-MCNC: 10 G/DL (ref 14–18)
IMM GRANULOCYTES # BLD AUTO: 0.06 K/UL (ref 0–0.04)
IMM GRANULOCYTES NFR BLD AUTO: 1.3 % (ref 0–0.5)
INR PPP: 1.2 (ref 0.8–1.2)
LYMPHOCYTES # BLD AUTO: 0.7 K/UL (ref 1–4.8)
LYMPHOCYTES NFR BLD: 15.3 % (ref 18–48)
MCH RBC QN AUTO: 27.9 PG (ref 27–31)
MCHC RBC AUTO-ENTMCNC: 31 G/DL (ref 32–36)
MCV RBC AUTO: 90 FL (ref 82–98)
MONOCYTES # BLD AUTO: 0.7 K/UL (ref 0.3–1)
MONOCYTES NFR BLD: 15.1 % (ref 4–15)
NEUTROPHILS # BLD AUTO: 3.2 K/UL (ref 1.8–7.7)
NEUTROPHILS NFR BLD: 67.5 % (ref 38–73)
NRBC BLD-RTO: 0 /100 WBC
PLATELET # BLD AUTO: 114 K/UL (ref 150–350)
PMV BLD AUTO: 10.7 FL (ref 9.2–12.9)
POTASSIUM SERPL-SCNC: 4 MMOL/L (ref 3.5–5.1)
PROT SERPL-MCNC: 6.2 G/DL (ref 6–8.4)
PROTHROMBIN TIME: 12.4 SEC (ref 9–12.5)
RBC # BLD AUTO: 3.58 M/UL (ref 4.6–6.2)
SODIUM SERPL-SCNC: 138 MMOL/L (ref 136–145)
WBC # BLD AUTO: 4.71 K/UL (ref 3.9–12.7)

## 2020-06-22 PROCEDURE — 85610 PROTHROMBIN TIME: CPT

## 2020-06-22 PROCEDURE — 82105 ALPHA-FETOPROTEIN SERUM: CPT

## 2020-06-22 PROCEDURE — 36415 COLL VENOUS BLD VENIPUNCTURE: CPT

## 2020-06-22 PROCEDURE — 85025 COMPLETE CBC W/AUTO DIFF WBC: CPT

## 2020-06-22 PROCEDURE — 25000003 PHARM REV CODE 250: Performed by: STUDENT IN AN ORGANIZED HEALTH CARE EDUCATION/TRAINING PROGRAM

## 2020-06-22 PROCEDURE — 63600175 PHARM REV CODE 636 W HCPCS: Performed by: STUDENT IN AN ORGANIZED HEALTH CARE EDUCATION/TRAINING PROGRAM

## 2020-06-22 PROCEDURE — 85730 THROMBOPLASTIN TIME PARTIAL: CPT

## 2020-06-22 PROCEDURE — 80053 COMPREHEN METABOLIC PANEL: CPT

## 2020-06-22 RX ORDER — ACETAMINOPHEN 325 MG/1
650 TABLET ORAL EVERY 6 HOURS PRN
Qty: 56 TABLET | Refills: 1 | Status: SHIPPED | OUTPATIENT
Start: 2020-06-22 | End: 2020-07-06

## 2020-06-22 RX ADMIN — URSODIOL 300 MG: 300 CAPSULE ORAL at 09:06

## 2020-06-22 RX ADMIN — OXYCODONE HYDROCHLORIDE AND ACETAMINOPHEN 1 TABLET: 10; 325 TABLET ORAL at 07:06

## 2020-06-22 RX ADMIN — PIPERACILLIN AND TAZOBACTAM 4.5 G: 4; .5 INJECTION, POWDER, LYOPHILIZED, FOR SOLUTION INTRAVENOUS; PARENTERAL at 09:06

## 2020-06-22 RX ADMIN — OXYCODONE HYDROCHLORIDE AND ACETAMINOPHEN 1 TABLET: 10; 325 TABLET ORAL at 02:06

## 2020-06-22 RX ADMIN — GABAPENTIN 300 MG: 300 CAPSULE ORAL at 09:06

## 2020-06-22 RX ADMIN — METOPROLOL TARTRATE 25 MG: 25 TABLET, FILM COATED ORAL at 09:06

## 2020-06-22 RX ADMIN — PIPERACILLIN AND TAZOBACTAM 4.5 G: 4; .5 INJECTION, POWDER, LYOPHILIZED, FOR SOLUTION INTRAVENOUS; PARENTERAL at 01:06

## 2020-06-22 NOTE — PLAN OF CARE
VN note: VN cued into patient's room to assess immunizations. Patient reports [place], unable to hear VN clearly over monitor. VN reviewed chart to answer questions. Bedside nurse notified. VN will continue to be available to patient and intervene prn.

## 2020-06-22 NOTE — DISCHARGE INSTRUCTIONS
Total Bilirubin (Blood) (English) View Edit Remove   ERCP, Discharge Instructions for (English) View Edit Remove   Ultrasound, Endoscopic (EUS) (English) View Edit Remove   Ursodeoxycholic Acid, Ursodiol capsules or tablets (English) View Edit Remove   Diabetes, Diet (English) View Edit Remove

## 2020-06-22 NOTE — PROGRESS NOTES
GENERAL SURGERY PROGRESS NOTE    NAEON  Patient is feeling better asking to go home    Bilirubin continues to decline   NPO for ERCP today     Inpatient Data      Vitals:   Temp:  [97.1 °F (36.2 °C)-98.6 °F (37 °C)]   Pulse:  [69-91]   Resp:  [17-19]   BP: (124-168)/(67-91)   SpO2:  [96 %-97 %]     Diet NPO   Intake/Output Summary (Last 24 hours) at 6/22/2020 0700  Last data filed at 6/22/2020 0500  Gross per 24 hour   Intake 300 ml   Output 850 ml   Net -550 ml          Physical Exam:  AFVSS  Gen - NAD  Chest - RRR, NWOB on RA  Abd - soft, ND, no signs of infxn or break down  Skin- jaundice     Laboratory:  CBC:   Recent Labs   Lab 06/22/20  0606   WBC 4.71   RBC 3.58*   HGB 10.0*   HCT 32.3*   *   MCV 90   MCH 27.9   MCHC 31.0*     CMP:   Recent Labs   Lab 06/21/20  0526   GLU 78   CALCIUM 8.7   ALBUMIN 2.8*   PROT 6.4      K 4.2   CO2 20*      BUN 15   CREATININE 1.2   ALKPHOS 216*   ALT 55*   AST 82*   BILITOT 7.2*       ASSESSMENT/PLAN:   79 yo male with history of HCC s/p L liver resection in 2005 presents with hemobilia     No surgical intervention necessary at this time   -H/H stable, liver enzymes elevated but imrpoving Tbili continues declined 8.8 -> 7.6->7.2  -Patient again hypoglycemic overnight 75, asymptomatic, likely occurs 2/2 fasting state   -Hepatitis work up negative   -Close monitoring of H/H and INR   -Low threshold to consult IR for embolization if patient declines acutely   -IR performed hepatic angiography on 6/19, no intervention   -Plan for ERCP 6/22  -Continue actigall 300 mg BID for elevated bilirubin at discharge     Thank you for the consult        Cristina Rudolph MD

## 2020-06-22 NOTE — DISCHARGE SUMMARY
[facility] Discharge Summary    Primary Team: [facility] IM Team B  Attending Physician: Tim Jennings MD  Resident: Ezequiel  Intern: Joseph    Date of Admit: 6/17/2020  Date of Discharge: 6/22/2020    Discharge to: Home  Condition: Good     Discharge Diagnoses     Patient Active Problem List   Diagnosis    CAD (coronary artery disease)    Hx of CABG    Atrial flutter    Pacemaker    Villous adenoma    Coronary artery disease involving native coronary artery of native heart without angina pectoris    Diabetes    Essential hypertension    *Atrial flutter    Acute renal failure    Hyperkalemia    Metabolic acidosis    Acute metabolic encephalopathy    Uremia    Polypharmacy    Normocytic anemia    Mixed hyperlipidemia    AKSHAT (acute kidney injury)    Toxic encephalopathy    Oropharyngeal dysphagia    Physical deconditioning    Anemia, chronic disease    Hypokalemia    PAF (paroxysmal atrial fibrillation)    Tobacco abuse    Acute biliary pancreatitis without infection or necrosis    Subclinical hypothyroidism    Epigastric pain    Transaminitis    Abnormal LFTs    Hemobilia    Hyperbilirubinemia    Other cirrhosis of liver       Consultants and Procedures     Consultants:  GI, General surgery, IR    Procedures:   EGD, EUS, ERCP with stent placement, hepatic angiogram     Brief History of Present Illness      Venu Lau is a 80 y.o.  male who  has a past medical history of *Atrial flutter, Anemia, Anticoagulant long-term use, Arthritis, Bipolar 1 disorder, Coronary artery disease, Diabetes mellitus, Diabetes mellitus, type 2, GERD (gastroesophageal reflux disease), Gout, unspecified, H/O: GI bleed, Hypertension, Liver cancer, PVD (peripheral vascular disease), Sciatica, SSS (sick sinus syndrome), Tachy-valentine syndrome, and Thyroid disease.  The patient presented to [facility] on 6/17/2020 with a primary complaint of No chief complaint on file.  .     Mr. Lau was  recently admitted to [facility] from 6/12 to 6/15 after he presented for abdominal pain. He has a hx of hepatocellular carcinoma s/p left lobectomy and cholecystectomy >10 years ago. Upon arrival for his recent admission, his LFTs were elevated with a tbili of 4.2. His CTabd/pelvis upon arrival on 6/12 for that admission demonstrated mild intrahepatic biliary ductal dilation.Pt was evaluated by Gastroenterology during that admission. A MRCP was unable to be obtained due to presence of a pacemaker. An ERCP was planned for Monday, 6/15, but pt had received is a.m. dose of Eliquis. His hyperbilirubinemia improved and he was discharged to home on Monday, 6/15 on metronidazole and ciprofloxacin with an outpatient ERCP scheduled for today.       Patient presented to [facility] today for his scheduled EUS and  ERCP. The procedure was performed by Dr. Humphrey; he noticed hemobilia with blood oozing in biliary system; one stent was placed. [facility] was consulted for admission for observation for continued bleeding.      Upon our evaluation, no H/H was available for comparison to 6/15's labs. Pt was awake, alert with no complaints with family member at bedside. They report that pt continued to hold his Eliquis after discharge to home on 6/15 but he admits that he had continued to take his daily ASA; last dose was this morning just prior to procedure. He denied CP, SOB, n/v, hematemesis, hematochezia/melena, abd pain, dysuria, fever and chills. Requested something to drink.     For the full HPI please refer to the History & Physical from this admission.    Hospital Course By Problem with Pertinent Findings     Hemobila  - Pt recently admitted from 6/12 - 6/15 with elevated LFTs and hyperbilirubinemia, evaluated by GI during that admission, MRCP unable to be obtained due to presence of pacemaker, ERCP deferred until today  - Pt presented today for outpatient ERCP with GI, Dr. Humphrey. Hemobilia with clots and stent  placement today  - Last H/H 11.5/36 on day of discharge on 6/15, pt denies upper and lower GI bleeding  - Last dose of Eliquis: 6/15a.m., Last dose of ASA this morning 6/17 just prior to procedure  - H/H post procedure 10.8/34  - Will consult IR, Gen Surg, GI, and continue abx per GI recommendations.  - CT liver protocol with intra and extrahepatic bile duct dilatation with scattered intraluminal high attenuation material likely representing blood products given findings on recent ERCP.  Biliary stent in appropriate position with expected pneumobilia. Curvilinear area of enhancement along the presumed left hepatic duct stump with enhancement pattern that follows the adjacent blood pool. Finding is new when compared to CT dated 06/08/2007 and is most suggestive of a prominent vascular structure.  A surgical scar is possible but felt to be less likely.  An enhancing neoplasm is felt to be unlikely.  - angiogram of liver without evidence of active extravisation, no angiographic abnormality to account for hemobilia   - x1 dark black stools after stent placement but no further melanic stools  - H/H has remained stable since admission  - Will hold Eliquis at discharge, restart per PCP     Obstructive jaundice  -Initially admitted with pancreatitis with evidence of dilated biliary tree on imaging  -LFTs with obstructive picture  -EGD with evidence of hemobilia on 6/18  -S/P stent placement on 6/18  -T bili/ LFTs initially trended up on 6/19, concern for blood clot in stent  -Labs appear to be down trending today, T bili 8.8-> 7.6->7.2->6.6, LFTs decreasing as well  -Plan to discharge with close PCP and GI follow up   -Will discharge with 3 months of Actigall 300mg BID   -Scheduled to see PCP on 6/25, recommend repeat LFTs at that visit   -Will need stent removal in 4-6 weeks with Dr. Humphrey     Hypoglycemia  -Blood glucose on 6/18 39, asymptomatic   -Given 1amp D50  -Patient has been maintained NPO for extended  period  -Regular diet that morning   -No further episodes      Normocytic anemia  -H/H 14.3/45.1, MCV 89 on 6/12 upon admit during recent admission, it was 11.5/36 with MCV 89 on discharge on 6/15  - Fe panel from last admission consistent with AOCD  - Recommend outpatient colonoscopy      CAD s/p CABG  -TTE 1/27/2020 with LVEF 55%, LVH, mild LAE, mild AS, mild tricuspid regurg  - last dose of ASA this morning just prior to ERCP  - Will hold ASA 2/2 to hemobila while inpatient, will restart at discharge      Paroxysmal atrial flutter/ fib, sick sinus syndrome, tachy-valentine arrythmia s/p pacemaker placement  - Recently seen in clinic, pacemaker interrogated DDD-CLS mode with normal function  - Last dose of eliquis: 6/15 a.m.  - Continue to hold home eliquis in setting of acute hemobilia, will need close PCP follow up      DM2  - On glimepiride at home; ISS while inpatient     Thrombocytopenia, chronic  - Plt 73 at time of recent discharge on 6/15  - Recent abdominal ultrasound on on 6/12 demonstrated normal hepatic texture, echogenicity, s/p partial hepatectomy    -Platelets improved to 90, no evidence of continued bleeding  - EUS shows liver with diffuse abnormal echotexture  - Will need follow up with hepatology referral placed     HLD  -Patient on rosuvastatin at home, holding for now    Discharge Medications        Medication List      START taking these medications    ursodioL 300 mg capsule  Commonly known as: ACTIGALL  Take 1 capsule (300 mg total) by mouth 2 (two) times daily.        CONTINUE taking these medications    acetaminophen 325 MG tablet  Commonly known as: TYLENOL  Take 2 tablets (650 mg total) by mouth every 6 (six) hours. for 7 days     aspirin 81 MG EC tablet  Commonly known as: ECOTRIN     ciprofloxacin HCl 500 MG tablet  Commonly known as: CIPRO  Take 1 tablet (500 mg total) by mouth 2 (two) times daily. for 7 days     gabapentin 300 MG capsule  Commonly known as: NEURONTIN     glimepiride 2 MG  tablet  Commonly known as: AMARYL     metoprolol tartrate 50 MG tablet  Commonly known as: LOPRESSOR  Take 0.5 tablets (25 mg total) by mouth 2 (two) times daily.     metroNIDAZOLE 500 MG tablet  Commonly known as: FLAGYL  Take 1 tablet (500 mg total) by mouth 3 (three) times daily. for 7 days     mirtazapine 15 MG tablet  Commonly known as: REMERON     OLANZapine 2.5 MG tablet  Commonly known as: ZyPREXA     rosuvastatin 40 MG Tab  Commonly known as: CRESTOR  Take 1 tablet (40 mg total) by mouth every evening.     SOLUS V2 LANCETS 30 gauge Misc  Generic drug: lancets     SOLUS V2 TEST STRIPS Strp  Generic drug: blood sugar diagnostic     VITAMIN D2 50,000 unit Cap  Generic drug: ergocalciferol  Take 1 capsule (50,000 Units total) by mouth every 7 days.        STOP taking these medications    ELIQUIS 5 mg Tab  Generic drug: apixaban     oxyCODONE-acetaminophen  mg per tablet  Commonly known as: PERCOCET           Where to Get Your Medications      You can get these medications from any pharmacy    Bring a paper prescription for each of these medications  · ursodioL 300 mg capsule         Discharge Information:   Diet:  Cardiac    Physical Activity:  As tolerated     Instructions:  1. Take all medications as prescribed  2. Keep all follow-up appointments  3. Return to the hospital or call your primary care physicians if any worsening symptoms such as abdominal pain, nausea, vomiting, diarrhea, fevers, dark black stools, or worsened yellowing of the eyes or skin occur.    Follow-Up Appointments:  Follow-up Information     Michaela Miller MD. Go on 6/25/2020.    Specialty: Internal Medicine  Why: time: 10:30am Will need LFTs repeated   Contact information:  [address] [place] [address]  [address] [place] [address] PRIMARY CARE  [facility] [address]  [phone]             Rei Humphrey MD In 2 weeks.    Specialty: Gastroenterology  Why: Dr. Humphrey's office will give you a call with your hospital follow up appointment.  Contact  information:  [address]  [facility] [address]  [phone]                 Gus Ruiz  [facility], -I

## 2020-06-22 NOTE — PLAN OF CARE
Patient is AAO X 4. Vital signs stable. Kept him NPO from 12 midnight for ERCP.Slept well during night. Denies pain, nausea or vomiting at present. Voided freely. Will continue to monitor patient.

## 2020-06-22 NOTE — PROGRESS NOTES
"[facility]    CC: hemobilia    Interval History: no acute issues overnight. Feeling well, tolerating diet this morning. Liver chemistries with continued gradual downtrend over weekend. Team plan and patient preference of disposition home. Hgb stable.    Past Medical History  Past Medical History:   Diagnosis Date    *Atrial flutter     Anemia     Anticoagulant long-term use     Arthritis     Bipolar 1 disorder     Coronary artery disease     Diabetes mellitus     Diabetes mellitus, type 2     GERD (gastroesophageal reflux disease)     Gout, unspecified     H/O: GI bleed     Hypertension     Liver cancer     PVD (peripheral vascular disease)     Sciatica     SSS (sick sinus syndrome)     Tachy-valentine syndrome     Thyroid disease      Review of Systems  General ROS: negative for chills, fever  Cardiovascular ROS: no chest pain or dyspnea on exertion  Gastrointestinal ROS: no abdominal pain. No blood in stools.    Physical Examination  BP (!) 171/83 (Patient Position: Lying)   Pulse 95   Temp 98.2 °F (36.8 °C) (Oral)   Resp 18   Ht 5' 5" (1.651 m)   Wt 79.2 kg (174 lb 9.7 oz)   SpO2 (!) 92%   BMI 29.06 kg/m²   General appearance: Mild jaundice. Overweight. alert, cooperative, no distress  HENT: Normocephalic, atraumatic, neck symmetrical, no nasal discharge   Lungs: clear to auscultation anteriorly, symmetric chest expansion and in no acute respiratory distress  Heart: regular rate and rhythm without rub; no palpated displacement of the PMI. Device pocket intact.  Abdomen: soft, non-tender; bowel sounds normoactive; no organomegaly  Extremities: extremities symmetric; no cyanosis  Neurologic: Alert and oriented X 3, normal coordination    Labs:  Lab Results   Component Value Date    WBC 4.71 06/22/2020    HGB 10.0 (L) 06/22/2020    HCT 32.3 (L) 06/22/2020    MCV 90 06/22/2020     (L) 06/22/2020     Lab Results   Component Value Date    ALT 48 (H) 06/22/2020    AST 79 (H) " 06/22/2020     (H) 06/17/2020    ALKPHOS 198 (H) 06/22/2020    BILITOT 6.6 (H) 06/22/2020     Acute hep panel negative 6/13    Assessment:   80 y.o. male patient who enderwent EUS and ERCP 6/17 for abnormal LCTs, mild ductal dilatation on prior imaging, and history of pancreatitis history of Eliquis use for AF held prior to procedure. Reported history of liver cancer with resection about 10 years ago by patient. EUS showed diffuse abnormal echotexture of liver; dilated CBD to 12.7mm; heterogenous material in the bile duct with clotted blood found in the ampulla. ERCP then performed with noted bulging major papilla, hemobilia found then biliary sphincterotomy was performed and the biliary tree was swept with clots removed. A 10 Fr x 7 cm plastic biliary stent was placed in CBD. Contrasted CT was with enhancement most suggestive of prominent vascular structure (vs less likely surgical scar vs unlikely enhancing neoplasm). IR visceral angio was with no active bleeding and thus no intervention. Surgery without planned interventions.    Hemodynamically stable. No leukocytosis or fevers. Hgb stable and now with gradual but continued downtrend in liver chemistries over the weekend for multiple days now.    Plan/Recommendation:  - Followup with advanced GI service/Dr. Humphrey for temporary stent removal/exchange in 4-6 weeks.  - repeat LFTs in about a week to ensure continued downtrend  - Hepatology followup

## 2020-06-22 NOTE — NURSING
Discharge instructions given to pt. Pt educated on new signs and symptoms to report to MD. Pt educated on his medications he needs to start and stop per AVS. Pt informed of upcoming appointments. Prescriptions faxed to pharmacy per pt request. Pt states he understands all discharge instructions that where given by this nurse. Pt educated on GI bleeding.

## 2020-06-22 NOTE — TELEPHONE ENCOUNTER
----- Message from Nona Zaragoza sent at 6/22/2020 11:22 AM CDT -----  Regarding: pt  Patient Requesting Sooner Appointment.     Reason for sooner appt.: pt is calling to schedule a hospital fu to be seen two weeks for a stent removal   When is the first available appointment? N/A   Communication Preference: can you please call pt at [phone]  Additional Information: pt is being discharged today     LES

## 2020-06-22 NOTE — PROGRESS NOTES
"[facility] Progress Note    Subjective:      Venu Lau is a 80 y.o.  male who is being followed by the [facility] at [facility] for hemobilia, obstructive jaundice.     Mr. Lau denies any issues overnight. He has had no abdominal pain, nausea, vomiting, diarrhea or melanic stools. He reports good appetite. He is eager to return home today. LFTs and T bili continued to trend down.       Objective:   Last 24 Hour Vital Signs:  BP  Min: 124/67  Max: 168/80  Temp  Av.9 °F (36.6 °C)  Min: 97.1 °F (36.2 °C)  Max: 98.6 °F (37 °C)  Pulse  Av.7  Min: 69  Max: 91  Resp  Av.8  Min: 17  Max: 19  SpO2  Av.6 %  Min: 96 %  Max: 97 %  Weight  Av.2 kg (174 lb 9.7 oz)  Min: 79.2 kg (174 lb 9.7 oz)  Max: 79.2 kg (174 lb 9.7 oz)  I/O last 3 completed shifts:  In: 600 [P.O.:300; IV Piggyback:300]  Out: 850 [Urine:850]    Physical Examination:  /67 (BP Location: Left arm, Patient Position: Lying)   Pulse 69   Temp 98.3 °F (36.8 °C) (Oral)   Resp 17   Ht 5' 5" (1.651 m)   Wt 79.2 kg (174 lb 9.7 oz)   SpO2 97%   BMI 29.06 kg/m²   GEN:  Elderly CM in NAD, lying in bed, AAOx3  HEENT: NC, AT, PERRL, EOMI, mild scleral icterus, no discharge or congestion, somewhat hard of hearing, moist oral mucosa, neck is supple  CV: RRR, No MGR, +2 distal pulses, no pedal edema  RESP: Lungs CTAB, No WRR, non labored breathing  ABD: soft, NT, ND, normoactive BS, no guarding or rebound  NEURO: AAO x3, CN II-XII grossly intact, Normal strength in b/l UE and LE, sensation intact to light tough in b/l LE and UE  EXTR: No joint swelling or tenderness, no cyanosis  INTEG: No rash noted      Laboratory:  Laboratory Data Reviewed: yes  Pertinent Findings:  Reviewed     Microbiology Data Reviewed: yes  Pertinent Findings:  Reviewed     Other Results:  EKG (my interpretation): Ventricular paced rhythm, wide QRS, [place] -90     Radiology Data Reviewed: yes  Pertinent Findings:  X-ray " Chest Ap Portable    Result Date: 6/12/2020  EXAMINATION: XR CHEST AP PORTABLE CLINICAL HISTORY: Chest Pain; FINDINGS: Comparison study 12/16/2017.  No change.  Normal size heart status post CABG.  Left chest wall cardiac pacemaker with intact leads.  Aortic arch atherosclerosis.  No congestion.  Lungs are clear.     Stable chest x-ray. Electronically signed by: Josh Thacker MD Date:    06/12/2020 Time:    12:09    Us Abdomen Limited    Result Date: 6/12/2020  EXAMINATION: US ABDOMEN LIMITED CLINICAL HISTORY: Generalized abdominal pain FINDINGS: Correlation exams abdominal and pelvic CT without contrast, renal ultrasound 10/29/2016.  The pancreas is unremarkable.  Liver measures 15 cm with normal echogenicity and echotexture.  There is a history of previous hepatectomy.  Cholecystectomy.  Again, the common bile duct is dilated, up to 1.1 cm, same as remote exam.  Right kidney, 9.6 cm, with stable hypoechoic mid pole pyramid.     Cholecystectomy with stable common bile duct dilatation, up to 1.1 cm, no change compared with October 2016.  No intrahepatic ductal dilatation. Electronically signed by: Josh Thacker MD Date:    06/12/2020 Time:    13:56    Ct Abdomen Pelvis  Without Contrast    Result Date: 6/12/2020  EXAMINATION: CT ABDOMEN PELVIS WITHOUT CONTRAST CLINICAL HISTORY: LLQ pain, suspect diverticulitis; TECHNIQUE: Low dose axial images, sagittal and coronal reformations were obtained from the lung bases to the pubic symphysis.  Contrast was not administered. All CT scans at this location are performed using dose modulation techniques as appropriate to a performed exam including the following: Automated exposure control; adjustment of the mA and/or kV according to patient size. COMPARISON: 10/29/2016 FINDINGS: Heart: Normal in size. No pericardial effusion. Lung Bases: Well aerated, without consolidation or pleural fluid. Liver: Evidence of prior left hepatic resection. Gallbladder: Surgically absent. Bile  Ducts: Suggestion of mild intrahepatic biliary ductal dilation.  Recommend correlation with serum analysis. Pancreas: No mass or peripancreatic fat stranding. Spleen: Unremarkable. Adrenals: Unremarkable. Kidneys/ Ureters: Chronic perinephric fat stranding similar to prior exams.  No hydronephrosis.  No renal, ureteral, or bladder stones. Bladder: Mild distention of the bladder with urine. Reproductive organs: Unremarkable. GI Tract/Mesentery: Distal esophagus, stomach, duodenum, small bowel unremarkable.  Evidence of prior appendectomy.  Moderate large volume stool throughout the colon.  Sigmoid diverticulosis without evidence of diverticulitis. Peritoneal Space: No ascites. No free air. Retroperitoneum: No significant adenopathy. Abdominal wall: Small fat containing uncomplicated left inguinal hernia. Vasculature: Aortoiliac  atherosclerotic calcification. No aneurysm. Bones: Moderate severity multilevel discogenic degenerative change of the lumbar spine.     Suggestion of mild intrahepatic biliary ductal dilation.  Recommend correlation with serum analysis. Moderate large volume stool throughout the colon. Sigmoid diverticulosis without evidence of diverticulitis. Electronically signed by: Kiran Paez Date:    06/12/2020 Time:    14:28      Current Medications:     Infusions:       Scheduled:   gabapentin  300 mg Oral BID    metoprolol tartrate  25 mg Oral BID    piperacillin-tazobactam (ZOSYN) IVPB  4.5 g Intravenous Q8H    ursodioL  300 mg Oral BID        PRN:  dextrose 50 % in water (D50W), dextrose 50 % in water (D50W), glucagon (human recombinant), glucose, glucose, oxyCODONE-acetaminophen, oxyCODONE-acetaminophen, sodium chloride 0.9%    Antibiotics and Day Number of Therapy:  Zosyn 6/16-present     Lines and Day Number of Therapy:  PIV     Assessment:     Venu Lau is a 80 y.o. male with PMHx of CAD s/p CABG, paroxismal a fib/flutter s/p 2 lead pacemaker in DDD-CLS mode, Biplar 1, HTN, HLD,  chronic pain, GERD, and a history of GI bleed. Patient admitted after undergoing EGD with EUS and ERCP, found to have hemobilia. Stent placed and admitted for close monitoring for bleeding.      Patient Active Problem List    Diagnosis Date Noted    Other cirrhosis of liver     Abnormal LFTs 06/17/2020    Hemobilia 06/17/2020    Hyperbilirubinemia 06/17/2020    Epigastric pain     Transaminitis     Acute biliary pancreatitis without infection or necrosis 06/12/2020    Subclinical hypothyroidism 06/12/2020    Tobacco abuse 01/29/2020    PAF (paroxysmal atrial fibrillation) 02/13/2017    Hypokalemia 10/31/2016    Oropharyngeal dysphagia 10/30/2016    Physical deconditioning 10/30/2016    Anemia, chronic disease 10/30/2016    Acute renal failure 10/29/2016    Hyperkalemia 10/29/2016    Metabolic acidosis 10/29/2016    Acute metabolic encephalopathy 10/29/2016    Uremia 10/29/2016    Polypharmacy 10/29/2016    Normocytic anemia 10/29/2016    Mixed hyperlipidemia 10/29/2016    AKSHAT (acute kidney injury) 10/29/2016    Toxic encephalopathy 10/29/2016    Coronary artery disease involving native coronary artery of native heart without angina pectoris     Diabetes     Essential hypertension     *Atrial flutter     CAD (coronary artery disease) 11/20/2012    Hx of CABG 11/20/2012    Atrial flutter 11/20/2012    Pacemaker 11/20/2012    Villous adenoma 11/20/2012        Plan:     Hemobila  - Pt recently admitted from 6/12 - 6/15 with elevated LFTs and hyperbilirubinemia, evaluated by GI during that admission, MRCP unable to be obtained due to presence of pacemaker, ERCP deferred until today  - Pt presented today for outpatient ERCP with GI, Dr. Humphrey. Hemobilia with clots and stent placement today  - Admitted to [facility] for observation  - Last H/H 11.5/36 on day of discharge on 6/15, pt denies upper and lower GI bleeding  - Last dose of Eliquis: 6/15a.m., Last dose of ASA this  morning 6/17 just prior to procedure  - H/H post procedure 10.8/34  - Will consult IR, Gen Surg, GI, and continue abx per GI recommendations.  - STAT CTA abd/pelvis prn if decompensates, PRBC prn, Goal Hbg > 8 (hx of CAD w/p CABG)  - CT liver protocol with intra and extrahepatic bile duct dilatation with scattered intraluminal high attenuation material likely representing blood products given findings on recent ERCP.  Biliary stent in appropriate position with expected pneumobilia. Curvilinear area of enhancement along the presumed left hepatic duct stump with enhancement pattern that follows the adjacent blood pool. Finding is new when compared to CT dated 06/08/2007 and is most suggestive of a prominent vascular structure.  A surgical scar is possible but felt to be less likely.  An enhancing neoplasm is felt to be unlikely.  - Angiogram of liver without evidence of active extravisation, no angiographic abnormality to account for hemobilia   - H/H has remained stable since admission     Obstructive jaundice  -Initially admitted with pancreatitis with evidence of dilated biliary tree on imaging  -LFTs with obstructive picture  -EGD with evidence of hemobilia on 6/18  -S/P stent placement on 6/18  -T bili/ LFTs initially trended up on 6/19, concern for blood clot in stent  -Labs appear to be down trending today, T bili 8.8-> 7.6->7.2->6.6, LFTs decreasing as well  -Plan to discharge with close PCP and GI follow up     Hypoglycemia  -Blood glucose on 6/18 39, asymptomatic   -Given 1amp D50  -Patient has been maintained NPO for extended period  -Regular diet this morning   -Will continue to monitor     Normocytic anemia  -H/H 14.3/45.1, MCV 89 on 6/12 upon admit during recent admission, it was 11.5/36 with MCV 89 on discharge on 6/15  - Fe panel from last admission consistent with AOCD; Pt has never had a colonoscopy, it was recommend prior to recent discharge but patient refused      CAD s/p CABG  -TTE 1/27/2020 with  LVEF 55%, LVH, mild LAE, mild AS, mild tricuspid regurg  - last dose of ASA this morning just prior to ERCP  - Will ASA 2/2 to hemobila     Paroxysmal atrial flutter/ fib, sick sinus syndrome, tachy-valentine arrythmia s/p pacemaker placement  - Recently seen in clinic, pacemaker interrogated DDD-CLS mode with normal function  - Last dose of eliquis: 6/15 a.m.  - Continue to hold home eliquis in setting of acute hemobilia, will need close PCP follow up      DM2  - On glimepiride at home; ISS while inpatient     Thrombocytopenia, chronic  - Plt 73 at time of recent discharge on 6/15  - Recent abdominal ultrasound on on 6/12 demonstrated normal hepatic texture, echogenicity, s/p partial hepatectomy    -  Will monitor, platelet transfusion prn     HLD  -Patient on rosuvastatin at home, holding for now     DVT ppx: COOKIE/SCDs, holding antiplatelets and anticoagulation 2/2 to hemobila  Diet: Cardiac   Dispo: Discharge home today    Code: Full    Gus Ruiz  [facility] HO-I  [facility] IM Service Team B    [facility] Medicine Hospitalist Pager numbers:   [facility] Hospitalist Medicine Team A (Tereza/Flakita): [phone]  [facility] Hospitalist Medicine Team B (Brian/Michaela):  [phone]

## 2020-06-22 NOTE — PLAN OF CARE
VN note: VN was called into room by patient's nurse Elaine. Nurse at bedside. VN attempted to review discharge. Patient Douglas unable to hear VN over monitor. No family at bedside. I asked nurse to review.

## 2020-06-23 ENCOUNTER — TELEPHONE (OUTPATIENT)
Dept: GASTROENTEROLOGY | Facility: CLINIC | Age: 80
End: 2020-06-23

## 2020-06-23 ENCOUNTER — PATIENT OUTREACH (OUTPATIENT)
Dept: ADMINISTRATIVE | Facility: CLINIC | Age: 80
End: 2020-06-23

## 2020-06-23 DIAGNOSIS — K85.10 ACUTE BILIARY PANCREATITIS WITHOUT INFECTION OR NECROSIS: Primary | ICD-10-CM

## 2020-06-23 DIAGNOSIS — Z01.812 PRE-PROCEDURE LAB EXAM: ICD-10-CM

## 2020-06-23 NOTE — PROGRESS NOTES
C3 nurse attempted to contact patient. No answer. The following message was left for the patient to return the call:  Good morning I am a nurse calling on behalf of [facility] from the Care Coordination Center.  This is a Transitional Care Call for Venu. When you have a moment please contact us at [phone] or [phone] Monday through Friday, between the hours of 8 am to 4 pm. We look forward to speaking with you. On behalf of [facility] have a nice day.    The patient does not have a scheduled HOSFU appointment within 7-14 days post hospital discharge date 6/22/20. Non [facility] PCP.

## 2020-06-23 NOTE — TELEPHONE ENCOUNTER
Called patient to schedule ERCP for stent removal. No answer left message to call back.  Will mail patient appointment instructions.

## 2020-06-24 ENCOUNTER — TELEPHONE (OUTPATIENT)
Dept: CARDIOLOGY | Facility: CLINIC | Age: 80
End: 2020-06-24

## 2020-06-24 NOTE — TELEPHONE ENCOUNTER
Reached out to patient to schedule in office pacemaker interrogation.  Confirmed appointment date, time and location with patient.

## 2020-06-25 ENCOUNTER — LAB VISIT (OUTPATIENT)
Dept: LAB | Facility: HOSPITAL | Age: 80
End: 2020-06-25
Attending: INTERNAL MEDICINE
Payer: MEDICARE

## 2020-06-25 DIAGNOSIS — R94.5 ABNORMAL RESULTS OF LIVER FUNCTION STUDIES: Primary | ICD-10-CM

## 2020-06-25 DIAGNOSIS — E11.9 TYPE 2 DIABETES MELLITUS WITHOUT COMPLICATIONS: ICD-10-CM

## 2020-06-25 LAB
ALBUMIN SERPL BCP-MCNC: 3.8 G/DL (ref 3.5–5.2)
ALP SERPL-CCNC: 195 U/L (ref 38–126)
ALT SERPL W/O P-5'-P-CCNC: 59 U/L (ref 10–44)
ANION GAP SERPL CALC-SCNC: 11 MMOL/L (ref 8–16)
AST SERPL-CCNC: 147 U/L (ref 15–46)
BASOPHILS # BLD AUTO: 0.02 K/UL (ref 0–0.2)
BASOPHILS NFR BLD: 0.5 % (ref 0–1.9)
BILIRUB SERPL-MCNC: 4.1 MG/DL (ref 0.1–1)
BUN SERPL-MCNC: 11 MG/DL (ref 2–20)
CALCIUM SERPL-MCNC: 9.4 MG/DL (ref 8.7–10.5)
CHLORIDE SERPL-SCNC: 105 MMOL/L (ref 95–110)
CHOLEST SERPL-MCNC: 93 MG/DL (ref 120–199)
CHOLEST/HDLC SERPL: 15.5 {RATIO} (ref 2–5)
CO2 SERPL-SCNC: 23 MMOL/L (ref 23–29)
CREAT SERPL-MCNC: 0.8 MG/DL (ref 0.5–1.4)
DIFFERENTIAL METHOD: ABNORMAL
EOSINOPHIL # BLD AUTO: 0 K/UL (ref 0–0.5)
EOSINOPHIL NFR BLD: 0 % (ref 0–8)
ERYTHROCYTE [DISTWIDTH] IN BLOOD BY AUTOMATED COUNT: 18.1 % (ref 11.5–14.5)
EST. GFR  (AFRICAN AMERICAN): >60 ML/MIN/1.73 M^2
EST. GFR  (NON AFRICAN AMERICAN): >60 ML/MIN/1.73 M^2
ESTIMATED AVG GLUCOSE: 100 MG/DL (ref 68–131)
GLUCOSE SERPL-MCNC: 163 MG/DL (ref 70–110)
HBA1C MFR BLD HPLC: 5.1 % (ref 4–5.6)
HCT VFR BLD AUTO: 34.5 % (ref 40–54)
HDLC SERPL-MCNC: 6 MG/DL (ref 40–75)
HDLC SERPL: 6.5 % (ref 20–50)
HGB BLD-MCNC: 10.7 G/DL (ref 14–18)
IMM GRANULOCYTES # BLD AUTO: 0.06 K/UL (ref 0–0.04)
IMM GRANULOCYTES NFR BLD AUTO: 1.6 % (ref 0–0.5)
LDLC SERPL CALC-MCNC: 56.6 MG/DL (ref 63–159)
LYMPHOCYTES # BLD AUTO: 0.6 K/UL (ref 1–4.8)
LYMPHOCYTES NFR BLD: 15.6 % (ref 18–48)
MCH RBC QN AUTO: 27.8 PG (ref 27–31)
MCHC RBC AUTO-ENTMCNC: 31 G/DL (ref 32–36)
MCV RBC AUTO: 90 FL (ref 82–98)
MONOCYTES # BLD AUTO: 0.4 K/UL (ref 0.3–1)
MONOCYTES NFR BLD: 11.4 % (ref 4–15)
NEUTROPHILS # BLD AUTO: 2.7 K/UL (ref 1.8–7.7)
NEUTROPHILS NFR BLD: 70.9 % (ref 38–73)
NONHDLC SERPL-MCNC: 87 MG/DL
NRBC BLD-RTO: 0 /100 WBC
PLATELET # BLD AUTO: 180 K/UL (ref 150–350)
PMV BLD AUTO: 10.7 FL (ref 9.2–12.9)
POTASSIUM SERPL-SCNC: 4.1 MMOL/L (ref 3.5–5.1)
PROT SERPL-MCNC: 7.3 G/DL (ref 6–8.4)
RBC # BLD AUTO: 3.85 M/UL (ref 4.6–6.2)
SODIUM SERPL-SCNC: 139 MMOL/L (ref 136–145)
T4 FREE SERPL-MCNC: 0.89 NG/DL (ref 0.71–1.51)
TRIGL SERPL-MCNC: 152 MG/DL (ref 30–150)
TSH SERPL DL<=0.005 MIU/L-ACNC: 8.74 UIU/ML (ref 0.4–4)
WBC # BLD AUTO: 3.78 K/UL (ref 3.9–12.7)

## 2020-06-25 PROCEDURE — 80053 COMPREHEN METABOLIC PANEL: CPT | Mod: PO

## 2020-06-25 PROCEDURE — 80061 LIPID PANEL: CPT

## 2020-06-25 PROCEDURE — 83036 HEMOGLOBIN GLYCOSYLATED A1C: CPT

## 2020-06-25 PROCEDURE — 84443 ASSAY THYROID STIM HORMONE: CPT | Mod: PO

## 2020-06-25 PROCEDURE — 36415 COLL VENOUS BLD VENIPUNCTURE: CPT | Mod: PO

## 2020-06-25 PROCEDURE — 85025 COMPLETE CBC W/AUTO DIFF WBC: CPT | Mod: PO

## 2020-06-25 PROCEDURE — 84439 ASSAY OF FREE THYROXINE: CPT

## 2020-06-28 ENCOUNTER — NURSE TRIAGE (OUTPATIENT)
Dept: ADMINISTRATIVE | Facility: CLINIC | Age: 80
End: 2020-06-28

## 2020-06-28 NOTE — TELEPHONE ENCOUNTER
RN attempted to contact pt through the Post Procedural Symptom Tracker for Day 13.  Unable to reach pt.  RN will attempt to contact pt on Day 14.  No additional calls or action needed at this time per protocol.      Reason for Disposition   No answer.  First attempt to contact caller.  Follow-up call scheduled within 15 minutes.    Protocols used: NO CONTACT OR DUPLICATE CONTACT CALL-A-AH

## 2020-06-29 NOTE — TELEPHONE ENCOUNTER
Attempted to contact pt on behalf of Post Procedural Symptom Tracker. No answer 2nd attempt. No follow up needed.

## 2020-07-08 ENCOUNTER — TELEPHONE (OUTPATIENT)
Dept: GASTROENTEROLOGY | Facility: CLINIC | Age: 80
End: 2020-07-08

## 2020-07-08 ENCOUNTER — CLINICAL SUPPORT (OUTPATIENT)
Dept: CARDIOLOGY | Facility: CLINIC | Age: 80
End: 2020-07-08
Payer: MEDICARE

## 2020-07-08 ENCOUNTER — TELEPHONE (OUTPATIENT)
Dept: ENDOSCOPY | Facility: HOSPITAL | Age: 80
End: 2020-07-08

## 2020-07-08 DIAGNOSIS — Z95.0 PACEMAKER: Primary | ICD-10-CM

## 2020-07-08 PROCEDURE — 93280 PM DEVICE PROGR EVAL DUAL: CPT | Mod: 26,,, | Performed by: INTERNAL MEDICINE

## 2020-07-08 PROCEDURE — 93280 PR PROGRAM EVAL (IN PERSON) IMPLANT DEVICE,PACEMAKER,2 LEAD: ICD-10-PCS | Mod: 26,,, | Performed by: INTERNAL MEDICINE

## 2020-07-08 NOTE — TELEPHONE ENCOUNTER
Called patient regarding his upcoming procedure appointment with Dr Sohail Boyle. Patient was recently put on Eliquis and has been taking his medication. We have requested a cardiac clearance to hold medication for procedure but have not received it yet. The procedure will have to be cancelled for this time. Attempted to contact patient but there was no answer. Left detailed message on voice mail.

## 2020-07-08 NOTE — TELEPHONE ENCOUNTER
Patient is on Eliquis and took a dose today, it wasn't on his medication profile.    Sending message to office.

## 2020-07-09 ENCOUNTER — TELEPHONE (OUTPATIENT)
Dept: GASTROENTEROLOGY | Facility: CLINIC | Age: 80
End: 2020-07-09

## 2020-07-09 NOTE — TELEPHONE ENCOUNTER
Patient recently started on Eliquis. Procedure was cancelled until we get cardiac clearance from Dr Quiroga. I informed the patient that I will call him back to reschedule as soon as I get the clearance to hold Eliquis. Patient verbalized understanding.

## 2020-07-09 NOTE — TELEPHONE ENCOUNTER
----- Message from Carolin Morocho MA sent at 7/9/2020  2:11 PM CDT -----  Regarding: FW: Surgery update  Contact: PT    ----- Message -----  From: Karen Zaman  Sent: 7/9/2020   2:10 PM CDT  To: Sohail BLANK Staff Jovani Trinidad  Subject: Surgery update                                     PT called in regards to surgery - would like an update on when it's being scheduled    Callback: [phone]

## 2020-07-13 ENCOUNTER — TELEPHONE (OUTPATIENT)
Dept: GASTROENTEROLOGY | Facility: CLINIC | Age: 80
End: 2020-07-13

## 2020-07-13 NOTE — TELEPHONE ENCOUNTER
Patient is tentatively scheduled July 10, 2020 for ERCP procedure. Will need Cardiac Clearance needed to hold Eliquis 3 days prior to procedure.   Please respond as soon as possible. thanks    procedure cancelled, has not received cardiac Clearance.

## 2020-07-15 ENCOUNTER — TELEPHONE (OUTPATIENT)
Dept: GASTROENTEROLOGY | Facility: CLINIC | Age: 80
End: 2020-07-15

## 2020-07-15 NOTE — PROGRESS NOTES
Pacemaker Evaluation Report      1/29/2020    Primary MD: Dr. Terrazas  Primary Cardiologist: Dr. Tracy     : [employer] Model: Evia DR/Serial No.  [id number]  Implant date: Jan 13, 2011    Reason for evaluation:routine  Indication for pacemaker: unknown    Measurements  Atrial sensing - P wave: 0.5 mV  Atrial capture: Maintained: 0.7 V @ 0.5 msec  Atrial lead impedance: 370 ohms  Ventricular sensing - R wave: 10 mV  Ventricular capture: RV Maintained: 1.1 V @ 0.4 ms   Ventricular lead impedance: right - 448 ohms   Underlying rhythm: pacer dependent       Diagnostic Data  Atrial paced: 25 % Ventricular paced: 46 %  Mode switch: on  Other: 41 Afib burden - longest episode 32 days   Sensor response: CLS  Battery status: satisfactory Magnet rate: 90 bpm   50% remaining capacity  Est. Longevity 4 years      Final Parameters  Mode: DDD-CLS Lower rate: 60 bpm Upper rate: 130 bpm  Atrial - Amplitude: 1.6 V Pulse width: 0.5 ms sensitivity 0.8 ms  Polarity: Bipolar  Ventricular - Amplitude: 2.5 V Pulse width: 0.5 ms sensitivity 2.5 ms  Polarity: Bipolar  Changes made:  Restart statistics   Conclusions: Normal device function.  Afib burden 41 %.    Follow up: 6 months      Carlos Quiroga

## 2020-08-27 ENCOUNTER — TELEPHONE (OUTPATIENT)
Dept: ENDOSCOPY | Facility: HOSPITAL | Age: 80
End: 2020-08-27

## 2020-08-27 NOTE — TELEPHONE ENCOUNTER
Spoke with patient about arrival time @ 0800  Covid test = 8/28/2020 at 9am    NPO status reviewed: Patient may eat until 7:00pm.  After 7pm, pt may have CLEAR liquids ONLY until completely NPO at Midnight.    Medications: Do not take Insulin or oral diabetic medications the day of the procedure.    Take as prescribed: heart, seizure and blood pressure medication in the morning with a sip of water (less than an ounce).  Take any breathing medications and bring inhalers to hospital with you.     Leave all valuables and jewelry at home. Wear comfortable clothes to procedure to change into hospital gown.   You cannot drive for 24 hours after your procedure because you will receive sedation for your procedure to make you comfortable.    A ride must be provided at discharge.

## 2020-08-28 ENCOUNTER — LAB VISIT (OUTPATIENT)
Dept: FAMILY MEDICINE | Facility: CLINIC | Age: 80
End: 2020-08-28
Payer: MEDICARE

## 2020-08-28 DIAGNOSIS — Z01.812 PRE-PROCEDURE LAB EXAM: ICD-10-CM

## 2020-08-28 PROCEDURE — U0003 INFECTIOUS AGENT DETECTION BY NUCLEIC ACID (DNA OR RNA); SEVERE ACUTE RESPIRATORY SYNDROME CORONAVIRUS 2 (SARS-COV-2) (CORONAVIRUS DISEASE [COVID-19]), AMPLIFIED PROBE TECHNIQUE, MAKING USE OF HIGH THROUGHPUT TECHNOLOGIES AS DESCRIBED BY CMS-2020-01-R: HCPCS

## 2020-08-29 LAB — SARS-COV-2 RNA RESP QL NAA+PROBE: NOT DETECTED

## 2020-08-31 ENCOUNTER — HOSPITAL ENCOUNTER (OUTPATIENT)
Facility: HOSPITAL | Age: 80
Discharge: HOME | End: 2020-08-31
Attending: INTERNAL MEDICINE | Admitting: INTERNAL MEDICINE
Payer: MEDICARE

## 2020-08-31 ENCOUNTER — ANESTHESIA EVENT (OUTPATIENT)
Dept: ENDOSCOPY | Facility: HOSPITAL | Age: 80
End: 2020-08-31
Payer: MEDICARE

## 2020-08-31 ENCOUNTER — ANESTHESIA (OUTPATIENT)
Dept: ENDOSCOPY | Facility: HOSPITAL | Age: 80
End: 2020-08-31
Payer: MEDICARE

## 2020-08-31 VITALS
OXYGEN SATURATION: 100 % | WEIGHT: 160 LBS | HEIGHT: 65 IN | BODY MASS INDEX: 26.66 KG/M2 | SYSTOLIC BLOOD PRESSURE: 146 MMHG | RESPIRATION RATE: 18 BRPM | HEART RATE: 73 BPM | DIASTOLIC BLOOD PRESSURE: 64 MMHG | TEMPERATURE: 98 F

## 2020-08-31 DIAGNOSIS — K83.1 BILIARY OBSTRUCTION: ICD-10-CM

## 2020-08-31 LAB
ALBUMIN SERPL BCP-MCNC: 4 G/DL (ref 3.5–5.2)
ALP SERPL-CCNC: 127 U/L (ref 55–135)
ALT SERPL W/O P-5'-P-CCNC: 53 U/L (ref 10–44)
ANION GAP SERPL CALC-SCNC: 10 MMOL/L (ref 8–16)
AST SERPL-CCNC: 63 U/L (ref 10–40)
BASOPHILS # BLD AUTO: 0.01 K/UL (ref 0–0.2)
BASOPHILS NFR BLD: 0.3 % (ref 0–1.9)
BILIRUB SERPL-MCNC: 0.9 MG/DL (ref 0.1–1)
BUN SERPL-MCNC: 14 MG/DL (ref 8–23)
CALCIUM SERPL-MCNC: 9.6 MG/DL (ref 8.7–10.5)
CHLORIDE SERPL-SCNC: 106 MMOL/L (ref 95–110)
CO2 SERPL-SCNC: 23 MMOL/L (ref 23–29)
CREAT SERPL-MCNC: 0.9 MG/DL (ref 0.5–1.4)
DIFFERENTIAL METHOD: ABNORMAL
EOSINOPHIL # BLD AUTO: 0 K/UL (ref 0–0.5)
EOSINOPHIL NFR BLD: 0.3 % (ref 0–8)
ERYTHROCYTE [DISTWIDTH] IN BLOOD BY AUTOMATED COUNT: 14.1 % (ref 11.5–14.5)
EST. GFR  (AFRICAN AMERICAN): >60 ML/MIN/1.73 M^2
EST. GFR  (NON AFRICAN AMERICAN): >60 ML/MIN/1.73 M^2
GLUCOSE SERPL-MCNC: 102 MG/DL (ref 70–110)
GLUCOSE SERPL-MCNC: 109 MG/DL (ref 70–110)
HCT VFR BLD AUTO: 37.4 % (ref 40–54)
HGB BLD-MCNC: 12.5 G/DL (ref 14–18)
IMM GRANULOCYTES # BLD AUTO: 0.02 K/UL (ref 0–0.04)
IMM GRANULOCYTES NFR BLD AUTO: 0.7 % (ref 0–0.5)
LYMPHOCYTES # BLD AUTO: 0.9 K/UL (ref 1–4.8)
LYMPHOCYTES NFR BLD: 31.7 % (ref 18–48)
MCH RBC QN AUTO: 28.8 PG (ref 27–31)
MCHC RBC AUTO-ENTMCNC: 33.4 G/DL (ref 32–36)
MCV RBC AUTO: 86 FL (ref 82–98)
MONOCYTES # BLD AUTO: 0.4 K/UL (ref 0.3–1)
MONOCYTES NFR BLD: 14.6 % (ref 4–15)
NEUTROPHILS # BLD AUTO: 1.5 K/UL (ref 1.8–7.7)
NEUTROPHILS NFR BLD: 52.4 % (ref 38–73)
NRBC BLD-RTO: 0 /100 WBC
PLATELET # BLD AUTO: 108 K/UL (ref 150–350)
PMV BLD AUTO: 9.4 FL (ref 9.2–12.9)
POCT GLUCOSE: 105 MG/DL (ref 70–110)
POTASSIUM SERPL-SCNC: 4.2 MMOL/L (ref 3.5–5.1)
PROT SERPL-MCNC: 7.5 G/DL (ref 6–8.4)
RBC # BLD AUTO: 4.34 M/UL (ref 4.6–6.2)
SODIUM SERPL-SCNC: 139 MMOL/L (ref 136–145)
WBC # BLD AUTO: 2.87 K/UL (ref 3.9–12.7)

## 2020-08-31 PROCEDURE — 43264 PR ERCP,W/REMOVAL STONE,BIL/PANCR DUCTS: ICD-10-PCS | Mod: 51,,, | Performed by: INTERNAL MEDICINE

## 2020-08-31 PROCEDURE — 27201089 HC SNARE, DISP (ANY): Performed by: INTERNAL MEDICINE

## 2020-08-31 PROCEDURE — 37000008 HC ANESTHESIA 1ST 15 MINUTES: Performed by: INTERNAL MEDICINE

## 2020-08-31 PROCEDURE — 25000003 PHARM REV CODE 250: Performed by: NURSE ANESTHETIST, CERTIFIED REGISTERED

## 2020-08-31 PROCEDURE — 85025 COMPLETE CBC W/AUTO DIFF WBC: CPT

## 2020-08-31 PROCEDURE — 36415 COLL VENOUS BLD VENIPUNCTURE: CPT

## 2020-08-31 PROCEDURE — 80053 COMPREHEN METABOLIC PANEL: CPT

## 2020-08-31 PROCEDURE — 74328 PR  X-RAY FOR BILE DUCT ENDOSCOPY: ICD-10-PCS | Mod: 26,,, | Performed by: INTERNAL MEDICINE

## 2020-08-31 PROCEDURE — 25000003 PHARM REV CODE 250: Performed by: INTERNAL MEDICINE

## 2020-08-31 PROCEDURE — 43264 ERCP REMOVE DUCT CALCULI: CPT | Performed by: INTERNAL MEDICINE

## 2020-08-31 PROCEDURE — 63600175 PHARM REV CODE 636 W HCPCS: Performed by: NURSE ANESTHETIST, CERTIFIED REGISTERED

## 2020-08-31 PROCEDURE — 27202125 HC BALLOON, EXTRACTION (ANY): Performed by: INTERNAL MEDICINE

## 2020-08-31 PROCEDURE — 43275 ERCP REMOVE FORGN BODY DUCT: CPT | Mod: ,,, | Performed by: INTERNAL MEDICINE

## 2020-08-31 PROCEDURE — 37000009 HC ANESTHESIA EA ADD 15 MINS: Performed by: INTERNAL MEDICINE

## 2020-08-31 PROCEDURE — 74328 X-RAY BILE DUCT ENDOSCOPY: CPT | Mod: 26,,, | Performed by: INTERNAL MEDICINE

## 2020-08-31 PROCEDURE — 43264 ERCP REMOVE DUCT CALCULI: CPT | Mod: 51,,, | Performed by: INTERNAL MEDICINE

## 2020-08-31 PROCEDURE — 43275 ERCP REMOVE FORGN BODY DUCT: CPT | Performed by: INTERNAL MEDICINE

## 2020-08-31 PROCEDURE — C1769 GUIDE WIRE: HCPCS | Performed by: INTERNAL MEDICINE

## 2020-08-31 PROCEDURE — 43275 PR ERCP W/REMOVAL FOREIGN BODY/STENT FROM BILIARY/PANCREATIC DUCT: ICD-10-PCS | Mod: ,,, | Performed by: INTERNAL MEDICINE

## 2020-08-31 PROCEDURE — 25500020 PHARM REV CODE 255: Performed by: INTERNAL MEDICINE

## 2020-08-31 RX ORDER — PROPOFOL 10 MG/ML
INJECTION, EMULSION INTRAVENOUS CONTINUOUS PRN
Status: DISCONTINUED | OUTPATIENT
Start: 2020-08-31 | End: 2020-08-31

## 2020-08-31 RX ORDER — PHENYLEPHRINE HYDROCHLORIDE 10 MG/ML
INJECTION INTRAVENOUS
Status: DISCONTINUED | OUTPATIENT
Start: 2020-08-31 | End: 2020-08-31

## 2020-08-31 RX ORDER — FENTANYL CITRATE 50 UG/ML
INJECTION, SOLUTION INTRAMUSCULAR; INTRAVENOUS
Status: DISCONTINUED | OUTPATIENT
Start: 2020-08-31 | End: 2020-08-31

## 2020-08-31 RX ORDER — SODIUM CHLORIDE 0.9 % (FLUSH) 0.9 %
10 SYRINGE (ML) INJECTION
Status: DISCONTINUED | OUTPATIENT
Start: 2020-08-31 | End: 2020-08-31 | Stop reason: HOSPADM

## 2020-08-31 RX ORDER — SODIUM CHLORIDE 9 MG/ML
INJECTION, SOLUTION INTRAVENOUS CONTINUOUS
Status: DISCONTINUED | OUTPATIENT
Start: 2020-08-31 | End: 2020-08-31 | Stop reason: HOSPADM

## 2020-08-31 RX ORDER — GLYCOPYRROLATE 0.2 MG/ML
INJECTION INTRAMUSCULAR; INTRAVENOUS
Status: DISCONTINUED | OUTPATIENT
Start: 2020-08-31 | End: 2020-08-31

## 2020-08-31 RX ORDER — LIDOCAINE HCL/PF 100 MG/5ML
SYRINGE (ML) INTRAVENOUS
Status: DISCONTINUED | OUTPATIENT
Start: 2020-08-31 | End: 2020-08-31

## 2020-08-31 RX ADMIN — FENTANYL CITRATE 25 MCG: 50 INJECTION, SOLUTION INTRAMUSCULAR; INTRAVENOUS at 09:08

## 2020-08-31 RX ADMIN — PROPOFOL 75 MCG/KG/MIN: 10 INJECTION, EMULSION INTRAVENOUS at 09:08

## 2020-08-31 RX ADMIN — GLYCOPYRROLATE 0.2 MG: 0.2 INJECTION, SOLUTION INTRAMUSCULAR; INTRAVENOUS at 10:08

## 2020-08-31 RX ADMIN — PHENYLEPHRINE HYDROCHLORIDE 100 MCG: 10 INJECTION INTRAVENOUS at 10:08

## 2020-08-31 RX ADMIN — IOHEXOL 13 ML: 300 INJECTION, SOLUTION INTRAVENOUS at 10:08

## 2020-08-31 RX ADMIN — SODIUM CHLORIDE: 0.9 INJECTION, SOLUTION INTRAVENOUS at 09:08

## 2020-08-31 RX ADMIN — FENTANYL CITRATE 25 MCG: 50 INJECTION, SOLUTION INTRAMUSCULAR; INTRAVENOUS at 10:08

## 2020-08-31 RX ADMIN — LIDOCAINE HYDROCHLORIDE 50 MG: 20 INJECTION, SOLUTION INTRAVENOUS at 09:08

## 2020-08-31 RX ADMIN — GLYCOPYRROLATE 0.2 MG: 0.2 INJECTION, SOLUTION INTRAMUSCULAR; INTRAVENOUS at 09:08

## 2020-08-31 NOTE — H&P
History & Physical - Short Stay  Gastroenterology      SUBJECTIVE:     Procedure: ERCP    Chief Complaint/Indication for Procedure: biliary obstruction    History of Present Illness:  Patient is a 80 y.o. male with biliary obstruction coming for ERCP for stent removal.     PTA Medications   Medication Sig    aspirin (ECOTRIN) 81 MG EC tablet Take 81 mg by mouth once daily.    glimepiride (AMARYL) 2 MG tablet Take 2 mg by mouth every morning.    metoprolol tartrate (LOPRESSOR) 50 MG tablet Take 0.5 tablets (25 mg total) by mouth 2 (two) times daily.    mirtazapine (REMERON) 15 MG tablet 15 mg once daily.    OLANZapine (ZYPREXA) 2.5 MG tablet 2.5 mg once daily.    rosuvastatin (CRESTOR) 40 MG Tab Take 1 tablet (40 mg total) by mouth every evening.    apixaban (ELIQUIS) 5 mg Tab Take 5 mg by mouth 2 (two) times daily.    ergocalciferol (ERGOCALCIFEROL) 50,000 unit Cap Take 1 capsule (50,000 Units total) by mouth every 7 days.    gabapentin (NEURONTIN) 300 MG capsule Take 300 mg by mouth 2 (two) times daily.    SOLUS V2 LANCETS 30 gauge Misc     SOLUS V2 TEST STRIPS Strp     ursodioL (ACTIGALL) 300 mg capsule Take 1 capsule (300 mg total) by mouth 2 (two) times daily.       Review of patient's allergies indicates:  No Known Allergies     Past Medical History:   Diagnosis Date    *Atrial flutter     Anemia     Anticoagulant long-term use     Arthritis     Bipolar 1 disorder     Coronary artery disease     Diabetes mellitus     Diabetes mellitus, type 2     GERD (gastroesophageal reflux disease)     Gout, unspecified     H/O: GI bleed     Hypertension     Liver cancer     PVD (peripheral vascular disease)     Sciatica     SSS (sick sinus syndrome)     Tachy-valentine syndrome     Thyroid disease      Past Surgical History:   Procedure Laterality Date    APPENDECTOMY      CHOLECYSTECTOMY      CORONARY ARTERY BYPASS GRAFT      ENDOSCOPIC ULTRASOUND OF UPPER GASTROINTESTINAL TRACT N/A 6/17/2020     Procedure: EUS;  Surgeon: Rei Humphrey MD;  Location: [facility] ENDO;  Service: Endoscopy;  Laterality: N/A;    ERCP N/A 2020    Procedure: ERCP;  Surgeon: Rei Humphrey MD;  Location: [facility] ENDO;  Service: Endoscopy;  Laterality: N/A;    INSERT / REPLACE / REMOVE PACEMAKER      LIVER RESECTION      LUMBAR DISC SURGERY       Family History   Problem Relation Age of Onset    Heart disease Mother     Pancreatic cancer Mother     Heart disease Father      Social History     Tobacco Use    Smoking status: Current Some Day Smoker     Packs/day: 0.25     Years: 37.00     Pack years: 9.25     Types: Cigarettes     Last attempt to quit: 1992     Years since quittin.7    Smokeless tobacco: Never Used    Tobacco comment: Pt declines enrollment in the [employer]. Handout provided for Ambulatory Smoking Cessation program.    Substance Use Topics    Alcohol use: Yes     Alcohol/week: 5.0 standard drinks     Types: 5 Cans of beer per week    Drug use: No            OBJECTIVE:     Vital Signs (Most Recent)  Temp: 98.1 °F (36.7 °C) (20)  Pulse: 63 (20)  Resp: 20 (20)  BP: (!) 144/68 (20)  SpO2: 100 % (20)         ASSESSMENT/PLAN:     Patient is a 80 y.o. male with biliary obstruction coming for ERCP for stent removal.     Plan: ERCP    Anesthesia Plan: Moderate Sedation    ASA Grade: ASA 2 - Patient with mild systemic disease with no functional limitations

## 2020-08-31 NOTE — ANESTHESIA PREPROCEDURE EVALUATION
08/31/2020  Venu Lau is a 80 y.o., male admitted with biliary pancreatitis for upper EUS and ERCP under GA/MAC.     Past Medical History:   Diagnosis Date    *Atrial flutter     Anemia     Anticoagulant long-term use     Arthritis     Bipolar 1 disorder     Coronary artery disease     Diabetes mellitus     Diabetes mellitus, type 2     GERD (gastroesophageal reflux disease)     Gout, unspecified     H/O: GI bleed     Hypertension     Liver cancer     PVD (peripheral vascular disease)     Sciatica     SSS (sick sinus syndrome)     Tachy-valentine syndrome     Thyroid disease    smoker    Past Surgical History:   Procedure Laterality Date    APPENDECTOMY      CHOLECYSTECTOMY      CORONARY ARTERY BYPASS GRAFT      ENDOSCOPIC ULTRASOUND OF UPPER GASTROINTESTINAL TRACT N/A 6/17/2020    Procedure: EUS;  Surgeon: Rei Humphrey MD;  Location: [facility] ENDO;  Service: Endoscopy;  Laterality: N/A;    ERCP N/A 6/17/2020    Procedure: ERCP;  Surgeon: Rei Humphrey MD;  Location: [facility] ENDO;  Service: Endoscopy;  Laterality: N/A;    INSERT / REPLACE / REMOVE PACEMAKER      LIVER RESECTION      LUMBAR DISC SURGERY           Pre-op Assessment    I have reviewed the Patient Summary Reports.     I have reviewed the Nursing Notes. I have reviewed the NPO Status.   I have reviewed the Medications.     Review of Systems  Anesthesia Hx:  No problems with previous Anesthesia   Denies Personal Hx of Anesthesia complications.   Social:  Smoker    Hematology/Oncology:         -- Anemia: Hematology Comments: thrombocytopenia Current/Recent Cancer. surgery    Cardiovascular:   Exercise tolerance: poor Pacemaker Hypertension CAD  CABG/stent Dysrhythmias (tachy-valentine syndrome)  PVD hyperlipidemia    Pulmonary:   COPD    Renal/:   Chronic Renal Disease    Hepatic/GI:   GERD Liver Disease,     Musculoskeletal:   Arthritis     Endocrine:   Diabetes Hypothyroidism    Psych:   Psychiatric History          Physical Exam  General:  Well nourished, Obesity    Airway/Jaw/Neck:  Airway Findings: Mouth Opening: Small, but > 3cm General Airway Assessment: Adult  Mallampati: III  TM Distance: Normal, at least 6 cm  Jaw/Neck Findings: (beard)  Neck ROM: Normal ROM      Dental:  Dental Findings: Edentulous   Chest/Lungs:  Chest/Lungs Findings: Normal Respiratory Rate, Decreased Breathe Sounds Left, Decreased Breathe Sounds Right     Heart/Vascular:  Heart Findings: Normal       Mental Status:  Mental Status Findings:  Alert and Oriented       Lab Results   Component Value Date    WBC 2.87 (L) 08/31/2020    HGB 12.5 (L) 08/31/2020    HCT 37.4 (L) 08/31/2020     (L) 08/31/2020    CHOL 93 (L) 06/25/2020    TRIG 152 (H) 06/25/2020    HDL 6 (L) 06/25/2020    ALT 53 (H) 08/31/2020    AST 63 (H) 08/31/2020     08/31/2020    K 4.2 08/31/2020     08/31/2020    CREATININE 0.9 08/31/2020    BUN 14 08/31/2020    CO2 23 08/31/2020    TSH 8.740 (H) 06/25/2020    INR 1.2 06/22/2020    HGBA1C 5.1 06/25/2020 1/2020  · Normal left ventricular systolic function. The estimated ejection fraction is 55%.  · Concentric left ventricular hypertrophy.  · Indeterminate left ventricular diastolic function.  · No wall motion abnormalities.  · Normal right ventricular systolic function.  · Mild left atrial enlargement.  · Mild aortic valve stenosis.  · Aortic valve area is 1.68 cm2; peak velocity is 1.90 m/s; mean gradient is 9 mmHg.  · Mild tricuspid regurgitation.  · The estimated PA systolic pressure is 32 mmHg.  · Normal central venous pressure (3 mmHg).    Anesthesia Plan  Type of Anesthesia, risks & benefits discussed:  Anesthesia Type:  MAC, general  Patient's Preference:   Intra-op Monitoring Plan: standard ASA monitors  Intra-op Monitoring Plan Comments:   Post Op Pain Control Plan: multimodal analgesia and per  primary service following discharge from PACU  Post Op Pain Control Plan Comments:   Induction:   IV  Beta Blocker:  Patient is on a Beta-Blocker and has received one dose within the past 24 hours (No further documentation required).       Informed Consent: Patient understands risks and agrees with Anesthesia plan.  Questions answered. Anesthesia consent signed with patient.  ASA Score: 4     Day of Surgery Review of History & Physical:  There are no significant changes.          Ready For Surgery From Anesthesia Perspective.

## 2020-08-31 NOTE — PLAN OF CARE
Spoke with González in Radiology and informed him that we are doing an ERCP on this patient. Requested to have himsend someone and he verbalized understanding.

## 2020-08-31 NOTE — TRANSFER OF CARE
"Anesthesia Transfer of Care Note    Patient: Venu Lau    Procedure(s) Performed: Procedure(s) (LRB):  ERCP (ENDOSCOPIC RETROGRADE CHOLANGIOPANCREATOGRAPHY) (N/A)    Patient location: GI    Anesthesia Type: MAC    Transport from OR: Transported from OR on room air with adequate spontaneous ventilation    Post pain: adequate analgesia    Post assessment: no apparent anesthetic complications and tolerated procedure well    Post vital signs: stable    Level of consciousness: awake, alert and oriented    Nausea/Vomiting: no nausea/vomiting    Complications: none    Transfer of care protocol was followed      Last vitals:   Visit Vitals  BP (!) 144/68 (BP Location: Left arm, Patient Position: Lying)   Pulse 63   Temp 36.7 °C (98.1 °F) (Temporal)   Resp 20   Ht 5' 5" (1.651 m)   Wt 72.6 kg (160 lb)   SpO2 100%   BMI 26.63 kg/m²     "

## 2020-08-31 NOTE — ANESTHESIA POSTPROCEDURE EVALUATION
Anesthesia Post Evaluation    Patient: Venu Lau    Procedure(s) Performed: Procedure(s) (LRB):  ERCP (ENDOSCOPIC RETROGRADE CHOLANGIOPANCREATOGRAPHY) (N/A)    Final Anesthesia Type: MAC    Patient location during evaluation: GI PACU  Patient participation: Yes- Able to Participate  Level of consciousness: awake and alert and oriented  Post-procedure vital signs: reviewed and stable  Pain management: adequate  Airway patency: patent    PONV status at discharge: No PONV  Anesthetic complications: no      Cardiovascular status: blood pressure returned to baseline, hemodynamically stable and stable  Respiratory status: unassisted, spontaneous ventilation and room air  Hydration status: euvolemic  Follow-up not needed.          Vitals Value Taken Time   BP  08/31/20 1103   Temp  08/31/20 1103   Pulse  08/31/20 1103   Resp  08/31/20 1103   SpO2  08/31/20 1103         No case tracking events are documented in the log.      Pain/Flory Score: No data recorded      
within normal limits

## 2020-08-31 NOTE — [employer] PATIENT INSTRUCTIONS
Discharge Summary/Instructions after an Endoscopic Procedure  Patient Name: Venu Lau  Patient MRN: [id number]  Patient YOB: 1940 Monday, August 31, 2020  Adela Boyle MD  Your health is very important to us during the Covid Crisis. Following your   procedure today, you will receive a daily text for 2 weeks asking about   signs or symptoms of Covid 19.  Please respond to this text when you   receive it so we can follow up and keep you as safe as possible.   RESTRICTIONS:  During your procedure today, you received medications for sedation.  These   medications may affect your judgment, balance and coordination.  Therefore,   for 24 hours, you have the following restrictions:   - DO NOT drive a car, operate machinery, make legal/financial decisions,   sign important papers or drink alcohol.    ACTIVITY:  Today: no heavy lifting, straining or running due to procedural   sedation/anesthesia.  The following day: return to full activity including work.  DIET:  Eat and drink normally unless instructed otherwise.     TREATMENT FOR COMMON SIDE EFFECTS:  - Mild abdominal pain, nausea, belching, bloating or excessive gas:  rest,   eat lightly and use a heating pad.  - Sore Throat: treat with throat lozenges and/or gargle with warm salt   water.  - Because air was used during the procedure, expelling large amounts of air   from your rectum or belching is normal.  - If a bowel prep was taken, you may not have a bowel movement for 1-3 days.    This is normal.  SYMPTOMS TO WATCH FOR AND REPORT TO YOUR PHYSICIAN:  1. Abdominal pain or bloating, other than gas cramps.  2. Chest pain.  3. Back pain.  4. Signs of infection such as: chills or fever occurring within 24 hours   after the procedure.  5. Rectal bleeding, which would show as bright red, maroon, or black stools.   (A tablespoon of blood from the rectum is not serious, especially if   hemorrhoids are present.)  6. Vomiting.  7. Weakness or  dizziness.  GO DIRECTLY TO THE NEAREST EMERGENCY ROOM IF YOU HAVE ANY OF THE FOLLOWING:      Difficulty breathing              Chills and/or fever over 101 F   Persistent vomiting and/or vomiting blood   Severe abdominal pain   Severe chest pain   Black, tarry stools   Bleeding- more than one tablespoon   Any other symptom or condition that you feel may need urgent attention  Your doctor recommends these additional instructions:  If any biopsies were taken, your doctors clinic will contact you in 1 to 2   weeks with any results.  - Discharge patient to home.   - Resume previous diet.   - Continue present medications.   - Return to referring physician.   - Patient has a contact number available for emergencies.  The signs and   symptoms of potential delayed complications were discussed with the   patient.  Return to normal activities tomorrow.  Written discharge   instructions were provided to the patient.   - Need to follow with PCP and referring physician.  For questions, problems or results please call your physician - Adela Boyle MD.  EMERGENCY PHONE NUMBER: [phone],  LAB RESULTS: [phone]  IF A COMPLICATION OR EMERGENCY SITUATION ARISES AND YOU ARE UNABLE TO REACH   YOUR PHYSICIAN - GO DIRECTLY TO THE EMERGENCY ROOM.  Adela Boyle MD  8/31/2020 11:21:55 AM  This report has been verified and signed electronically.  [employer]

## 2021-02-18 ENCOUNTER — TELEPHONE (OUTPATIENT)
Dept: CARDIOLOGY | Facility: CLINIC | Age: 81
End: 2021-02-18

## 2021-02-19 ENCOUNTER — TELEPHONE (OUTPATIENT)
Dept: CARDIOLOGY | Facility: CLINIC | Age: 81
End: 2021-02-19

## 2021-02-24 ENCOUNTER — CLINICAL SUPPORT (OUTPATIENT)
Dept: CARDIOLOGY | Facility: CLINIC | Age: 81
End: 2021-02-24
Payer: MEDICARE

## 2021-02-24 DIAGNOSIS — Z95.0 PACEMAKER: Primary | ICD-10-CM

## 2021-02-24 PROCEDURE — 93279 PR PROGRAM EVAL (IN PERSON) IMPLANT DEVICE,PACEMAKER,1 LEAD: ICD-10-PCS | Mod: 26,,, | Performed by: INTERNAL MEDICINE

## 2021-02-24 PROCEDURE — 93279 PRGRMG DEV EVAL PM/LDLS PM: CPT | Mod: 26,,, | Performed by: INTERNAL MEDICINE

## 2021-03-17 ENCOUNTER — LAB VISIT (OUTPATIENT)
Dept: LAB | Facility: HOSPITAL | Age: 81
End: 2021-03-17
Attending: NURSE PRACTITIONER
Payer: MEDICARE

## 2021-03-17 DIAGNOSIS — E11.49 TYPE 2 DIABETES MELLITUS WITH OTHER DIABETIC NEUROLOGICAL COMPLICATION: Primary | ICD-10-CM

## 2021-03-17 LAB
ALBUMIN SERPL BCP-MCNC: 4.4 G/DL (ref 3.5–5.2)
ALBUMIN/CREAT UR: 36.3 UG/MG (ref 0–30)
ALP SERPL-CCNC: 112 U/L (ref 38–126)
ALT SERPL W/O P-5'-P-CCNC: 44 U/L (ref 10–44)
ANION GAP SERPL CALC-SCNC: 9 MMOL/L (ref 8–16)
AST SERPL-CCNC: 76 U/L (ref 15–46)
BILIRUB SERPL-MCNC: 0.6 MG/DL (ref 0.1–1)
CALCIUM SERPL-MCNC: 9.2 MG/DL (ref 8.7–10.5)
CHLORIDE SERPL-SCNC: 111 MMOL/L (ref 95–110)
CO2 SERPL-SCNC: 21 MMOL/L (ref 23–29)
CREAT SERPL-MCNC: 0.93 MG/DL (ref 0.5–1.4)
CREAT UR-MCNC: 80 MG/DL (ref 23–375)
EST. GFR  (AFRICAN AMERICAN): >60 ML/MIN/1.73 M^2
EST. GFR  (NON AFRICAN AMERICAN): >60 ML/MIN/1.73 M^2
ESTIMATED AVG GLUCOSE: 108 MG/DL (ref 68–131)
GLUCOSE SERPL-MCNC: 187 MG/DL (ref 70–110)
HBA1C MFR BLD: 5.4 % (ref 4–5.6)
MICROALBUMIN UR DL<=1MG/L-MCNC: 29 UG/ML
POTASSIUM SERPL-SCNC: 4.3 MMOL/L (ref 3.5–5.1)
PROT SERPL-MCNC: 7.9 G/DL (ref 6–8.4)
SODIUM SERPL-SCNC: 141 MMOL/L (ref 136–145)
UUN UR-MCNC: 17 MG/DL (ref 2–20)

## 2021-03-17 PROCEDURE — 83036 HEMOGLOBIN GLYCOSYLATED A1C: CPT | Performed by: NURSE PRACTITIONER

## 2021-03-17 PROCEDURE — 82043 UR ALBUMIN QUANTITATIVE: CPT | Mod: PO | Performed by: NURSE PRACTITIONER

## 2021-03-17 PROCEDURE — 82570 ASSAY OF URINE CREATININE: CPT | Mod: PO | Performed by: NURSE PRACTITIONER

## 2021-03-17 PROCEDURE — 80053 COMPREHEN METABOLIC PANEL: CPT | Mod: PO | Performed by: NURSE PRACTITIONER

## 2021-03-17 PROCEDURE — 36415 COLL VENOUS BLD VENIPUNCTURE: CPT | Mod: PO | Performed by: NURSE PRACTITIONER

## 2021-05-14 NOTE — TELEPHONE ENCOUNTER
Patient is cleared to hold Eliquis patient verbalized understanding. Info mailed to patients home.  Procedure scheduled for 7/31   Attending with

## 2021-05-26 ENCOUNTER — HOSPITAL ENCOUNTER (EMERGENCY)
Facility: HOSPITAL | Age: 81
End: 2021-05-26
Attending: EMERGENCY MEDICINE
Payer: MEDICARE

## 2021-05-26 ENCOUNTER — HOSPITAL ENCOUNTER (INPATIENT)
Facility: HOSPITAL | Age: 81
LOS: 1 days | Discharge: HOME HEALTH | DRG: 064 | End: 2021-05-28
Attending: EMERGENCY MEDICINE | Admitting: PSYCHIATRY & NEUROLOGY
Payer: MEDICARE

## 2021-05-26 VITALS
TEMPERATURE: 98 F | BODY MASS INDEX: 27.42 KG/M2 | SYSTOLIC BLOOD PRESSURE: 173 MMHG | HEART RATE: 66 BPM | WEIGHT: 164.81 LBS | RESPIRATION RATE: 19 BRPM | DIASTOLIC BLOOD PRESSURE: 81 MMHG | OXYGEN SATURATION: 99 %

## 2021-05-26 DIAGNOSIS — I49.9 CARDIAC ARRHYTHMIA, UNSPECIFIED CARDIAC ARRHYTHMIA TYPE: ICD-10-CM

## 2021-05-26 DIAGNOSIS — I63.432 EMBOLIC STROKE INVOLVING LEFT POSTERIOR CEREBRAL ARTERY: ICD-10-CM

## 2021-05-26 DIAGNOSIS — I49.9 ARRHYTHMIA: ICD-10-CM

## 2021-05-26 DIAGNOSIS — R47.01 APHASIA: ICD-10-CM

## 2021-05-26 DIAGNOSIS — R79.89 ELEVATED TROPONIN: ICD-10-CM

## 2021-05-26 DIAGNOSIS — I63.9 STROKE: ICD-10-CM

## 2021-05-26 LAB
ALBUMIN SERPL BCP-MCNC: 4.7 G/DL (ref 3.5–5.2)
ALP SERPL-CCNC: 115 U/L (ref 38–126)
ALT SERPL W/O P-5'-P-CCNC: 29 U/L (ref 10–44)
ANION GAP SERPL CALC-SCNC: 11 MMOL/L (ref 8–16)
APTT BLDCRRT: 29.1 SEC (ref 21–32)
AST SERPL-CCNC: 59 U/L (ref 15–46)
BASOPHILS # BLD AUTO: 0.02 K/UL (ref 0–0.2)
BASOPHILS NFR BLD: 0.6 % (ref 0–1.9)
BILIRUB SERPL-MCNC: 0.7 MG/DL (ref 0.1–1)
BUN SERPL-MCNC: 9 MG/DL (ref 6–30)
CALCIUM SERPL-MCNC: 9.9 MG/DL (ref 8.7–10.5)
CHLORIDE SERPL-SCNC: 104 MMOL/L (ref 95–110)
CHLORIDE SERPL-SCNC: 105 MMOL/L (ref 95–110)
CO2 SERPL-SCNC: 24 MMOL/L (ref 23–29)
CREAT SERPL-MCNC: 1.04 MG/DL (ref 0.5–1.4)
CREAT SERPL-MCNC: 1.1 MG/DL (ref 0.5–1.4)
DIFFERENTIAL METHOD: ABNORMAL
EOSINOPHIL # BLD AUTO: 0 K/UL (ref 0–0.5)
EOSINOPHIL NFR BLD: 0.3 % (ref 0–8)
ERYTHROCYTE [DISTWIDTH] IN BLOOD BY AUTOMATED COUNT: 14.7 % (ref 11.5–14.5)
EST. GFR  (AFRICAN AMERICAN): >60 ML/MIN/1.73 M^2
EST. GFR  (NON AFRICAN AMERICAN): >60 ML/MIN/1.73 M^2
ETHANOL SERPL-MCNC: 34 MG/DL
GLUCOSE SERPL-MCNC: 75 MG/DL (ref 70–110)
GLUCOSE SERPL-MCNC: 86 MG/DL (ref 70–110)
HCT VFR BLD AUTO: 38.9 % (ref 40–54)
HCT VFR BLD CALC: 36 %PCV (ref 36–54)
HGB BLD-MCNC: 12.8 G/DL (ref 14–18)
IMM GRANULOCYTES # BLD AUTO: 0.03 K/UL (ref 0–0.04)
IMM GRANULOCYTES NFR BLD AUTO: 0.8 % (ref 0–0.5)
INR PPP: 1.1 (ref 0.8–1.2)
LYMPHOCYTES # BLD AUTO: 1.2 K/UL (ref 1–4.8)
LYMPHOCYTES NFR BLD: 32.8 % (ref 18–48)
MCH RBC QN AUTO: 28.6 PG (ref 27–31)
MCHC RBC AUTO-ENTMCNC: 32.9 G/DL (ref 32–36)
MCV RBC AUTO: 87 FL (ref 82–98)
MONOCYTES # BLD AUTO: 0.5 K/UL (ref 0.3–1)
MONOCYTES NFR BLD: 13.8 % (ref 4–15)
NEUTROPHILS # BLD AUTO: 1.8 K/UL (ref 1.8–7.7)
NEUTROPHILS NFR BLD: 51.7 % (ref 38–73)
NRBC BLD-RTO: 0 /100 WBC
PLATELET # BLD AUTO: 120 K/UL (ref 150–450)
PMV BLD AUTO: 9.4 FL (ref 9.2–12.9)
POC IONIZED CALCIUM: 1.17 MMOL/L (ref 1.06–1.42)
POC TCO2 (MEASURED): 21 MMOL/L (ref 23–29)
POCT GLUCOSE: 74 MG/DL (ref 70–110)
POTASSIUM BLD-SCNC: 3.8 MMOL/L (ref 3.5–5.1)
POTASSIUM SERPL-SCNC: 4 MMOL/L (ref 3.5–5.1)
PROT SERPL-MCNC: 8.3 G/DL (ref 6–8.4)
PROTHROMBIN TIME: 11.4 SEC (ref 9–12.5)
RBC # BLD AUTO: 4.47 M/UL (ref 4.6–6.2)
SAMPLE: ABNORMAL
SARS-COV-2 RDRP RESP QL NAA+PROBE: NEGATIVE
SODIUM BLD-SCNC: 141 MMOL/L (ref 136–145)
SODIUM SERPL-SCNC: 139 MMOL/L (ref 136–145)
TROPONIN I SERPL-MCNC: 0.02 NG/ML (ref 0.01–0.03)
TSH SERPL DL<=0.005 MIU/L-ACNC: 5.63 UIU/ML (ref 0.4–4)
UUN UR-MCNC: 11 MG/DL (ref 2–20)
WBC # BLD AUTO: 3.54 K/UL (ref 3.9–12.7)

## 2021-05-26 PROCEDURE — 94760 N-INVAS EAR/PLS OXIMETRY 1: CPT | Mod: ER

## 2021-05-26 PROCEDURE — U0002 COVID-19 LAB TEST NON-CDC: HCPCS | Mod: ER | Performed by: EMERGENCY MEDICINE

## 2021-05-26 PROCEDURE — 84484 ASSAY OF TROPONIN QUANT: CPT | Mod: ER | Performed by: EMERGENCY MEDICINE

## 2021-05-26 PROCEDURE — 80061 LIPID PANEL: CPT | Performed by: STUDENT IN AN ORGANIZED HEALTH CARE EDUCATION/TRAINING PROGRAM

## 2021-05-26 PROCEDURE — 93010 EKG 12-LEAD: ICD-10-PCS | Mod: ,,, | Performed by: INTERNAL MEDICINE

## 2021-05-26 PROCEDURE — 93010 EKG 12-LEAD: ICD-10-PCS | Mod: 76,,, | Performed by: INTERNAL MEDICINE

## 2021-05-26 PROCEDURE — 85025 COMPLETE CBC W/AUTO DIFF WBC: CPT | Mod: ER | Performed by: EMERGENCY MEDICINE

## 2021-05-26 PROCEDURE — 84439 ASSAY OF FREE THYROXINE: CPT | Performed by: STUDENT IN AN ORGANIZED HEALTH CARE EDUCATION/TRAINING PROGRAM

## 2021-05-26 PROCEDURE — 80053 COMPREHEN METABOLIC PANEL: CPT | Mod: 91 | Performed by: STUDENT IN AN ORGANIZED HEALTH CARE EDUCATION/TRAINING PROGRAM

## 2021-05-26 PROCEDURE — G0425 PR INPT TELEHEALTH CONSULT 30M: ICD-10-PCS | Mod: 95,,, | Performed by: PSYCHIATRY & NEUROLOGY

## 2021-05-26 PROCEDURE — 84443 ASSAY THYROID STIM HORMONE: CPT | Mod: ER | Performed by: EMERGENCY MEDICINE

## 2021-05-26 PROCEDURE — 99285 PR EMERGENCY DEPT VISIT,LEVEL V: ICD-10-PCS | Mod: ,,, | Performed by: EMERGENCY MEDICINE

## 2021-05-26 PROCEDURE — 80061 LIPID PANEL: CPT | Mod: 91 | Performed by: EMERGENCY MEDICINE

## 2021-05-26 PROCEDURE — 80053 COMPREHEN METABOLIC PANEL: CPT | Mod: ER | Performed by: EMERGENCY MEDICINE

## 2021-05-26 PROCEDURE — 99285 EMERGENCY DEPT VISIT HI MDM: CPT | Mod: 25

## 2021-05-26 PROCEDURE — 99285 EMERGENCY DEPT VISIT HI MDM: CPT | Mod: ,,, | Performed by: EMERGENCY MEDICINE

## 2021-05-26 PROCEDURE — 82962 GLUCOSE BLOOD TEST: CPT | Mod: ER

## 2021-05-26 PROCEDURE — 82077 ASSAY SPEC XCP UR&BREATH IA: CPT | Mod: ER | Performed by: EMERGENCY MEDICINE

## 2021-05-26 PROCEDURE — 93005 ELECTROCARDIOGRAM TRACING: CPT | Mod: ER

## 2021-05-26 PROCEDURE — 85025 COMPLETE CBC W/AUTO DIFF WBC: CPT | Mod: 91 | Performed by: STUDENT IN AN ORGANIZED HEALTH CARE EDUCATION/TRAINING PROGRAM

## 2021-05-26 PROCEDURE — 93010 ELECTROCARDIOGRAM REPORT: CPT | Mod: 76,,, | Performed by: INTERNAL MEDICINE

## 2021-05-26 PROCEDURE — 99291 CRITICAL CARE FIRST HOUR: CPT | Mod: 25,ER

## 2021-05-26 PROCEDURE — 93010 ELECTROCARDIOGRAM REPORT: CPT | Mod: ,,, | Performed by: INTERNAL MEDICINE

## 2021-05-26 PROCEDURE — 82962 GLUCOSE BLOOD TEST: CPT

## 2021-05-26 PROCEDURE — G0425 INPT/ED TELECONSULT30: HCPCS | Mod: 95,,, | Performed by: PSYCHIATRY & NEUROLOGY

## 2021-05-26 PROCEDURE — 85730 THROMBOPLASTIN TIME PARTIAL: CPT | Mod: ER | Performed by: EMERGENCY MEDICINE

## 2021-05-26 PROCEDURE — 84443 ASSAY THYROID STIM HORMONE: CPT | Mod: 91 | Performed by: STUDENT IN AN ORGANIZED HEALTH CARE EDUCATION/TRAINING PROGRAM

## 2021-05-26 PROCEDURE — 85610 PROTHROMBIN TIME: CPT | Mod: ER | Performed by: EMERGENCY MEDICINE

## 2021-05-26 PROCEDURE — 84439 ASSAY OF FREE THYROXINE: CPT | Mod: 91 | Performed by: EMERGENCY MEDICINE

## 2021-05-26 RX ADMIN — IOHEXOL 75 ML: 350 INJECTION, SOLUTION INTRAVENOUS at 11:05

## 2021-05-27 PROBLEM — G93.6 CYTOTOXIC CEREBRAL EDEMA: Status: ACTIVE | Noted: 2021-05-27

## 2021-05-27 PROBLEM — F10.10 ALCOHOL USE DISORDER, MILD, ABUSE: Status: ACTIVE | Noted: 2021-05-27

## 2021-05-27 PROBLEM — I49.9 ARRHYTHMIA: Status: ACTIVE | Noted: 2021-05-27

## 2021-05-27 PROBLEM — I63.432 EMBOLIC STROKE INVOLVING LEFT POSTERIOR CEREBRAL ARTERY: Status: ACTIVE | Noted: 2021-05-27

## 2021-05-27 LAB
ALBUMIN SERPL BCP-MCNC: 3.9 G/DL (ref 3.5–5.2)
ALP SERPL-CCNC: 119 U/L (ref 55–135)
ALT SERPL W/O P-5'-P-CCNC: 26 U/L (ref 10–44)
ANION GAP SERPL CALC-SCNC: 13 MMOL/L (ref 8–16)
APTT BLDCRRT: 29.5 SEC (ref 21–32)
ASCENDING AORTA: 3.75 CM
AST SERPL-CCNC: 39 U/L (ref 10–40)
AV INDEX (PROSTH): 0.34
AV MEAN GRADIENT: 16 MMHG
AV PEAK GRADIENT: 28 MMHG
AV VALVE AREA: 1.23 CM2
AV VELOCITY RATIO: 0.35
BASOPHILS # BLD AUTO: 0.02 K/UL (ref 0–0.2)
BASOPHILS NFR BLD: 0.5 % (ref 0–1.9)
BILIRUB SERPL-MCNC: 0.7 MG/DL (ref 0.1–1)
BSA FOR ECHO PROCEDURE: 1.85 M2
BUN SERPL-MCNC: 10 MG/DL (ref 8–23)
CALCIUM SERPL-MCNC: 9.9 MG/DL (ref 8.7–10.5)
CHLORIDE SERPL-SCNC: 107 MMOL/L (ref 95–110)
CHOLEST SERPL-MCNC: 78 MG/DL (ref 120–199)
CHOLEST SERPL-MCNC: 84 MG/DL (ref 120–199)
CHOLEST/HDLC SERPL: 2.7 {RATIO} (ref 2–5)
CHOLEST/HDLC SERPL: 2.8 {RATIO} (ref 2–5)
CK MB SERPL-MCNC: 1.4 NG/ML (ref 0.1–6.5)
CK MB SERPL-RTO: 1.7 % (ref 0–5)
CK SERPL-CCNC: 83 U/L (ref 20–200)
CO2 SERPL-SCNC: 18 MMOL/L (ref 23–29)
CREAT SERPL-MCNC: 0.6 MG/DL (ref 0.5–1.4)
CREAT SERPL-MCNC: 1 MG/DL (ref 0.5–1.4)
CV ECHO LV RWT: 0.37 CM
DIFFERENTIAL METHOD: ABNORMAL
DOP CALC AO PEAK VEL: 2.66 M/S
DOP CALC AO VTI: 55.14 CM
DOP CALC LVOT AREA: 3.6 CM2
DOP CALC LVOT DIAMETER: 2.15 CM
DOP CALC LVOT PEAK VEL: 0.93 M/S
DOP CALC LVOT STROKE VOLUME: 67.89 CM3
DOP CALCLVOT PEAK VEL VTI: 18.71 CM
E WAVE DECELERATION TIME: 146.16 MSEC
E/A RATIO: 2.71
E/E' RATIO: 12.12 M/S
ECHO LV POSTERIOR WALL: 0.88 CM (ref 0.6–1.1)
EJECTION FRACTION: 55 %
EOSINOPHIL # BLD AUTO: 0 K/UL (ref 0–0.5)
EOSINOPHIL NFR BLD: 0.3 % (ref 0–8)
ERYTHROCYTE [DISTWIDTH] IN BLOOD BY AUTOMATED COUNT: 15 % (ref 11.5–14.5)
EST. GFR  (AFRICAN AMERICAN): >60 ML/MIN/1.73 M^2
EST. GFR  (NON AFRICAN AMERICAN): >60 ML/MIN/1.73 M^2
ESTIMATED AVG GLUCOSE: 111 MG/DL (ref 68–131)
FRACTIONAL SHORTENING: 27 % (ref 28–44)
GLUCOSE SERPL-MCNC: 73 MG/DL (ref 70–110)
HBA1C MFR BLD: 5.5 % (ref 4–5.6)
HCT VFR BLD AUTO: 37.4 % (ref 40–54)
HDLC SERPL-MCNC: 28 MG/DL (ref 40–75)
HDLC SERPL-MCNC: 31 MG/DL (ref 40–75)
HDLC SERPL: 35.9 % (ref 20–50)
HDLC SERPL: 36.9 % (ref 20–50)
HGB BLD-MCNC: 12.5 G/DL (ref 14–18)
IMM GRANULOCYTES # BLD AUTO: 0.03 K/UL (ref 0–0.04)
IMM GRANULOCYTES NFR BLD AUTO: 0.8 % (ref 0–0.5)
INR PPP: 1.1 (ref 0.8–1.2)
INTERVENTRICULAR SEPTUM: 0.88 CM (ref 0.6–1.1)
LA MAJOR: 5.9 CM
LA MINOR: 6 CM
LA WIDTH: 4.12 CM
LDLC SERPL CALC-MCNC: 29.2 MG/DL (ref 63–159)
LDLC SERPL CALC-MCNC: 33.6 MG/DL (ref 63–159)
LEFT ATRIUM SIZE: 4.97 CM
LEFT ATRIUM VOLUME INDEX MOD: 47.9 ML/M2
LEFT ATRIUM VOLUME INDEX: 57.9 ML/M2
LEFT ATRIUM VOLUME MOD: 85.69 CM3
LEFT ATRIUM VOLUME: 103.55 CM3
LEFT INTERNAL DIMENSION IN SYSTOLE: 3.46 CM (ref 2.1–4)
LEFT VENTRICLE DIASTOLIC VOLUME INDEX: 59.32 ML/M2
LEFT VENTRICLE DIASTOLIC VOLUME: 106.19 ML
LEFT VENTRICLE MASS INDEX: 79 G/M2
LEFT VENTRICLE SYSTOLIC VOLUME INDEX: 27.7 ML/M2
LEFT VENTRICLE SYSTOLIC VOLUME: 49.64 ML
LEFT VENTRICULAR INTERNAL DIMENSION IN DIASTOLE: 4.77 CM (ref 3.5–6)
LEFT VENTRICULAR MASS: 141.97 G
LV LATERAL E/E' RATIO: 9.36 M/S
LV SEPTAL E/E' RATIO: 17.17 M/S
LYMPHOCYTES # BLD AUTO: 1.2 K/UL (ref 1–4.8)
LYMPHOCYTES NFR BLD: 32.8 % (ref 18–48)
MCH RBC QN AUTO: 28.8 PG (ref 27–31)
MCHC RBC AUTO-ENTMCNC: 33.4 G/DL (ref 32–36)
MCV RBC AUTO: 86 FL (ref 82–98)
MONOCYTES # BLD AUTO: 0.6 K/UL (ref 0.3–1)
MONOCYTES NFR BLD: 15.1 % (ref 4–15)
MV PEAK A VEL: 0.38 M/S
MV PEAK E VEL: 1.03 M/S
MV STENOSIS PRESSURE HALF TIME: 42.39 MS
MV VALVE AREA P 1/2 METHOD: 5.19 CM2
NEUTROPHILS # BLD AUTO: 1.9 K/UL (ref 1.8–7.7)
NEUTROPHILS NFR BLD: 50.5 % (ref 38–73)
NONHDLC SERPL-MCNC: 50 MG/DL
NONHDLC SERPL-MCNC: 53 MG/DL
NRBC BLD-RTO: 0 /100 WBC
PISA TR MAX VEL: 2.71 M/S
PLATELET # BLD AUTO: 116 K/UL (ref 150–450)
PMV BLD AUTO: 9.6 FL (ref 9.2–12.9)
POC PTINR: 1.3 (ref 0.9–1.2)
POC PTWBT: 15.3 SEC (ref 9.7–14.3)
POCT GLUCOSE: 126 MG/DL (ref 70–110)
POCT GLUCOSE: 130 MG/DL (ref 70–110)
POCT GLUCOSE: 151 MG/DL (ref 70–110)
POTASSIUM SERPL-SCNC: 3.9 MMOL/L (ref 3.5–5.1)
PROT SERPL-MCNC: 7.7 G/DL (ref 6–8.4)
PROTHROMBIN TIME: 11.5 SEC (ref 9–12.5)
RA MAJOR: 4.93 CM
RA PRESSURE: 3 MMHG
RA WIDTH: 4.69 CM
RBC # BLD AUTO: 4.34 M/UL (ref 4.6–6.2)
RIGHT VENTRICULAR END-DIASTOLIC DIMENSION: 4.6 CM
SAMPLE: ABNORMAL
SAMPLE: NORMAL
SINUS: 3.59 CM
SODIUM SERPL-SCNC: 138 MMOL/L (ref 136–145)
STJ: 3.54 CM
T4 FREE SERPL-MCNC: 0.96 NG/DL (ref 0.71–1.51)
T4 FREE SERPL-MCNC: 1.03 NG/DL (ref 0.71–1.51)
TDI LATERAL: 0.11 M/S
TDI SEPTAL: 0.06 M/S
TDI: 0.09 M/S
TR MAX PG: 29 MMHG
TRICUSPID ANNULAR PLANE SYSTOLIC EXCURSION: 1.38 CM
TRIGL SERPL-MCNC: 104 MG/DL (ref 30–150)
TRIGL SERPL-MCNC: 97 MG/DL (ref 30–150)
TROPONIN I SERPL DL<=0.01 NG/ML-MCNC: 0.05 NG/ML (ref 0–0.03)
TROPONIN I SERPL DL<=0.01 NG/ML-MCNC: 0.07 NG/ML (ref 0–0.03)
TROPONIN I SERPL DL<=0.01 NG/ML-MCNC: 0.09 NG/ML (ref 0–0.03)
TSH SERPL DL<=0.005 MIU/L-ACNC: 4.89 UIU/ML (ref 0.4–4)
TV REST PULMONARY ARTERY PRESSURE: 32 MMHG
WBC # BLD AUTO: 3.72 K/UL (ref 3.9–12.7)

## 2021-05-27 PROCEDURE — 93010 ELECTROCARDIOGRAM REPORT: CPT | Mod: ,,, | Performed by: INTERNAL MEDICINE

## 2021-05-27 PROCEDURE — 11000001 HC ACUTE MED/SURG PRIVATE ROOM

## 2021-05-27 PROCEDURE — 85730 THROMBOPLASTIN TIME PARTIAL: CPT | Performed by: NURSE PRACTITIONER

## 2021-05-27 PROCEDURE — 97535 SELF CARE MNGMENT TRAINING: CPT

## 2021-05-27 PROCEDURE — 99223 PR INITIAL HOSPITAL CARE,LEVL III: ICD-10-PCS | Mod: AI,GC,, | Performed by: PSYCHIATRY & NEUROLOGY

## 2021-05-27 PROCEDURE — 63600175 PHARM REV CODE 636 W HCPCS: Performed by: NURSE PRACTITIONER

## 2021-05-27 PROCEDURE — 93005 ELECTROCARDIOGRAM TRACING: CPT

## 2021-05-27 PROCEDURE — 97116 GAIT TRAINING THERAPY: CPT

## 2021-05-27 PROCEDURE — 99222 1ST HOSP IP/OBS MODERATE 55: CPT | Mod: ,,, | Performed by: NURSE PRACTITIONER

## 2021-05-27 PROCEDURE — 36415 COLL VENOUS BLD VENIPUNCTURE: CPT | Performed by: NURSE PRACTITIONER

## 2021-05-27 PROCEDURE — 99900035 HC TECH TIME PER 15 MIN (STAT)

## 2021-05-27 PROCEDURE — 25000003 PHARM REV CODE 250: Performed by: PSYCHIATRY & NEUROLOGY

## 2021-05-27 PROCEDURE — 99222 PR INITIAL HOSPITAL CARE,LEVL II: ICD-10-PCS | Mod: ,,, | Performed by: NURSE PRACTITIONER

## 2021-05-27 PROCEDURE — 82565 ASSAY OF CREATININE: CPT

## 2021-05-27 PROCEDURE — 99223 1ST HOSP IP/OBS HIGH 75: CPT | Mod: AI,GC,, | Performed by: PSYCHIATRY & NEUROLOGY

## 2021-05-27 PROCEDURE — 25500020 PHARM REV CODE 255: Performed by: STUDENT IN AN ORGANIZED HEALTH CARE EDUCATION/TRAINING PROGRAM

## 2021-05-27 PROCEDURE — 85610 PROTHROMBIN TIME: CPT | Performed by: NURSE PRACTITIONER

## 2021-05-27 PROCEDURE — 93010 EKG 12-LEAD: ICD-10-PCS | Mod: ,,, | Performed by: INTERNAL MEDICINE

## 2021-05-27 PROCEDURE — 92610 EVALUATE SWALLOWING FUNCTION: CPT

## 2021-05-27 PROCEDURE — 97165 OT EVAL LOW COMPLEX 30 MIN: CPT

## 2021-05-27 PROCEDURE — S4991 NICOTINE PATCH NONLEGEND: HCPCS | Performed by: NURSE PRACTITIONER

## 2021-05-27 PROCEDURE — 85610 PROTHROMBIN TIME: CPT

## 2021-05-27 PROCEDURE — 93010 ELECTROCARDIOGRAM REPORT: CPT | Mod: 59,,, | Performed by: INTERNAL MEDICINE

## 2021-05-27 PROCEDURE — 97161 PT EVAL LOW COMPLEX 20 MIN: CPT

## 2021-05-27 PROCEDURE — 84484 ASSAY OF TROPONIN QUANT: CPT | Performed by: NURSE PRACTITIONER

## 2021-05-27 PROCEDURE — 25000003 PHARM REV CODE 250: Performed by: NURSE PRACTITIONER

## 2021-05-27 PROCEDURE — 93010 EKG 12-LEAD: ICD-10-PCS | Mod: 59,,, | Performed by: INTERNAL MEDICINE

## 2021-05-27 PROCEDURE — 82550 ASSAY OF CK (CPK): CPT | Performed by: NURSE PRACTITIONER

## 2021-05-27 PROCEDURE — 83036 HEMOGLOBIN GLYCOSYLATED A1C: CPT | Performed by: NURSE PRACTITIONER

## 2021-05-27 PROCEDURE — 92523 SPEECH SOUND LANG COMPREHEN: CPT

## 2021-05-27 PROCEDURE — 84484 ASSAY OF TROPONIN QUANT: CPT | Mod: 91 | Performed by: NURSE PRACTITIONER

## 2021-05-27 RX ORDER — OLANZAPINE 2.5 MG/1
2.5 TABLET ORAL DAILY
Status: DISCONTINUED | OUTPATIENT
Start: 2021-05-27 | End: 2021-05-28 | Stop reason: HOSPADM

## 2021-05-27 RX ORDER — SODIUM CHLORIDE 9 MG/ML
INJECTION, SOLUTION INTRAVENOUS
Status: DISCONTINUED | OUTPATIENT
Start: 2021-05-27 | End: 2021-05-28 | Stop reason: HOSPADM

## 2021-05-27 RX ORDER — INSULIN ASPART 100 [IU]/ML
0-5 INJECTION, SOLUTION INTRAVENOUS; SUBCUTANEOUS EVERY 6 HOURS PRN
Status: DISCONTINUED | OUTPATIENT
Start: 2021-05-27 | End: 2021-05-28 | Stop reason: HOSPADM

## 2021-05-27 RX ORDER — MUPIROCIN 20 MG/G
OINTMENT TOPICAL 2 TIMES DAILY
Status: DISCONTINUED | OUTPATIENT
Start: 2021-05-27 | End: 2021-05-28 | Stop reason: HOSPADM

## 2021-05-27 RX ORDER — GLUCAGON 1 MG
1 KIT INJECTION
Status: DISCONTINUED | OUTPATIENT
Start: 2021-05-27 | End: 2021-05-28 | Stop reason: HOSPADM

## 2021-05-27 RX ORDER — ASPIRIN 81 MG/1
81 TABLET ORAL DAILY
Status: DISCONTINUED | OUTPATIENT
Start: 2021-05-27 | End: 2021-05-27

## 2021-05-27 RX ORDER — SODIUM CHLORIDE 0.9 % (FLUSH) 0.9 %
10 SYRINGE (ML) INJECTION
Status: DISCONTINUED | OUTPATIENT
Start: 2021-05-27 | End: 2021-05-28 | Stop reason: HOSPADM

## 2021-05-27 RX ORDER — ONDANSETRON 8 MG/1
8 TABLET, ORALLY DISINTEGRATING ORAL EVERY 8 HOURS PRN
Status: DISCONTINUED | OUTPATIENT
Start: 2021-05-27 | End: 2021-05-28 | Stop reason: HOSPADM

## 2021-05-27 RX ORDER — LABETALOL HYDROCHLORIDE 5 MG/ML
10 INJECTION, SOLUTION INTRAVENOUS EVERY 6 HOURS PRN
Status: DISCONTINUED | OUTPATIENT
Start: 2021-05-27 | End: 2021-05-28 | Stop reason: HOSPADM

## 2021-05-27 RX ORDER — FOLIC ACID 5 MG/ML
1 INJECTION, SOLUTION INTRAMUSCULAR; INTRAVENOUS; SUBCUTANEOUS DAILY
Status: DISCONTINUED | OUTPATIENT
Start: 2021-05-27 | End: 2021-05-27

## 2021-05-27 RX ORDER — HEPARIN SODIUM 5000 [USP'U]/ML
5000 INJECTION, SOLUTION INTRAVENOUS; SUBCUTANEOUS EVERY 8 HOURS
Status: DISCONTINUED | OUTPATIENT
Start: 2021-05-27 | End: 2021-05-27

## 2021-05-27 RX ORDER — ACETAMINOPHEN 325 MG/1
650 TABLET ORAL EVERY 6 HOURS PRN
Status: DISCONTINUED | OUTPATIENT
Start: 2021-05-27 | End: 2021-05-28 | Stop reason: HOSPADM

## 2021-05-27 RX ORDER — ONDANSETRON 2 MG/ML
4 INJECTION INTRAMUSCULAR; INTRAVENOUS EVERY 12 HOURS PRN
Status: DISCONTINUED | OUTPATIENT
Start: 2021-05-27 | End: 2021-05-28 | Stop reason: HOSPADM

## 2021-05-27 RX ORDER — ATORVASTATIN CALCIUM 10 MG/1
10 TABLET, FILM COATED ORAL DAILY
Status: DISCONTINUED | OUTPATIENT
Start: 2021-05-27 | End: 2021-05-28 | Stop reason: HOSPADM

## 2021-05-27 RX ORDER — IBUPROFEN 200 MG
1 TABLET ORAL DAILY
Status: DISCONTINUED | OUTPATIENT
Start: 2021-05-27 | End: 2021-05-28 | Stop reason: HOSPADM

## 2021-05-27 RX ADMIN — FOLIC ACID 1 MG: 5 INJECTION, SOLUTION INTRAMUSCULAR; INTRAVENOUS; SUBCUTANEOUS at 09:05

## 2021-05-27 RX ADMIN — OLANZAPINE 2.5 MG: 2.5 TABLET, FILM COATED ORAL at 08:05

## 2021-05-27 RX ADMIN — ATORVASTATIN CALCIUM 10 MG: 10 TABLET, FILM COATED ORAL at 08:05

## 2021-05-27 RX ADMIN — SODIUM CHLORIDE: 0.9 INJECTION, SOLUTION INTRAVENOUS at 11:05

## 2021-05-27 RX ADMIN — SODIUM CHLORIDE: 0.9 INJECTION, SOLUTION INTRAVENOUS at 10:05

## 2021-05-27 RX ADMIN — APIXABAN 5 MG: 5 TABLET, FILM COATED ORAL at 09:05

## 2021-05-27 RX ADMIN — MUPIROCIN: 20 OINTMENT TOPICAL at 11:05

## 2021-05-27 RX ADMIN — MUPIROCIN: 20 OINTMENT TOPICAL at 09:05

## 2021-05-27 RX ADMIN — THIAMINE HYDROCHLORIDE 100 MG: 100 INJECTION, SOLUTION INTRAMUSCULAR; INTRAVENOUS at 11:05

## 2021-05-27 RX ADMIN — NICOTINE 1 PATCH: 21 PATCH, EXTENDED RELEASE TRANSDERMAL at 08:05

## 2021-05-27 RX ADMIN — APIXABAN 5 MG: 5 TABLET, FILM COATED ORAL at 08:05

## 2021-05-28 VITALS
DIASTOLIC BLOOD PRESSURE: 86 MMHG | OXYGEN SATURATION: 98 % | HEART RATE: 79 BPM | TEMPERATURE: 97 F | SYSTOLIC BLOOD PRESSURE: 176 MMHG | RESPIRATION RATE: 18 BRPM | BODY MASS INDEX: 26.45 KG/M2 | HEIGHT: 65 IN | WEIGHT: 158.75 LBS

## 2021-05-28 LAB
ALBUMIN SERPL BCP-MCNC: 4 G/DL (ref 3.5–5.2)
ALP SERPL-CCNC: 129 U/L (ref 55–135)
ALT SERPL W/O P-5'-P-CCNC: 32 U/L (ref 10–44)
ANION GAP SERPL CALC-SCNC: 14 MMOL/L (ref 8–16)
AST SERPL-CCNC: 55 U/L (ref 10–40)
BASOPHILS # BLD AUTO: 0.02 K/UL (ref 0–0.2)
BASOPHILS NFR BLD: 0.4 % (ref 0–1.9)
BILIRUB SERPL-MCNC: 1.3 MG/DL (ref 0.1–1)
BUN SERPL-MCNC: 10 MG/DL (ref 8–23)
CALCIUM SERPL-MCNC: 9.7 MG/DL (ref 8.7–10.5)
CHLORIDE SERPL-SCNC: 101 MMOL/L (ref 95–110)
CO2 SERPL-SCNC: 20 MMOL/L (ref 23–29)
CREAT SERPL-MCNC: 0.9 MG/DL (ref 0.5–1.4)
DIFFERENTIAL METHOD: ABNORMAL
EOSINOPHIL # BLD AUTO: 0 K/UL (ref 0–0.5)
EOSINOPHIL NFR BLD: 0.2 % (ref 0–8)
ERYTHROCYTE [DISTWIDTH] IN BLOOD BY AUTOMATED COUNT: 14.8 % (ref 11.5–14.5)
EST. GFR  (AFRICAN AMERICAN): >60 ML/MIN/1.73 M^2
EST. GFR  (NON AFRICAN AMERICAN): >60 ML/MIN/1.73 M^2
GLUCOSE SERPL-MCNC: 134 MG/DL (ref 70–110)
HCT VFR BLD AUTO: 41.3 % (ref 40–54)
HGB BLD-MCNC: 13.9 G/DL (ref 14–18)
IMM GRANULOCYTES # BLD AUTO: 0.02 K/UL (ref 0–0.04)
IMM GRANULOCYTES NFR BLD AUTO: 0.4 % (ref 0–0.5)
LYMPHOCYTES # BLD AUTO: 0.9 K/UL (ref 1–4.8)
LYMPHOCYTES NFR BLD: 19.6 % (ref 18–48)
MAGNESIUM SERPL-MCNC: 1.9 MG/DL (ref 1.6–2.6)
MCH RBC QN AUTO: 28.7 PG (ref 27–31)
MCHC RBC AUTO-ENTMCNC: 33.7 G/DL (ref 32–36)
MCV RBC AUTO: 85 FL (ref 82–98)
MONOCYTES # BLD AUTO: 0.7 K/UL (ref 0.3–1)
MONOCYTES NFR BLD: 14.7 % (ref 4–15)
NEUTROPHILS # BLD AUTO: 3 K/UL (ref 1.8–7.7)
NEUTROPHILS NFR BLD: 64.7 % (ref 38–73)
NRBC BLD-RTO: 0 /100 WBC
PHOSPHATE SERPL-MCNC: 2.8 MG/DL (ref 2.7–4.5)
PLATELET # BLD AUTO: 131 K/UL (ref 150–450)
PMV BLD AUTO: 9.8 FL (ref 9.2–12.9)
POCT GLUCOSE: 145 MG/DL (ref 70–110)
POTASSIUM SERPL-SCNC: 3.7 MMOL/L (ref 3.5–5.1)
PROT SERPL-MCNC: 7.9 G/DL (ref 6–8.4)
RBC # BLD AUTO: 4.85 M/UL (ref 4.6–6.2)
SODIUM SERPL-SCNC: 135 MMOL/L (ref 136–145)
WBC # BLD AUTO: 4.69 K/UL (ref 3.9–12.7)

## 2021-05-28 PROCEDURE — 97535 SELF CARE MNGMENT TRAINING: CPT

## 2021-05-28 PROCEDURE — 92507 TX SP LANG VOICE COMM INDIV: CPT

## 2021-05-28 PROCEDURE — 83735 ASSAY OF MAGNESIUM: CPT | Performed by: NURSE PRACTITIONER

## 2021-05-28 PROCEDURE — 25000003 PHARM REV CODE 250: Performed by: NURSE PRACTITIONER

## 2021-05-28 PROCEDURE — 99232 SBSQ HOSP IP/OBS MODERATE 35: CPT | Mod: ,,, | Performed by: NURSE PRACTITIONER

## 2021-05-28 PROCEDURE — 99233 SBSQ HOSP IP/OBS HIGH 50: CPT | Mod: ,,, | Performed by: PSYCHIATRY & NEUROLOGY

## 2021-05-28 PROCEDURE — S4991 NICOTINE PATCH NONLEGEND: HCPCS | Performed by: NURSE PRACTITIONER

## 2021-05-28 PROCEDURE — 36415 COLL VENOUS BLD VENIPUNCTURE: CPT | Performed by: NURSE PRACTITIONER

## 2021-05-28 PROCEDURE — 63600175 PHARM REV CODE 636 W HCPCS: Performed by: NURSE PRACTITIONER

## 2021-05-28 PROCEDURE — 99233 PR SUBSEQUENT HOSPITAL CARE,LEVL III: ICD-10-PCS | Mod: ,,, | Performed by: PSYCHIATRY & NEUROLOGY

## 2021-05-28 PROCEDURE — 85025 COMPLETE CBC W/AUTO DIFF WBC: CPT | Performed by: NURSE PRACTITIONER

## 2021-05-28 PROCEDURE — 80053 COMPREHEN METABOLIC PANEL: CPT | Performed by: NURSE PRACTITIONER

## 2021-05-28 PROCEDURE — 99232 PR SUBSEQUENT HOSPITAL CARE,LEVL II: ICD-10-PCS | Mod: ,,, | Performed by: NURSE PRACTITIONER

## 2021-05-28 PROCEDURE — 84100 ASSAY OF PHOSPHORUS: CPT | Performed by: NURSE PRACTITIONER

## 2021-05-28 PROCEDURE — 25000003 PHARM REV CODE 250: Performed by: PSYCHIATRY & NEUROLOGY

## 2021-05-28 RX ORDER — METOPROLOL TARTRATE 25 MG/1
25 TABLET, FILM COATED ORAL 2 TIMES DAILY
Status: DISCONTINUED | OUTPATIENT
Start: 2021-05-28 | End: 2021-05-28 | Stop reason: HOSPADM

## 2021-05-28 RX ORDER — ATORVASTATIN CALCIUM 10 MG/1
10 TABLET, FILM COATED ORAL DAILY
Qty: 30 TABLET | Refills: 0 | Status: ON HOLD | OUTPATIENT
Start: 2021-05-29 | End: 2022-10-05 | Stop reason: HOSPADM

## 2021-05-28 RX ADMIN — THIAMINE HYDROCHLORIDE 100 MG: 100 INJECTION, SOLUTION INTRAMUSCULAR; INTRAVENOUS at 09:05

## 2021-05-28 RX ADMIN — MUPIROCIN: 20 OINTMENT TOPICAL at 09:05

## 2021-05-28 RX ADMIN — FOLIC ACID 1 MG: 5 INJECTION, SOLUTION INTRAMUSCULAR; INTRAVENOUS; SUBCUTANEOUS at 09:05

## 2021-05-28 RX ADMIN — OLANZAPINE 2.5 MG: 2.5 TABLET, FILM COATED ORAL at 09:05

## 2021-05-28 RX ADMIN — ATORVASTATIN CALCIUM 10 MG: 10 TABLET, FILM COATED ORAL at 09:05

## 2021-05-28 RX ADMIN — METOPROLOL TARTRATE 25 MG: 25 TABLET, FILM COATED ORAL at 02:05

## 2021-05-28 RX ADMIN — APIXABAN 5 MG: 5 TABLET, FILM COATED ORAL at 09:05

## 2021-05-28 RX ADMIN — NICOTINE 1 PATCH: 21 PATCH, EXTENDED RELEASE TRANSDERMAL at 09:05

## 2021-05-31 ENCOUNTER — PATIENT OUTREACH (OUTPATIENT)
Dept: ADMINISTRATIVE | Facility: CLINIC | Age: 81
End: 2021-05-31

## 2021-05-31 DIAGNOSIS — I63.432 CEREBROVASCULAR ACCIDENT (CVA) DUE TO EMBOLISM OF LEFT POSTERIOR CEREBRAL ARTERY: Primary | ICD-10-CM

## 2021-06-01 ENCOUNTER — NURSE TRIAGE (OUTPATIENT)
Dept: ADMINISTRATIVE | Facility: CLINIC | Age: 81
End: 2021-06-01

## 2021-07-01 ENCOUNTER — LAB VISIT (OUTPATIENT)
Dept: LAB | Facility: HOSPITAL | Age: 81
End: 2021-07-01
Attending: INTERNAL MEDICINE
Payer: MEDICARE

## 2021-07-01 DIAGNOSIS — E11.49 TYPE 2 DIABETES MELLITUS WITH OTHER DIABETIC NEUROLOGICAL COMPLICATION: ICD-10-CM

## 2021-07-01 DIAGNOSIS — I10 ESSENTIAL (PRIMARY) HYPERTENSION: Primary | ICD-10-CM

## 2021-07-01 LAB
ALBUMIN SERPL BCP-MCNC: 4.5 G/DL (ref 3.5–5.2)
ALP SERPL-CCNC: 127 U/L (ref 38–126)
ALT SERPL W/O P-5'-P-CCNC: 52 U/L (ref 10–44)
ANION GAP SERPL CALC-SCNC: 11 MMOL/L (ref 8–16)
AST SERPL-CCNC: 60 U/L (ref 15–46)
BASOPHILS # BLD AUTO: 0.02 K/UL (ref 0–0.2)
BASOPHILS NFR BLD: 0.6 % (ref 0–1.9)
BILIRUB SERPL-MCNC: 0.6 MG/DL (ref 0.1–1)
CALCIUM SERPL-MCNC: 9.6 MG/DL (ref 8.7–10.5)
CHLORIDE SERPL-SCNC: 107 MMOL/L (ref 95–110)
CHOLEST SERPL-MCNC: 135 MG/DL (ref 120–199)
CHOLEST/HDLC SERPL: 4.1 {RATIO} (ref 2–5)
CO2 SERPL-SCNC: 21 MMOL/L (ref 23–29)
CREAT SERPL-MCNC: 0.93 MG/DL (ref 0.5–1.4)
DIFFERENTIAL METHOD: ABNORMAL
EOSINOPHIL # BLD AUTO: 0 K/UL (ref 0–0.5)
EOSINOPHIL NFR BLD: 0.3 % (ref 0–8)
ERYTHROCYTE [DISTWIDTH] IN BLOOD BY AUTOMATED COUNT: 14 % (ref 11.5–14.5)
EST. GFR  (AFRICAN AMERICAN): >60 ML/MIN/1.73 M^2
EST. GFR  (NON AFRICAN AMERICAN): >60 ML/MIN/1.73 M^2
GLUCOSE SERPL-MCNC: 160 MG/DL (ref 70–110)
HCT VFR BLD AUTO: 42.7 % (ref 40–54)
HDLC SERPL-MCNC: 33 MG/DL (ref 40–75)
HDLC SERPL: 24.4 % (ref 20–50)
HGB BLD-MCNC: 13.9 G/DL (ref 14–18)
IMM GRANULOCYTES # BLD AUTO: 0.03 K/UL (ref 0–0.04)
IMM GRANULOCYTES NFR BLD AUTO: 0.9 % (ref 0–0.5)
LDLC SERPL CALC-MCNC: 73.4 MG/DL (ref 63–159)
LYMPHOCYTES # BLD AUTO: 1.3 K/UL (ref 1–4.8)
LYMPHOCYTES NFR BLD: 38.1 % (ref 18–48)
MCH RBC QN AUTO: 28 PG (ref 27–31)
MCHC RBC AUTO-ENTMCNC: 32.6 G/DL (ref 32–36)
MCV RBC AUTO: 86 FL (ref 82–98)
MONOCYTES # BLD AUTO: 0.5 K/UL (ref 0.3–1)
MONOCYTES NFR BLD: 13.7 % (ref 4–15)
NEUTROPHILS # BLD AUTO: 1.5 K/UL (ref 1.8–7.7)
NEUTROPHILS NFR BLD: 46.4 % (ref 38–73)
NONHDLC SERPL-MCNC: 102 MG/DL
NRBC BLD-RTO: 0 /100 WBC
PLATELET # BLD AUTO: 148 K/UL (ref 150–450)
PMV BLD AUTO: 9.5 FL (ref 9.2–12.9)
POTASSIUM SERPL-SCNC: 3.9 MMOL/L (ref 3.5–5.1)
PROT SERPL-MCNC: 7.9 G/DL (ref 6–8.4)
RBC # BLD AUTO: 4.97 M/UL (ref 4.6–6.2)
SODIUM SERPL-SCNC: 139 MMOL/L (ref 136–145)
T4 FREE SERPL-MCNC: 0.87 NG/DL (ref 0.71–1.51)
TRIGL SERPL-MCNC: 143 MG/DL (ref 30–150)
TSH SERPL DL<=0.005 MIU/L-ACNC: 4.41 UIU/ML (ref 0.4–4)
UUN UR-MCNC: 19 MG/DL (ref 2–20)
WBC # BLD AUTO: 3.28 K/UL (ref 3.9–12.7)

## 2021-07-01 PROCEDURE — 85025 COMPLETE CBC W/AUTO DIFF WBC: CPT | Mod: PO | Performed by: INTERNAL MEDICINE

## 2021-07-01 PROCEDURE — 80061 LIPID PANEL: CPT | Performed by: INTERNAL MEDICINE

## 2021-07-01 PROCEDURE — 36415 COLL VENOUS BLD VENIPUNCTURE: CPT | Mod: PO | Performed by: INTERNAL MEDICINE

## 2021-07-01 PROCEDURE — 84443 ASSAY THYROID STIM HORMONE: CPT | Mod: PO | Performed by: INTERNAL MEDICINE

## 2021-07-01 PROCEDURE — 80053 COMPREHEN METABOLIC PANEL: CPT | Mod: PO | Performed by: INTERNAL MEDICINE

## 2021-07-01 PROCEDURE — 84439 ASSAY OF FREE THYROXINE: CPT | Performed by: INTERNAL MEDICINE

## 2021-07-22 ENCOUNTER — OFFICE VISIT (OUTPATIENT)
Dept: NEUROLOGY | Facility: CLINIC | Age: 81
End: 2021-07-22
Payer: MEDICARE

## 2021-07-22 VITALS
DIASTOLIC BLOOD PRESSURE: 74 MMHG | SYSTOLIC BLOOD PRESSURE: 132 MMHG | BODY MASS INDEX: 27.09 KG/M2 | WEIGHT: 162.56 LBS | HEART RATE: 67 BPM | HEIGHT: 65 IN

## 2021-07-22 DIAGNOSIS — I63.432 EMBOLIC STROKE INVOLVING LEFT POSTERIOR CEREBRAL ARTERY: ICD-10-CM

## 2021-07-22 PROCEDURE — 99999 PR PBB SHADOW E&M-EST. PATIENT-LVL III: CPT | Mod: PBBFAC,GC,, | Performed by: STUDENT IN AN ORGANIZED HEALTH CARE EDUCATION/TRAINING PROGRAM

## 2021-07-22 PROCEDURE — 99213 PR OFFICE/OUTPT VISIT, EST, LEVL III, 20-29 MIN: ICD-10-PCS | Mod: GC,S$GLB,, | Performed by: PSYCHIATRY & NEUROLOGY

## 2021-07-22 PROCEDURE — 99999 PR PBB SHADOW E&M-EST. PATIENT-LVL III: ICD-10-PCS | Mod: PBBFAC,GC,, | Performed by: STUDENT IN AN ORGANIZED HEALTH CARE EDUCATION/TRAINING PROGRAM

## 2021-07-22 PROCEDURE — 99213 OFFICE O/P EST LOW 20 MIN: CPT | Mod: GC,S$GLB,, | Performed by: PSYCHIATRY & NEUROLOGY

## 2021-07-22 RX ORDER — OXYCODONE AND ACETAMINOPHEN 10; 325 MG/1; MG/1
TABLET ORAL
COMMUNITY
Start: 2021-07-16 | End: 2022-10-19

## 2021-10-05 ENCOUNTER — HOSPITAL ENCOUNTER (EMERGENCY)
Facility: HOSPITAL | Age: 81
Discharge: HOME | End: 2021-10-05
Attending: EMERGENCY MEDICINE
Payer: MEDICARE

## 2021-10-05 VITALS
SYSTOLIC BLOOD PRESSURE: 173 MMHG | HEIGHT: 65 IN | HEART RATE: 64 BPM | DIASTOLIC BLOOD PRESSURE: 80 MMHG | OXYGEN SATURATION: 100 % | BODY MASS INDEX: 26.66 KG/M2 | RESPIRATION RATE: 16 BRPM | WEIGHT: 160 LBS | TEMPERATURE: 98 F

## 2021-10-05 DIAGNOSIS — R10.9 ABDOMINAL PAIN, UNSPECIFIED ABDOMINAL LOCATION: Primary | ICD-10-CM

## 2021-10-05 DIAGNOSIS — I10 HYPERTENSION, UNSPECIFIED TYPE: ICD-10-CM

## 2021-10-05 DIAGNOSIS — R07.9 CHEST PAIN: ICD-10-CM

## 2021-10-05 DIAGNOSIS — R06.02 SOB (SHORTNESS OF BREATH): ICD-10-CM

## 2021-10-05 LAB
ALBUMIN SERPL BCP-MCNC: 4.2 G/DL (ref 3.5–5.2)
ALP SERPL-CCNC: 188 U/L (ref 38–126)
ALT SERPL W/O P-5'-P-CCNC: 141 U/L (ref 10–44)
ANION GAP SERPL CALC-SCNC: 9 MMOL/L (ref 8–16)
AST SERPL-CCNC: 188 U/L (ref 15–46)
BASOPHILS # BLD AUTO: 0.03 K/UL (ref 0–0.2)
BASOPHILS NFR BLD: 0.9 % (ref 0–1.9)
BILIRUB SERPL-MCNC: 3.1 MG/DL (ref 0.1–1)
BILIRUB UR QL STRIP: NEGATIVE
CALCIUM SERPL-MCNC: 9.2 MG/DL (ref 8.7–10.5)
CHLORIDE SERPL-SCNC: 109 MMOL/L (ref 95–110)
CLARITY UR REFRACT.AUTO: CLEAR
CO2 SERPL-SCNC: 22 MMOL/L (ref 23–29)
COLOR UR AUTO: NORMAL
CREAT SERPL-MCNC: 0.88 MG/DL (ref 0.5–1.4)
D DIMER PPP IA.FEU-MCNC: 1.2 MG/L FEU
DIFFERENTIAL METHOD: ABNORMAL
EOSINOPHIL # BLD AUTO: 0 K/UL (ref 0–0.5)
EOSINOPHIL NFR BLD: 0.6 % (ref 0–8)
ERYTHROCYTE [DISTWIDTH] IN BLOOD BY AUTOMATED COUNT: 15.5 % (ref 11.5–14.5)
EST. GFR  (AFRICAN AMERICAN): >60 ML/MIN/1.73 M^2
EST. GFR  (NON AFRICAN AMERICAN): >60 ML/MIN/1.73 M^2
GLUCOSE SERPL-MCNC: 180 MG/DL (ref 70–110)
GLUCOSE UR QL STRIP: NEGATIVE
HCT VFR BLD AUTO: 40.9 % (ref 40–54)
HGB BLD-MCNC: 13.4 G/DL (ref 14–18)
HGB UR QL STRIP: NEGATIVE
IMM GRANULOCYTES # BLD AUTO: 0.02 K/UL (ref 0–0.04)
IMM GRANULOCYTES NFR BLD AUTO: 0.6 % (ref 0–0.5)
KETONES UR QL STRIP: NEGATIVE
LEUKOCYTE ESTERASE UR QL STRIP: NEGATIVE
LYMPHOCYTES # BLD AUTO: 0.6 K/UL (ref 1–4.8)
LYMPHOCYTES NFR BLD: 19.6 % (ref 18–48)
MCH RBC QN AUTO: 28.2 PG (ref 27–31)
MCHC RBC AUTO-ENTMCNC: 32.8 G/DL (ref 32–36)
MCV RBC AUTO: 86 FL (ref 82–98)
MONOCYTES # BLD AUTO: 0.4 K/UL (ref 0.3–1)
MONOCYTES NFR BLD: 13.9 % (ref 4–15)
NEUTROPHILS # BLD AUTO: 2 K/UL (ref 1.8–7.7)
NEUTROPHILS NFR BLD: 64.4 % (ref 38–73)
NITRITE UR QL STRIP: NEGATIVE
NRBC BLD-RTO: 0 /100 WBC
PH UR STRIP: 7 [PH] (ref 5–8)
PLATELET # BLD AUTO: 128 K/UL (ref 150–450)
PMV BLD AUTO: 9.8 FL (ref 9.2–12.9)
POTASSIUM SERPL-SCNC: 4.3 MMOL/L (ref 3.5–5.1)
PROT SERPL-MCNC: 7.5 G/DL (ref 6–8.4)
PROT UR QL STRIP: NEGATIVE
RBC # BLD AUTO: 4.76 M/UL (ref 4.6–6.2)
SARS-COV-2 RDRP RESP QL NAA+PROBE: NEGATIVE
SODIUM SERPL-SCNC: 140 MMOL/L (ref 136–145)
SP GR UR STRIP: 1.01 (ref 1–1.03)
TROPONIN I SERPL-MCNC: 0.02 NG/ML (ref 0.01–0.03)
URN SPEC COLLECT METH UR: NORMAL
UROBILINOGEN UR STRIP-ACNC: 1 EU/DL
UUN UR-MCNC: 16 MG/DL (ref 2–20)
WBC # BLD AUTO: 3.16 K/UL (ref 3.9–12.7)

## 2021-10-05 PROCEDURE — 63600175 PHARM REV CODE 636 W HCPCS: Mod: ER | Performed by: EMERGENCY MEDICINE

## 2021-10-05 PROCEDURE — 93005 ELECTROCARDIOGRAM TRACING: CPT | Mod: ER

## 2021-10-05 PROCEDURE — U0002 COVID-19 LAB TEST NON-CDC: HCPCS | Mod: ER | Performed by: EMERGENCY MEDICINE

## 2021-10-05 PROCEDURE — 81003 URINALYSIS AUTO W/O SCOPE: CPT | Mod: ER | Performed by: EMERGENCY MEDICINE

## 2021-10-05 PROCEDURE — 25000003 PHARM REV CODE 250: Mod: ER | Performed by: EMERGENCY MEDICINE

## 2021-10-05 PROCEDURE — 99285 EMERGENCY DEPT VISIT HI MDM: CPT | Mod: 25,ER

## 2021-10-05 PROCEDURE — 85025 COMPLETE CBC W/AUTO DIFF WBC: CPT | Mod: ER | Performed by: EMERGENCY MEDICINE

## 2021-10-05 PROCEDURE — 93010 ELECTROCARDIOGRAM REPORT: CPT | Mod: ,,, | Performed by: INTERNAL MEDICINE

## 2021-10-05 PROCEDURE — 84484 ASSAY OF TROPONIN QUANT: CPT | Mod: ER | Performed by: EMERGENCY MEDICINE

## 2021-10-05 PROCEDURE — 85379 FIBRIN DEGRADATION QUANT: CPT | Mod: ER | Performed by: EMERGENCY MEDICINE

## 2021-10-05 PROCEDURE — 96374 THER/PROPH/DIAG INJ IV PUSH: CPT | Mod: ER

## 2021-10-05 PROCEDURE — 80053 COMPREHEN METABOLIC PANEL: CPT | Mod: ER | Performed by: EMERGENCY MEDICINE

## 2021-10-05 PROCEDURE — 25500020 PHARM REV CODE 255: Mod: ER | Performed by: EMERGENCY MEDICINE

## 2021-10-05 PROCEDURE — 93010 EKG 12-LEAD: ICD-10-PCS | Mod: ,,, | Performed by: INTERNAL MEDICINE

## 2021-10-05 RX ORDER — MAG HYDROX/ALUMINUM HYD/SIMETH 200-200-20
30 SUSPENSION, ORAL (FINAL DOSE FORM) ORAL
Status: DISCONTINUED | OUTPATIENT
Start: 2021-10-05 | End: 2021-10-05 | Stop reason: HOSPADM

## 2021-10-05 RX ORDER — FAMOTIDINE 20 MG/1
20 TABLET, FILM COATED ORAL 2 TIMES DAILY
Qty: 20 TABLET | Refills: 0 | Status: ON HOLD | OUTPATIENT
Start: 2021-10-05 | End: 2021-10-15 | Stop reason: HOSPADM

## 2021-10-05 RX ORDER — SUCRALFATE 1 G/10ML
1 SUSPENSION ORAL EVERY 6 HOURS
Status: DISCONTINUED | OUTPATIENT
Start: 2021-10-05 | End: 2021-10-05 | Stop reason: HOSPADM

## 2021-10-05 RX ORDER — HYDROCODONE BITARTRATE AND ACETAMINOPHEN 5; 325 MG/1; MG/1
1 TABLET ORAL EVERY 4 HOURS PRN
Qty: 18 TABLET | Refills: 0 | Status: ON HOLD | OUTPATIENT
Start: 2021-10-05 | End: 2021-10-15 | Stop reason: HOSPADM

## 2021-10-05 RX ORDER — HYDROMORPHONE HYDROCHLORIDE 1 MG/ML
0.5 INJECTION, SOLUTION INTRAMUSCULAR; INTRAVENOUS; SUBCUTANEOUS
Status: COMPLETED | OUTPATIENT
Start: 2021-10-05 | End: 2021-10-05

## 2021-10-05 RX ADMIN — IOHEXOL 100 ML: 350 INJECTION, SOLUTION INTRAVENOUS at 12:10

## 2021-10-05 RX ADMIN — HYDROMORPHONE HYDROCHLORIDE 0.5 MG: 1 INJECTION, SOLUTION INTRAMUSCULAR; INTRAVENOUS; SUBCUTANEOUS at 12:10

## 2021-10-05 RX ADMIN — LIDOCAINE HYDROCHLORIDE: 20 SOLUTION ORAL; TOPICAL at 11:10

## 2021-10-09 ENCOUNTER — HOSPITAL ENCOUNTER (EMERGENCY)
Facility: HOSPITAL | Age: 81
Discharge: ANOTHER HOSPITAL | End: 2021-10-09
Attending: EMERGENCY MEDICINE
Payer: MEDICARE

## 2021-10-09 VITALS
WEIGHT: 160.06 LBS | BODY MASS INDEX: 26.63 KG/M2 | DIASTOLIC BLOOD PRESSURE: 58 MMHG | SYSTOLIC BLOOD PRESSURE: 128 MMHG | HEART RATE: 69 BPM | TEMPERATURE: 98 F | RESPIRATION RATE: 21 BRPM | OXYGEN SATURATION: 100 %

## 2021-10-09 DIAGNOSIS — R11.10 VOMITING: ICD-10-CM

## 2021-10-09 DIAGNOSIS — R10.9 ABDOMINAL PAIN: ICD-10-CM

## 2021-10-09 DIAGNOSIS — K92.2 UGIB (UPPER GASTROINTESTINAL BLEED): Primary | ICD-10-CM

## 2021-10-09 LAB
ALBUMIN SERPL BCP-MCNC: 3.6 G/DL (ref 3.5–5.2)
ALP SERPL-CCNC: 227 U/L (ref 38–126)
ALT SERPL W/O P-5'-P-CCNC: 126 U/L (ref 10–44)
AMMONIA BLD-SCNC: 9 UMOL/L (ref 9–30)
ANION GAP SERPL CALC-SCNC: 11 MMOL/L (ref 8–16)
APTT BLDCRRT: 30.1 SEC (ref 21–32)
AST SERPL-CCNC: 154 U/L (ref 15–46)
BASOPHILS # BLD AUTO: 0.01 K/UL (ref 0–0.2)
BASOPHILS NFR BLD: 0.3 % (ref 0–1.9)
BILIRUB SERPL-MCNC: 4 MG/DL (ref 0.1–1)
CALCIUM SERPL-MCNC: 8.3 MG/DL (ref 8.7–10.5)
CHLORIDE SERPL-SCNC: 108 MMOL/L (ref 95–110)
CO2 SERPL-SCNC: 20 MMOL/L (ref 23–29)
CREAT SERPL-MCNC: 1.06 MG/DL (ref 0.5–1.4)
DIFFERENTIAL METHOD: ABNORMAL
EOSINOPHIL # BLD AUTO: 0 K/UL (ref 0–0.5)
EOSINOPHIL NFR BLD: 0 % (ref 0–8)
ERYTHROCYTE [DISTWIDTH] IN BLOOD BY AUTOMATED COUNT: 16.6 % (ref 11.5–14.5)
EST. GFR  (AFRICAN AMERICAN): >60 ML/MIN/1.73 M^2
EST. GFR  (NON AFRICAN AMERICAN): >60 ML/MIN/1.73 M^2
GLUCOSE SERPL-MCNC: 130 MG/DL (ref 70–110)
HCT VFR BLD AUTO: 22 % (ref 40–54)
HGB BLD-MCNC: 7.2 G/DL (ref 14–18)
IMM GRANULOCYTES # BLD AUTO: 0.03 K/UL (ref 0–0.04)
IMM GRANULOCYTES NFR BLD AUTO: 0.9 % (ref 0–0.5)
INR PPP: 1.1 (ref 0.8–1.2)
LACTATE SERPL-SCNC: 2 MMOL/L (ref 0.5–2.2)
LIPASE SERPL-CCNC: 266 U/L (ref 23–300)
LYMPHOCYTES # BLD AUTO: 0.7 K/UL (ref 1–4.8)
LYMPHOCYTES NFR BLD: 20.9 % (ref 18–48)
MCH RBC QN AUTO: 29 PG (ref 27–31)
MCHC RBC AUTO-ENTMCNC: 32.7 G/DL (ref 32–36)
MCV RBC AUTO: 89 FL (ref 82–98)
MONOCYTES # BLD AUTO: 0.5 K/UL (ref 0.3–1)
MONOCYTES NFR BLD: 15 % (ref 4–15)
NEUTROPHILS # BLD AUTO: 2.1 K/UL (ref 1.8–7.7)
NEUTROPHILS NFR BLD: 62.9 % (ref 38–73)
NRBC BLD-RTO: 1 /100 WBC
OB PNL STL: POSITIVE
PLATELET # BLD AUTO: 123 K/UL (ref 150–450)
PMV BLD AUTO: 10.1 FL (ref 9.2–12.9)
POTASSIUM SERPL-SCNC: 3.6 MMOL/L (ref 3.5–5.1)
PROT SERPL-MCNC: 6.7 G/DL (ref 6–8.4)
PROTHROMBIN TIME: 11.1 SEC (ref 9–12.5)
RBC # BLD AUTO: 2.48 M/UL (ref 4.6–6.2)
SARS-COV-2 RDRP RESP QL NAA+PROBE: NEGATIVE
SODIUM SERPL-SCNC: 139 MMOL/L (ref 136–145)
TROPONIN I SERPL-MCNC: 0.03 NG/ML (ref 0.01–0.03)
UUN UR-MCNC: 21 MG/DL (ref 2–20)
WBC # BLD AUTO: 3.39 K/UL (ref 3.9–12.7)

## 2021-10-09 PROCEDURE — U0002 COVID-19 LAB TEST NON-CDC: HCPCS | Mod: ER | Performed by: EMERGENCY MEDICINE

## 2021-10-09 PROCEDURE — 25500020 PHARM REV CODE 255: Mod: ER | Performed by: EMERGENCY MEDICINE

## 2021-10-09 PROCEDURE — 36430 TRANSFUSION BLD/BLD COMPNT: CPT | Mod: 59,ER

## 2021-10-09 PROCEDURE — 63600175 PHARM REV CODE 636 W HCPCS: Mod: ER | Performed by: EMERGENCY MEDICINE

## 2021-10-09 PROCEDURE — 99291 CRITICAL CARE FIRST HOUR: CPT | Mod: 25,ER

## 2021-10-09 PROCEDURE — 96365 THER/PROPH/DIAG IV INF INIT: CPT | Mod: 59,ER

## 2021-10-09 PROCEDURE — 80053 COMPREHEN METABOLIC PANEL: CPT | Mod: ER | Performed by: EMERGENCY MEDICINE

## 2021-10-09 PROCEDURE — 25000003 PHARM REV CODE 250: Mod: ER | Performed by: EMERGENCY MEDICINE

## 2021-10-09 PROCEDURE — 93010 ELECTROCARDIOGRAM REPORT: CPT | Mod: ,,, | Performed by: INTERNAL MEDICINE

## 2021-10-09 PROCEDURE — 84484 ASSAY OF TROPONIN QUANT: CPT | Mod: ER | Performed by: EMERGENCY MEDICINE

## 2021-10-09 PROCEDURE — 86920 COMPATIBILITY TEST SPIN: CPT | Performed by: EMERGENCY MEDICINE

## 2021-10-09 PROCEDURE — 93010 EKG 12-LEAD: ICD-10-PCS | Mod: ,,, | Performed by: INTERNAL MEDICINE

## 2021-10-09 PROCEDURE — 85730 THROMBOPLASTIN TIME PARTIAL: CPT | Mod: ER | Performed by: EMERGENCY MEDICINE

## 2021-10-09 PROCEDURE — 85610 PROTHROMBIN TIME: CPT | Mod: ER | Performed by: EMERGENCY MEDICINE

## 2021-10-09 PROCEDURE — 83605 ASSAY OF LACTIC ACID: CPT | Mod: ER | Performed by: EMERGENCY MEDICINE

## 2021-10-09 PROCEDURE — P9016 RBC LEUKOCYTES REDUCED: HCPCS | Performed by: EMERGENCY MEDICINE

## 2021-10-09 PROCEDURE — 82272 OCCULT BLD FECES 1-3 TESTS: CPT | Mod: ER | Performed by: EMERGENCY MEDICINE

## 2021-10-09 PROCEDURE — 82140 ASSAY OF AMMONIA: CPT | Mod: ER | Performed by: EMERGENCY MEDICINE

## 2021-10-09 PROCEDURE — C9113 INJ PANTOPRAZOLE SODIUM, VIA: HCPCS | Mod: ER | Performed by: EMERGENCY MEDICINE

## 2021-10-09 PROCEDURE — 93005 ELECTROCARDIOGRAM TRACING: CPT | Mod: ER

## 2021-10-09 PROCEDURE — 83690 ASSAY OF LIPASE: CPT | Mod: ER | Performed by: EMERGENCY MEDICINE

## 2021-10-09 PROCEDURE — 96375 TX/PRO/DX INJ NEW DRUG ADDON: CPT | Mod: ER

## 2021-10-09 PROCEDURE — 85025 COMPLETE CBC W/AUTO DIFF WBC: CPT | Mod: ER | Performed by: EMERGENCY MEDICINE

## 2021-10-09 RX ORDER — OCTREOTIDE ACETATE 50 UG/ML
50 INJECTION, SOLUTION INTRAVENOUS; SUBCUTANEOUS
Status: COMPLETED | OUTPATIENT
Start: 2021-10-09 | End: 2021-10-09

## 2021-10-09 RX ORDER — PANTOPRAZOLE SODIUM 40 MG/10ML
80 INJECTION, POWDER, LYOPHILIZED, FOR SOLUTION INTRAVENOUS ONCE
Status: COMPLETED | OUTPATIENT
Start: 2021-10-09 | End: 2021-10-09

## 2021-10-09 RX ORDER — HYDROCODONE BITARTRATE AND ACETAMINOPHEN 500; 5 MG/1; MG/1
TABLET ORAL
Status: DISCONTINUED | OUTPATIENT
Start: 2021-10-09 | End: 2021-10-10 | Stop reason: HOSPADM

## 2021-10-09 RX ADMIN — PANTOPRAZOLE SODIUM 80 MG: 40 INJECTION, POWDER, FOR SOLUTION INTRAVENOUS at 09:10

## 2021-10-09 RX ADMIN — IOHEXOL 100 ML: 350 INJECTION, SOLUTION INTRAVENOUS at 08:10

## 2021-10-09 RX ADMIN — OCTREOTIDE ACETATE 50 MCG: 50 INJECTION, SOLUTION INTRAVENOUS; SUBCUTANEOUS at 09:10

## 2021-10-09 RX ADMIN — SODIUM CHLORIDE 1000 ML: 0.9 INJECTION, SOLUTION INTRAVENOUS at 08:10

## 2021-10-09 RX ADMIN — OCTREOTIDE ACETATE 50 MCG/HR: 500 INJECTION, SOLUTION INTRAVENOUS; SUBCUTANEOUS at 10:10

## 2021-10-09 RX ADMIN — CEFTRIAXONE 1 G: 1 INJECTION, SOLUTION INTRAVENOUS at 09:10

## 2021-10-10 ENCOUNTER — HOSPITAL ENCOUNTER (INPATIENT)
Facility: OTHER | Age: 81
LOS: 5 days | Discharge: HOME HEALTH | DRG: 424 | End: 2021-10-15
Attending: HOSPITALIST | Admitting: HOSPITALIST
Payer: MEDICARE

## 2021-10-10 DIAGNOSIS — K83.1 BILIARY OBSTRUCTION: ICD-10-CM

## 2021-10-10 DIAGNOSIS — K92.2 GASTROINTESTINAL HEMORRHAGE, UNSPECIFIED GASTROINTESTINAL HEMORRHAGE TYPE: Primary | ICD-10-CM

## 2021-10-10 DIAGNOSIS — K92.2 ACUTE UPPER GI BLEEDING: ICD-10-CM

## 2021-10-10 DIAGNOSIS — I48.91 ATRIAL FIBRILLATION: ICD-10-CM

## 2021-10-10 DIAGNOSIS — K92.2 GIB (GASTROINTESTINAL BLEEDING): ICD-10-CM

## 2021-10-10 LAB
ABO + RH BLD: NORMAL
ALBUMIN SERPL BCP-MCNC: 2.8 G/DL (ref 3.5–5.2)
ALBUMIN SERPL BCP-MCNC: 2.8 G/DL (ref 3.5–5.2)
ALP SERPL-CCNC: 198 U/L (ref 55–135)
ALP SERPL-CCNC: 235 U/L (ref 55–135)
ALT SERPL W/O P-5'-P-CCNC: 113 U/L (ref 10–44)
ALT SERPL W/O P-5'-P-CCNC: 131 U/L (ref 10–44)
ANION GAP SERPL CALC-SCNC: 10 MMOL/L (ref 8–16)
ANION GAP SERPL CALC-SCNC: 11 MMOL/L (ref 8–16)
AST SERPL-CCNC: 124 U/L (ref 10–40)
AST SERPL-CCNC: 155 U/L (ref 10–40)
BASOPHILS # BLD AUTO: 0.02 K/UL (ref 0–0.2)
BASOPHILS NFR BLD: 0.4 % (ref 0–1.9)
BASOPHILS NFR BLD: 0.5 % (ref 0–1.9)
BASOPHILS NFR BLD: 0.5 % (ref 0–1.9)
BASOPHILS NFR BLD: 0.6 % (ref 0–1.9)
BILIRUB SERPL-MCNC: 4.6 MG/DL (ref 0.1–1)
BILIRUB SERPL-MCNC: 5.5 MG/DL (ref 0.1–1)
BLD GP AB SCN CELLS X3 SERPL QL: NORMAL
BLD PROD TYP BPU: NORMAL
BLOOD UNIT EXPIRATION DATE: NORMAL
BLOOD UNIT TYPE CODE: 9500
BLOOD UNIT TYPE: NORMAL
BUN SERPL-MCNC: 17 MG/DL (ref 8–23)
BUN SERPL-MCNC: 19 MG/DL (ref 8–23)
CALCIUM SERPL-MCNC: 8 MG/DL (ref 8.7–10.5)
CALCIUM SERPL-MCNC: 8.1 MG/DL (ref 8.7–10.5)
CHLORIDE SERPL-SCNC: 109 MMOL/L (ref 95–110)
CHLORIDE SERPL-SCNC: 111 MMOL/L (ref 95–110)
CO2 SERPL-SCNC: 16 MMOL/L (ref 23–29)
CO2 SERPL-SCNC: 16 MMOL/L (ref 23–29)
CODING SYSTEM: NORMAL
CREAT SERPL-MCNC: 0.9 MG/DL (ref 0.5–1.4)
CREAT SERPL-MCNC: 1 MG/DL (ref 0.5–1.4)
DIFFERENTIAL METHOD: ABNORMAL
DISPENSE STATUS: NORMAL
EOSINOPHIL # BLD AUTO: 0 K/UL (ref 0–0.5)
EOSINOPHIL NFR BLD: 0.2 % (ref 0–8)
EOSINOPHIL NFR BLD: 0.2 % (ref 0–8)
EOSINOPHIL NFR BLD: 0.3 % (ref 0–8)
EOSINOPHIL NFR BLD: 0.3 % (ref 0–8)
ERYTHROCYTE [DISTWIDTH] IN BLOOD BY AUTOMATED COUNT: 18.5 % (ref 11.5–14.5)
ERYTHROCYTE [DISTWIDTH] IN BLOOD BY AUTOMATED COUNT: 19.5 % (ref 11.5–14.5)
ERYTHROCYTE [DISTWIDTH] IN BLOOD BY AUTOMATED COUNT: 19.9 % (ref 11.5–14.5)
ERYTHROCYTE [DISTWIDTH] IN BLOOD BY AUTOMATED COUNT: 20.2 % (ref 11.5–14.5)
EST. GFR  (AFRICAN AMERICAN): >60 ML/MIN/1.73 M^2
EST. GFR  (AFRICAN AMERICAN): >60 ML/MIN/1.73 M^2
EST. GFR  (NON AFRICAN AMERICAN): >60 ML/MIN/1.73 M^2
EST. GFR  (NON AFRICAN AMERICAN): >60 ML/MIN/1.73 M^2
ESTIMATED AVG GLUCOSE: 105 MG/DL (ref 68–131)
GLUCOSE SERPL-MCNC: 119 MG/DL (ref 70–110)
GLUCOSE SERPL-MCNC: 202 MG/DL (ref 70–110)
HAPTOGLOB SERPL-MCNC: 161 MG/DL (ref 30–250)
HBA1C MFR BLD: 5.3 % (ref 4–5.6)
HCT VFR BLD AUTO: 21.4 % (ref 40–54)
HCT VFR BLD AUTO: 22.1 % (ref 40–54)
HCT VFR BLD AUTO: 24.5 % (ref 40–54)
HCT VFR BLD AUTO: 24.7 % (ref 40–54)
HGB BLD-MCNC: 6.9 G/DL (ref 14–18)
HGB BLD-MCNC: 7 G/DL (ref 14–18)
HGB BLD-MCNC: 7.2 G/DL (ref 14–18)
HGB BLD-MCNC: 7.4 G/DL (ref 14–18)
HGB BLD-MCNC: 7.9 G/DL (ref 14–18)
IMM GRANULOCYTES # BLD AUTO: 0.05 K/UL (ref 0–0.04)
IMM GRANULOCYTES # BLD AUTO: 0.06 K/UL (ref 0–0.04)
IMM GRANULOCYTES # BLD AUTO: 0.08 K/UL (ref 0–0.04)
IMM GRANULOCYTES # BLD AUTO: 0.08 K/UL (ref 0–0.04)
IMM GRANULOCYTES NFR BLD AUTO: 1.2 % (ref 0–0.5)
IMM GRANULOCYTES NFR BLD AUTO: 1.6 % (ref 0–0.5)
IMM GRANULOCYTES NFR BLD AUTO: 1.7 % (ref 0–0.5)
IMM GRANULOCYTES NFR BLD AUTO: 2.4 % (ref 0–0.5)
LYMPHOCYTES # BLD AUTO: 0.6 K/UL (ref 1–4.8)
LYMPHOCYTES # BLD AUTO: 0.8 K/UL (ref 1–4.8)
LYMPHOCYTES # BLD AUTO: 0.8 K/UL (ref 1–4.8)
LYMPHOCYTES # BLD AUTO: 0.9 K/UL (ref 1–4.8)
LYMPHOCYTES NFR BLD: 16.8 % (ref 18–48)
LYMPHOCYTES NFR BLD: 16.9 % (ref 18–48)
LYMPHOCYTES NFR BLD: 20.9 % (ref 18–48)
LYMPHOCYTES NFR BLD: 21.1 % (ref 18–48)
MAGNESIUM SERPL-MCNC: 2.1 MG/DL (ref 1.6–2.6)
MCH RBC QN AUTO: 27.6 PG (ref 27–31)
MCH RBC QN AUTO: 28 PG (ref 27–31)
MCH RBC QN AUTO: 28.1 PG (ref 27–31)
MCH RBC QN AUTO: 28.6 PG (ref 27–31)
MCHC RBC AUTO-ENTMCNC: 30.2 G/DL (ref 32–36)
MCHC RBC AUTO-ENTMCNC: 31.7 G/DL (ref 32–36)
MCHC RBC AUTO-ENTMCNC: 32 G/DL (ref 32–36)
MCHC RBC AUTO-ENTMCNC: 32.2 G/DL (ref 32–36)
MCV RBC AUTO: 86 FL (ref 82–98)
MCV RBC AUTO: 87 FL (ref 82–98)
MCV RBC AUTO: 89 FL (ref 82–98)
MCV RBC AUTO: 95 FL (ref 82–98)
MONOCYTES # BLD AUTO: 0.5 K/UL (ref 0.3–1)
MONOCYTES # BLD AUTO: 0.7 K/UL (ref 0.3–1)
MONOCYTES # BLD AUTO: 1 K/UL (ref 0.3–1)
MONOCYTES # BLD AUTO: 1.1 K/UL (ref 0.3–1)
MONOCYTES NFR BLD: 15.2 % (ref 4–15)
MONOCYTES NFR BLD: 19.3 % (ref 4–15)
MONOCYTES NFR BLD: 23.1 % (ref 4–15)
MONOCYTES NFR BLD: 24.7 % (ref 4–15)
NEUTROPHILS # BLD AUTO: 2.1 K/UL (ref 1.8–7.7)
NEUTROPHILS # BLD AUTO: 2.2 K/UL (ref 1.8–7.7)
NEUTROPHILS # BLD AUTO: 2.2 K/UL (ref 1.8–7.7)
NEUTROPHILS # BLD AUTO: 2.6 K/UL (ref 1.8–7.7)
NEUTROPHILS NFR BLD: 54.1 % (ref 38–73)
NEUTROPHILS NFR BLD: 56.1 % (ref 38–73)
NEUTROPHILS NFR BLD: 57.2 % (ref 38–73)
NEUTROPHILS NFR BLD: 64.7 % (ref 38–73)
NRBC BLD-RTO: 1 /100 WBC
NUM UNITS TRANS PACKED RBC: NORMAL
PHOSPHATE SERPL-MCNC: 2.1 MG/DL (ref 2.7–4.5)
PLATELET # BLD AUTO: 115 K/UL (ref 150–450)
PLATELET # BLD AUTO: 117 K/UL (ref 150–450)
PLATELET # BLD AUTO: 129 K/UL (ref 150–450)
PLATELET # BLD AUTO: 98 K/UL (ref 150–450)
PMV BLD AUTO: 10 FL (ref 9.2–12.9)
PMV BLD AUTO: 10 FL (ref 9.2–12.9)
PMV BLD AUTO: 10.1 FL (ref 9.2–12.9)
PMV BLD AUTO: 9.7 FL (ref 9.2–12.9)
POCT GLUCOSE: 130 MG/DL (ref 70–110)
POCT GLUCOSE: 162 MG/DL (ref 70–110)
POCT GLUCOSE: 190 MG/DL (ref 70–110)
POTASSIUM SERPL-SCNC: 4.1 MMOL/L (ref 3.5–5.1)
POTASSIUM SERPL-SCNC: 4.1 MMOL/L (ref 3.5–5.1)
PROT SERPL-MCNC: 5.6 G/DL (ref 6–8.4)
PROT SERPL-MCNC: 5.8 G/DL (ref 6–8.4)
RBC # BLD AUTO: 2.46 M/UL (ref 4.6–6.2)
RBC # BLD AUTO: 2.49 M/UL (ref 4.6–6.2)
RBC # BLD AUTO: 2.59 M/UL (ref 4.6–6.2)
RBC # BLD AUTO: 2.86 M/UL (ref 4.6–6.2)
SODIUM SERPL-SCNC: 136 MMOL/L (ref 136–145)
SODIUM SERPL-SCNC: 137 MMOL/L (ref 136–145)
WBC # BLD AUTO: 3.28 K/UL (ref 3.9–12.7)
WBC # BLD AUTO: 3.83 K/UL (ref 3.9–12.7)
WBC # BLD AUTO: 4.11 K/UL (ref 3.9–12.7)
WBC # BLD AUTO: 4.62 K/UL (ref 3.9–12.7)

## 2021-10-10 PROCEDURE — 63600175 PHARM REV CODE 636 W HCPCS: Performed by: NURSE PRACTITIONER

## 2021-10-10 PROCEDURE — 83735 ASSAY OF MAGNESIUM: CPT | Performed by: HOSPITALIST

## 2021-10-10 PROCEDURE — 85025 COMPLETE CBC W/AUTO DIFF WBC: CPT | Mod: 91 | Performed by: NURSE PRACTITIONER

## 2021-10-10 PROCEDURE — C9113 INJ PANTOPRAZOLE SODIUM, VIA: HCPCS | Performed by: NURSE PRACTITIONER

## 2021-10-10 PROCEDURE — 36415 COLL VENOUS BLD VENIPUNCTURE: CPT | Performed by: INTERNAL MEDICINE

## 2021-10-10 PROCEDURE — 85025 COMPLETE CBC W/AUTO DIFF WBC: CPT | Mod: 91 | Performed by: HOSPITALIST

## 2021-10-10 PROCEDURE — 85018 HEMOGLOBIN: CPT | Performed by: INTERNAL MEDICINE

## 2021-10-10 PROCEDURE — 86900 BLOOD TYPING SEROLOGIC ABO: CPT | Performed by: NURSE PRACTITIONER

## 2021-10-10 PROCEDURE — 83010 ASSAY OF HAPTOGLOBIN QUANT: CPT | Performed by: INTERNAL MEDICINE

## 2021-10-10 PROCEDURE — 86920 COMPATIBILITY TEST SPIN: CPT | Performed by: ANESTHESIOLOGY

## 2021-10-10 PROCEDURE — 25000003 PHARM REV CODE 250: Performed by: HOSPITALIST

## 2021-10-10 PROCEDURE — 99223 1ST HOSP IP/OBS HIGH 75: CPT | Mod: AI,,, | Performed by: HOSPITALIST

## 2021-10-10 PROCEDURE — 83036 HEMOGLOBIN GLYCOSYLATED A1C: CPT | Performed by: NURSE PRACTITIONER

## 2021-10-10 PROCEDURE — 99223 PR INITIAL HOSPITAL CARE,LEVL III: ICD-10-PCS | Mod: AI,,, | Performed by: HOSPITALIST

## 2021-10-10 PROCEDURE — 93005 ELECTROCARDIOGRAM TRACING: CPT

## 2021-10-10 PROCEDURE — 80053 COMPREHEN METABOLIC PANEL: CPT | Mod: 91 | Performed by: HOSPITALIST

## 2021-10-10 PROCEDURE — 63600175 PHARM REV CODE 636 W HCPCS: Performed by: HOSPITALIST

## 2021-10-10 PROCEDURE — 36415 COLL VENOUS BLD VENIPUNCTURE: CPT | Performed by: NURSE PRACTITIONER

## 2021-10-10 PROCEDURE — 20000000 HC ICU ROOM

## 2021-10-10 PROCEDURE — 25000003 PHARM REV CODE 250: Performed by: NURSE PRACTITIONER

## 2021-10-10 PROCEDURE — 84100 ASSAY OF PHOSPHORUS: CPT | Performed by: HOSPITALIST

## 2021-10-10 PROCEDURE — 80053 COMPREHEN METABOLIC PANEL: CPT | Performed by: NURSE PRACTITIONER

## 2021-10-10 PROCEDURE — 86920 COMPATIBILITY TEST SPIN: CPT | Performed by: HOSPITALIST

## 2021-10-10 PROCEDURE — 36415 COLL VENOUS BLD VENIPUNCTURE: CPT | Performed by: HOSPITALIST

## 2021-10-10 RX ORDER — SODIUM CHLORIDE 9 MG/ML
INJECTION, SOLUTION INTRAVENOUS CONTINUOUS
Status: DISCONTINUED | OUTPATIENT
Start: 2021-10-10 | End: 2021-10-10

## 2021-10-10 RX ORDER — MORPHINE SULFATE 2 MG/ML
2 INJECTION, SOLUTION INTRAMUSCULAR; INTRAVENOUS EVERY 4 HOURS PRN
Status: DISCONTINUED | OUTPATIENT
Start: 2021-10-10 | End: 2021-10-15 | Stop reason: HOSPADM

## 2021-10-10 RX ORDER — MUPIROCIN 20 MG/G
OINTMENT TOPICAL 2 TIMES DAILY
Status: DISPENSED | OUTPATIENT
Start: 2021-10-10 | End: 2021-10-15

## 2021-10-10 RX ORDER — PANTOPRAZOLE SODIUM 40 MG/10ML
40 INJECTION, POWDER, LYOPHILIZED, FOR SOLUTION INTRAVENOUS 2 TIMES DAILY
Status: DISCONTINUED | OUTPATIENT
Start: 2021-10-10 | End: 2021-10-12

## 2021-10-10 RX ORDER — INSULIN ASPART 100 [IU]/ML
0-5 INJECTION, SOLUTION INTRAVENOUS; SUBCUTANEOUS EVERY 6 HOURS PRN
Status: DISCONTINUED | OUTPATIENT
Start: 2021-10-10 | End: 2021-10-15 | Stop reason: HOSPADM

## 2021-10-10 RX ORDER — GLUCAGON 1 MG
1 KIT INJECTION
Status: DISCONTINUED | OUTPATIENT
Start: 2021-10-10 | End: 2021-10-15 | Stop reason: HOSPADM

## 2021-10-10 RX ADMIN — OCTREOTIDE ACETATE 50 MCG/HR: 500 INJECTION, SOLUTION INTRAVENOUS; SUBCUTANEOUS at 09:10

## 2021-10-10 RX ADMIN — MORPHINE SULFATE 2 MG: 2 INJECTION, SOLUTION INTRAMUSCULAR; INTRAVENOUS at 08:10

## 2021-10-10 RX ADMIN — PANTOPRAZOLE SODIUM 40 MG: 40 INJECTION, POWDER, FOR SOLUTION INTRAVENOUS at 08:10

## 2021-10-10 RX ADMIN — CEFTRIAXONE 1 G: 1 INJECTION, POWDER, FOR SOLUTION INTRAMUSCULAR; INTRAVENOUS at 09:10

## 2021-10-10 RX ADMIN — OCTREOTIDE ACETATE 50 MCG/HR: 500 INJECTION, SOLUTION INTRAVENOUS; SUBCUTANEOUS at 01:10

## 2021-10-10 RX ADMIN — SODIUM CHLORIDE: 0.9 INJECTION, SOLUTION INTRAVENOUS at 01:10

## 2021-10-10 RX ADMIN — MORPHINE SULFATE 2 MG: 2 INJECTION, SOLUTION INTRAMUSCULAR; INTRAVENOUS at 04:10

## 2021-10-10 RX ADMIN — MORPHINE SULFATE 2 MG: 2 INJECTION, SOLUTION INTRAMUSCULAR; INTRAVENOUS at 09:10

## 2021-10-10 RX ADMIN — MORPHINE SULFATE 2 MG: 2 INJECTION, SOLUTION INTRAMUSCULAR; INTRAVENOUS at 01:10

## 2021-10-10 RX ADMIN — MORPHINE SULFATE 2 MG: 2 INJECTION, SOLUTION INTRAMUSCULAR; INTRAVENOUS at 02:10

## 2021-10-10 RX ADMIN — MUPIROCIN: 20 OINTMENT TOPICAL at 08:10

## 2021-10-11 ENCOUNTER — ANESTHESIA EVENT (OUTPATIENT)
Dept: ENDOSCOPY | Facility: OTHER | Age: 81
DRG: 424 | End: 2021-10-11
Payer: MEDICARE

## 2021-10-11 ENCOUNTER — ANESTHESIA (OUTPATIENT)
Dept: ENDOSCOPY | Facility: OTHER | Age: 81
DRG: 424 | End: 2021-10-11
Payer: MEDICARE

## 2021-10-11 LAB
ALBUMIN SERPL BCP-MCNC: 2.8 G/DL (ref 3.5–5.2)
ALP SERPL-CCNC: 236 U/L (ref 55–135)
ALT SERPL W/O P-5'-P-CCNC: 122 U/L (ref 10–44)
ANION GAP SERPL CALC-SCNC: 9 MMOL/L (ref 8–16)
ANISOCYTOSIS BLD QL SMEAR: SLIGHT
AST SERPL-CCNC: 133 U/L (ref 10–40)
BASOPHILS # BLD AUTO: 0.02 K/UL (ref 0–0.2)
BASOPHILS NFR BLD: 0.6 % (ref 0–1.9)
BASOPHILS NFR BLD: 2 % (ref 0–1.9)
BILIRUB SERPL-MCNC: 3.5 MG/DL (ref 0.1–1)
BLD PROD TYP BPU: NORMAL
BLD PROD TYP BPU: NORMAL
BLOOD UNIT EXPIRATION DATE: NORMAL
BLOOD UNIT EXPIRATION DATE: NORMAL
BLOOD UNIT TYPE CODE: 600
BLOOD UNIT TYPE CODE: 9500
BLOOD UNIT TYPE: NORMAL
BLOOD UNIT TYPE: NORMAL
BUN SERPL-MCNC: 19 MG/DL (ref 8–23)
CALCIUM SERPL-MCNC: 8.1 MG/DL (ref 8.7–10.5)
CHLORIDE SERPL-SCNC: 110 MMOL/L (ref 95–110)
CO2 SERPL-SCNC: 20 MMOL/L (ref 23–29)
CODING SYSTEM: NORMAL
CODING SYSTEM: NORMAL
CREAT SERPL-MCNC: 1 MG/DL (ref 0.5–1.4)
DIFFERENTIAL METHOD: ABNORMAL
DIFFERENTIAL METHOD: ABNORMAL
DISPENSE STATUS: NORMAL
DISPENSE STATUS: NORMAL
EOSINOPHIL # BLD AUTO: 0 K/UL (ref 0–0.5)
EOSINOPHIL NFR BLD: 0 % (ref 0–8)
EOSINOPHIL NFR BLD: 0.3 % (ref 0–8)
ERYTHROCYTE [DISTWIDTH] IN BLOOD BY AUTOMATED COUNT: 18.8 % (ref 11.5–14.5)
ERYTHROCYTE [DISTWIDTH] IN BLOOD BY AUTOMATED COUNT: 20 % (ref 11.5–14.5)
EST. GFR  (AFRICAN AMERICAN): >60 ML/MIN/1.73 M^2
EST. GFR  (NON AFRICAN AMERICAN): >60 ML/MIN/1.73 M^2
GLUCOSE SERPL-MCNC: 139 MG/DL (ref 70–110)
HCT VFR BLD AUTO: 20.2 % (ref 40–54)
HCT VFR BLD AUTO: 27.8 % (ref 40–54)
HGB BLD-MCNC: 6.4 G/DL (ref 14–18)
HGB BLD-MCNC: 8.9 G/DL (ref 14–18)
IMM GRANULOCYTES # BLD AUTO: 0.07 K/UL (ref 0–0.04)
IMM GRANULOCYTES # BLD AUTO: ABNORMAL K/UL (ref 0–0.04)
IMM GRANULOCYTES NFR BLD AUTO: 2 % (ref 0–0.5)
IMM GRANULOCYTES NFR BLD AUTO: ABNORMAL % (ref 0–0.5)
LYMPHOCYTES # BLD AUTO: 0.7 K/UL (ref 1–4.8)
LYMPHOCYTES NFR BLD: 19.8 % (ref 18–48)
LYMPHOCYTES NFR BLD: 9 % (ref 18–48)
MAGNESIUM SERPL-MCNC: 2.2 MG/DL (ref 1.6–2.6)
MCH RBC QN AUTO: 27.6 PG (ref 27–31)
MCH RBC QN AUTO: 27.9 PG (ref 27–31)
MCHC RBC AUTO-ENTMCNC: 31.7 G/DL (ref 32–36)
MCHC RBC AUTO-ENTMCNC: 32 G/DL (ref 32–36)
MCV RBC AUTO: 86 FL (ref 82–98)
MCV RBC AUTO: 88 FL (ref 82–98)
MONOCYTES # BLD AUTO: 0.6 K/UL (ref 0.3–1)
MONOCYTES NFR BLD: 10 % (ref 4–15)
MONOCYTES NFR BLD: 17.8 % (ref 4–15)
NEUTROPHILS # BLD AUTO: 2.1 K/UL (ref 1.8–7.7)
NEUTROPHILS NFR BLD: 59.5 % (ref 38–73)
NEUTROPHILS NFR BLD: 79 % (ref 38–73)
NRBC BLD-RTO: 1 /100 WBC
NRBC BLD-RTO: 1 /100 WBC
PHOSPHATE SERPL-MCNC: 2 MG/DL (ref 2.7–4.5)
PLATELET # BLD AUTO: 123 K/UL (ref 150–450)
PLATELET # BLD AUTO: 123 K/UL (ref 150–450)
PLATELET BLD QL SMEAR: ABNORMAL
PMV BLD AUTO: 10.1 FL (ref 9.2–12.9)
PMV BLD AUTO: 9.7 FL (ref 9.2–12.9)
POCT GLUCOSE: 144 MG/DL (ref 70–110)
POCT GLUCOSE: 150 MG/DL (ref 70–110)
POCT GLUCOSE: 154 MG/DL (ref 70–110)
POIKILOCYTOSIS BLD QL SMEAR: SLIGHT
POLYCHROMASIA BLD QL SMEAR: ABNORMAL
POTASSIUM SERPL-SCNC: 4.5 MMOL/L (ref 3.5–5.1)
PROT SERPL-MCNC: 5.8 G/DL (ref 6–8.4)
RBC # BLD AUTO: 2.29 M/UL (ref 4.6–6.2)
RBC # BLD AUTO: 3.22 M/UL (ref 4.6–6.2)
SODIUM SERPL-SCNC: 139 MMOL/L (ref 136–145)
TRANS ERYTHROCYTES VOL PATIENT: NORMAL ML
TRANS ERYTHROCYTES VOL PATIENT: NORMAL ML
WBC # BLD AUTO: 3.53 K/UL (ref 3.9–12.7)
WBC # BLD AUTO: 4.95 K/UL (ref 3.9–12.7)

## 2021-10-11 PROCEDURE — 99153 MOD SED SAME PHYS/QHP EA: CPT | Performed by: RADIOLOGY

## 2021-10-11 PROCEDURE — P9021 RED BLOOD CELLS UNIT: HCPCS | Performed by: HOSPITALIST

## 2021-10-11 PROCEDURE — 37000008 HC ANESTHESIA 1ST 15 MINUTES: Performed by: INTERNAL MEDICINE

## 2021-10-11 PROCEDURE — 99223 1ST HOSP IP/OBS HIGH 75: CPT | Mod: ,,, | Performed by: INTERNAL MEDICINE

## 2021-10-11 PROCEDURE — 85007 BL SMEAR W/DIFF WBC COUNT: CPT | Performed by: HOSPITALIST

## 2021-10-11 PROCEDURE — 85025 COMPLETE CBC W/AUTO DIFF WBC: CPT | Performed by: NURSE PRACTITIONER

## 2021-10-11 PROCEDURE — 25000003 PHARM REV CODE 250: Performed by: NURSE ANESTHETIST, CERTIFIED REGISTERED

## 2021-10-11 PROCEDURE — C1769 GUIDE WIRE: HCPCS | Performed by: RADIOLOGY

## 2021-10-11 PROCEDURE — 63600175 PHARM REV CODE 636 W HCPCS: Performed by: NURSE ANESTHETIST, CERTIFIED REGISTERED

## 2021-10-11 PROCEDURE — 94761 N-INVAS EAR/PLS OXIMETRY MLT: CPT

## 2021-10-11 PROCEDURE — 99233 PR SUBSEQUENT HOSPITAL CARE,LEVL III: ICD-10-PCS | Mod: ,,, | Performed by: HOSPITALIST

## 2021-10-11 PROCEDURE — 25000003 PHARM REV CODE 250: Performed by: HOSPITALIST

## 2021-10-11 PROCEDURE — 99152 MOD SED SAME PHYS/QHP 5/>YRS: CPT | Performed by: RADIOLOGY

## 2021-10-11 PROCEDURE — P9021 RED BLOOD CELLS UNIT: HCPCS | Performed by: ANESTHESIOLOGY

## 2021-10-11 PROCEDURE — 36415 COLL VENOUS BLD VENIPUNCTURE: CPT | Performed by: HOSPITALIST

## 2021-10-11 PROCEDURE — C1894 INTRO/SHEATH, NON-LASER: HCPCS | Performed by: RADIOLOGY

## 2021-10-11 PROCEDURE — 20000000 HC ICU ROOM

## 2021-10-11 PROCEDURE — 43235 EGD DIAGNOSTIC BRUSH WASH: CPT | Performed by: INTERNAL MEDICINE

## 2021-10-11 PROCEDURE — 37000009 HC ANESTHESIA EA ADD 15 MINS: Performed by: INTERNAL MEDICINE

## 2021-10-11 PROCEDURE — 83735 ASSAY OF MAGNESIUM: CPT | Performed by: HOSPITALIST

## 2021-10-11 PROCEDURE — 25500020 PHARM REV CODE 255: Performed by: RADIOLOGY

## 2021-10-11 PROCEDURE — 25000003 PHARM REV CODE 250: Performed by: NURSE PRACTITIONER

## 2021-10-11 PROCEDURE — 36430 TRANSFUSION BLD/BLD COMPNT: CPT

## 2021-10-11 PROCEDURE — C1887 CATHETER, GUIDING: HCPCS | Performed by: RADIOLOGY

## 2021-10-11 PROCEDURE — C9113 INJ PANTOPRAZOLE SODIUM, VIA: HCPCS | Performed by: NURSE PRACTITIONER

## 2021-10-11 PROCEDURE — 99223 PR INITIAL HOSPITAL CARE,LEVL III: ICD-10-PCS | Mod: ,,, | Performed by: INTERNAL MEDICINE

## 2021-10-11 PROCEDURE — 27800903 OPTIME MED/SURG SUP & DEVICES OTHER IMPLANTS: Performed by: RADIOLOGY

## 2021-10-11 PROCEDURE — 85027 COMPLETE CBC AUTOMATED: CPT | Performed by: HOSPITALIST

## 2021-10-11 PROCEDURE — 25000003 PHARM REV CODE 250: Performed by: RADIOLOGY

## 2021-10-11 PROCEDURE — 99233 SBSQ HOSP IP/OBS HIGH 50: CPT | Mod: ,,, | Performed by: HOSPITALIST

## 2021-10-11 PROCEDURE — 84100 ASSAY OF PHOSPHORUS: CPT | Performed by: HOSPITALIST

## 2021-10-11 PROCEDURE — 63600175 PHARM REV CODE 636 W HCPCS: Mod: JA | Performed by: NURSE PRACTITIONER

## 2021-10-11 PROCEDURE — 63600175 PHARM REV CODE 636 W HCPCS: Performed by: RADIOLOGY

## 2021-10-11 PROCEDURE — 80053 COMPREHEN METABOLIC PANEL: CPT | Performed by: HOSPITALIST

## 2021-10-11 PROCEDURE — 63600175 PHARM REV CODE 636 W HCPCS: Performed by: HOSPITALIST

## 2021-10-11 DEVICE — IMPLANTABLE DEVICE: Type: IMPLANTABLE DEVICE | Site: LIVER | Status: FUNCTIONAL

## 2021-10-11 DEVICE — COIL EMB DETACH AZUR CX 2X4: Type: IMPLANTABLE DEVICE | Site: LIVER | Status: FUNCTIONAL

## 2021-10-11 RX ORDER — OXYCODONE HYDROCHLORIDE 5 MG/1
5 TABLET ORAL
Status: DISCONTINUED | OUTPATIENT
Start: 2021-10-11 | End: 2021-10-13

## 2021-10-11 RX ORDER — MEPERIDINE HYDROCHLORIDE 25 MG/ML
12.5 INJECTION INTRAMUSCULAR; INTRAVENOUS; SUBCUTANEOUS ONCE AS NEEDED
Status: ACTIVE | OUTPATIENT
Start: 2021-10-11 | End: 2021-10-12

## 2021-10-11 RX ORDER — SODIUM CHLORIDE 0.9 % (FLUSH) 0.9 %
3 SYRINGE (ML) INJECTION
Status: DISCONTINUED | OUTPATIENT
Start: 2021-10-11 | End: 2021-10-13 | Stop reason: ALTCHOICE

## 2021-10-11 RX ORDER — HYDROCODONE BITARTRATE AND ACETAMINOPHEN 500; 5 MG/1; MG/1
TABLET ORAL
Status: DISCONTINUED | OUTPATIENT
Start: 2021-10-11 | End: 2021-10-13

## 2021-10-11 RX ORDER — FENTANYL CITRATE 50 UG/ML
INJECTION, SOLUTION INTRAMUSCULAR; INTRAVENOUS
Status: DISCONTINUED | OUTPATIENT
Start: 2021-10-11 | End: 2021-10-11 | Stop reason: HOSPADM

## 2021-10-11 RX ORDER — PROPOFOL 10 MG/ML
VIAL (ML) INTRAVENOUS
Status: DISCONTINUED | OUTPATIENT
Start: 2021-10-11 | End: 2021-10-11

## 2021-10-11 RX ORDER — LIDOCAINE HYDROCHLORIDE 10 MG/ML
INJECTION, SOLUTION EPIDURAL; INFILTRATION; INTRACAUDAL; PERINEURAL
Status: DISCONTINUED | OUTPATIENT
Start: 2021-10-11 | End: 2021-10-11 | Stop reason: HOSPADM

## 2021-10-11 RX ORDER — HYDROMORPHONE HYDROCHLORIDE 2 MG/ML
0.4 INJECTION, SOLUTION INTRAMUSCULAR; INTRAVENOUS; SUBCUTANEOUS EVERY 5 MIN PRN
Status: DISCONTINUED | OUTPATIENT
Start: 2021-10-11 | End: 2021-10-13

## 2021-10-11 RX ORDER — ONDANSETRON 2 MG/ML
4 INJECTION INTRAMUSCULAR; INTRAVENOUS DAILY PRN
Status: DISCONTINUED | OUTPATIENT
Start: 2021-10-11 | End: 2021-10-13 | Stop reason: ALTCHOICE

## 2021-10-11 RX ORDER — LIDOCAINE HCL/PF 100 MG/5ML
SYRINGE (ML) INTRAVENOUS
Status: DISCONTINUED | OUTPATIENT
Start: 2021-10-11 | End: 2021-10-11

## 2021-10-11 RX ADMIN — MORPHINE SULFATE 2 MG: 2 INJECTION, SOLUTION INTRAMUSCULAR; INTRAVENOUS at 08:10

## 2021-10-11 RX ADMIN — PROPOFOL 30 MG: 10 INJECTION, EMULSION INTRAVENOUS at 01:10

## 2021-10-11 RX ADMIN — PANTOPRAZOLE SODIUM 40 MG: 40 INJECTION, POWDER, FOR SOLUTION INTRAVENOUS at 08:10

## 2021-10-11 RX ADMIN — PANTOPRAZOLE SODIUM 40 MG: 40 INJECTION, POWDER, FOR SOLUTION INTRAVENOUS at 09:10

## 2021-10-11 RX ADMIN — MORPHINE SULFATE 2 MG: 2 INJECTION, SOLUTION INTRAMUSCULAR; INTRAVENOUS at 12:10

## 2021-10-11 RX ADMIN — OCTREOTIDE ACETATE 50 MCG/HR: 500 INJECTION, SOLUTION INTRAVENOUS; SUBCUTANEOUS at 05:10

## 2021-10-11 RX ADMIN — PROPOFOL 20 MG: 10 INJECTION, EMULSION INTRAVENOUS at 01:10

## 2021-10-11 RX ADMIN — LIDOCAINE HYDROCHLORIDE 50 MG: 20 INJECTION, SOLUTION INTRAVENOUS at 01:10

## 2021-10-11 RX ADMIN — CEFTRIAXONE 1 G: 1 INJECTION, POWDER, FOR SOLUTION INTRAMUSCULAR; INTRAVENOUS at 09:10

## 2021-10-11 RX ADMIN — MORPHINE SULFATE 2 MG: 2 INJECTION, SOLUTION INTRAMUSCULAR; INTRAVENOUS at 07:10

## 2021-10-11 RX ADMIN — OCTREOTIDE ACETATE 50 MCG/HR: 500 INJECTION, SOLUTION INTRAVENOUS; SUBCUTANEOUS at 11:10

## 2021-10-11 RX ADMIN — OCTREOTIDE ACETATE 50 MCG/HR: 500 INJECTION, SOLUTION INTRAVENOUS; SUBCUTANEOUS at 01:10

## 2021-10-11 RX ADMIN — MORPHINE SULFATE 2 MG: 2 INJECTION, SOLUTION INTRAMUSCULAR; INTRAVENOUS at 01:10

## 2021-10-11 RX ADMIN — MUPIROCIN: 20 OINTMENT TOPICAL at 08:10

## 2021-10-12 LAB
ALBUMIN SERPL BCP-MCNC: 2.8 G/DL (ref 3.5–5.2)
ALP SERPL-CCNC: 287 U/L (ref 55–135)
ALT SERPL W/O P-5'-P-CCNC: 130 U/L (ref 10–44)
ANION GAP SERPL CALC-SCNC: 10 MMOL/L (ref 8–16)
AST SERPL-CCNC: 145 U/L (ref 10–40)
BASOPHILS # BLD AUTO: 0.01 K/UL (ref 0–0.2)
BASOPHILS # BLD AUTO: 0.02 K/UL (ref 0–0.2)
BASOPHILS NFR BLD: 0.2 % (ref 0–1.9)
BASOPHILS NFR BLD: 0.4 % (ref 0–1.9)
BILIRUB SERPL-MCNC: 5.1 MG/DL (ref 0.1–1)
BUN SERPL-MCNC: 16 MG/DL (ref 8–23)
CALCIUM SERPL-MCNC: 8.4 MG/DL (ref 8.7–10.5)
CHLORIDE SERPL-SCNC: 110 MMOL/L (ref 95–110)
CO2 SERPL-SCNC: 19 MMOL/L (ref 23–29)
CREAT SERPL-MCNC: 0.8 MG/DL (ref 0.5–1.4)
DIFFERENTIAL METHOD: ABNORMAL
DIFFERENTIAL METHOD: ABNORMAL
EOSINOPHIL # BLD AUTO: 0 K/UL (ref 0–0.5)
EOSINOPHIL # BLD AUTO: 0 K/UL (ref 0–0.5)
EOSINOPHIL NFR BLD: 0.5 % (ref 0–8)
EOSINOPHIL NFR BLD: 0.6 % (ref 0–8)
ERYTHROCYTE [DISTWIDTH] IN BLOOD BY AUTOMATED COUNT: 19.5 % (ref 11.5–14.5)
ERYTHROCYTE [DISTWIDTH] IN BLOOD BY AUTOMATED COUNT: 19.5 % (ref 11.5–14.5)
EST. GFR  (AFRICAN AMERICAN): >60 ML/MIN/1.73 M^2
EST. GFR  (NON AFRICAN AMERICAN): >60 ML/MIN/1.73 M^2
GLUCOSE SERPL-MCNC: 150 MG/DL (ref 70–110)
HCT VFR BLD AUTO: 29 % (ref 40–54)
HCT VFR BLD AUTO: 29.9 % (ref 40–54)
HGB BLD-MCNC: 9.2 G/DL (ref 14–18)
HGB BLD-MCNC: 9.6 G/DL (ref 14–18)
IMM GRANULOCYTES # BLD AUTO: 0.09 K/UL (ref 0–0.04)
IMM GRANULOCYTES # BLD AUTO: 0.11 K/UL (ref 0–0.04)
IMM GRANULOCYTES NFR BLD AUTO: 2.2 % (ref 0–0.5)
IMM GRANULOCYTES NFR BLD AUTO: 2.4 % (ref 0–0.5)
LYMPHOCYTES # BLD AUTO: 0.8 K/UL (ref 1–4.8)
LYMPHOCYTES # BLD AUTO: 0.8 K/UL (ref 1–4.8)
LYMPHOCYTES NFR BLD: 18.2 % (ref 18–48)
LYMPHOCYTES NFR BLD: 20.5 % (ref 18–48)
MCH RBC QN AUTO: 28 PG (ref 27–31)
MCH RBC QN AUTO: 28 PG (ref 27–31)
MCHC RBC AUTO-ENTMCNC: 31.7 G/DL (ref 32–36)
MCHC RBC AUTO-ENTMCNC: 32.1 G/DL (ref 32–36)
MCV RBC AUTO: 87 FL (ref 82–98)
MCV RBC AUTO: 88 FL (ref 82–98)
MONOCYTES # BLD AUTO: 0.5 K/UL (ref 0.3–1)
MONOCYTES # BLD AUTO: 0.6 K/UL (ref 0.3–1)
MONOCYTES NFR BLD: 13.1 % (ref 4–15)
MONOCYTES NFR BLD: 13.9 % (ref 4–15)
NEUTROPHILS # BLD AUTO: 2.6 K/UL (ref 1.8–7.7)
NEUTROPHILS # BLD AUTO: 3 K/UL (ref 1.8–7.7)
NEUTROPHILS NFR BLD: 63.5 % (ref 38–73)
NEUTROPHILS NFR BLD: 64.5 % (ref 38–73)
NRBC BLD-RTO: 1 /100 WBC
NRBC BLD-RTO: 1 /100 WBC
PLATELET # BLD AUTO: 121 K/UL (ref 150–450)
PLATELET # BLD AUTO: 132 K/UL (ref 150–450)
PMV BLD AUTO: 9.7 FL (ref 9.2–12.9)
PMV BLD AUTO: 9.9 FL (ref 9.2–12.9)
POCT GLUCOSE: 120 MG/DL (ref 70–110)
POTASSIUM SERPL-SCNC: 4.1 MMOL/L (ref 3.5–5.1)
PROT SERPL-MCNC: 5.9 G/DL (ref 6–8.4)
RBC # BLD AUTO: 3.28 M/UL (ref 4.6–6.2)
RBC # BLD AUTO: 3.43 M/UL (ref 4.6–6.2)
SODIUM SERPL-SCNC: 139 MMOL/L (ref 136–145)
WBC # BLD AUTO: 4.05 K/UL (ref 3.9–12.7)
WBC # BLD AUTO: 4.62 K/UL (ref 3.9–12.7)

## 2021-10-12 PROCEDURE — 25000003 PHARM REV CODE 250: Performed by: ANESTHESIOLOGY

## 2021-10-12 PROCEDURE — C9113 INJ PANTOPRAZOLE SODIUM, VIA: HCPCS | Performed by: NURSE PRACTITIONER

## 2021-10-12 PROCEDURE — 63600175 PHARM REV CODE 636 W HCPCS: Performed by: HOSPITALIST

## 2021-10-12 PROCEDURE — 99233 SBSQ HOSP IP/OBS HIGH 50: CPT | Mod: ,,, | Performed by: INTERNAL MEDICINE

## 2021-10-12 PROCEDURE — 80053 COMPREHEN METABOLIC PANEL: CPT | Performed by: INTERNAL MEDICINE

## 2021-10-12 PROCEDURE — 20000000 HC ICU ROOM

## 2021-10-12 PROCEDURE — 99233 PR SUBSEQUENT HOSPITAL CARE,LEVL III: ICD-10-PCS | Mod: ,,, | Performed by: INTERNAL MEDICINE

## 2021-10-12 PROCEDURE — 99900035 HC TECH TIME PER 15 MIN (STAT)

## 2021-10-12 PROCEDURE — 85025 COMPLETE CBC W/AUTO DIFF WBC: CPT | Mod: 91 | Performed by: HOSPITALIST

## 2021-10-12 PROCEDURE — 94761 N-INVAS EAR/PLS OXIMETRY MLT: CPT

## 2021-10-12 PROCEDURE — 85025 COMPLETE CBC W/AUTO DIFF WBC: CPT | Performed by: INTERNAL MEDICINE

## 2021-10-12 PROCEDURE — 63600175 PHARM REV CODE 636 W HCPCS: Performed by: NURSE PRACTITIONER

## 2021-10-12 PROCEDURE — 36415 COLL VENOUS BLD VENIPUNCTURE: CPT | Performed by: INTERNAL MEDICINE

## 2021-10-12 RX ORDER — PANTOPRAZOLE SODIUM 40 MG/1
40 TABLET, DELAYED RELEASE ORAL DAILY
Status: DISCONTINUED | OUTPATIENT
Start: 2021-10-13 | End: 2021-10-15 | Stop reason: HOSPADM

## 2021-10-12 RX ORDER — PANTOPRAZOLE SODIUM 40 MG/1
40 TABLET, DELAYED RELEASE ORAL DAILY
Status: DISCONTINUED | OUTPATIENT
Start: 2021-10-12 | End: 2021-10-12

## 2021-10-12 RX ADMIN — PANTOPRAZOLE SODIUM 40 MG: 40 INJECTION, POWDER, FOR SOLUTION INTRAVENOUS at 08:10

## 2021-10-12 RX ADMIN — MUPIROCIN: 20 OINTMENT TOPICAL at 08:10

## 2021-10-12 RX ADMIN — CEFTRIAXONE 1 G: 1 INJECTION, POWDER, FOR SOLUTION INTRAMUSCULAR; INTRAVENOUS at 09:10

## 2021-10-12 RX ADMIN — MORPHINE SULFATE 2 MG: 2 INJECTION, SOLUTION INTRAMUSCULAR; INTRAVENOUS at 08:10

## 2021-10-12 RX ADMIN — MUPIROCIN: 20 OINTMENT TOPICAL at 09:10

## 2021-10-12 RX ADMIN — OXYCODONE 5 MG: 5 TABLET ORAL at 11:10

## 2021-10-12 RX ADMIN — OXYCODONE 5 MG: 5 TABLET ORAL at 09:10

## 2021-10-12 RX ADMIN — MORPHINE SULFATE 2 MG: 2 INJECTION, SOLUTION INTRAMUSCULAR; INTRAVENOUS at 04:10

## 2021-10-13 LAB
ALBUMIN SERPL BCP-MCNC: 2.8 G/DL (ref 3.5–5.2)
ALP SERPL-CCNC: 248 U/L (ref 55–135)
ALT SERPL W/O P-5'-P-CCNC: 110 U/L (ref 10–44)
AST SERPL-CCNC: 119 U/L (ref 10–40)
BASOPHILS # BLD AUTO: 0.02 K/UL (ref 0–0.2)
BASOPHILS NFR BLD: 0.5 % (ref 0–1.9)
BILIRUB DIRECT SERPL-MCNC: 2.6 MG/DL (ref 0.1–0.3)
BILIRUB SERPL-MCNC: 3.5 MG/DL (ref 0.1–1)
BLD PROD TYP BPU: NORMAL
BLD PROD TYP BPU: NORMAL
BLOOD UNIT EXPIRATION DATE: NORMAL
BLOOD UNIT EXPIRATION DATE: NORMAL
BLOOD UNIT TYPE CODE: 600
BLOOD UNIT TYPE CODE: 600
BLOOD UNIT TYPE: NORMAL
BLOOD UNIT TYPE: NORMAL
CODING SYSTEM: NORMAL
CODING SYSTEM: NORMAL
DIFFERENTIAL METHOD: ABNORMAL
DISPENSE STATUS: NORMAL
DISPENSE STATUS: NORMAL
EOSINOPHIL # BLD AUTO: 0 K/UL (ref 0–0.5)
EOSINOPHIL NFR BLD: 0.5 % (ref 0–8)
ERYTHROCYTE [DISTWIDTH] IN BLOOD BY AUTOMATED COUNT: 19.5 % (ref 11.5–14.5)
HCT VFR BLD AUTO: 28.8 % (ref 40–54)
HGB BLD-MCNC: 9.2 G/DL (ref 14–18)
IMM GRANULOCYTES # BLD AUTO: 0.18 K/UL (ref 0–0.04)
IMM GRANULOCYTES NFR BLD AUTO: 4.9 % (ref 0–0.5)
LYMPHOCYTES # BLD AUTO: 0.8 K/UL (ref 1–4.8)
LYMPHOCYTES NFR BLD: 20.3 % (ref 18–48)
MCH RBC QN AUTO: 28 PG (ref 27–31)
MCHC RBC AUTO-ENTMCNC: 31.9 G/DL (ref 32–36)
MCV RBC AUTO: 88 FL (ref 82–98)
MONOCYTES # BLD AUTO: 0.5 K/UL (ref 0.3–1)
MONOCYTES NFR BLD: 13.3 % (ref 4–15)
NEUTROPHILS # BLD AUTO: 2.2 K/UL (ref 1.8–7.7)
NEUTROPHILS NFR BLD: 60.5 % (ref 38–73)
NRBC BLD-RTO: 1 /100 WBC
PLATELET # BLD AUTO: 126 K/UL (ref 150–450)
PMV BLD AUTO: 10 FL (ref 9.2–12.9)
POCT GLUCOSE: 136 MG/DL (ref 70–110)
POCT GLUCOSE: 170 MG/DL (ref 70–110)
POCT GLUCOSE: 182 MG/DL (ref 70–110)
PROT SERPL-MCNC: 5.9 G/DL (ref 6–8.4)
RBC # BLD AUTO: 3.28 M/UL (ref 4.6–6.2)
TRANS ERYTHROCYTES VOL PATIENT: NORMAL ML
TRANS ERYTHROCYTES VOL PATIENT: NORMAL ML
WBC # BLD AUTO: 3.69 K/UL (ref 3.9–12.7)

## 2021-10-13 PROCEDURE — 99233 SBSQ HOSP IP/OBS HIGH 50: CPT | Mod: ,,, | Performed by: INTERNAL MEDICINE

## 2021-10-13 PROCEDURE — 99233 PR SUBSEQUENT HOSPITAL CARE,LEVL III: ICD-10-PCS | Mod: ,,, | Performed by: INTERNAL MEDICINE

## 2021-10-13 PROCEDURE — 36415 COLL VENOUS BLD VENIPUNCTURE: CPT | Performed by: INTERNAL MEDICINE

## 2021-10-13 PROCEDURE — 25000003 PHARM REV CODE 250: Performed by: INTERNAL MEDICINE

## 2021-10-13 PROCEDURE — 63600175 PHARM REV CODE 636 W HCPCS: Performed by: NURSE PRACTITIONER

## 2021-10-13 PROCEDURE — 80076 HEPATIC FUNCTION PANEL: CPT | Performed by: INTERNAL MEDICINE

## 2021-10-13 PROCEDURE — 20000000 HC ICU ROOM

## 2021-10-13 PROCEDURE — 94761 N-INVAS EAR/PLS OXIMETRY MLT: CPT

## 2021-10-13 PROCEDURE — 85025 COMPLETE CBC W/AUTO DIFF WBC: CPT | Performed by: INTERNAL MEDICINE

## 2021-10-13 RX ORDER — OXYCODONE HYDROCHLORIDE 5 MG/1
5 TABLET ORAL
Status: DISCONTINUED | OUTPATIENT
Start: 2021-10-13 | End: 2021-10-15 | Stop reason: HOSPADM

## 2021-10-13 RX ADMIN — OXYCODONE 5 MG: 5 TABLET ORAL at 07:10

## 2021-10-13 RX ADMIN — PANTOPRAZOLE SODIUM 40 MG: 40 TABLET, DELAYED RELEASE ORAL at 08:10

## 2021-10-13 RX ADMIN — MUPIROCIN: 20 OINTMENT TOPICAL at 08:10

## 2021-10-13 RX ADMIN — MORPHINE SULFATE 2 MG: 2 INJECTION, SOLUTION INTRAMUSCULAR; INTRAVENOUS at 01:10

## 2021-10-13 RX ADMIN — MORPHINE SULFATE 2 MG: 2 INJECTION, SOLUTION INTRAMUSCULAR; INTRAVENOUS at 10:10

## 2021-10-13 RX ADMIN — MORPHINE SULFATE 2 MG: 2 INJECTION, SOLUTION INTRAMUSCULAR; INTRAVENOUS at 12:10

## 2021-10-13 RX ADMIN — MORPHINE SULFATE 2 MG: 2 INJECTION, SOLUTION INTRAMUSCULAR; INTRAVENOUS at 08:10

## 2021-10-14 LAB
ALBUMIN SERPL BCP-MCNC: 3 G/DL (ref 3.5–5.2)
ALP SERPL-CCNC: 246 U/L (ref 55–135)
ALT SERPL W/O P-5'-P-CCNC: 113 U/L (ref 10–44)
ANION GAP SERPL CALC-SCNC: 8 MMOL/L (ref 8–16)
AST SERPL-CCNC: 120 U/L (ref 10–40)
BASOPHILS # BLD AUTO: 0.02 K/UL (ref 0–0.2)
BASOPHILS NFR BLD: 0.5 % (ref 0–1.9)
BILIRUB SERPL-MCNC: 3.4 MG/DL (ref 0.1–1)
BUN SERPL-MCNC: 12 MG/DL (ref 8–23)
CALCIUM SERPL-MCNC: 8.6 MG/DL (ref 8.7–10.5)
CHLORIDE SERPL-SCNC: 107 MMOL/L (ref 95–110)
CO2 SERPL-SCNC: 22 MMOL/L (ref 23–29)
CREAT SERPL-MCNC: 1 MG/DL (ref 0.5–1.4)
DIFFERENTIAL METHOD: ABNORMAL
EOSINOPHIL # BLD AUTO: 0 K/UL (ref 0–0.5)
EOSINOPHIL NFR BLD: 0.5 % (ref 0–8)
ERYTHROCYTE [DISTWIDTH] IN BLOOD BY AUTOMATED COUNT: 18.9 % (ref 11.5–14.5)
EST. GFR  (AFRICAN AMERICAN): >60 ML/MIN/1.73 M^2
EST. GFR  (NON AFRICAN AMERICAN): >60 ML/MIN/1.73 M^2
GLUCOSE SERPL-MCNC: 123 MG/DL (ref 70–110)
HCT VFR BLD AUTO: 30.3 % (ref 40–54)
HGB BLD-MCNC: 9.4 G/DL (ref 14–18)
IMM GRANULOCYTES # BLD AUTO: 0.18 K/UL (ref 0–0.04)
IMM GRANULOCYTES NFR BLD AUTO: 4.8 % (ref 0–0.5)
LYMPHOCYTES # BLD AUTO: 0.9 K/UL (ref 1–4.8)
LYMPHOCYTES NFR BLD: 23.8 % (ref 18–48)
MCH RBC QN AUTO: 27.9 PG (ref 27–31)
MCHC RBC AUTO-ENTMCNC: 31 G/DL (ref 32–36)
MCV RBC AUTO: 90 FL (ref 82–98)
MONOCYTES # BLD AUTO: 0.4 K/UL (ref 0.3–1)
MONOCYTES NFR BLD: 11.2 % (ref 4–15)
NEUTROPHILS # BLD AUTO: 2.2 K/UL (ref 1.8–7.7)
NEUTROPHILS NFR BLD: 59.2 % (ref 38–73)
NRBC BLD-RTO: 0 /100 WBC
PLATELET # BLD AUTO: 128 K/UL (ref 150–450)
PMV BLD AUTO: 9.6 FL (ref 9.2–12.9)
POCT GLUCOSE: 119 MG/DL (ref 70–110)
POCT GLUCOSE: 166 MG/DL (ref 70–110)
POCT GLUCOSE: 180 MG/DL (ref 70–110)
POTASSIUM SERPL-SCNC: 4.6 MMOL/L (ref 3.5–5.1)
PROT SERPL-MCNC: 6.3 G/DL (ref 6–8.4)
RBC # BLD AUTO: 3.37 M/UL (ref 4.6–6.2)
SODIUM SERPL-SCNC: 137 MMOL/L (ref 136–145)
WBC # BLD AUTO: 3.74 K/UL (ref 3.9–12.7)

## 2021-10-14 PROCEDURE — 25000003 PHARM REV CODE 250: Performed by: INTERNAL MEDICINE

## 2021-10-14 PROCEDURE — 85025 COMPLETE CBC W/AUTO DIFF WBC: CPT | Performed by: INTERNAL MEDICINE

## 2021-10-14 PROCEDURE — 99233 PR SUBSEQUENT HOSPITAL CARE,LEVL III: ICD-10-PCS | Mod: ,,, | Performed by: INTERNAL MEDICINE

## 2021-10-14 PROCEDURE — 99233 SBSQ HOSP IP/OBS HIGH 50: CPT | Mod: ,,, | Performed by: INTERNAL MEDICINE

## 2021-10-14 PROCEDURE — 99232 SBSQ HOSP IP/OBS MODERATE 35: CPT | Mod: ,,, | Performed by: INTERNAL MEDICINE

## 2021-10-14 PROCEDURE — 94761 N-INVAS EAR/PLS OXIMETRY MLT: CPT

## 2021-10-14 PROCEDURE — 36415 COLL VENOUS BLD VENIPUNCTURE: CPT | Performed by: INTERNAL MEDICINE

## 2021-10-14 PROCEDURE — 63600175 PHARM REV CODE 636 W HCPCS: Performed by: NURSE PRACTITIONER

## 2021-10-14 PROCEDURE — 99232 PR SUBSEQUENT HOSPITAL CARE,LEVL II: ICD-10-PCS | Mod: ,,, | Performed by: INTERNAL MEDICINE

## 2021-10-14 PROCEDURE — 80053 COMPREHEN METABOLIC PANEL: CPT | Performed by: INTERNAL MEDICINE

## 2021-10-14 PROCEDURE — 20000000 HC ICU ROOM

## 2021-10-14 RX ADMIN — OXYCODONE 5 MG: 5 TABLET ORAL at 12:10

## 2021-10-14 RX ADMIN — MORPHINE SULFATE 2 MG: 2 INJECTION, SOLUTION INTRAMUSCULAR; INTRAVENOUS at 07:10

## 2021-10-14 RX ADMIN — MUPIROCIN: 20 OINTMENT TOPICAL at 08:10

## 2021-10-14 RX ADMIN — MORPHINE SULFATE 2 MG: 2 INJECTION, SOLUTION INTRAMUSCULAR; INTRAVENOUS at 03:10

## 2021-10-14 RX ADMIN — MORPHINE SULFATE 2 MG: 2 INJECTION, SOLUTION INTRAMUSCULAR; INTRAVENOUS at 08:10

## 2021-10-14 RX ADMIN — PANTOPRAZOLE SODIUM 40 MG: 40 TABLET, DELAYED RELEASE ORAL at 08:10

## 2021-10-15 VITALS
OXYGEN SATURATION: 96 % | HEIGHT: 65 IN | DIASTOLIC BLOOD PRESSURE: 60 MMHG | TEMPERATURE: 98 F | RESPIRATION RATE: 15 BRPM | HEART RATE: 60 BPM | BODY MASS INDEX: 27.29 KG/M2 | SYSTOLIC BLOOD PRESSURE: 127 MMHG | WEIGHT: 163.81 LBS

## 2021-10-15 LAB
ALBUMIN SERPL BCP-MCNC: 2.9 G/DL (ref 3.5–5.2)
ALP SERPL-CCNC: 225 U/L (ref 55–135)
ALT SERPL W/O P-5'-P-CCNC: 114 U/L (ref 10–44)
ANION GAP SERPL CALC-SCNC: 10 MMOL/L (ref 8–16)
AST SERPL-CCNC: 125 U/L (ref 10–40)
BASOPHILS # BLD AUTO: 0.01 K/UL (ref 0–0.2)
BASOPHILS NFR BLD: 0.3 % (ref 0–1.9)
BILIRUB SERPL-MCNC: 2.9 MG/DL (ref 0.1–1)
BUN SERPL-MCNC: 13 MG/DL (ref 8–23)
CALCIUM SERPL-MCNC: 8.5 MG/DL (ref 8.7–10.5)
CHLORIDE SERPL-SCNC: 106 MMOL/L (ref 95–110)
CO2 SERPL-SCNC: 21 MMOL/L (ref 23–29)
CREAT SERPL-MCNC: 1 MG/DL (ref 0.5–1.4)
DIFFERENTIAL METHOD: ABNORMAL
EOSINOPHIL # BLD AUTO: 0 K/UL (ref 0–0.5)
EOSINOPHIL NFR BLD: 0.3 % (ref 0–8)
ERYTHROCYTE [DISTWIDTH] IN BLOOD BY AUTOMATED COUNT: 18.5 % (ref 11.5–14.5)
EST. GFR  (AFRICAN AMERICAN): >60 ML/MIN/1.73 M^2
EST. GFR  (NON AFRICAN AMERICAN): >60 ML/MIN/1.73 M^2
GLUCOSE SERPL-MCNC: 131 MG/DL (ref 70–110)
HCT VFR BLD AUTO: 30.9 % (ref 40–54)
HGB BLD-MCNC: 9.7 G/DL (ref 14–18)
IMM GRANULOCYTES # BLD AUTO: 0.11 K/UL (ref 0–0.04)
IMM GRANULOCYTES NFR BLD AUTO: 3.5 % (ref 0–0.5)
LYMPHOCYTES # BLD AUTO: 0.8 K/UL (ref 1–4.8)
LYMPHOCYTES NFR BLD: 24.8 % (ref 18–48)
MCH RBC QN AUTO: 28.2 PG (ref 27–31)
MCHC RBC AUTO-ENTMCNC: 31.4 G/DL (ref 32–36)
MCV RBC AUTO: 90 FL (ref 82–98)
MONOCYTES # BLD AUTO: 0.4 K/UL (ref 0.3–1)
MONOCYTES NFR BLD: 12.9 % (ref 4–15)
NEUTROPHILS # BLD AUTO: 1.8 K/UL (ref 1.8–7.7)
NEUTROPHILS NFR BLD: 58.2 % (ref 38–73)
NRBC BLD-RTO: 0 /100 WBC
PLATELET # BLD AUTO: 136 K/UL (ref 150–450)
PMV BLD AUTO: 9.6 FL (ref 9.2–12.9)
POCT GLUCOSE: 123 MG/DL (ref 70–110)
POCT GLUCOSE: 144 MG/DL (ref 70–110)
POTASSIUM SERPL-SCNC: 3.8 MMOL/L (ref 3.5–5.1)
PROT SERPL-MCNC: 6.3 G/DL (ref 6–8.4)
RBC # BLD AUTO: 3.44 M/UL (ref 4.6–6.2)
SODIUM SERPL-SCNC: 137 MMOL/L (ref 136–145)
WBC # BLD AUTO: 3.1 K/UL (ref 3.9–12.7)

## 2021-10-15 PROCEDURE — 99239 HOSP IP/OBS DSCHRG MGMT >30: CPT | Mod: ,,, | Performed by: INTERNAL MEDICINE

## 2021-10-15 PROCEDURE — 25000003 PHARM REV CODE 250: Performed by: INTERNAL MEDICINE

## 2021-10-15 PROCEDURE — 99239 PR HOSPITAL DISCHARGE DAY,>30 MIN: ICD-10-PCS | Mod: ,,, | Performed by: INTERNAL MEDICINE

## 2021-10-15 PROCEDURE — 63600175 PHARM REV CODE 636 W HCPCS: Performed by: NURSE PRACTITIONER

## 2021-10-15 PROCEDURE — 85025 COMPLETE CBC W/AUTO DIFF WBC: CPT | Performed by: INTERNAL MEDICINE

## 2021-10-15 PROCEDURE — 36415 COLL VENOUS BLD VENIPUNCTURE: CPT | Performed by: INTERNAL MEDICINE

## 2021-10-15 PROCEDURE — 99232 SBSQ HOSP IP/OBS MODERATE 35: CPT | Mod: ,,, | Performed by: INTERNAL MEDICINE

## 2021-10-15 PROCEDURE — 94761 N-INVAS EAR/PLS OXIMETRY MLT: CPT

## 2021-10-15 PROCEDURE — 99232 PR SUBSEQUENT HOSPITAL CARE,LEVL II: ICD-10-PCS | Mod: ,,, | Performed by: INTERNAL MEDICINE

## 2021-10-15 PROCEDURE — 97162 PT EVAL MOD COMPLEX 30 MIN: CPT

## 2021-10-15 PROCEDURE — 80053 COMPREHEN METABOLIC PANEL: CPT | Performed by: INTERNAL MEDICINE

## 2021-10-15 RX ORDER — PANTOPRAZOLE SODIUM 40 MG/1
40 TABLET, DELAYED RELEASE ORAL DAILY
Qty: 30 TABLET | Refills: 2 | Status: SHIPPED | OUTPATIENT
Start: 2021-10-16 | End: 2024-04-02

## 2021-10-15 RX ADMIN — PANTOPRAZOLE SODIUM 40 MG: 40 TABLET, DELAYED RELEASE ORAL at 08:10

## 2021-10-15 RX ADMIN — OXYCODONE 5 MG: 5 TABLET ORAL at 06:10

## 2021-10-15 RX ADMIN — MORPHINE SULFATE 2 MG: 2 INJECTION, SOLUTION INTRAMUSCULAR; INTRAVENOUS at 03:10

## 2021-10-27 ENCOUNTER — LAB VISIT (OUTPATIENT)
Dept: LAB | Facility: OTHER | Age: 81
End: 2021-10-27
Attending: INTERNAL MEDICINE
Payer: MEDICARE

## 2021-10-27 DIAGNOSIS — E55.9 VITAMIN D DEFICIENCY: ICD-10-CM

## 2021-10-27 DIAGNOSIS — K92.2 GI BLEEDING: Primary | ICD-10-CM

## 2021-10-27 DIAGNOSIS — E53.8 VITAMIN B 12 DEFICIENCY: ICD-10-CM

## 2021-10-27 DIAGNOSIS — K74.00 HEPATIC FIBROSIS, UNSPECIFIED: ICD-10-CM

## 2021-10-27 DIAGNOSIS — E53.8 FOLATE DEFICIENCY: ICD-10-CM

## 2021-10-27 LAB
25(OH)D3+25(OH)D2 SERPL-MCNC: 41 NG/ML (ref 30–96)
AFP SERPL-MCNC: 4.9 NG/ML (ref 0–8.4)
APTT BLDCRRT: 29.2 SEC (ref 21–32)
BASOPHILS # BLD AUTO: 0.03 K/UL (ref 0–0.2)
BASOPHILS NFR BLD: 0.9 % (ref 0–1.9)
CRP SERPL-MCNC: 3.8 MG/L (ref 0–8.2)
DIFFERENTIAL METHOD: ABNORMAL
EOSINOPHIL # BLD AUTO: 0 K/UL (ref 0–0.5)
EOSINOPHIL NFR BLD: 0.3 % (ref 0–8)
ERYTHROCYTE [DISTWIDTH] IN BLOOD BY AUTOMATED COUNT: 16.5 % (ref 11.5–14.5)
FOLATE SERPL-MCNC: 8 NG/ML (ref 4–24)
HCT VFR BLD AUTO: 33.4 % (ref 40–54)
HGB BLD-MCNC: 10.4 G/DL (ref 14–18)
IMM GRANULOCYTES # BLD AUTO: 0.02 K/UL (ref 0–0.04)
IMM GRANULOCYTES NFR BLD AUTO: 0.6 % (ref 0–0.5)
INR PPP: 1 (ref 0.8–1.2)
LYMPHOCYTES # BLD AUTO: 0.9 K/UL (ref 1–4.8)
LYMPHOCYTES NFR BLD: 27.4 % (ref 18–48)
MCH RBC QN AUTO: 27.2 PG (ref 27–31)
MCHC RBC AUTO-ENTMCNC: 31.1 G/DL (ref 32–36)
MCV RBC AUTO: 87 FL (ref 82–98)
MONOCYTES # BLD AUTO: 0.4 K/UL (ref 0.3–1)
MONOCYTES NFR BLD: 12.9 % (ref 4–15)
NEUTROPHILS # BLD AUTO: 1.9 K/UL (ref 1.8–7.7)
NEUTROPHILS NFR BLD: 57.9 % (ref 38–73)
NRBC BLD-RTO: 0 /100 WBC
PLATELET # BLD AUTO: 190 K/UL (ref 150–450)
PMV BLD AUTO: 9.4 FL (ref 9.2–12.9)
PROTHROMBIN TIME: 10.5 SEC (ref 9–12.5)
RBC # BLD AUTO: 3.83 M/UL (ref 4.6–6.2)
VIT B12 SERPL-MCNC: 968 PG/ML (ref 210–950)
WBC # BLD AUTO: 3.25 K/UL (ref 3.9–12.7)

## 2021-10-27 PROCEDURE — 85730 THROMBOPLASTIN TIME PARTIAL: CPT | Performed by: INTERNAL MEDICINE

## 2021-10-27 PROCEDURE — 82306 VITAMIN D 25 HYDROXY: CPT | Performed by: INTERNAL MEDICINE

## 2021-10-27 PROCEDURE — 85610 PROTHROMBIN TIME: CPT | Performed by: INTERNAL MEDICINE

## 2021-10-27 PROCEDURE — 82105 ALPHA-FETOPROTEIN SERUM: CPT | Performed by: INTERNAL MEDICINE

## 2021-10-27 PROCEDURE — 36415 COLL VENOUS BLD VENIPUNCTURE: CPT | Performed by: INTERNAL MEDICINE

## 2021-10-27 PROCEDURE — 82607 VITAMIN B-12: CPT | Performed by: INTERNAL MEDICINE

## 2021-10-27 PROCEDURE — 85025 COMPLETE CBC W/AUTO DIFF WBC: CPT | Performed by: INTERNAL MEDICINE

## 2021-10-27 PROCEDURE — 86140 C-REACTIVE PROTEIN: CPT | Performed by: INTERNAL MEDICINE

## 2021-10-27 PROCEDURE — 82746 ASSAY OF FOLIC ACID SERUM: CPT | Performed by: INTERNAL MEDICINE

## 2022-01-10 ENCOUNTER — HOSPITAL ENCOUNTER (EMERGENCY)
Facility: HOSPITAL | Age: 82
Discharge: HOME | End: 2022-01-10
Attending: EMERGENCY MEDICINE
Payer: MEDICARE

## 2022-01-10 VITALS
BODY MASS INDEX: 28.65 KG/M2 | HEART RATE: 72 BPM | SYSTOLIC BLOOD PRESSURE: 166 MMHG | TEMPERATURE: 98 F | DIASTOLIC BLOOD PRESSURE: 87 MMHG | OXYGEN SATURATION: 97 % | RESPIRATION RATE: 16 BRPM | HEIGHT: 65 IN | WEIGHT: 171.94 LBS

## 2022-01-10 DIAGNOSIS — R26.81 GAIT INSTABILITY: Primary | ICD-10-CM

## 2022-01-10 LAB
ALBUMIN SERPL BCP-MCNC: 4.1 G/DL (ref 3.5–5.2)
ALP SERPL-CCNC: 115 U/L (ref 38–126)
ALT SERPL W/O P-5'-P-CCNC: 26 U/L (ref 10–44)
ANION GAP SERPL CALC-SCNC: 10 MMOL/L (ref 8–16)
AST SERPL-CCNC: 54 U/L (ref 15–46)
BASOPHILS # BLD AUTO: 0.01 K/UL (ref 0–0.2)
BASOPHILS NFR BLD: 0.4 % (ref 0–1.9)
BILIRUB SERPL-MCNC: 1 MG/DL (ref 0.1–1)
BILIRUB UR QL STRIP: NEGATIVE
CALCIUM SERPL-MCNC: 8.8 MG/DL (ref 8.7–10.5)
CHLORIDE SERPL-SCNC: 108 MMOL/L (ref 95–110)
CLARITY UR REFRACT.AUTO: CLEAR
CO2 SERPL-SCNC: 24 MMOL/L (ref 23–29)
COLOR UR AUTO: YELLOW
CREAT SERPL-MCNC: 0.95 MG/DL (ref 0.5–1.4)
DIFFERENTIAL METHOD: ABNORMAL
EOSINOPHIL # BLD AUTO: 0 K/UL (ref 0–0.5)
EOSINOPHIL NFR BLD: 0.4 % (ref 0–8)
ERYTHROCYTE [DISTWIDTH] IN BLOOD BY AUTOMATED COUNT: 18.2 % (ref 11.5–14.5)
EST. GFR  (AFRICAN AMERICAN): >60 ML/MIN/1.73 M^2
EST. GFR  (NON AFRICAN AMERICAN): >60 ML/MIN/1.73 M^2
GLUCOSE SERPL-MCNC: 172 MG/DL (ref 70–110)
GLUCOSE UR QL STRIP: NEGATIVE
HCT VFR BLD AUTO: 32.7 % (ref 40–54)
HGB BLD-MCNC: 9.5 G/DL (ref 14–18)
HGB UR QL STRIP: NEGATIVE
IMM GRANULOCYTES # BLD AUTO: 0.01 K/UL (ref 0–0.04)
IMM GRANULOCYTES NFR BLD AUTO: 0.4 % (ref 0–0.5)
KETONES UR QL STRIP: NEGATIVE
LEUKOCYTE ESTERASE UR QL STRIP: NEGATIVE
LYMPHOCYTES # BLD AUTO: 0.7 K/UL (ref 1–4.8)
LYMPHOCYTES NFR BLD: 26.2 % (ref 18–48)
MCH RBC QN AUTO: 23.3 PG (ref 27–31)
MCHC RBC AUTO-ENTMCNC: 29.1 G/DL (ref 32–36)
MCV RBC AUTO: 80 FL (ref 82–98)
MONOCYTES # BLD AUTO: 0.4 K/UL (ref 0.3–1)
MONOCYTES NFR BLD: 15.7 % (ref 4–15)
NEUTROPHILS # BLD AUTO: 1.4 K/UL (ref 1.8–7.7)
NEUTROPHILS NFR BLD: 56.9 % (ref 38–73)
NITRITE UR QL STRIP: NEGATIVE
NRBC BLD-RTO: 0 /100 WBC
NT-PROBNP SERPL-MCNC: 1040 PG/ML (ref 5–1800)
PH UR STRIP: 7 [PH] (ref 5–8)
PLATELET # BLD AUTO: 137 K/UL (ref 150–450)
PMV BLD AUTO: 9.8 FL (ref 9.2–12.9)
POTASSIUM SERPL-SCNC: 4 MMOL/L (ref 3.5–5.1)
PROT SERPL-MCNC: 7.6 G/DL (ref 6–8.4)
PROT UR QL STRIP: ABNORMAL
RBC # BLD AUTO: 4.08 M/UL (ref 4.6–6.2)
SODIUM SERPL-SCNC: 142 MMOL/L (ref 136–145)
SP GR UR STRIP: 1.01 (ref 1–1.03)
TROPONIN I SERPL-MCNC: 0.01 NG/ML (ref 0.01–0.03)
URN SPEC COLLECT METH UR: ABNORMAL
UROBILINOGEN UR STRIP-ACNC: NEGATIVE EU/DL
UUN UR-MCNC: 11 MG/DL (ref 2–20)
WBC # BLD AUTO: 2.48 K/UL (ref 3.9–12.7)

## 2022-01-10 PROCEDURE — 85025 COMPLETE CBC W/AUTO DIFF WBC: CPT | Mod: ER | Performed by: EMERGENCY MEDICINE

## 2022-01-10 PROCEDURE — 83880 ASSAY OF NATRIURETIC PEPTIDE: CPT | Mod: ER | Performed by: EMERGENCY MEDICINE

## 2022-01-10 PROCEDURE — 81003 URINALYSIS AUTO W/O SCOPE: CPT | Mod: ER | Performed by: EMERGENCY MEDICINE

## 2022-01-10 PROCEDURE — 80053 COMPREHEN METABOLIC PANEL: CPT | Mod: ER | Performed by: EMERGENCY MEDICINE

## 2022-01-10 PROCEDURE — 84484 ASSAY OF TROPONIN QUANT: CPT | Mod: ER | Performed by: EMERGENCY MEDICINE

## 2022-01-10 PROCEDURE — 99284 EMERGENCY DEPT VISIT MOD MDM: CPT | Mod: 25,ER

## 2022-02-22 ENCOUNTER — HOSPITAL ENCOUNTER (EMERGENCY)
Facility: HOSPITAL | Age: 82
Discharge: HOME | End: 2022-02-22
Attending: EMERGENCY MEDICINE
Payer: MEDICARE

## 2022-02-22 VITALS
SYSTOLIC BLOOD PRESSURE: 172 MMHG | DIASTOLIC BLOOD PRESSURE: 81 MMHG | WEIGHT: 170 LBS | OXYGEN SATURATION: 95 % | BODY MASS INDEX: 28.32 KG/M2 | HEART RATE: 86 BPM | HEIGHT: 65 IN | TEMPERATURE: 99 F | RESPIRATION RATE: 18 BRPM

## 2022-02-22 DIAGNOSIS — M79.672 BILATERAL FOOT PAIN: Primary | ICD-10-CM

## 2022-02-22 DIAGNOSIS — M79.671 BILATERAL FOOT PAIN: Primary | ICD-10-CM

## 2022-02-22 PROCEDURE — 25000003 PHARM REV CODE 250: Mod: ER | Performed by: PHYSICIAN ASSISTANT

## 2022-02-22 PROCEDURE — 99283 EMERGENCY DEPT VISIT LOW MDM: CPT | Mod: ER

## 2022-02-22 RX ORDER — ACETAMINOPHEN 500 MG
1000 TABLET ORAL
Status: COMPLETED | OUTPATIENT
Start: 2022-02-22 | End: 2022-02-22

## 2022-02-22 RX ADMIN — ACETAMINOPHEN 1000 MG: 500 TABLET ORAL at 02:02

## 2022-02-22 NOTE — ED PROVIDER NOTES
Encounter Date: 2/22/2022       History     Chief Complaint   Patient presents with    Foot Pain     Brought in by EMS, bilateral foot pain x 3 days      Patient is a 83 y/o male who presents to ED with c/o bilateral foot pain. Onset of symptoms yesterday after he was on his feet for an extended period of time.  Patient denies any swelling, redness, numbness/tingling.  Denies any falls or trauma.  Limited history secondary to patient's history of CVA and residual dysarthria.        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    GERD (gastroesophageal reflux disease)     Gout, unspecified     H/O: GI bleed     Hypertension     Liver cancer     PVD (peripheral vascular disease)     Sciatica     SSS (sick sinus syndrome)     Tachy-valentine syndrome     Thyroid disease      Past Surgical History:   Procedure Laterality Date    ANGIOGRAPHY Right 10/11/2021    Procedure: ANGIOGRAM-HEPATIC;  Surgeon: Phil Stuart II, MD;  Location: [facility] CATH LAB;  Service: Interventional Radiology;  Laterality: Right;    APPENDECTOMY      CHOLECYSTECTOMY      CORONARY ARTERY BYPASS GRAFT      ENDOSCOPIC ULTRASOUND OF UPPER GASTROINTESTINAL TRACT N/A 6/17/2020    Procedure: EUS;  Surgeon: Rei Humphrey MD;  Location: [facility] ENDO;  Service: Endoscopy;  Laterality: N/A;    ERCP N/A 6/17/2020    Procedure: ERCP;  Surgeon: Rei Humphrey MD;  Location: [facility] ENDO;  Service: Endoscopy;  Laterality: N/A;    ERCP N/A 8/31/2020    Procedure: ERCP (ENDOSCOPIC RETROGRADE CHOLANGIOPANCREATOGRAPHY);  Surgeon: Adela Boyle MD;  Location: [facility] ENDO;  Service: Endoscopy;  Laterality: N/A;     ESOPHAGOGASTRODUODENOSCOPY N/A 10/11/2021    Procedure: EGD (ESOPHAGOGASTRODUODENOSCOPY);  Surgeon: Skip Varela MD;  Location[facility];  Service: Gastroenterology;  Laterality: N/A;    INSERT / REPLACE / REMOVE PACEMAKER      LIVER RESECTION      LUMBAR DISC SURGERY       Family History   Problem Relation Age of Onset    Heart disease Mother     Pancreatic cancer Mother     Heart disease Father      Social History     Tobacco Use    Smoking status: Current Some Day Smoker     Packs/day: 0.25     Years: 37.00     Pack years: 9.25     Types: Cigarettes     Last attempt to quit: 1992     Years since quittin.2    Smokeless tobacco: Never Used    Tobacco comment: Pt declines enrollment in the [employer]. Handout provided for Ambulatory Smoking Cessation program.    Substance Use Topics    Alcohol use: Yes     Alcohol/week: 5.0 standard drinks     Types: 5 Cans of beer per week    Drug use: No     Review of Systems   Constitutional: Negative for diaphoresis and fever.   Respiratory: Negative for shortness of breath.    Cardiovascular: Negative for chest pain and leg swelling.   Musculoskeletal: Positive for myalgias. Negative for arthralgias and joint swelling.   Skin: Negative for color change, rash and wound.   Neurological: Negative for weakness and numbness.       Physical Exam     Initial Vitals [22 1348]   BP Pulse Resp Temp SpO2   (!) 150/80 79 18 97.8 °F (36.6 °C) 100 %      MAP       --         Physical Exam    Nursing note and vitals reviewed.  Constitutional: He appears well-developed and well-nourished. He is not diaphoretic. No distress.   Cardiovascular: Normal rate and intact distal pulses.   2+ DP pulses bilaterally   Pulmonary/Chest: Breath sounds normal. No respiratory distress.   Musculoskeletal:         General: No tenderness or edema. Normal range of motion.      Comments: No edema, erythema, or ecchymosis of bilateral feet.  No focal tenderness.  No  deformities noted.  Sensation is intact.     Neurological: He is alert and oriented to person, place, and time. He has normal strength. No sensory deficit.   Skin: Skin is warm. Capillary refill takes less than 2 seconds. No rash noted.         ED Course   Procedures  Labs Reviewed - No data to display       Imaging Results    None          Medications   acetaminophen tablet 1,000 mg (1,000 mg Oral Given 2/22/22 7479)     Medical Decision Making:   ED Management:  Patient presents ED with complaints of bilateral foot pain.  There is no edema of lower extremities.  No signs of trauma, swelling, skin changes.  Patient reported pain started after he was on his feet yesterday.  Will treat with Tylenol and supportive care measures.  PCP follow-up if symptoms persist.  All questions answered.                      Clinical Impression:   Final diagnoses:  [M79.671, M79.672] Bilateral foot pain (Primary)          ED Disposition Condition    Discharge Stable        ED Prescriptions     None        Follow-up Information     Follow up With Specialties Details Why Contact Info    Michaela Miller MD Internal Medicine   [address] [place] [id number] [phone]             Christie Chavez PA-C  02/23/22 8330

## 2022-03-17 ENCOUNTER — TELEPHONE (OUTPATIENT)
Dept: CARDIOLOGY | Facility: CLINIC | Age: 82
End: 2022-03-17
Payer: MEDICARE

## 2022-03-17 NOTE — TELEPHONE ENCOUNTER
Reached out to patient to schedule an in office pacemaker interrogation for April in [place].  No answer.  Left callback message.

## 2022-04-21 DIAGNOSIS — E11.9 TYPE 2 DIABETES MELLITUS WITHOUT COMPLICATION, WITHOUT LONG-TERM CURRENT USE OF INSULIN: Primary | ICD-10-CM

## 2022-04-27 ENCOUNTER — TELEPHONE (OUTPATIENT)
Dept: ADMINISTRATIVE | Facility: OTHER | Age: 82
End: 2022-04-27
Payer: MEDICARE

## 2022-04-27 ENCOUNTER — CLINICAL SUPPORT (OUTPATIENT)
Dept: CARDIOLOGY | Facility: CLINIC | Age: 82
End: 2022-04-27
Payer: MEDICARE

## 2022-04-27 DIAGNOSIS — Z95.0 PACEMAKER: Primary | ICD-10-CM

## 2022-04-27 PROCEDURE — 93280 PM DEVICE PROGR EVAL DUAL: CPT | Mod: 26,,, | Performed by: INTERNAL MEDICINE

## 2022-04-27 PROCEDURE — 93280 PR PROGRAM EVAL (IN PERSON) IMPLANT DEVICE,PACEMAKER,2 LEAD: ICD-10-PCS | Mod: 26,,, | Performed by: INTERNAL MEDICINE

## 2022-05-03 NOTE — PROGRESS NOTES
Pacemaker Evaluation Report    4/27/2022      Primary MD: Dr. Terrazas  Primary Cardiologist: Dr. Quiroga     : [employer] Model: Evia DR/Serial No.  [id number]  Implant date: Jan 13, 2011    Reason for evaluation:routine  Indication for pacemaker: unknown    Measurements  Atrial sensing - P wave: 0.5 mV  Atrial capture: Maintained: 0.8 V @ 0.5 msec  Atrial lead impedance: 331 ohms  Ventricular sensing - R wave: 9 mV  Ventricular capture: RV Maintained: 1.5 V @ 0.4 ms   Ventricular lead impedance: right - 429 ohms   Underlying rhythm: AF with HR 39       Diagnostic Data  Atrial paced: 95 % Ventricular paced: 46 %  Mode switch: on  Other: 18 episodes of Afib burden - longest episode 129 hours on 10/21/2021 then chronic AF since 10/21/2021    Sensor response: CLS  Battery status: satisfactory Magnet rate: 90 bpm   20 % remaining capacity  Est. Longevity 1 yr 8 months       Final Parameters  Mode: DDD-CLS Lower rate: 60 bpm Upper rate: 110 bpm  Atrial - Amplitude: 1.6 V Pulse width: 0.4 ms sensitivity 0.8 ms  Polarity: Bipolar  Ventricular - Amplitude: 2.5 V Pulse width: 0.5 ms sensitivity 2.5 ms  Polarity: Bipolar  Changes made:  RV captured turned on.   Conclusions: Normal device function.      Follow up: 6 months      Carlos Quiroga

## 2022-05-11 ENCOUNTER — CLINICAL SUPPORT (OUTPATIENT)
Dept: DIABETES | Facility: CLINIC | Age: 82
End: 2022-05-11
Payer: MEDICARE

## 2022-05-11 DIAGNOSIS — E11.9 TYPE 2 DIABETES MELLITUS WITHOUT COMPLICATION, WITHOUT LONG-TERM CURRENT USE OF INSULIN: ICD-10-CM

## 2022-05-11 PROCEDURE — G0108 PR DIAB MANAGE TRN  PER INDIV: ICD-10-PCS | Mod: S$GLB,,, | Performed by: DIETITIAN, REGISTERED

## 2022-05-11 PROCEDURE — G0108 DIAB MANAGE TRN  PER INDIV: HCPCS | Mod: S$GLB,,, | Performed by: DIETITIAN, REGISTERED

## 2022-05-11 NOTE — PROGRESS NOTES
Diabetes Care Specialist Progress Note  Author: Heather Hays RD  Date: 5/11/2022    Program Intake  Reason for Diabetes Program Visit:: Initial Diabetes Assessment  Current diabetes risk level:: low  In the last 12 months, have you:: used emergency room services  Was the ER or hospital admission related to diabetes?: No  Permission to speak with others about care:: yes    Lab Results   Component Value Date    HGBA1C 5.3 10/10/2021       Clinical  Patient in office with wife. Blood Sugars overall controlled.  Glimepiride Once Daily  Monitoring Once Daily    24hr Recall  B - toast x2, coffee  L - sandwich  D - sandwich  S - chips, cookies occasionally  Diet coke      Patient Health Rating  Compared to other people your age, how would you rate your health?: Poor    Problem Review  Reviewed Problem List with Patient: yes  Active comorbidities affecting diabetes self-care.: no  Reviewed health maintenance: yes    Clinical Assessment  Current Diabetes Treatment: Oral Medication  Have you ever experienced hypoglycemia (low blood sugar)?: no  Have you ever experienced hyperglycemia (high blood sugar)?: no    Medication Information  How do you obtain your medications?: Family picks up  How many days a week do you miss your medications?: Never  Do you sometimes have difficulty refilling your medications?: No  Medication adherence impacting ability to self-manage diabetes?: No    Labs  Do you have regular lab work to monitor your medications?: Yes  Type of Regular Lab Work: A1c  Where do you get your labs drawn?: [facility]  Lab Compliance Barriers: No    Nutritional Status  Diet: Regular  Change in appetite?: No  Dentation:: Needs Dentures  Current nutritional status an area of need that is impacting patient's ability to self-manage diabetes?: No    Additional Social History    Support  Does anyone support you with your diabetes care?: yes  Who supports you?: spouse  Who takes you to your medical appointments?: spouse  Does  the current support meet the patient's needs?: Yes  Is Support an area impacting ability to self-manage diabetes?: No    Access to Mass Media & Technology  Does the patient have access to any of the following devices or technologies?: Internet Access  Media or technology needs impacting ability to self-manage diabetes?: No    Cognitive/Behavioral Health  Alert and Oriented: No  Difficulty Thinking: Yes  Requires Prompting: Yes  Requires assistance for routine expression?: No  Cognitive or behavioral barriers impacting ability to self-manage diabetes?: No    Culture/Taoist  Culture or Christianity beliefs that may impact ability to access healthcare: No    Communication  Language preference: English  Hearing Problems: Yes  Vision Problems: No  Communication needs impacting ability to self-manage diabetes?: No    Health Literacy  Preferred Learning Method: Face to Face  How often do you need to have someone help you read instructions, pamphlets, or written material from your doctor or pharmacy?: Sometimes  Health literacy needs impacting ability to self-manage diabetes?: No      Diabetes Self-Management Skills Assessment    Diabetes Disease Process/Treatment Options  Patient/caregiver able to state what happens when someone has diabetes.: yes  Patient/caregiver knows what type of diabetes they have.: yes  Diabetes Type : Type II  Patient/caregiver able to identify at least three signs and symptoms of diabetes.: yes  Patient able to identify at least three risk factors for diabetes.: yes  Diabetes Disease Process/Treatment Options: Skills Assessment Completed: Yes  Assessment indicates:: Adequate understanding  Area of need?: No    Nutrition/Healthy Eating  Method of carbohydrate measurement:: no knowledge of what carbs are  Nutrition/Healthy Eating Skills Assessment Completed:: Yes  Assessment indicates:: Instruction Needed, Knowledge deficit  Area of need?: Yes    Physical Activity/Exercise  Patient's daily activity  level:: sedentary  Patient formally exercises outside of work.: no  Patient can identify forms of physical activity.: yes  Patient can identify reasons why exercise/physical activity is important in diabetes management.: yes  Physical Activity/Exercise Skills Assessment Completed: : Yes  Assessment indicates:: Adequate understanding  Area of need?: No    Medications  Patient is able to describe current diabetes management routine.: yes  Diabetes management routine:: oral medications  Patient is able to identify current diabetes medications, dosages, and appropriate timing of medications.: yes  Patient understands the purpose of the medications taken for diabetes.: yes  Patient reports problems or concerns with current medication regimen.: no  Medication Skills Assessment Completed:: Yes  Assessment indicates:: Adequate understanding  Area of need?: No    Home Blood Glucose Monitoring  Patient states that blood sugar is checked at home daily.: yes  Monitoring Method:: home glucometer  How often do you check your blood sugar?: Occasionally  Home Blood Glucose Monitoring Skills Assessment Completed: : Yes  Assessment indicates:: Adequate understanding  Area of need?: No    Acute Complications  Patient is able to identify types of acute complications: No  Acute Complications Skills Assessment Completed: : Yes  Assessment indicates:: Instruction Needed, Knowledge deficit  Area of need?: Yes    Chronic Complications  Patient can identify major chronic complications of diabetes.: yes  Patient can identify ways to prevent or delay diabetes complications.: yes  Patient is aware that having diabetes increases risk of heart disease?: Yes  Patient is aware that heart disease is the leading cause of death and disability in people with diabetes?: Yes  Patient able to state risk factors for heart disease?: Yes  Patient is taking statin?: Yes  Chronic Complications Skills Assessment Completed: : Yes  Assessment indicates:: Adequate  understanding  Area of need?: No    Psychosocial/Coping  Patient can identify ways of coping with chronic disease.: yes  Patient-stated ways of coping with chronic disease:: support from loved ones  Psychosocial/Coping Skills Assessment Completed: : Yes  Assessment indicates:: Adequate understanding  Area of need?: No      Diabetes Self Support Plan         Assessment Summary and Plan    Based on today's diabetes care assessment, the following areas of need were identified:      Social 5/11/2022   Support No   Access to Mass Media/Tech No   Cognitive/Behavioral Health No   Culture/[place] No   Communication No   Health Literacy No        Clinical 5/11/2022   Medication Adherence No   Lab Compliance No   Nutritional Status No        Diabetes Self-Management Skills 5/11/2022   Diabetes Disease Process/Treatment Options No   Nutrition/Healthy Eating Yes - see care plan   Physical Activity/Exercise No   Medication No   Home Blood Glucose Monitoring No   Acute Complications Yes - Reviewed blood glucose goals, prevention, detection, signs and symptoms, and treatment of hypoglycemia and hyperglycemia, and when to contact the clinic.   Chronic Complications No   Psychosocial/Coping No          Today's interventions were provided through individual discussion, instruction, and written materials were provided.      Patient verbalized understanding of instruction and written materials.  Pt was able to return back demonstration of instructions today. Patient understood key points, needs reinforcement and further instruction.     Diabetes Self-Management Care Plan:    Today's Diabetes Self-Management Care Plan was developed with Venu's input. Venu has agreed to work toward the following goal(s) to improve his/her overall diabetes control.      Care Plan: Diabetes Management   Updates made since 4/11/2022 12:00 AM      Problem: Healthy Eating       Goal: Limit CHO intake at meals to have bg wnl    Start Date: 5/11/2022    Expected End Date: 8/1/2022   Priority: High   Barriers: No Barriers Identified      Task: Reviewed the sources and role of Carbohydrate, Protein, and Fat and how each nutrient impacts blood sugar. Completed 5/11/2022      Task: Provided visual examples using dry measuring cups, food models, and other familiar objects such as computer mouse, deck or cards, tennis ball etc. to help with visualization of portions.       Task: Explained how to count carbohydrates using the food label and the use of dry measuring cups for accurate carb counting.       Task: Discussed strategies for choosing healthier menu options when dining out.       Task: Recommended replacing beverages containing high sugar content with noncaloric/sugar free options and/or water.       Task: Review the importance of balancing carbohydrates with each meal using portion control techniques to count servings of carbohydrate and label reading to identify serving size and amount of total carbs per serving.       Task: Provided Sample plate method and reviewed the use of the plate to estimate amounts of carbohydrate per meal.           Follow Up Plan     Follow up in about 6 months (around 11/11/2022).    Today's care plan and follow up schedule was discussed with patient.  Venu verbalized understanding of the care plan, goals, and agrees to follow up plan.        The patient was encouraged to communicate with his/her health care provider/physician and care team regarding his/her condition(s) and treatment.  I provided the patient with my contact information today and encouraged to contact me via phone or [facility] Patient Portal as needed.     Length of Visit   Total Time: 30 Minutes

## 2022-05-12 ENCOUNTER — TELEPHONE (OUTPATIENT)
Dept: CARDIOLOGY | Facility: CLINIC | Age: 82
End: 2022-05-12
Payer: MEDICARE

## 2022-05-12 DIAGNOSIS — E87.20 METABOLIC ACIDOSIS: ICD-10-CM

## 2022-05-12 DIAGNOSIS — I48.0 PAROXYSMAL ATRIAL FIBRILLATION: ICD-10-CM

## 2022-05-12 DIAGNOSIS — R79.89 ABNORMAL LFTS: ICD-10-CM

## 2022-05-12 DIAGNOSIS — I25.10 CORONARY ARTERY DISEASE INVOLVING NATIVE CORONARY ARTERY OF NATIVE HEART WITHOUT ANGINA PECTORIS: Primary | ICD-10-CM

## 2022-05-12 DIAGNOSIS — I48.92 ATRIAL FLUTTER, UNSPECIFIED TYPE: ICD-10-CM

## 2022-05-12 DIAGNOSIS — D63.8 ANEMIA, CHRONIC DISEASE: ICD-10-CM

## 2022-05-12 DIAGNOSIS — N17.9 AKI (ACUTE KIDNEY INJURY): ICD-10-CM

## 2022-05-12 DIAGNOSIS — I10 ESSENTIAL HYPERTENSION: ICD-10-CM

## 2022-05-12 DIAGNOSIS — E78.5 DYSLIPIDEMIA: ICD-10-CM

## 2022-05-12 NOTE — TELEPHONE ENCOUNTER
"2nd attempt.    I called patient again , with no luck.    Recording on phone "Mail Box Is Full". Cant leave     a message.        Nw                              MD Elli Correa MA      Please set up a follow up   Echo + ECG + labs prior to visit       Thanks       ZN  "

## 2022-05-17 ENCOUNTER — TELEPHONE (OUTPATIENT)
Dept: ADMINISTRATIVE | Facility: OTHER | Age: 82
End: 2022-05-17
Payer: MEDICARE

## 2022-05-18 ENCOUNTER — TELEPHONE (OUTPATIENT)
Dept: CARDIOLOGY | Facility: CLINIC | Age: 82
End: 2022-05-18

## 2022-05-18 ENCOUNTER — TELEPHONE (OUTPATIENT)
Dept: CARDIOLOGY | Facility: CLINIC | Age: 82
End: 2022-05-18
Payer: MEDICARE

## 2022-05-23 ENCOUNTER — TELEPHONE (OUTPATIENT)
Dept: CARDIOLOGY | Facility: CLINIC | Age: 82
End: 2022-05-23
Payer: MEDICARE

## 2022-05-23 NOTE — TELEPHONE ENCOUNTER
I reached out to patient last week and again today and I     left him v/Ernesto @[phone].    Regarding your up coming appointment with   .   wants you to do Some test prior to your visit.    You need prior to your appointment     EKG, ECHO, Fasting LAB.    Please call to schedule test at [phone].        Nw  
appears normal and intact

## 2022-06-02 ENCOUNTER — TELEPHONE (OUTPATIENT)
Dept: CARDIOLOGY | Facility: CLINIC | Age: 82
End: 2022-06-02
Payer: MEDICARE

## 2022-07-03 ENCOUNTER — HOSPITAL ENCOUNTER (EMERGENCY)
Facility: HOSPITAL | Age: 82
Discharge: HOME | End: 2022-07-03
Attending: EMERGENCY MEDICINE
Payer: MEDICARE

## 2022-07-03 VITALS
HEIGHT: 65 IN | RESPIRATION RATE: 18 BRPM | TEMPERATURE: 99 F | SYSTOLIC BLOOD PRESSURE: 162 MMHG | DIASTOLIC BLOOD PRESSURE: 79 MMHG | BODY MASS INDEX: 27.49 KG/M2 | OXYGEN SATURATION: 100 % | HEART RATE: 59 BPM | WEIGHT: 165 LBS

## 2022-07-03 DIAGNOSIS — G89.29 CHRONIC BILATERAL LOW BACK PAIN WITH BILATERAL SCIATICA: Primary | ICD-10-CM

## 2022-07-03 DIAGNOSIS — M54.42 CHRONIC BILATERAL LOW BACK PAIN WITH BILATERAL SCIATICA: Primary | ICD-10-CM

## 2022-07-03 DIAGNOSIS — M54.41 CHRONIC BILATERAL LOW BACK PAIN WITH BILATERAL SCIATICA: Primary | ICD-10-CM

## 2022-07-03 PROCEDURE — 96372 THER/PROPH/DIAG INJ SC/IM: CPT | Performed by: EMERGENCY MEDICINE

## 2022-07-03 PROCEDURE — 25000003 PHARM REV CODE 250: Mod: ER | Performed by: EMERGENCY MEDICINE

## 2022-07-03 PROCEDURE — 63600175 PHARM REV CODE 636 W HCPCS: Mod: ER | Performed by: EMERGENCY MEDICINE

## 2022-07-03 PROCEDURE — 99284 EMERGENCY DEPT VISIT MOD MDM: CPT | Mod: 25,ER

## 2022-07-03 RX ORDER — KETOROLAC TROMETHAMINE 30 MG/ML
30 INJECTION, SOLUTION INTRAMUSCULAR; INTRAVENOUS
Status: COMPLETED | OUTPATIENT
Start: 2022-07-03 | End: 2022-07-03

## 2022-07-03 RX ORDER — CYCLOBENZAPRINE HCL 10 MG
10 TABLET ORAL EVERY 8 HOURS PRN
Qty: 14 TABLET | Refills: 0 | Status: SHIPPED | OUTPATIENT
Start: 2022-07-03 | End: 2022-07-08

## 2022-07-03 RX ORDER — PROCHLORPERAZINE EDISYLATE 5 MG/ML
10 INJECTION INTRAMUSCULAR; INTRAVENOUS
Status: COMPLETED | OUTPATIENT
Start: 2022-07-03 | End: 2022-07-03

## 2022-07-03 RX ORDER — LIDOCAINE 50 MG/G
1 PATCH TOPICAL DAILY
Qty: 15 PATCH | Refills: 0 | Status: SHIPPED | OUTPATIENT
Start: 2022-07-03 | End: 2022-10-19

## 2022-07-03 RX ORDER — LIDOCAINE 50 MG/G
1 PATCH TOPICAL
Status: DISCONTINUED | OUTPATIENT
Start: 2022-07-03 | End: 2022-07-03 | Stop reason: HOSPADM

## 2022-07-03 RX ADMIN — PROCHLORPERAZINE EDISYLATE 10 MG: 5 INJECTION INTRAMUSCULAR; INTRAVENOUS at 08:07

## 2022-07-03 RX ADMIN — KETOROLAC TROMETHAMINE 30 MG: 30 INJECTION, SOLUTION INTRAMUSCULAR at 08:07

## 2022-07-03 RX ADMIN — LIDOCAINE 1 PATCH: 50 PATCH CUTANEOUS at 08:07

## 2022-07-03 NOTE — DISCHARGE INSTRUCTIONS
I recommend taking ibuprofen 600 mg every 8 hours with food in addition to the prescribed medication.  Please follow-up with your primary care physician for further evaluation and management.  Please return with any new or worsening symptoms.

## 2022-07-03 NOTE — ED PROVIDER NOTES
Encounter Date: 7/3/2022       History     Chief Complaint   Patient presents with    Back Pain     Pt states I fell a couple of months ago and I was not hurting that bad and now It has gotten worse, pt reports pain to his back and worsening pain in his legs. PT states he is also having some numbness to his feet off and on.      82-year-old male presents emergency department complaining of back pain.  States he fell a year so ago.  He has been having intermittent back pain since that time.  States it worsened today.  Indicates a diffuse low back pain worse with ambulation and without alleviating factors.  States it radiates down the back of his legs.  States my doctors and given me enough pain medicine for it. Denies any recent trauma or fall.  Denies any new symptoms.  Denies any focal numbness or weakness, loss of continence, difficulty urinating defecating, or saddle paresthesias.  No other symptoms reported at this time.        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    GERD (gastroesophageal reflux disease)     Gout, unspecified     H/O: GI bleed     Hypertension     Liver cancer     PVD (peripheral vascular disease)     Sciatica     SSS (sick sinus syndrome)     Tachy-valentine syndrome     Thyroid disease      Past Surgical History:   Procedure Laterality Date    ANGIOGRAPHY Right 10/11/2021    Procedure: ANGIOGRAM-HEPATIC;  Surgeon: Phil Stuart II, MD;  Location: [facility] CATH LAB;  Service: Interventional Radiology;  Laterality: Right;    APPENDECTOMY      CHOLECYSTECTOMY      CORONARY ARTERY BYPASS GRAFT      ENDOSCOPIC ULTRASOUND OF UPPER GASTROINTESTINAL TRACT N/A 6/17/2020     Procedure: EUS;  Surgeon: Rei Humphrey MD;  Location: [facility] ENDO;  Service: Endoscopy;  Laterality: N/A;    ERCP N/A 2020    Procedure: ERCP;  Surgeon: Rei Humphrey MD;  Location: [facility] ENDO;  Service: Endoscopy;  Laterality: N/A;    ERCP N/A 2020    Procedure: ERCP (ENDOSCOPIC RETROGRADE CHOLANGIOPANCREATOGRAPHY);  Surgeon: Adela Boyle MD;  Location: [facility] ENDO;  Service: Endoscopy;  Laterality: N/A;    ESOPHAGOGASTRODUODENOSCOPY N/A 10/11/2021    Procedure: EGD (ESOPHAGOGASTRODUODENOSCOPY);  Surgeon: Skip Varela MD;  Location[facility];  Service: Gastroenterology;  Laterality: N/A;    INSERT / REPLACE / REMOVE PACEMAKER      LIVER RESECTION      LUMBAR DISC SURGERY       Family History   Problem Relation Age of Onset    Heart disease Mother     Pancreatic cancer Mother     Heart disease Father      Social History     Tobacco Use    Smoking status: Current Some Day Smoker     Packs/day: 0.25     Years: 37.00     Pack years: 9.25     Types: Cigarettes     Last attempt to quit: 1992     Years since quittin.6    Smokeless tobacco: Never Used    Tobacco comment: Pt declines enrollment in the [employer]. Handout provided for Ambulatory Smoking Cessation program.    Substance Use Topics    Alcohol use: Yes     Alcohol/week: 5.0 standard drinks     Types: 5 Cans of beer per week    Drug use: No     Review of Systems   Constitutional: Negative for chills, fatigue and fever.   HENT: Negative for congestion and sore throat.    Eyes: Negative for photophobia and visual disturbance.   Respiratory: Negative for cough and shortness of breath.    Cardiovascular: Negative for chest pain and palpitations.   Gastrointestinal: Negative for abdominal pain, diarrhea and vomiting.   Musculoskeletal: Positive for back pain. Negative for neck pain and neck stiffness.   Neurological: Negative for light-headedness, numbness and headaches.       Physical Exam     Initial  Vitals [07/03/22 0822]   BP Pulse Resp Temp SpO2   (!) 162/79 (!) 59 18 98.7 °F (37.1 °C) 100 %      MAP       --         Physical Exam    Nursing note and vitals reviewed.  Constitutional: He appears well-developed and well-nourished. No distress.   HENT:   Head: Normocephalic and atraumatic.   Eyes: Conjunctivae and EOM are normal. Pupils are equal, round, and reactive to light.   Neck: Neck supple. No tracheal deviation present.   Normal range of motion.  Cardiovascular: Normal rate and intact distal pulses.   Pulmonary/Chest: No respiratory distress.   Musculoskeletal:         General: Tenderness (Diffuse low back TTP; No point or bony TTP, no rash, no swelling) present. No edema. Normal range of motion.      Cervical back: Normal range of motion and neck supple.     Neurological: He is alert and oriented to person, place, and time. He has normal strength. No cranial nerve deficit. GCS score is 15. GCS eye subscore is 4. GCS verbal subscore is 5. GCS motor subscore is 6.   Skin: Skin is warm and dry.         ED Course   Procedures  Labs Reviewed - No data to display       Imaging Results    None          Medications   LIDOcaine 5 % patch 1 patch (1 patch Transdermal Patch Applied 7/3/22 0842)   ketorolac injection 30 mg (30 mg Intramuscular Given 7/3/22 0842)   prochlorperazine injection Soln 10 mg (10 mg Intramuscular Given 7/3/22 0842)     Medical Decision Making:   Initial Assessment:   82-year-old male presents emergency department complaining of low back pain  Differential Diagnosis:   Fracture, dislocation, contusion, sprain, strain, radiculopathy, cauda equina  ED Management:  Exam relatively benign.  Patient given IM Toradol and Compazine as well as a Lidoderm patch.  Reports significant improvement in symptoms and requesting discharge.  Vital signs stable.  Patient given prescriptions for Flexeril and Lidoderm, instructed on home management, follow up with primary care physician, strict return  precautions.                       Clinical Impression:   Final diagnoses:  [M54.42, M54.41, G89.29] Chronic bilateral low back pain with bilateral sciatica (Primary)          ED Disposition Condition    Discharge Stable        ED Prescriptions     Medication Sig Dispense Start Date End Date Auth. Provider    cyclobenzaprine (FLEXERIL) 10 MG tablet Take 1 tablet (10 mg total) by mouth every 8 (eight) hours as needed for Muscle spasms. 14 tablet 7/3/2022 7/8/2022 Sumit Gimenez MD    LIDOcaine (LIDODERM) 5 % Place 1 patch onto the skin once daily. Remove & Discard patch within 12 hours or as directed by MD 15 patch 7/3/2022  Sumit Gimenez MD        Follow-up Information     Follow up With Specialties Details Why Contact Info    Michaela Miller MD Internal Medicine Schedule an appointment as soon as possible for a visit   [address] [place] [id number] [phone]             Sumit Gimenez MD  07/03/22 5698

## 2022-07-18 ENCOUNTER — HOSPITAL ENCOUNTER (EMERGENCY)
Facility: HOSPITAL | Age: 82
Discharge: HOME | End: 2022-07-18
Attending: EMERGENCY MEDICINE
Payer: MEDICARE

## 2022-07-18 VITALS
SYSTOLIC BLOOD PRESSURE: 144 MMHG | OXYGEN SATURATION: 98 % | HEART RATE: 62 BPM | WEIGHT: 155.81 LBS | HEIGHT: 65 IN | BODY MASS INDEX: 25.96 KG/M2 | TEMPERATURE: 98 F | DIASTOLIC BLOOD PRESSURE: 73 MMHG | RESPIRATION RATE: 17 BRPM

## 2022-07-18 DIAGNOSIS — M25.511 ACUTE PAIN OF RIGHT SHOULDER: ICD-10-CM

## 2022-07-18 DIAGNOSIS — S09.90XA INJURY OF HEAD, INITIAL ENCOUNTER: Primary | ICD-10-CM
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
## 2022-07-18 LAB
ALBUMIN SERPL BCP-MCNC: 4.5 G/DL (ref 3.5–5.2)
ALP SERPL-CCNC: 184 U/L (ref 38–126)
ALT SERPL W/O P-5'-P-CCNC: 27 U/L (ref 10–44)
AMPHET+METHAMPHET UR QL: NEGATIVE
ANION GAP SERPL CALC-SCNC: 11 MMOL/L (ref 8–16)
ANISOCYTOSIS BLD QL SMEAR: SLIGHT
AST SERPL-CCNC: 40 U/L (ref 15–46)
BACTERIA #/AREA URNS AUTO: ABNORMAL /HPF
BARBITURATES UR QL SCN>200 NG/ML: NEGATIVE
BASOPHILS # BLD AUTO: 0.01 K/UL (ref 0–0.2)
BASOPHILS NFR BLD: 0.5 % (ref 0–1.9)
BENZODIAZ UR QL SCN>200 NG/ML: ABNORMAL
BILIRUB SERPL-MCNC: 1 MG/DL (ref 0.1–1)
BILIRUB UR QL STRIP: NEGATIVE
BZE UR QL SCN: NEGATIVE
CALCIUM SERPL-MCNC: 9.2 MG/DL (ref 8.7–10.5)
CANNABINOIDS UR QL SCN: NEGATIVE
CHLORIDE SERPL-SCNC: 104 MMOL/L (ref 95–110)
CLARITY UR REFRACT.AUTO: CLEAR
CO2 SERPL-SCNC: 24 MMOL/L (ref 23–29)
COLOR UR AUTO: YELLOW
CREAT SERPL-MCNC: 1.3 MG/DL (ref 0.5–1.4)
CREAT UR-MCNC: 83.3 MG/DL (ref 23–375)
DIFFERENTIAL METHOD: ABNORMAL
EOSINOPHIL # BLD AUTO: 0 K/UL (ref 0–0.5)
EOSINOPHIL NFR BLD: 0 % (ref 0–8)
ERYTHROCYTE [DISTWIDTH] IN BLOOD BY AUTOMATED COUNT: 21.1 % (ref 11.5–14.5)
EST. GFR  (AFRICAN AMERICAN): 58.7 ML/MIN/1.73 M^2
EST. GFR  (NON AFRICAN AMERICAN): 50.8 ML/MIN/1.73 M^2
ETHANOL SERPL-MCNC: <10 MG/DL
GLUCOSE SERPL-MCNC: 128 MG/DL (ref 70–110)
GLUCOSE UR QL STRIP: ABNORMAL
HCT VFR BLD AUTO: 37.4 % (ref 40–54)
HGB BLD-MCNC: 11.3 G/DL (ref 14–18)
HGB UR QL STRIP: NEGATIVE
IMM GRANULOCYTES # BLD AUTO: 0.01 K/UL (ref 0–0.04)
IMM GRANULOCYTES NFR BLD AUTO: 0.5 % (ref 0–0.5)
KETONES UR QL STRIP: NEGATIVE
LEUKOCYTE ESTERASE UR QL STRIP: NEGATIVE
LYMPHOCYTES # BLD AUTO: 0.9 K/UL (ref 1–4.8)
LYMPHOCYTES NFR BLD: 41.9 % (ref 18–48)
MCH RBC QN AUTO: 23.6 PG (ref 27–31)
MCHC RBC AUTO-ENTMCNC: 30.2 G/DL (ref 32–36)
MCV RBC AUTO: 78 FL (ref 82–98)
METHADONE UR QL SCN>300 NG/ML: NEGATIVE
MICROSCOPIC COMMENT: ABNORMAL
MONOCYTES # BLD AUTO: 0.4 K/UL (ref 0.3–1)
MONOCYTES NFR BLD: 18.5 % (ref 4–15)
NEUTROPHILS # BLD AUTO: 0.9 K/UL (ref 1.8–7.7)
NEUTROPHILS NFR BLD: 38.6 % (ref 38–73)
NITRITE UR QL STRIP: NEGATIVE
NRBC BLD-RTO: 0 /100 WBC
OPIATES UR QL SCN: NEGATIVE
PCP UR QL SCN>25 NG/ML: NEGATIVE
PH UR STRIP: 7 [PH] (ref 5–8)
PLATELET # BLD AUTO: 114 K/UL (ref 150–450)
PLATELET BLD QL SMEAR: ABNORMAL
PMV BLD AUTO: 9.6 FL (ref 9.2–12.9)
POTASSIUM SERPL-SCNC: 3.9 MMOL/L (ref 3.5–5.1)
PROT SERPL-MCNC: 8 G/DL (ref 6–8.4)
PROT UR QL STRIP: NEGATIVE
RBC # BLD AUTO: 4.79 M/UL (ref 4.6–6.2)
SARS-COV-2 RDRP RESP QL NAA+PROBE: NEGATIVE
SODIUM SERPL-SCNC: 139 MMOL/L (ref 136–145)
SP GR UR STRIP: 1.01 (ref 1–1.03)
T4 FREE SERPL-MCNC: 0.88 NG/DL (ref 0.71–1.51)
TOXICOLOGY INFORMATION: ABNORMAL
TSH SERPL DL<=0.005 MIU/L-ACNC: 7.9 UIU/ML (ref 0.4–4)
URN SPEC COLLECT METH UR: ABNORMAL
UROBILINOGEN UR STRIP-ACNC: 1 EU/DL
UUN UR-MCNC: 13 MG/DL (ref 2–20)
WBC # BLD AUTO: 2.22 K/UL (ref 3.9–12.7)
YEAST UR QL AUTO: ABNORMAL

## 2022-07-18 PROCEDURE — U0002 COVID-19 LAB TEST NON-CDC: HCPCS | Mod: ER | Performed by: EMERGENCY MEDICINE

## 2022-07-18 PROCEDURE — 82077 ASSAY SPEC XCP UR&BREATH IA: CPT | Mod: ER | Performed by: EMERGENCY MEDICINE

## 2022-07-18 PROCEDURE — 80307 DRUG TEST PRSMV CHEM ANLYZR: CPT | Mod: ER | Performed by: EMERGENCY MEDICINE

## 2022-07-18 PROCEDURE — 93010 ELECTROCARDIOGRAM REPORT: CPT | Mod: ,,, | Performed by: INTERNAL MEDICINE

## 2022-07-18 PROCEDURE — 81000 URINALYSIS NONAUTO W/SCOPE: CPT | Mod: ER,59 | Performed by: EMERGENCY MEDICINE

## 2022-07-18 PROCEDURE — 84439 ASSAY OF FREE THYROXINE: CPT | Performed by: EMERGENCY MEDICINE

## 2022-07-18 PROCEDURE — 93005 ELECTROCARDIOGRAM TRACING: CPT | Mod: ER

## 2022-07-18 PROCEDURE — 84443 ASSAY THYROID STIM HORMONE: CPT | Mod: ER | Performed by: EMERGENCY MEDICINE

## 2022-07-18 PROCEDURE — 99285 EMERGENCY DEPT VISIT HI MDM: CPT | Mod: 25,ER

## 2022-07-18 PROCEDURE — 99900035 HC TECH TIME PER 15 MIN (STAT): Mod: ER

## 2022-07-18 PROCEDURE — 85025 COMPLETE CBC W/AUTO DIFF WBC: CPT | Mod: ER | Performed by: EMERGENCY MEDICINE

## 2022-07-18 PROCEDURE — 93010 EKG 12-LEAD: ICD-10-PCS | Mod: ,,, | Performed by: INTERNAL MEDICINE

## 2022-07-18 PROCEDURE — 80053 COMPREHEN METABOLIC PANEL: CPT | Mod: ER | Performed by: EMERGENCY MEDICINE

## 2022-07-18 NOTE — ED PROVIDER NOTES
Encounter Date: 7/18/2022       History     Chief Complaint   Patient presents with    Fall     PT states he keeps falling after having stroke. Last fall was this morning after getting out of bed. Pt states he hit head on right side with no LOC.      HPI   82 y.o.   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    GERD (gastroesophageal reflux disease)     Gout, unspecified     H/O: GI bleed     Hypertension     Liver cancer     PVD (peripheral vascular disease)     Sciatica     SSS (sick sinus syndrome)     Tachy-valentine syndrome     Thyroid disease      Past Surgical History:   Procedure Laterality Date    ANGIOGRAPHY Right 10/11/2021    Procedure: ANGIOGRAM-HEPATIC;  Surgeon: Phil Stuart II, MD;  Location: [facility] CATH LAB;  Service: Interventional Radiology;  Laterality: Right;    APPENDECTOMY      CHOLECYSTECTOMY      CORONARY ARTERY BYPASS GRAFT      ENDOSCOPIC ULTRASOUND OF UPPER GASTROINTESTINAL TRACT N/A 6/17/2020    Procedure: EUS;  Surgeon: Rei Humphrey MD;  Location: [facility] ENDO;  Service: Endoscopy;  Laterality: N/A;    ERCP N/A 6/17/2020    Procedure: ERCP;  Surgeon: Rei Humphrey MD;  Location: [facility] ENDO;  Service: Endoscopy;  Laterality: N/A;    ERCP N/A 8/31/2020    Procedure: ERCP (ENDOSCOPIC RETROGRADE CHOLANGIOPANCREATOGRAPHY);  Surgeon: Adela Boyle MD;  Location: [facility] ENDO;  Service: Endoscopy;  Laterality: N/A;    ESOPHAGOGASTRODUODENOSCOPY N/A 10/11/2021    Procedure: EGD  (ESOPHAGOGASTRODUODENOSCOPY);  Surgeon: Skip Varela MD;  Location: [facility];  Service: Gastroenterology;  Laterality: N/A;    INSERT / REPLACE / REMOVE PACEMAKER      LIVER RESECTION      LUMBAR DISC SURGERY       Family History   Problem Relation Age of Onset    Heart disease Mother     Pancreatic cancer Mother     Heart disease Father      Social History     Tobacco Use    Smoking status: Current Some Day Smoker     Packs/day: 0.25     Years: 37.00     Pack years: 9.25     Types: Cigarettes     Last attempt to quit: 1992     Years since quittin.6    Smokeless tobacco: Never Used    Tobacco comment: Pt declines enrollment in the [employer]. Handout provided for Ambulatory Smoking Cessation program.    Substance Use Topics    Alcohol use: Yes     Alcohol/week: 5.0 standard drinks     Types: 5 Cans of beer per week    Drug use: No     Review of Systems  All systems were reviewed/examined and were negative except as noted in the HPI.    Physical Exam     Initial Vitals [22 0503]   BP Pulse Resp Temp SpO2   (!) 187/84 63 17 98.4 °F (36.9 °C) 97 %      MAP       --         Physical Exam    General: the patient is awake, alert, and in no apparent distress.  Head: abrasion to forehead o/w normocephalic and atraumatic, sclera are clear  Neck: supple without meningismus nt  Chest: clear to auscultation bilaterally, no respiratory distress  Heart: regular rate and rhythm  ABD soft, nontender, nondistended, no peritoneal signs  Back nt in the midline  Extremities: warm and well perfused  Skin: warm and dry  Psych conversant  Neuro: awake, alert, moving all extremities; some expressive aphasia, follows commands      ED Course   Procedures  Labs Reviewed   CBC W/ AUTO DIFFERENTIAL - Abnormal; Notable for the following components:       Result Value    WBC 2.22 (*)     Hemoglobin 11.3 (*)     Hematocrit 37.4 (*)     MCV 78 (*)     MCH 23.6 (*)     MCHC 30.2 (*)     RDW 21.1 (*)      Platelets 114 (*)     Gran # (ANC) 0.9 (*)     Lymph # 0.9 (*)     Mono % 18.5 (*)     Platelet Estimate Decreased (*)     All other components within normal limits   COMPREHENSIVE METABOLIC PANEL - Abnormal; Notable for the following components:    Glucose 128 (*)     Alkaline Phosphatase 184 (*)     eGFR if  58.7 (*)     eGFR if non  50.8 (*)     All other components within normal limits   URINALYSIS, REFLEX TO URINE CULTURE - Abnormal; Notable for the following components:    Glucose, UA 3+ (*)     All other components within normal limits    Narrative:     Preferred Collection Type->Urine, Clean Catch  Specimen Source->Urine  Collection Type->Urine, Clean Catch   DRUG SCREEN PANEL, URINE EMERGENCY - Abnormal; Notable for the following components:    Benzodiazepines Presumptive Positive (*)     All other components within normal limits    Narrative:     Preferred Collection Type->Urine, Clean Catch  Specimen Source->Urine  Collection Type->Urine, Clean Catch   TSH - Abnormal; Notable for the following components:    TSH 7.900 (*)     All other components within normal limits   URINALYSIS MICROSCOPIC - Abnormal; Notable for the following components:    Bacteria Few (*)     All other components within normal limits    Narrative:     Preferred Collection Type->Urine, Clean Catch  Specimen Source->Urine  Collection Type->Urine, Clean Catch   ALCOHOL,MEDICAL (ETHANOL)   SARS-COV-2 RNA AMPLIFICATION, QUAL    Narrative:     Is the patient symptomatic?->No   T4, FREE        ECG Results          EKG 12-lead (Final result)  Result time 07/18/22 19:38:20    Final result by Interface, Lab In [facility] (07/18/22 19:38:20)                 Narrative:    Test Reason : W19.XXXA,    Vent. Rate : 061 BPM     Atrial Rate : 078 BPM     P-R Int : 000 ms          QRS Dur : 166 ms      QT Int : 490 ms       P-R-T Axes : 000 -80 090 degrees     QTc Int : 493 ms    Electronic ventricular pacemaker with  Intermittent Junctional rhythm ?  Left axis deviation  Right bundle branch block  Possible Anterolateral infarct ,age undetermined  Abnormal ECG  When compared with ECG of 10-OCT-2021 16:25,  Junctional rhythm Now present    Confirmed by Arun Shelton MD ([id number]) on 7/18/2022 7:38:10 PM    Referred By: TATI   SELF           Confirmed By:Arun Shelton MD                            Imaging Results          X-ray Shoulder 2 or More Views Right (Final result)  Result time 07/18/22 07:35:43    Final result by NATHALIE Edwards Sr., MD (07/18/22 07:35:43)                 Impression:      1. There is no acute fracture visualized. There are healed fractures on the right side of the thoracic cage.  2. Surgical changes      Electronically signed by: Jaun Edwards MD  Date:    07/18/2022  Time:    07:35             Narrative:    EXAMINATION:  XR SHOULDER COMPLETE 2 OR MORE VIEWS RIGHT    CLINICAL HISTORY:  pain;    COMPARISON:  None    FINDINGS:  There is no acute fracture visualized.  There are healed fractures on the right side of the thoracic cage.  There is no dislocation.  There are surgical changes associated with a prior sternotomy.  There is partial visualization of a cardiac pacemaker.                               CT Head Without Contrast (Final result)  Result time 07/18/22 07:16:45    Final result by Rosalino Oreilly MD (07/18/22 07:16:45)                 Impression:      Chronic microvascular ischemic changes. If there is additional clinical concern for acute infarct, MRI with diffusion weighted imaging recommended.      Electronically signed by: Rosalino Oreilly MD  Date:    07/18/2022  Time:    07:16             Narrative:    EXAMINATION:  CT HEAD WITHOUT CONTRAST    CLINICAL HISTORY:  Head trauma, minor (Age >= 65y);Mental status change, unknown cause;    TECHNIQUE:  Low dose axial CT images obtained throughout the head without intravenous contrast. Sagittal and coronal reconstructions were performed.   All CT scans at this facility use dose modulation, iterative reconstruction and/or weight based dosing when appropriate to reduce radiation dose to as low as reasonably achievable.    COMPARISON:  01/10/2022.    FINDINGS:  Intracranial compartment:    The brain parenchyma demonstrates areas of decreased attenuation with moderate periventricular white matter consistent with chronic microvascular ischemic changes.  Remote left MCA distribution infarct..  No parenchymal mass, hemorrhage, edema or major vascular distribution infarct.  Vascular calcifications are noted.    Moderate prominence of the sulci and ventricles are consistent with age-related involutional changes.    No extra-axial blood or fluid collections.    Skull/extracranial contents (limited evaluation): No fracture.  Trace bilateral mastoid effusions, right greater than left.  Paranasal sinuses are essentially clear.                                 Medications - No data to display         Medical Decision Making:    This is an emergent evaluation of a patient presenting to the ED.  Nursing notes were reviewed.  I personally reviewed, read, and interpreted the ECG and any monitoring strips.  V paced  I reviewed radiology images personally along with interpretations.  Imaging reviewed by me, personally and independently, and prelims if available.  No acute/emergent findings noted on radiologic studies ordered.      I personally reviewed and interpreted the laboratory results.  Communicated with another physician regarding patient's care: day doc  I decided to obtain and review old medical records, which showed: h/o stroke    Evaluation for Emergency Medical Condition  The patient received a medical screening exam and within a reasonable degree of clinical confidence an emergency medical condition has not been identified.  The patient is instructed on proper follow up and return precautions to the ED.      Vinh Sotelo MD, SUMAN                   Clinical  Impression:   Final diagnoses:  [W19.XXXA] Fall  [S09.90XA] Injury of head, initial encounter (Primary)  [M25.511] Acute pain of right shoulder          ED Disposition Condition    Discharge Stable        ED Prescriptions     None        Follow-up Information     Follow up With Specialties Details Why Contact Info    Michaela Miller MD Internal Medicine Schedule an appointment as soon as possible for a visit in 3 days  [address] [place] [address] [id number] [phone]          Discharged to home in stable condition, return to ED warnings given, follow up and patient care instructions given.      Vinh Sotelo MD, SUMAN, FACEP  Department of Emergency Medicine       Brandan Sotelo MD  07/19/22 [id number]

## 2022-09-01 ENCOUNTER — TELEPHONE (OUTPATIENT)
Dept: CARDIOLOGY | Facility: CLINIC | Age: 82
End: 2022-09-01
Payer: MEDICARE

## 2022-09-01 NOTE — TELEPHONE ENCOUNTER
Reached out to Mrs. Lau today.    I left her V/Message regarding scheduling the Test     Patient will need prior to seeing .    Please call at your convenience and schedule at    [phone] an EKG, ECHO and Fast-LAB.      [facility]                               ----- Message from Nina Diez sent at 9/1/2022  1:47 PM CDT -----  Regarding: appointment  Contact: [phone]/wife Ela  Patient's wife Ela is requesting a call back regarding scheduling an appointment and any tests he needs prior to the office visit. Pedro only please   Would the patient rather a call back or a response via [facility]?  call  Best Call Back Number:  [phone]/wife Ela  Additional Information:

## 2022-09-10 ENCOUNTER — HOSPITAL ENCOUNTER (EMERGENCY)
Facility: HOSPITAL | Age: 82
Discharge: HOME | End: 2022-09-10
Attending: EMERGENCY MEDICINE
Payer: MEDICARE

## 2022-09-10 VITALS
RESPIRATION RATE: 18 BRPM | TEMPERATURE: 98 F | DIASTOLIC BLOOD PRESSURE: 70 MMHG | HEIGHT: 65 IN | BODY MASS INDEX: 27.49 KG/M2 | WEIGHT: 165 LBS | HEART RATE: 59 BPM | SYSTOLIC BLOOD PRESSURE: 125 MMHG | OXYGEN SATURATION: 97 %

## 2022-09-10 DIAGNOSIS — M79.605 BILATERAL LEG PAIN: Primary | ICD-10-CM

## 2022-09-10 DIAGNOSIS — M79.604 BILATERAL LEG PAIN: Primary | ICD-10-CM

## 2022-09-10 PROCEDURE — 63600175 PHARM REV CODE 636 W HCPCS: Mod: ER | Performed by: EMERGENCY MEDICINE

## 2022-09-10 PROCEDURE — 96372 THER/PROPH/DIAG INJ SC/IM: CPT | Performed by: EMERGENCY MEDICINE

## 2022-09-10 PROCEDURE — 99284 EMERGENCY DEPT VISIT MOD MDM: CPT | Mod: 25,ER

## 2022-09-10 RX ORDER — KETOROLAC TROMETHAMINE 30 MG/ML
15 INJECTION, SOLUTION INTRAMUSCULAR; INTRAVENOUS
Status: COMPLETED | OUTPATIENT
Start: 2022-09-10 | End: 2022-09-10

## 2022-09-10 RX ADMIN — KETOROLAC TROMETHAMINE 15 MG: 30 INJECTION, SOLUTION INTRAMUSCULAR; INTRAVENOUS at 08:09

## 2022-09-11 NOTE — ED PROVIDER NOTES
Encounter Date: 9/10/2022       History     Chief Complaint   Patient presents with    Leg Pain     Pt reports bilateral leg and foot pain. Denies falls or injuries. Pt took a Percocet for pain     HPI  82 y.o.   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    GERD (gastroesophageal reflux disease)     Gout, unspecified     H/O: GI bleed     Hypertension     Liver cancer     PVD (peripheral vascular disease)     Sciatica     SSS (sick sinus syndrome)     Tachy-valentine syndrome     Thyroid disease      Past Surgical History:   Procedure Laterality Date    ANGIOGRAPHY Right 10/11/2021    Procedure: ANGIOGRAM-HEPATIC;  Surgeon: Phil Stuart II, MD;  Location: [facility] CATH LAB;  Service: Interventional Radiology;  Laterality: Right;    APPENDECTOMY      CHOLECYSTECTOMY      CORONARY ARTERY BYPASS GRAFT      ENDOSCOPIC ULTRASOUND OF UPPER GASTROINTESTINAL TRACT N/A 6/17/2020    Procedure: EUS;  Surgeon: Rei Humphrey MD;  Location: [facility] ENDO;  Service: Endoscopy;  Laterality: N/A;    ERCP N/A 6/17/2020    Procedure: ERCP;  Surgeon: Rei Humphrey MD;  Location: [facility] ENDO;  Service: Endoscopy;  Laterality: N/A;    ERCP N/A 8/31/2020    Procedure: ERCP (ENDOSCOPIC RETROGRADE CHOLANGIOPANCREATOGRAPHY);  Surgeon: Adela Boyle MD;  Location: [facility] ENDO;  Service: Endoscopy;  Laterality: N/A;    ESOPHAGOGASTRODUODENOSCOPY N/A 10/11/2021    Procedure: EGD  (ESOPHAGOGASTRODUODENOSCOPY);  Surgeon: Skip Varela MD;  Location: [facility];  Service: Gastroenterology;  Laterality: N/A;    INSERT / REPLACE / REMOVE PACEMAKER      LIVER RESECTION      LUMBAR DISC SURGERY       Family History   Problem Relation Age of Onset    Heart disease Mother     Pancreatic cancer Mother     Heart disease Father      Social History     Tobacco Use    Smoking status: Some Days     Packs/day: 0.25     Years: 37.00     Pack years: 9.25     Types: Cigarettes     Last attempt to quit: 1992     Years since quittin.8    Smokeless tobacco: Never    Tobacco comments:     Pt declines enrollment in the [employer]. Handout provided for Ambulatory Smoking Cessation program.    Substance Use Topics    Alcohol use: Yes     Alcohol/week: 5.0 standard drinks     Types: 5 Cans of beer per week    Drug use: No     Review of Systems  All systems were reviewed/examined and were negative except as noted in the HPI.    Physical Exam     Initial Vitals [09/10/22 1945]   BP Pulse Resp Temp SpO2   135/66 (!) 59 17 98.3 °F (36.8 °C) 97 %      MAP       --         Physical Exam    General: the patient is awake, alert, and in no apparent distress.  Head: normocephalic and atraumatic, sclera are clear  Neck: supple without meningismus  Chest:  bilaterally, no respiratory distress  Heart: regular rate and rhythm  ABD soft, nontender, nondistended, no peritoneal signs  Back nt in the midline  Extremities: warm and well perfused    Has some chronic vascular changes bilaterally, hair loss and shiny skin; pulses are symmetric and good brisk CR  +SILT  Skin: warm and dry  Psych conversant  Neuro: awake, alert, moving all extremities    ED Course   Procedures  Labs Reviewed - No data to display       Imaging Results    None          Medications   ketorolac injection 15 mg (15 mg Intramuscular Given 9/10/22 2022)         Medical Decision Making:    This is an emergent evaluation of a patient presenting to  the ED.  Nursing notes were reviewed.    I decided to obtain and review old medical records, which showed: ED visits and outpatient care    Evaluation for Emergency Medical Condition  The patient received a medical screening exam and within a reasonable degree of clinical confidence an emergency medical condition has not been identified.  The patient is instructed on proper follow up and return precautions to the ED.    The patient was encouraged strongly to get the COVID-19 vaccine either after asymptomatic (if COVID positive) or offered it here in the ED is COVID negative.    The patient was encouraged and counseled to quit smoking and use of tobacco products, including the effect on their health.  Smoking cessation resources were provided.        Vinh Sotelo MD, SUMAN                   Clinical Impression:   Final diagnoses:  [M79.604, M79.605] Bilateral leg pain (Primary)      ED Disposition Condition    Discharge Stable          ED Prescriptions    None       Follow-up Information       Follow up With Specialties Details Why Contact Info    Michaela Miller MD Internal Medicine Schedule an appointment as soon as possible for a visit   [address] [place] [id number] [phone]            Discharged to home in stable condition, return to ED warnings given, follow up and patient care instructions given.    Vinh Sotelo MD, SUMAN, [facility]  Department of Emergency Medicine       Brandan Sotelo MD  09/13/22 0220

## 2022-10-03 ENCOUNTER — HOSPITAL ENCOUNTER (OUTPATIENT)
Facility: HOSPITAL | Age: 82
Discharge: HOME | End: 2022-10-05
Attending: EMERGENCY MEDICINE | Admitting: INTERNAL MEDICINE
Payer: MEDICARE

## 2022-10-03 DIAGNOSIS — I25.10 CAD (CORONARY ARTERY DISEASE): ICD-10-CM

## 2022-10-03 DIAGNOSIS — R07.9 CHEST PAIN: Primary | ICD-10-CM

## 2022-10-03 DIAGNOSIS — R07.89 OTHER CHEST PAIN: ICD-10-CM

## 2022-10-03 DIAGNOSIS — D61.818 PANCYTOPENIA: ICD-10-CM

## 2022-10-03 LAB
ALBUMIN SERPL BCP-MCNC: 4.4 G/DL (ref 3.5–5.2)
ALP SERPL-CCNC: 140 U/L (ref 38–126)
ALT SERPL W/O P-5'-P-CCNC: 37 U/L (ref 10–44)
ANION GAP SERPL CALC-SCNC: 15 MMOL/L (ref 8–16)
AST SERPL-CCNC: 43 U/L (ref 15–46)
BASOPHILS # BLD AUTO: 0.01 K/UL (ref 0–0.2)
BASOPHILS NFR BLD: 0.4 % (ref 0–1.9)
BILIRUB SERPL-MCNC: 0.4 MG/DL (ref 0.1–1)
BILIRUB UR QL STRIP: NEGATIVE
CALCIUM SERPL-MCNC: 8.7 MG/DL (ref 8.7–10.5)
CHLORIDE SERPL-SCNC: 107 MMOL/L (ref 95–110)
CLARITY UR REFRACT.AUTO: CLEAR
CO2 SERPL-SCNC: 17 MMOL/L (ref 23–29)
COLOR UR AUTO: ABNORMAL
CREAT SERPL-MCNC: 1.09 MG/DL (ref 0.5–1.4)
DIFFERENTIAL METHOD: ABNORMAL
EOSINOPHIL # BLD AUTO: 0 K/UL (ref 0–0.5)
EOSINOPHIL NFR BLD: 0.4 % (ref 0–8)
ERYTHROCYTE [DISTWIDTH] IN BLOOD BY AUTOMATED COUNT: 18.7 % (ref 11.5–14.5)
EST. GFR  (NO RACE VARIABLE): >60 ML/MIN/1.73 M^2
GLUCOSE SERPL-MCNC: 74 MG/DL (ref 70–110)
GLUCOSE UR QL STRIP: ABNORMAL
HCT VFR BLD AUTO: 35.5 % (ref 40–54)
HGB BLD-MCNC: 11 G/DL (ref 14–18)
HGB UR QL STRIP: NEGATIVE
IMM GRANULOCYTES # BLD AUTO: 0.01 K/UL (ref 0–0.04)
IMM GRANULOCYTES NFR BLD AUTO: 0.4 % (ref 0–0.5)
KETONES UR QL STRIP: NEGATIVE
LEUKOCYTE ESTERASE UR QL STRIP: NEGATIVE
LYMPHOCYTES # BLD AUTO: 1.2 K/UL (ref 1–4.8)
LYMPHOCYTES NFR BLD: 48.4 % (ref 18–48)
MCH RBC QN AUTO: 24.6 PG (ref 27–31)
MCHC RBC AUTO-ENTMCNC: 31 G/DL (ref 32–36)
MCV RBC AUTO: 79 FL (ref 82–98)
MONOCYTES # BLD AUTO: 0.5 K/UL (ref 0.3–1)
MONOCYTES NFR BLD: 18.9 % (ref 4–15)
NEUTROPHILS # BLD AUTO: 0.8 K/UL (ref 1.8–7.7)
NEUTROPHILS NFR BLD: 31.5 % (ref 38–73)
NITRITE UR QL STRIP: NEGATIVE
NRBC BLD-RTO: 0 /100 WBC
PH UR STRIP: 6 [PH] (ref 5–8)
PLATELET # BLD AUTO: 111 K/UL (ref 150–450)
PMV BLD AUTO: 9.3 FL (ref 9.2–12.9)
POTASSIUM SERPL-SCNC: 3.5 MMOL/L (ref 3.5–5.1)
PROT SERPL-MCNC: 7.5 G/DL (ref 6–8.4)
PROT UR QL STRIP: NEGATIVE
RBC # BLD AUTO: 4.47 M/UL (ref 4.6–6.2)
SARS-COV-2 RDRP RESP QL NAA+PROBE: NEGATIVE
SODIUM SERPL-SCNC: 139 MMOL/L (ref 136–145)
SP GR UR STRIP: <=1.005 (ref 1–1.03)
TROPONIN I SERPL-MCNC: 0.02 NG/ML (ref 0.01–0.03)
URN SPEC COLLECT METH UR: ABNORMAL
UROBILINOGEN UR STRIP-ACNC: NEGATIVE EU/DL
UUN UR-MCNC: 15 MG/DL (ref 2–20)
WBC # BLD AUTO: 2.54 K/UL (ref 3.9–12.7)

## 2022-10-03 PROCEDURE — U0002 COVID-19 LAB TEST NON-CDC: HCPCS | Mod: ER | Performed by: EMERGENCY MEDICINE

## 2022-10-03 PROCEDURE — 93005 ELECTROCARDIOGRAM TRACING: CPT | Mod: ER

## 2022-10-03 PROCEDURE — 99285 EMERGENCY DEPT VISIT HI MDM: CPT | Mod: 25,ER

## 2022-10-03 PROCEDURE — 25000003 PHARM REV CODE 250: Mod: ER | Performed by: EMERGENCY MEDICINE

## 2022-10-03 PROCEDURE — 93010 ELECTROCARDIOGRAM REPORT: CPT | Mod: ,,, | Performed by: INTERNAL MEDICINE

## 2022-10-03 PROCEDURE — 84484 ASSAY OF TROPONIN QUANT: CPT | Mod: ER | Performed by: EMERGENCY MEDICINE

## 2022-10-03 PROCEDURE — 85025 COMPLETE CBC W/AUTO DIFF WBC: CPT | Mod: ER | Performed by: EMERGENCY MEDICINE

## 2022-10-03 PROCEDURE — 81003 URINALYSIS AUTO W/O SCOPE: CPT | Mod: ER | Performed by: EMERGENCY MEDICINE

## 2022-10-03 PROCEDURE — 93010 EKG 12-LEAD: ICD-10-PCS | Mod: ,,, | Performed by: INTERNAL MEDICINE

## 2022-10-03 PROCEDURE — 80053 COMPREHEN METABOLIC PANEL: CPT | Mod: ER | Performed by: EMERGENCY MEDICINE

## 2022-10-03 RX ORDER — NAPROXEN SODIUM 220 MG/1
324 TABLET, FILM COATED ORAL
Status: COMPLETED | OUTPATIENT
Start: 2022-10-03 | End: 2022-10-03

## 2022-10-03 RX ADMIN — ASPIRIN 81 MG CHEWABLE TABLET 324 MG: 81 TABLET CHEWABLE at 10:10

## 2022-10-04 PROBLEM — K92.2 ACUTE UPPER GI BLEEDING: Status: RESOLVED | Noted: 2021-10-10 | Resolved: 2022-10-04

## 2022-10-04 PROBLEM — R07.9 CHEST PAIN: Status: ACTIVE | Noted: 2022-10-04

## 2022-10-04 PROBLEM — K85.10 ACUTE BILIARY PANCREATITIS WITHOUT INFECTION OR NECROSIS: Status: RESOLVED | Noted: 2020-06-12 | Resolved: 2022-10-04

## 2022-10-04 PROBLEM — G93.6 CYTOTOXIC CEREBRAL EDEMA: Status: RESOLVED | Noted: 2021-05-27 | Resolved: 2022-10-04

## 2022-10-04 PROBLEM — K83.1 BILIARY OBSTRUCTION: Status: RESOLVED | Noted: 2020-08-31 | Resolved: 2022-10-04

## 2022-10-04 LAB
ASCENDING AORTA: 3.47 CM
AV INDEX (PROSTH): 0.26
AV MEAN GRADIENT: 21 MMHG
AV PEAK GRADIENT: 36 MMHG
AV REGURGITATION PRESSURE HALF TIME: 480.75 MS
AV VALVE AREA: 1.07 CM2
AV VELOCITY RATIO: 0.25
BSA FOR ECHO PROCEDURE: 1.8 M2
CHOLEST SERPL-MCNC: 100 MG/DL (ref 120–199)
CHOLEST/HDLC SERPL: 2.5 {RATIO} (ref 2–5)
CV ECHO LV RWT: 0.49 CM
DOP CALC AO PEAK VEL: 2.99 M/S
DOP CALC AO VTI: 67.2 CM
DOP CALC LVOT AREA: 4.2 CM2
DOP CALC LVOT DIAMETER: 2.3 CM
DOP CALC LVOT PEAK VEL: 0.74 M/S
DOP CALC LVOT STROKE VOLUME: 71.84 CM3
DOP CALC MV VTI: 33.5 CM
DOP CALCLVOT PEAK VEL VTI: 17.3 CM
E WAVE DECELERATION TIME: 202.23 MSEC
E/A RATIO: 2.85
ECHO LV POSTERIOR WALL: 1.15 CM (ref 0.6–1.1)
EJECTION FRACTION: 60 %
ESTIMATED AVG GLUCOSE: 114 MG/DL (ref 68–131)
FERRITIN SERPL-MCNC: 25 NG/ML (ref 20–300)
FOLATE SERPL-MCNC: 7.4 NG/ML (ref 4–24)
FRACTIONAL SHORTENING: 28 % (ref 28–44)
GGT SERPL-CCNC: 149 U/L (ref 8–55)
HBA1C MFR BLD: 5.6 % (ref 4–5.6)
HDLC SERPL-MCNC: 40 MG/DL (ref 40–75)
HDLC SERPL: 40 % (ref 20–50)
INTERVENTRICULAR SEPTUM: 1.38 CM (ref 0.6–1.1)
IRON SERPL-MCNC: 50 UG/DL (ref 45–160)
IVC DIAMETER: 2.35 CM
LA MAJOR: 5.43 CM
LDLC SERPL CALC-MCNC: 41.2 MG/DL (ref 63–159)
LEFT ATRIUM SIZE: 4.43 CM
LEFT ATRIUM VOLUME INDEX MOD: 53.3 ML/M2
LEFT ATRIUM VOLUME MOD: 94.79 CM3
LEFT INTERNAL DIMENSION IN SYSTOLE: 3.39 CM (ref 2.1–4)
LEFT VENTRICLE DIASTOLIC VOLUME INDEX: 57.51 ML/M2
LEFT VENTRICLE DIASTOLIC VOLUME: 102.36 ML
LEFT VENTRICLE MASS INDEX: 128 G/M2
LEFT VENTRICLE SYSTOLIC VOLUME INDEX: 26.5 ML/M2
LEFT VENTRICLE SYSTOLIC VOLUME: 47.17 ML
LEFT VENTRICULAR INTERNAL DIMENSION IN DIASTOLE: 4.7 CM (ref 3.5–6)
LEFT VENTRICULAR MASS: 228.66 G
LVOT MG: 1.23 MMHG
LVOT MV: 0.52 CM/S
MAGNESIUM SERPL-MCNC: 2.1 MG/DL (ref 1.6–2.6)
MV MEAN GRADIENT: 2 MMHG
MV PEAK A VEL: 0.4 M/S
MV PEAK E VEL: 1.14 M/S
MV PEAK GRADIENT: 5 MMHG
MV STENOSIS PRESSURE HALF TIME: 58.65 MS
MV VALVE AREA BY CONTINUITY EQUATION: 2.14 CM2
MV VALVE AREA P 1/2 METHOD: 3.75 CM2
NONHDLC SERPL-MCNC: 60 MG/DL
PISA AR MAX VEL: 3.94 M/S
PISA MRMAX VEL: 4.29 M/S
PISA TR MAX VEL: 2.5 M/S
PV MV: 0.63 M/S
PV PEAK VELOCITY: 0.96 CM/S
RA MAJOR: 5.47 CM
RA PRESSURE: 8 MMHG
RIGHT VENTRICULAR END-DIASTOLIC DIMENSION: 3.69 CM
RV TISSUE DOPPLER FREE WALL SYSTOLIC VELOCITY 1 (APICAL 4 CHAMBER VIEW): 0.01 CM/S
SATURATED IRON: 12 % (ref 20–50)
STJ: 2.83 CM
T4 FREE SERPL-MCNC: 0.78 NG/DL (ref 0.71–1.51)
TOTAL IRON BINDING CAPACITY: 425 UG/DL (ref 250–450)
TR MAX PG: 25 MMHG
TRANSFERRIN SERPL-MCNC: 287 MG/DL (ref 200–375)
TRIGL SERPL-MCNC: 94 MG/DL (ref 30–150)
TROPONIN I SERPL DL<=0.01 NG/ML-MCNC: 0.02 NG/ML (ref 0–0.03)
TROPONIN I SERPL DL<=0.01 NG/ML-MCNC: 0.02 NG/ML (ref 0–0.03)
TSH SERPL DL<=0.005 MIU/L-ACNC: 5.43 UIU/ML (ref 0.4–4)
TV REST PULMONARY ARTERY PRESSURE: 33 MMHG
VIT B12 SERPL-MCNC: 776 PG/ML (ref 210–950)

## 2022-10-04 PROCEDURE — 25000003 PHARM REV CODE 250

## 2022-10-04 PROCEDURE — 84466 ASSAY OF TRANSFERRIN: CPT

## 2022-10-04 PROCEDURE — 82728 ASSAY OF FERRITIN: CPT

## 2022-10-04 PROCEDURE — 82977 ASSAY OF GGT: CPT

## 2022-10-04 PROCEDURE — 84439 ASSAY OF FREE THYROXINE: CPT

## 2022-10-04 PROCEDURE — 80061 LIPID PANEL: CPT

## 2022-10-04 PROCEDURE — 99900035 HC TECH TIME PER 15 MIN (STAT)

## 2022-10-04 PROCEDURE — 99219 PR INITIAL OBSERVATION CARE,LEVL II: ICD-10-PCS | Mod: FS,,, | Performed by: INTERNAL MEDICINE

## 2022-10-04 PROCEDURE — 83735 ASSAY OF MAGNESIUM: CPT

## 2022-10-04 PROCEDURE — 36415 COLL VENOUS BLD VENIPUNCTURE: CPT | Performed by: STUDENT IN AN ORGANIZED HEALTH CARE EDUCATION/TRAINING PROGRAM

## 2022-10-04 PROCEDURE — 93010 ELECTROCARDIOGRAM REPORT: CPT | Mod: ,,, | Performed by: INTERNAL MEDICINE

## 2022-10-04 PROCEDURE — 84484 ASSAY OF TROPONIN QUANT: CPT

## 2022-10-04 PROCEDURE — G0378 HOSPITAL OBSERVATION PER HR: HCPCS

## 2022-10-04 PROCEDURE — 97116 GAIT TRAINING THERAPY: CPT

## 2022-10-04 PROCEDURE — 99219 PR INITIAL OBSERVATION CARE,LEVL II: CPT | Mod: FS,,, | Performed by: INTERNAL MEDICINE

## 2022-10-04 PROCEDURE — 94761 N-INVAS EAR/PLS OXIMETRY MLT: CPT

## 2022-10-04 PROCEDURE — 83036 HEMOGLOBIN GLYCOSYLATED A1C: CPT

## 2022-10-04 PROCEDURE — 82607 VITAMIN B-12: CPT

## 2022-10-04 PROCEDURE — 93010 EKG 12-LEAD: ICD-10-PCS | Mod: ,,, | Performed by: INTERNAL MEDICINE

## 2022-10-04 PROCEDURE — 84484 ASSAY OF TROPONIN QUANT: CPT | Mod: 91 | Performed by: STUDENT IN AN ORGANIZED HEALTH CARE EDUCATION/TRAINING PROGRAM

## 2022-10-04 PROCEDURE — 84443 ASSAY THYROID STIM HORMONE: CPT

## 2022-10-04 PROCEDURE — 93005 ELECTROCARDIOGRAM TRACING: CPT | Mod: ER

## 2022-10-04 PROCEDURE — 97161 PT EVAL LOW COMPLEX 20 MIN: CPT

## 2022-10-04 PROCEDURE — 36415 COLL VENOUS BLD VENIPUNCTURE: CPT

## 2022-10-04 PROCEDURE — 82746 ASSAY OF FOLIC ACID SERUM: CPT

## 2022-10-04 RX ORDER — NAPROXEN SODIUM 220 MG/1
81 TABLET, FILM COATED ORAL DAILY
Status: DISCONTINUED | OUTPATIENT
Start: 2022-10-04 | End: 2022-10-04

## 2022-10-04 RX ORDER — PANTOPRAZOLE SODIUM 40 MG/1
40 TABLET, DELAYED RELEASE ORAL DAILY
Status: DISCONTINUED | OUTPATIENT
Start: 2022-10-04 | End: 2022-10-05 | Stop reason: HOSPADM

## 2022-10-04 RX ORDER — NAPROXEN SODIUM 220 MG/1
81 TABLET, FILM COATED ORAL DAILY
Status: DISCONTINUED | OUTPATIENT
Start: 2022-10-04 | End: 2022-10-05 | Stop reason: HOSPADM

## 2022-10-04 RX ORDER — EMPAGLIFLOZIN 25 MG/1
25 TABLET, FILM COATED ORAL DAILY
COMMUNITY
Start: 2022-07-19

## 2022-10-04 RX ORDER — ATORVASTATIN CALCIUM 10 MG/1
10 TABLET, FILM COATED ORAL DAILY
Status: DISCONTINUED | OUTPATIENT
Start: 2022-10-04 | End: 2022-10-04

## 2022-10-04 RX ORDER — ATORVASTATIN CALCIUM 40 MG/1
40 TABLET, FILM COATED ORAL DAILY
Status: DISCONTINUED | OUTPATIENT
Start: 2022-10-04 | End: 2022-10-05 | Stop reason: HOSPADM

## 2022-10-04 RX ORDER — OLANZAPINE 2.5 MG/1
2.5 TABLET ORAL NIGHTLY
Status: DISCONTINUED | OUTPATIENT
Start: 2022-10-05 | End: 2022-10-04

## 2022-10-04 RX ORDER — ATORVASTATIN CALCIUM 40 MG/1
40 TABLET, FILM COATED ORAL DAILY
COMMUNITY
Start: 2022-09-29

## 2022-10-04 RX ORDER — AMITRIPTYLINE HYDROCHLORIDE 100 MG/1
2 TABLET ORAL DAILY
COMMUNITY
Start: 2022-07-19 | End: 2022-10-19

## 2022-10-04 RX ORDER — METOPROLOL TARTRATE 25 MG/1
25 TABLET, FILM COATED ORAL 2 TIMES DAILY
Status: DISCONTINUED | OUTPATIENT
Start: 2022-10-04 | End: 2022-10-05 | Stop reason: HOSPADM

## 2022-10-04 RX ORDER — SODIUM CHLORIDE 0.9 % (FLUSH) 0.9 %
10 SYRINGE (ML) INJECTION
Status: DISCONTINUED | OUTPATIENT
Start: 2022-10-04 | End: 2022-10-05 | Stop reason: HOSPADM

## 2022-10-04 RX ORDER — OLANZAPINE 2.5 MG/1
2.5 TABLET ORAL DAILY
Status: DISCONTINUED | OUTPATIENT
Start: 2022-10-04 | End: 2022-10-05 | Stop reason: HOSPADM

## 2022-10-04 RX ORDER — ATORVASTATIN CALCIUM 40 MG/1
40 TABLET, FILM COATED ORAL DAILY
Status: DISCONTINUED | OUTPATIENT
Start: 2022-10-04 | End: 2022-10-04

## 2022-10-04 RX ORDER — TALC
6 POWDER (GRAM) TOPICAL NIGHTLY
Status: DISCONTINUED | OUTPATIENT
Start: 2022-10-04 | End: 2022-10-05 | Stop reason: HOSPADM

## 2022-10-04 RX ORDER — GABAPENTIN 300 MG/1
300 CAPSULE ORAL 2 TIMES DAILY
Status: DISCONTINUED | OUTPATIENT
Start: 2022-10-04 | End: 2022-10-04

## 2022-10-04 RX ADMIN — OLANZAPINE 2.5 MG: 2.5 TABLET, FILM COATED ORAL at 08:10

## 2022-10-04 RX ADMIN — ASPIRIN 81 MG: 81 TABLET, CHEWABLE ORAL at 01:10

## 2022-10-04 RX ADMIN — METOPROLOL TARTRATE 25 MG: 25 TABLET, FILM COATED ORAL at 08:10

## 2022-10-04 RX ADMIN — ATORVASTATIN CALCIUM 40 MG: 40 TABLET, FILM COATED ORAL at 08:10

## 2022-10-04 RX ADMIN — METOPROLOL TARTRATE 25 MG: 25 TABLET, FILM COATED ORAL at 10:10

## 2022-10-04 RX ADMIN — MELATONIN TAB 3 MG 6 MG: 3 TAB at 08:10

## 2022-10-04 RX ADMIN — PANTOPRAZOLE SODIUM 40 MG: 40 TABLET, DELAYED RELEASE ORAL at 08:10

## 2022-10-04 RX ADMIN — TRAZODONE HYDROCHLORIDE 25 MG: 50 TABLET ORAL at 11:10

## 2022-10-04 RX ADMIN — MELATONIN TAB 3 MG 6 MG: 3 TAB at 03:10

## 2022-10-04 NOTE — PT/OT/SLP EVAL
Physical Therapy Evaluation    Patient Name:  Venu Lau   MRN:  [id number]    Recommendations:     Discharge Recommendations:  home health PT, home health OT   Discharge Equipment Recommendations: none   Barriers to discharge: None    Assessment:     Venu Lau is a 82 y.o. male admitted with a medical diagnosis of Chest pain.  He presents with the following impairments/functional limitations:  weakness, impaired cognition, impaired endurance, impaired self care skills, impaired functional mobility, gait instability, impaired balance, decreased safety awareness, decreased lower extremity function, decreased upper extremity function, decreased ROM, decreased coordination, impaired coordination ([place] IRA) .Pt ambulated ~100 ft with RW and SBA. Pt with overall weakness. Pt demonstrating impaired balance without use of RW and needing CGA. Spoke with pt's wife over the phone who reports she took off of work this week and will be able to stay home to assist pt. Pt's spouse reports he has falls when not using AD. Pt owns all recommended equipment. Educated pt on strict use of AD, specifically RW for ambulation to prevent falls / decrease fall risk. Recommending  PT/OT upon d/c.     Rehab Prognosis: Good; patient would benefit from acute skilled PT services to address these deficits and reach maximum level of function.    Recent Surgery: * No surgery found *      Plan:     During this hospitalization, patient to be seen 3 x/week to address the identified rehab impairments via gait training, therapeutic activities, therapeutic exercises, neuromuscular re-education and progress toward the following goals:    Plan of Care Expires:  11/04/22    Subjective     Chief Complaint: None  Patient/Family Comments/goals: Return home to OF  Pain/Comfort:  Pain Rating 1: 0/10  Pain Rating Post-Intervention 1: 0/10    Patients cultural, spiritual, Scientologist conflicts given the current situation: no    Living Environment:  Pt  lives with spouse in a trailer, 3 NORA B HR, and WIS with SC  Prior to admission, patients level of function was Mod I with SPC per pt. Pt reports he is Mod I for ADL's and endorses 2-3 falls in the past 3 months while using SPC. Wife drives. Spoke with pt's spouse over the phone who reports pt has falls when he ambulates without an AD.  Equipment used at home: cane, straight, walker, rolling, shower chair, rollator.  DME owned (not currently used): rolling walker and rollator .  Upon discharge, patient will have assistance from spouse who reports she took off of work this week to stay home and assist pt.    Objective:     Communicated with nsg prior to session.  Patient found HOB elevated with telemetry, peripheral IV  upon PT entry to room.    General Precautions: Standard, fall   Orthopedic Precautions:N/A   Braces: N/A  Respiratory Status: Room air    Exams:  Cognitive Exam:  Patient is oriented to Person, Place, Situation, and pt with hx of CVA and word finding, impaired memory, and dysarthria at baseline   Postural Exam:  Patient presented with the following abnormalities:    -       Rounded shoulders  -       Forward head  Sensation: pt denies any numbness/tingling or decreased sensation   Skin Integrity/Edema:      -       Skin integrity: Visible skin intact  -       Edema: None noted BLE  RUE ROM: WFL  RUE Strength: grossly 4/5  LUE ROM: WFL  LUE Strength: grossly 4/5  Decreased  strength B and equal on both sides  Mild shaking/tremors  RLE ROM: WFL  RLE Strength: grossly 3+/5 hip flexion, 4+/5 knee ext, and 3-/5 ankle DF/PF; jerky movements noted with ankle PF/DF  LLE ROM: WFL  LLE Strength: grossly 3+/5 hip flexion, 4+/5 knee ext, and 3-/5 ankle DF/PF; jerky movements noted with ankle PF/DF  Fair+ static standing balance  Good static sitting balance    Functional Mobility:  Bed Mobility:     Scooting: supervision  Supine to Sit: supervision  Sit to Supine: supervision  Transfers:     Sit to Stand:   stand by assistance and contact guard assistance with rolling walker  Gait: Pt ambulated ~100 ft with RW and SBA. VC's for upright posture. Pt demonstrating impaired balance without use of RW and needing CGA - ambulated ~20 ft; decreased toe clearance B and forward momentum noted    Therapeutic Activities and Exercises:   Pt ambulated ~100 ft with RW and SBA.   Pt demonstrating impaired balance without use of RW and needing CGA.   Spoke with pt's wife over the phone who reports she took off of work this week and will be able to stay home to assist pt. Educated spouse and pt on d/c recs.   Pt's spouse reports he has falls when not using AD.   Pt owns all recommended equipment.   Educated pt and spouse on strict use of AD, specifically RW for ambulation to prevent falls / decrease fall risk.   Pt reports no pain    AM-PAC 6 CLICK MOBILITY  Total Score:19     Patient left HOB elevated with all lines intact, call button in reach, bed alarm on, and nsg notified.    GOALS:   Multidisciplinary Problems       Physical Therapy Goals          Problem: Physical Therapy    Goal Priority Disciplines Outcome Goal Variances Interventions   Physical Therapy Goal     PT, PT/OT Ongoing, Progressing     Description: Goals to be met by: 22     Patient will increase functional independence with mobility by performin. Supine to sit with Modified [place]  2. Sit to stand transfer with Modified [place]  3. Bed to chair transfer with Modified [place] using Rolling Walker  4. Gait  x 200 feet with Modified [place] using Rolling Walker.   5. Ascend/Descend 3 steps with Supervision using B handrails.   6. 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    GERD (gastroesophageal reflux disease)     Gout, unspecified     H/O: GI bleed     Hypertension     Liver cancer     PVD (peripheral vascular disease)     Sciatica     SSS (sick sinus syndrome)     Tachy-valentine syndrome     Thyroid disease        Past Surgical History:   Procedure Laterality Date    ANGIOGRAPHY Right 10/11/2021    Procedure: ANGIOGRAM-HEPATIC;  Surgeon: Phil Stuart II, MD;  Location: [facility] CATH LAB;  Service: Interventional Radiology;  Laterality: Right;    APPENDECTOMY      CHOLECYSTECTOMY      CORONARY ARTERY BYPASS GRAFT      ENDOSCOPIC ULTRASOUND OF UPPER GASTROINTESTINAL TRACT N/A 6/17/2020    Procedure: EUS;  Surgeon: Rei Humphrey MD;  Location: [facility] ENDO;  Service: Endoscopy;  Laterality: N/A;    ERCP N/A 6/17/2020    Procedure: ERCP;  Surgeon: Rei Humphrey MD;  Location: [facility] ENDO;  Service: Endoscopy;  Laterality: N/A;    ERCP N/A 8/31/2020    Procedure: ERCP (ENDOSCOPIC RETROGRADE CHOLANGIOPANCREATOGRAPHY);  Surgeon: Adela Boyle MD;  Location: [facility];  Service: Endoscopy;  Laterality: N/A;    ESOPHAGOGASTRODUODENOSCOPY N/A 10/11/2021    Procedure: EGD (ESOPHAGOGASTRODUODENOSCOPY);  Surgeon: Skip Varela MD;  Location: [facility];  Service: Gastroenterology;  Laterality: N/A;    INSERT / REPLACE / REMOVE PACEMAKER      LIVER RESECTION      LUMBAR DISC SURGERY         Time Tracking:     PT Received On: 10/04/22  PT Start Time: 1223     PT Stop Time: 1249  PT Total Time (min): 26 min     Billable Minutes: Evaluation 10 and Gait Training 16      10/04/2022

## 2022-10-04 NOTE — CONSULTS
[facility] - Telemetry  Cardiology  Consult Note    Patient Name: Venu Lau  MRN: [id number]  Admission Date: 10/3/2022  Hospital Length of Stay: 0 days  Code Status: Full Code   Attending Provider: Leidy Starks MD   Consulting Provider: Gino Albarado NP  Primary Care Physician: Michaela Miller MD  Principal Problem:Chest pain    Patient information was obtained from patient and ER records.     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 Patient presented to the ED with CP and SOB x 2 days. Patient reports pain lasted for 6-7 hours and was described as a burning sensation that began while he was drinking a beer.  Patient reports pain started on Sunday and worsened Monday prompting him to come to the ED. He reports associated SOB. Pain is different from prior MI. Patient is pain free at this time. He denies orthopnea, PND, diaphoresis, NV, dizziness. Troponin negative x 2. EKG paced.   TTE pending.       Past Medical History:   Diagnosis Date    *Atrial flutter     Anemia     Anticoagulant long-term use     Arthritis     Bipolar 1 disorder     Coronary artery disease     Diabetes mellitus     Diabetes mellitus, type 2     Embolic stroke involving left posterior cerebral artery 5/27/2021    GERD (gastroesophageal reflux disease)     Gout, unspecified     H/O: GI bleed     Hypertension     Liver cancer     PVD (peripheral vascular disease)     Sciatica     SSS (sick sinus syndrome)     Tachy-valentine syndrome      Thyroid disease        Past Surgical History:   Procedure Laterality Date    ANGIOGRAPHY Right 10/11/2021    Procedure: ANGIOGRAM-HEPATIC;  Surgeon: Phil Stuart II, MD;  Location: [facility] CATH LAB;  Service: Interventional Radiology;  Laterality: Right;    APPENDECTOMY      CHOLECYSTECTOMY      CORONARY ARTERY BYPASS GRAFT      ENDOSCOPIC ULTRASOUND OF UPPER GASTROINTESTINAL TRACT N/A 6/17/2020    Procedure: EUS;  Surgeon: Rei Humphrey MD;  Location: [facility] ENDO;  Service: Endoscopy;  Laterality: N/A;    ERCP N/A 6/17/2020    Procedure: ERCP;  Surgeon: Rei Humphrey MD;  Location: [facility] ENDO;  Service: Endoscopy;  Laterality: N/A;    ERCP N/A 8/31/2020    Procedure: ERCP (ENDOSCOPIC RETROGRADE CHOLANGIOPANCREATOGRAPHY);  Surgeon: Adela Boyle MD;  Location: [facility] ENDO;  Service: Endoscopy;  Laterality: N/A;    ESOPHAGOGASTRODUODENOSCOPY N/A 10/11/2021    Procedure: EGD (ESOPHAGOGASTRODUODENOSCOPY);  Surgeon: Skip Varela MD;  Location: [facility];  Service: Gastroenterology;  Laterality: N/A;    INSERT / REPLACE / REMOVE PACEMAKER      LIVER RESECTION      LUMBAR DISC SURGERY         Review of patient's allergies indicates:  No Known Allergies    No current facility-administered medications on file prior to encounter.     Current Outpatient Medications on File Prior to Encounter   Medication Sig    atorvastatin (LIPITOR) 10 MG tablet Take 1 tablet (10 mg total) by mouth once daily.    gabapentin (NEURONTIN) 300 MG capsule Take 300 mg by mouth 2 (two) times daily.    glimepiride (AMARYL) 2 MG tablet Take 1 mg by mouth every morning.    LIDOcaine (LIDODERM) 5 % Place 1 patch onto the skin once daily. Remove & Discard patch within 12 hours or as directed by MD    metoprolol tartrate (LOPRESSOR) 50 MG tablet Take 0.5 tablets (25 mg total) by mouth 2 (two) times daily.    mirtazapine (REMERON) 15 MG tablet 15 mg once daily.    OLANZapine (ZYPREXA) 2.5 MG tablet 2.5 mg once  daily.    oxyCODONE-acetaminophen (PERCOCET)  mg per tablet SMARTSI.5 Tablet(s) By Mouth Every 6-8 Hours    pantoprazole (PROTONIX) 40 MG tablet Take 1 tablet (40 mg total) by mouth once daily.    SOLUS V2 LANCETS 30 gauge Misc     SOLUS V2 TEST STRIPS Strp      Family History       Problem Relation (Age of Onset)    Heart disease Mother, Father    Pancreatic cancer Mother          Tobacco Use    Smoking status: Some Days     Packs/day: 0.25     Years: 37.00     Pack years: 9.25     Types: Cigarettes     Last attempt to quit: 1992     Years since quittin.8    Smokeless tobacco: Never    Tobacco comments:     Pt declines enrollment in the [employer]. Handout provided for Ambulatory Smoking Cessation program.    Substance and Sexual Activity    Alcohol use: Yes     Alcohol/week: 5.0 standard drinks     Types: 5 Cans of beer per week    Drug use: No    Sexual activity: Not Currently     Review of Systems   Constitutional: Negative. Negative for diaphoresis.   HENT: Negative.     Eyes: Negative.    Cardiovascular:  Positive for chest pain. Negative for irregular heartbeat, leg swelling, near-syncope, orthopnea, palpitations, paroxysmal nocturnal dyspnea and syncope.   Respiratory:  Positive for shortness of breath. Negative for cough.    Endocrine: Negative.    Hematologic/Lymphatic: Negative.    Skin: Negative.    Musculoskeletal:  Positive for arthritis, back pain and myalgias.   Gastrointestinal: Negative.  Negative for nausea and vomiting.   Genitourinary: Negative.    Neurological:  Positive for weakness.   Psychiatric/Behavioral:  Positive for substance abuse.    Allergic/Immunologic: Negative.    Objective:     Vital Signs (Most Recent):  Temp: 98.2 °F (36.8 °C) (10/04/22 0757)  Pulse: (!) 59 (10/04/22 0757)  Resp: 18 (10/04/22 0757)  BP: (!) 152/69 (10/04/22 0757)  SpO2: 99 % (10/04/22 0757)   Vital Signs (24h Range):  Temp:  [96 °F (35.6 °C)-98.2 °F (36.8 °C)] 98.2 °F (36.8  °C)  Pulse:  [58-62] 59  Resp:  [14-22] 18  SpO2:  [95 %-99 %] 99 %  BP: (109-170)/(52-74) 152/69     Weight: 70.5 kg (155 lb 6.8 oz)  Body mass index is 25.86 kg/m².    SpO2: 99 %  O2 Device (Oxygen Therapy): room air    No intake or output data in the 24 hours ending 10/04/22 1040    Lines/Drains/Airways       Peripheral Intravenous Line  Duration                  Peripheral IV - Single Lumen 10/03/22 2301 20 G Left;Lateral Antecubital <1 day                    Physical Exam  Constitutional:       General: He is not in acute distress.     Appearance: He is not diaphoretic.   HENT:      Head: Atraumatic.   Eyes:      General:         Right eye: No discharge.         Left eye: No discharge.   Cardiovascular:      Rate and Rhythm: Normal rate and regular rhythm.      Heart sounds: Murmur heard.   Pulmonary:      Effort: Pulmonary effort is normal.      Breath sounds: No rales.   Abdominal:      General: Bowel sounds are normal.      Palpations: Abdomen is soft.   Musculoskeletal:      Right lower leg: No edema.      Left lower leg: No edema.   Skin:     General: Skin is warm and dry.      Capillary Refill: Capillary refill takes 2 to 3 seconds.   Neurological:      Mental Status: He is alert. Mental status is at baseline.       Significant Labs: BMP:   Recent Labs   Lab 10/03/22  2302 10/04/22  0402   GLU 74  --      --    K 3.5  --      --    CO2 17*  --    BUN 15  --    CREATININE 1.09  --    CALCIUM 8.7  --    MG  --  2.1   , CMP   Recent Labs   Lab 10/03/22  2302      K 3.5      CO2 17*   GLU 74   BUN 15   CREATININE 1.09   CALCIUM 8.7   PROT 7.5   ALBUMIN 4.4   BILITOT 0.4   ALKPHOS 140*   AST 43   ALT 37   ANIONGAP 15   , CBC   Recent Labs   Lab 10/03/22  2302   WBC 2.54*   HGB 11.0*   HCT 35.5*   *   , INR No results for input(s): INR, PROTIME in the last 48 hours., Lipid Panel   Recent Labs   Lab 10/04/22  0402   CHOL 100*   HDL 40   LDLCALC 41.2*   TRIG 94   CHOLHDL 40.0   ,  Troponin   Recent Labs   Lab 10/03/22  2302 10/04/22  0402   TROPONINI 0.019 0.024   , and All pertinent lab results from the last 24 hours have been reviewed.    Significant Imaging: Echocardiogram: Transthoracic echo (TTE) complete (C[facility] Only):   Results for orders placed or performed during the hospital encounter of 05/26/21   Echo   Result Value Ref Range    Ascending aorta 3.75 cm    STJ 3.54 cm    AV mean gradient 16 mmHg    Ao [place] 2.66 m/s    Ao VTI 55.14 cm    IVS 0.88 0.6 - 1.1 cm    LA size 4.97 cm    Left Atrium Major Axis 5.90 cm    Left Atrium Minor Axis 6.00 cm    LVIDd 4.77 3.5 - 6.0 cm    LVIDs 3.46 2.1 - 4.0 cm    LVOT diameter 2.15 cm    LVOT peak VTI 18.71 cm    Posterior Wall 0.88 0.6 - 1.1 cm    MV [place] A [place] 0.38 m/s    E wave deceleration time 146.16 msec    MV Peak E [place] 1.03 m/s    RA Major Axis 4.93 cm    RA Width 4.69 cm    RVDD 4.60 cm    Sinus 3.59 cm    TAPSE 1.38 cm    TR Max [place] 2.71 m/s    TDI LATERAL 0.11 m/s    TDI SEPTAL 0.06 m/s    LA WIDTH 4.12 cm    MV stenosis pressure 1/2 time 42.39 ms    LV Diastolic Volume 106.19 mL    LV Systolic Volume 49.64 mL    LVOT [place] 0.93 m/s    LA volume (mod) 85.69 cm3    LV LATERAL E/E' RATIO 9.36 m/s    LV SEPTAL E/E' RATIO 17.17 m/s    FS 27 %    LA volume 103.55 cm3    LV mass 141.97 g    Left Ventricle Relative Wall Thickness 0.37 cm    AV valve area 1.23 cm2    AV Velocity Ratio 0.35     AV index (prosthetic) 0.34     MV valve area p 1/2 method 5.19 cm2    E/A ratio 2.71     Mean e' 0.09 m/s    LVOT area 3.6 cm2    LVOT stroke volume 67.89 cm3    AV peak gradient 28 mmHg    E/E' ratio 12.12 m/s    LV Systolic Volume Index 27.7 mL/m2    LV Diastolic Volume Index 59.32 mL/m2    LA Volume Index 57.9 mL/m2    LV Mass Index 79 g/m2    Triscuspid Valve Regurgitation Peak Gradient 29 mmHg    LA Volume Index (Mod) 47.9 mL/m2    BSA 1.85 m2    Right Atrial Pressure (from IVC) 3 mmHg    EF 55 %    TV rest pulmonary artery pressure 32 mmHg     Narrative    · The left ventricle is normal in size with normal systolic function. The   estimated ejection fraction is 55%.  · Indeterminate left ventricular diastolic function.  · Mild right ventricular enlargement with normal right ventricular   systolic function.  · Severe left atrial enlargement.  · Mild aortic regurgitation.  · Moderate tricuspid regurgitation.  · The estimated PA systolic pressure is 32 mmHg.  · Normal central venous pressure (3 mmHg).        Assessment and Plan:     Tobacco abuse  Smoking cessation     Essential hypertension  SBP 100s-150s  Continue BB     Atrial flutter  Presently paced  Continue BB  Not on OAC 2/2 GIBs and falls     CAD (coronary artery disease)  S/p CABG in 1990   EKG paced  Troponin negative x 2- repeat pending   CP reported as atypical to prior MI pain   TTE pending   If 3rd troponin negative and TTE unremarkable, patient can proceed with stress test as outpatient   Continue BB, statin  Not on asa- pancytopenia         VTE Risk Mitigation (From admission, onward)         Ordered     IP VTE HIGH RISK PATIENT  Once         10/04/22 0325     Place sequential compression device  Until discontinued         10/04/22 0325                Thank you for your consult. I will follow-up with patient. Please contact us if you have any additional questions.    Gino Albarado, JOSE M  Cardiology   [place] - Telemetry

## 2022-10-04 NOTE — SUBJECTIVE & OBJECTIVE
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    GERD (gastroesophageal reflux disease)     Gout, unspecified     H/O: GI bleed     Hypertension     Liver cancer     PVD (peripheral vascular disease)     Sciatica     SSS (sick sinus syndrome)     Tachy-valentine syndrome     Thyroid disease        Past Surgical History:   Procedure Laterality Date    ANGIOGRAPHY Right 10/11/2021    Procedure: ANGIOGRAM-HEPATIC;  Surgeon: Phil Stuart II, MD;  Location: [facility] CATH LAB;  Service: Interventional Radiology;  Laterality: Right;    APPENDECTOMY      CHOLECYSTECTOMY      CORONARY ARTERY BYPASS GRAFT      ENDOSCOPIC ULTRASOUND OF UPPER GASTROINTESTINAL TRACT N/A 6/17/2020    Procedure: EUS;  Surgeon: Rei Humphrey MD;  Location: [facility] ENDO;  Service: Endoscopy;  Laterality: N/A;    ERCP N/A 6/17/2020    Procedure: ERCP;  Surgeon: Rei Humphrey MD;  Location: [facility] ENDO;  Service: Endoscopy;  Laterality: N/A;    ERCP N/A 8/31/2020    Procedure: ERCP (ENDOSCOPIC RETROGRADE CHOLANGIOPANCREATOGRAPHY);  Surgeon: Adela Boyle MD;  Location: [facility] ENDO;  Service: Endoscopy;  Laterality: N/A;    ESOPHAGOGASTRODUODENOSCOPY N/A 10/11/2021    Procedure: EGD (ESOPHAGOGASTRODUODENOSCOPY);  Surgeon: Skip Varela MD;  Location: [facility];  Service: Gastroenterology;  Laterality: N/A;    INSERT / REPLACE / REMOVE PACEMAKER      LIVER RESECTION      LUMBAR DISC SURGERY         Review of patient's allergies indicates:  No Known Allergies    No current facility-administered medications on file prior to encounter.     Current Outpatient Medications on File Prior to Encounter   Medication Sig    atorvastatin (LIPITOR) 10 MG tablet Take 1 tablet (10 mg total) by mouth once daily.     gabapentin (NEURONTIN) 300 MG capsule Take 300 mg by mouth 2 (two) times daily.    glimepiride (AMARYL) 2 MG tablet Take 1 mg by mouth every morning.    LIDOcaine (LIDODERM) 5 % Place 1 patch onto the skin once daily. Remove & Discard patch within 12 hours or as directed by MD    metoprolol tartrate (LOPRESSOR) 50 MG tablet Take 0.5 tablets (25 mg total) by mouth 2 (two) times daily.    mirtazapine (REMERON) 15 MG tablet 15 mg once daily.    OLANZapine (ZYPREXA) 2.5 MG tablet 2.5 mg once daily.    oxyCODONE-acetaminophen (PERCOCET)  mg per tablet SMARTSI.5 Tablet(s) By Mouth Every 6-8 Hours    pantoprazole (PROTONIX) 40 MG tablet Take 1 tablet (40 mg total) by mouth once daily.    SOLUS V2 LANCETS 30 gauge Misc     SOLUS V2 TEST STRIPS Strp      Family History       Problem Relation (Age of Onset)    Heart disease Mother, Father    Pancreatic cancer Mother          Tobacco Use    Smoking status: Some Days     Packs/day: 0.25     Years: 37.00     Pack years: 9.25     Types: Cigarettes     Last attempt to quit: 1992     Years since quittin.8    Smokeless tobacco: Never    Tobacco comments:     Pt declines enrollment in the [employer]. Handout provided for Ambulatory Smoking Cessation program.    Substance and Sexual Activity    Alcohol use: Yes     Alcohol/week: 5.0 standard drinks     Types: 5 Cans of beer per week    Drug use: No    Sexual activity: Not Currently     Review of Systems   Constitutional: Negative. Negative for diaphoresis.   HENT: Negative.     Eyes: Negative.    Cardiovascular:  Positive for chest pain. Negative for irregular heartbeat, leg swelling, near-syncope, orthopnea, palpitations, paroxysmal nocturnal dyspnea and syncope.   Respiratory:  Positive for shortness of breath. Negative for cough.    Endocrine: Negative.    Hematologic/Lymphatic: Negative.    Skin: Negative.    Musculoskeletal:  Positive for arthritis, back pain and myalgias.   Gastrointestinal: Negative.   Negative for nausea and vomiting.   Genitourinary: Negative.    Neurological:  Positive for weakness.   Psychiatric/Behavioral:  Positive for substance abuse.    Allergic/Immunologic: Negative.    Objective:     Vital Signs (Most Recent):  Temp: 98.2 °F (36.8 °C) (10/04/22 0757)  Pulse: (!) 59 (10/04/22 0757)  Resp: 18 (10/04/22 0757)  BP: (!) 152/69 (10/04/22 0757)  SpO2: 99 % (10/04/22 0757)   Vital Signs (24h Range):  Temp:  [96 °F (35.6 °C)-98.2 °F (36.8 °C)] 98.2 °F (36.8 °C)  Pulse:  [58-62] 59  Resp:  [14-22] 18  SpO2:  [95 %-99 %] 99 %  BP: (109-170)/(52-74) 152/69     Weight: 70.5 kg (155 lb 6.8 oz)  Body mass index is 25.86 kg/m².    SpO2: 99 %  O2 Device (Oxygen Therapy): room air    No intake or output data in the 24 hours ending 10/04/22 1040    Lines/Drains/Airways       Peripheral Intravenous Line  Duration                  Peripheral IV - Single Lumen 10/03/22 2301 20 G Left;Lateral Antecubital <1 day                    Physical Exam  Constitutional:       General: He is not in acute distress.     Appearance: He is not diaphoretic.   HENT:      Head: Atraumatic.   Eyes:      General:         Right eye: No discharge.         Left eye: No discharge.   Cardiovascular:      Rate and Rhythm: Normal rate and regular rhythm.      Heart sounds: Murmur heard.   Pulmonary:      Effort: Pulmonary effort is normal.      Breath sounds: No rales.   Abdominal:      General: Bowel sounds are normal.      Palpations: Abdomen is soft.   Musculoskeletal:      Right lower leg: No edema.      Left lower leg: No edema.   Skin:     General: Skin is warm and dry.      Capillary Refill: Capillary refill takes 2 to 3 seconds.   Neurological:      Mental Status: He is alert. Mental status is at baseline.       Significant Labs: BMP:   Recent Labs   Lab 10/03/22  2302 10/04/22  0402   GLU 74  --      --    K 3.5  --      --    CO2 17*  --    BUN 15  --    CREATININE 1.09  --    CALCIUM 8.7  --    MG  --  2.1   , CMP    Recent Labs   Lab 10/03/22  2302      K 3.5      CO2 17*   GLU 74   BUN 15   CREATININE 1.09   CALCIUM 8.7   PROT 7.5   ALBUMIN 4.4   BILITOT 0.4   ALKPHOS 140*   AST 43   ALT 37   ANIONGAP 15   , CBC   Recent Labs   Lab 10/03/22  2302   WBC 2.54*   HGB 11.0*   HCT 35.5*   *   , INR No results for input(s): INR, PROTIME in the last 48 hours., Lipid Panel   Recent Labs   Lab 10/04/22  0402   CHOL 100*   HDL 40   LDLCALC 41.2*   TRIG 94   CHOLHDL 40.0   , Troponin   Recent Labs   Lab 10/03/22  2302 10/04/22  0402   TROPONINI 0.019 0.024   , and All pertinent lab results from the last 24 hours have been reviewed.    Significant Imaging: Echocardiogram: Transthoracic echo (TTE) complete (Cupid Only):   Results for orders placed or performed during the hospital encounter of 05/26/21   Echo   Result Value Ref Range    Ascending aorta 3.75 cm    STJ 3.54 cm    AV mean gradient 16 mmHg    Ao peak parish 2.66 m/s    Ao VTI 55.14 cm    IVS 0.88 0.6 - 1.1 cm    LA size 4.97 cm    Left Atrium Major Axis 5.90 cm    Left Atrium Minor Axis 6.00 cm    LVIDd 4.77 3.5 - 6.0 cm    LVIDs 3.46 2.1 - 4.0 cm    LVOT diameter 2.15 cm    LVOT peak VTI 18.71 cm    Posterior Wall 0.88 0.6 - 1.1 cm    MV Peak A [place] 0.38 m/s    E wave deceleration time 146.16 msec    MV Peak E [place] 1.03 m/s    RA Major Axis 4.93 cm    RA Width 4.69 cm    RVDD 4.60 cm    Sinus 3.59 cm    TAPSE 1.38 cm    TR Max Parish 2.71 m/s    TDI LATERAL 0.11 m/s    TDI SEPTAL 0.06 m/s    LA WIDTH 4.12 cm    MV stenosis pressure 1/2 time 42.39 ms    LV Diastolic Volume 106.19 mL    LV Systolic Volume 49.64 mL    LVOT peak [place] 0.93 m/s    LA volume (mod) 85.69 cm3    LV LATERAL E/E' RATIO 9.36 m/s    LV SEPTAL E/E' RATIO 17.17 m/s    FS 27 %    LA volume 103.55 cm3    LV mass 141.97 g    Left Ventricle Relative Wall Thickness 0.37 cm    AV valve area 1.23 cm2    AV Velocity Ratio 0.35     AV index (prosthetic) 0.34     MV valve area p 1/2 method 5.19 cm2    E/A  ratio 2.71     Mean e' 0.09 m/s    LVOT area 3.6 cm2    LVOT stroke volume 67.89 cm3    AV peak gradient 28 mmHg    E/E' ratio 12.12 m/s    LV Systolic Volume Index 27.7 mL/m2    LV Diastolic Volume Index 59.32 mL/m2    LA Volume Index 57.9 mL/m2    LV Mass Index 79 g/m2    Triscuspid Valve Regurgitation Peak Gradient 29 mmHg    LA Volume Index (Mod) 47.9 mL/m2    BSA 1.85 m2    Right Atrial Pressure (from IVC) 3 mmHg    EF 55 %    TV rest pulmonary artery pressure 32 mmHg    Narrative    · The left ventricle is normal in size with normal systolic function. The   estimated ejection fraction is 55%.  · Indeterminate left ventricular diastolic function.  · Mild right ventricular enlargement with normal right ventricular   systolic function.  · Severe left atrial enlargement.  · Mild aortic regurgitation.  · Moderate tricuspid regurgitation.  · The estimated PA systolic pressure is 32 mmHg.  · Normal central venous pressure (3 mmHg).

## 2022-10-04 NOTE — PT/OT/SLP PROGRESS
Physical Therapy Evaluation Attempt      Patient Name:  Venu Lau   MRN:  [id number]    Patient not seen today secondary to Other (Comment) (Transport present to take pt for echo). Will follow-up as able.    10/4/2022

## 2022-10-04 NOTE — ASSESSMENT & PLAN NOTE
S/p CABG in 1990   EKG paced  Troponin negative x 2- repeat pending   CP reported as atypical to prior MI pain   TTE pending   If 3rd troponin negative and TTE unremarkable, patient can proceed with stress test as outpatient   Continue BB, statin  Not on asa- pancytopenia

## 2022-10-04 NOTE — PLAN OF CARE
SW met with pt via adsquarenect to complete assessment. Pt is AxO 3 and able to verbally answer assessment questions.  Pt is able to confirm demographic information. Pt reports to live at home with spouse Ela. Pt reports while at home being independent of most ADL's but could use more support for safety. Pt reports DME in the home to include a cane, grab bars, and shower chair for safe showering. Pt reports not driving anymore but having spouse Ela to support. Pt reports spouse to drive and completes errands and bring to appointments. Pt also reports daughter to assist. At time of discharge pt to have family to transport home. SW spoke with pt about recs for HH and pt open to services. Pt would like hospital follow up to remain with PCP. SW updated whiteboard with CM name and contact information. SW confirmed pt understanding of Observation unit and expected discharge plan. SW will continue to follow pt throughout care and assist with any discharge needs.         10/04/22 5127   Discharge Planning   Assessment Type Discharge Planning Brief Assessment   Resource/Environmental Concerns none   Support Systems Spouse/significant other;Children;Family members   Equipment Currently Used at Home cane, straight;shower chair;grab bar   Current Living Arrangements home/apartment/condo   Patient/Family Anticipates Transition to home with family;home with help/services   Patient/Family Anticipated Services at Transition none;home health care   DME Needed Upon Discharge  none   Discharge Plan A Home with family;Home Health     Future Appointments   Date Time Provider Department Center   10/19/2022  9:00 AM Carlos Quiroga MD [facility]IO [facility]  [phone]

## 2022-10-04 NOTE — HPI
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 Patient presented to the ED with CP and SOB x 2 days. Patient reports pain lasted for 6-7 hours and was described as a burning sensation that began while he was drinking a beer.  Patient reports pain started on Sunday and worsened Monday prompting him to come to the ED. He reports associated SOB. Pain is different from prior MI. Patient is pain free at this time. He denies orthopnea, PND, diaphoresis, NV, dizziness. Troponin negative x 2. EKG paced.   TTE pending.

## 2022-10-04 NOTE — ED NOTES
Pt to be 1:1 with sitter at this time per MD. PCT placed at bedside and PEC precautions in place. Equipment remains in room s/t pts arrival CC.

## 2022-10-04 NOTE — PLAN OF CARE
[facility] - Telemetry      HOME HEALTH ORDERS  FACE TO FACE ENCOUNTER    Patient Name: Venu Lau  YOB: 1940    PCP: Michaela Miller MD   [facility] [address] [facility] / [facility] *  PCP Phone Number: [phone]  PCP Fax: [phone]    Encounter Date: 10/3/22    Admit to Home Health    Diagnoses:  Active Hospital Problems    Diagnosis  POA    *Chest pain [R07.9]  Yes    Tobacco abuse [Z72.0]  Yes    Metabolic acidosis [E87.20]  Yes    Essential hypertension [I10]  Yes    CAD (coronary artery disease) [I25.10]  Yes    Hx of CABG [Z95.1]  Not Applicable    Atrial flutter [I48.92]  Yes    Pacemaker [Z95.0]  Yes      Resolved Hospital Problems   No resolved problems to display.       Follow Up Appointments:  Future Appointments   Date Time Provider Department Center   10/19/2022  9:00 AM Carlos Quiroga MD [facility]IO [facility]       Allergies:Review of patient's allergies indicates:  No Known Allergies    Medications: Review discharge medications with patient and family and provide education.    Current Facility-Administered Medications   Medication Dose Route Frequency Provider Last Rate Last Admin    aspirin chewable tablet 81 mg  81 mg Oral Daily Emely Garcia MD   81 mg at 10/04/22 1333    atorvastatin tablet 40 mg  40 mg Oral Daily Valerie Miller MD   40 mg at 10/04/22 0814    melatonin tablet 6 mg  6 mg Oral Nightly Valerie Miller MD   6 mg at 10/04/22 0328    metoprolol tartrate (LOPRESSOR) tablet 25 mg  25 mg Oral BID Valerie Miller MD   25 mg at 10/04/22 1029    OLANZapine tablet 2.5 mg  2.5 mg Oral Daily Valerie Miller MD   2.5 mg at 10/04/22 0814    pantoprazole EC tablet 40 mg  40 mg Oral Daily Valerie Miller MD   40 mg at 10/04/22 0814    sodium chloride 0.9% flush 10 mL  10 mL Intravenous PRN Valerie Miller MD         Current Discharge Medication List        CONTINUE these medications which have NOT CHANGED    Details   amitriptyline (ELAVIL)  100 MG tablet Take 2 tablets by mouth once daily.      atorvastatin (LIPITOR) 40 MG tablet Take 40 mg by mouth once daily.      gabapentin (NEURONTIN) 300 MG capsule Take 300 mg by mouth 2 (two) times daily.      glimepiride (AMARYL) 2 MG tablet Take 1 mg by mouth every morning.      JARDIANCE 25 mg tablet Take 25 mg by mouth once daily.      LIDOcaine (LIDODERM) 5 % Place 1 patch onto the skin once daily. Remove & Discard patch within 12 hours or as directed by MD  Qty: 15 patch, Refills: 0      metoprolol tartrate (LOPRESSOR) 50 MG tablet Take 0.5 tablets (25 mg total) by mouth 2 (two) times daily.  Qty: 180 tablet, Refills: 3      mirtazapine (REMERON) 15 MG tablet 15 mg once daily.      OLANZapine (ZYPREXA) 2.5 MG tablet 2.5 mg once daily.      oxyCODONE-acetaminophen (PERCOCET)  mg per tablet SMARTSI.5 Tablet(s) By Mouth Every 6-8 Hours      pantoprazole (PROTONIX) 40 MG tablet Take 1 tablet (40 mg total) by mouth once daily.  Qty: 30 tablet, Refills: 2      SOLUS V2 LANCETS 30 gauge Misc       SOLUS V2 TEST STRIPS Strp                I have seen and examined this patient within the last 30 days. My clinical findings that support the need for the home health skilled services and home bound status are the following:no   Weakness/numbness causing balance and gait disturbance due to Coronary Heart Disease and Weakness/Debility making it taxing to leave home.     Diet:   cardiac diet and diabetic diet  calorie    Labs:  None    Referrals/ Consults  Physical Therapy to evaluate and treat. Evaluate for home safety and equipment needs; Establish/upgrade home exercise program. Perform / instruct on therapeutic exercises, gait training, transfer training, and Range of Motion.  Occupational Therapy to evaluate and treat. Evaluate home environment for safety and equipment needs. Perform/Instruct on transfers, ADL training, ROM, and therapeutic exercises.    Activities:   activity as tolerated    Nursing:    Agency to admit patient within 24 hours of hospital discharge unless specified on physician order or at patient request    SN to complete comprehensive assessment including routine vital signs. Instruct on disease process and s/s of complications to report to MD. Review/verify medication list sent home with the patient at time of discharge  and instruct patient/caregiver as needed. Frequency may be adjusted depending on start of care date.     Skilled nurse to perform up to 3 visits PRN for symptoms related to diagnosis    Notify MD if SBP > 160 or < 90; DBP > 90 or < 50; HR > 120 or < 50; Temp > 101; O2 < 88    Ok to schedule additional visits based on staff availability and patient request on consecutive days within the home health episode.    When multiple disciplines ordered:    Start of Care occurs on Sunday - Wednesday schedule remaining discipline evaluations as ordered on separate consecutive days following the start of care.    Thursday SOC -schedule subsequent evaluations Friday and Monday the following week.     Friday - Saturday SOC - schedule subsequent discipline evaluations on consecutive days starting Monday of the following week.    For all post-discharge communication and subsequent orders please contact patient's primary care physician. If unable to reach primary care physician or do not receive response within 30 minutes, please contact [phone] for clinical staff order clarification    Home Health Aide:  Physical Therapy Twice weekly and Occupational Therapy Twice weekly    Wound Care Orders  no    I certify that this patient is confined to his home and needs physical therapy and occupational therapy.      Rosalino Arellano MD  \[facility]\"" Internal Medicine -3  \[facility]\"" Hospitalist Team A    \[facility]\"" Medicine Hospitalist Pager numbers:   \[facility]\"" Hospitalist Medicine Team A (Tereza/Flakita): [phone]  \[facility]\"" Hospitalist Medicine Team B (Brian/Michaela):  [phone]

## 2022-10-04 NOTE — NURSING
Care plan reviewed. Pt went for echo this morning. Participated with PT, tolerated well. Safety maintained, bed at lowest position, wheels locked, call light within reach, bed alarm on.

## 2022-10-04 NOTE — H&P
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      Pt was in his normal state of health until Sunday when he was sitting around, drinking a beer.  He reports he felt a pain in the center of his chest that he describes as a burning pain.  The pain was mostly over the sternum, but did radiate to his left side near his pacemaker.  He is unsure how long the pain lasted for or if it was constant, but felt that the pain was worse on Monday night which is what prompted him to come to the hospital.  He does endorse associated shortness of breath, but denies any associated N/V, or diaphoresis.  Denies any upper extremity pain or weakness.  States this is unlike the pain he experienced during his previous heart attack. The pain is now resolved during our interview.  Denies any orthopnea, PND, or lower extremity edema.  He denies any recent fevers or chills.  Denies any abdominal pain, hematochezia, melena, dysuria, or hematuria.  He is complaining of recent issues with insomnia and states he has not slept in the last 4 days; he is unsure why.  He is also reporting chronic back and bilateral lower extremity pain.  He has had several presentations to the ED over the last year for back, leg, foot pain, and falls.  States his falls are related to  "generalized weakness and denies any loss of consciousness during these falls.  At baseline, he has residual dysarthria from his previous stroke as well as difficulty with memory and word finding. Reports he has no residual focal extremity weakness, but more of a generalized weakness which he believes is contributing to his falls.  He is unsure what medications he is currently taking, but does know that he has an appointment tomorrow for testing for a cardiology appointment.  He thinks this has to do with his pacemaker.    He does endorse drinking 4-5 beers daily and continues to smoke 1-2 cigarettes a day.     On interview he does voice his frustration with chronic pain causing him to sometimes think he is "better off dead".  Not currently endorsing SI, but states he has thought about it in the past.     Past Medical History:    Atrial Flutter  Anemia  Bipolar 1 Disorder  CAD s/p CABG 1990  DM2, well controlled (last A1C 5.3)  L MCA CVA 5/2021  GERD  Gout  GI Bleed  Hemobilia  HTN  HCC s/p partial hepatectomy  PVD  Pancreatitis  Chronic lower back pain  Subclinical hypothyroidism      Past Surgical History:  Past Surgical History:   Procedure Laterality Date    ANGIOGRAPHY Right 10/11/2021    Procedure: ANGIOGRAM-HEPATIC;  Surgeon: Phil Stuart II, MD;  Location: [facility] CATH LAB;  Service: Interventional Radiology;  Laterality: Right;    APPENDECTOMY      CHOLECYSTECTOMY      CORONARY ARTERY BYPASS GRAFT      ENDOSCOPIC ULTRASOUND OF UPPER GASTROINTESTINAL TRACT N/A 6/17/2020    Procedure: EUS;  Surgeon: Rei Humphrey MD;  Location: [facility] ENDO;  Service: Endoscopy;  Laterality: N/A;    ERCP N/A 6/17/2020    Procedure: ERCP;  Surgeon: Rei Humphrey MD;  Location: [facility] ENDO;  Service: Endoscopy;  Laterality: N/A;    ERCP N/A 8/31/2020    Procedure: ERCP (ENDOSCOPIC RETROGRADE CHOLANGIOPANCREATOGRAPHY);  Surgeon: Adela Boyle MD;  Location: [facility] ENDO;  Service: Endoscopy;  Laterality: N/A;    " "ESOPHAGOGASTRODUODENOSCOPY N/A 10/11/2021    Procedure: EGD (ESOPHAGOGASTRODUODENOSCOPY);  Surgeon: Skip Varela MD;  Location: [facility];  Service: Gastroenterology;  Laterality: N/A;    INSERT / REPLACE / REMOVE PACEMAKER      LIVER RESECTION      LUMBAR DISC SURGERY         Allergies:  Review of patient's allergies indicates:  No Known Allergies    Home Medications:    Atorvastatin 40mg PO daily  Pantoprazole 40mg PO daily  Olanzapine 2.5mg PO daily  Jardiance 25mg daily  Glimeperide 1mg BID  Metoprolol 25mg BID  Oxycodone 10mg PO prn    Family History:  Mother pancreatic CA  Father - DVT    Social History:  Tobacco: Previous heavy smoker ~1ppd x ~33 years; states he cut back significantly after his heart attack in , now reports smoking 1-2 cigarettes a day  Alcohol: 4-5 beers a day; denies any history of alcohol withdrawals  Drugs: denies    Lives with wife.  Ambulates with cane    Review of Systems:  Pertinent items are noted in HPI. All other systems are reviewed and are negative.    Health Maintaince :   Primary Care Physician: Dr. Michaela Miller    Immunizations:   TDap - thinks he is UTD    Flu - UTD   Pna not UTD; PCV 7 in   COVID - x2, booster eligible    Cancer Screening:    Colonoscopy: not UTD; never had colonscopy      Objective:   Last 24 Hour Vital Signs:  BP  Min: 109/52  Max: 153/73  Temp  Av °F (36.7 °C)  Min: 98 °F (36.7 °C)  Max: 98 °F (36.7 °C)  Pulse  Av.8  Min: 59  Max: 62  Resp  Av  Min: 14  Max: 22  SpO2  Av.6 %  Min: 95 %  Max: 98 %  Height  Av' 5" (165.1 cm)  Min: 5' 5" (165.1 cm)  Max: 5' 5" (165.1 cm)  Weight  Av.8 kg (165 lb)  Min: 74.8 kg (165 lb)  Max: 74.8 kg (165 lb)  Body mass index is 27.46 kg/m².  No intake/output data recorded.    Physical Examination:  Examination  General: Patient sitting comfortably in NAD  Head: normocephalic, atraumatic  Eyes: pupils equal 4-5mm, reactive to light; EOMI, no conjunctival injections or " icterus  Mouth: MMM, posterior oropharynx without erythema  Neck: No thyromegaly or masses, supple, trachea midline   Cardiac: RRR, 3/6 systolic murmur heard throughout  Pulmonary/Chest: CTAB, symmetric chest rise, no wheezing or crackles; mild TTP over PPM site; site without erythema or warmth  GI: Soft, non tender, non distended, normoactive bowel sounds  Extremities: no edema, clubbing, or cyanosis  Skin: dry, warm, intact.  Well healed surgical scars to midline chest and LLE  Neuro: Alert and oriented, moving all extremities 4/5 strength in BLE, 5/5 strength in BUE, sensation equal and symmetric; baseline dysarthria from previous CVA      Laboratory:  Most Recent Data:  CBC:   Lab Results   Component Value Date    WBC 2.54 (L) 10/03/2022    HGB 11.0 (L) 10/03/2022    HCT 35.5 (L) 10/03/2022     (L) 10/03/2022    MCV 79 (L) 10/03/2022    RDW 18.7 (H) 10/03/2022     WBC Differential: 31.5 % N, 0 % Bands, 48.4 % L, 18.9 % M, 0.4 % Eo, 0.4 % Baso, no additional cells seen  BMP:   Lab Results   Component Value Date     10/03/2022    K 3.5 10/03/2022     10/03/2022    CO2 17 (L) 10/03/2022    BUN 15 10/03/2022    CREATININE 1.09 10/03/2022    GLU 74 10/03/2022    CALCIUM 8.7 10/03/2022    MG 2.2 10/11/2021    PHOS 2.0 (L) 10/11/2021     LFTs:   Lab Results   Component Value Date    PROT 7.5 10/03/2022    ALBUMIN 4.4 10/03/2022    BILITOT 0.4 10/03/2022    AST 43 10/03/2022    ALKPHOS 140 (H) 10/03/2022    ALT 37 10/03/2022     (H) 06/17/2020     Coags:   Lab Results   Component Value Date    INR 1.0 10/27/2021     FLP:   Lab Results   Component Value Date    CHOL 135 07/01/2021    HDL 33 (L) 07/01/2021    LDLCALC 73.4 07/01/2021    TRIG 143 07/01/2021    CHOLHDL 24.4 07/01/2021     DM:   Lab Results   Component Value Date    HGBA1C 5.3 10/10/2021    HGBA1C 5.5 05/27/2021    HGBA1C 5.4 03/17/2021    LDLCALC 73.4 07/01/2021    CREATININE 1.09 10/03/2022     Thyroid:   Lab Results   Component  Value Date    TSH 7.900 (H) 07/18/2022    FREET4 0.88 07/18/2022     Anemia:   Lab Results   Component Value Date    IRON 41 (L) 06/13/2020    TIBC 337 06/13/2020    FERRITIN 364 (H) 06/13/2020    HPLPNZUN04 968 (H) 10/27/2021    FOLATE 8.0 10/27/2021     Cardiac:   Lab Results   Component Value Date    TROPONINI 0.019 10/03/2022     (H) 10/29/2016     Urinalysis:   Lab Results   Component Value Date    LABURIN No growth 10/29/2016    COLORU Straw 10/03/2022    SPECGRAV <=1.005 10/03/2022    NITRITE Negative 10/03/2022    KETONESU Negative 10/03/2022    UROBILINOGEN Negative 10/03/2022       Trended Lab Data:  Recent Labs   Lab 10/03/22  2302   WBC 2.54*   HGB 11.0*   HCT 35.5*   *   MCV 79*   RDW 18.7*      K 3.5      CO2 17*   BUN 15   CREATININE 1.09   GLU 74   PROT 7.5   ALBUMIN 4.4   BILITOT 0.4   AST 43   ALKPHOS 140*   ALT 37       Trended Cardiac Data:  Recent Labs   Lab 10/03/22  2302   TROPONINI 0.019       Microbiology Data:  none    Other Results:  EKG (my interpretation):   Ventricular paced rhythm, extreme axis deviation, wide QRS, no ST segment changes, T wave flattening throughout     Radiology:  10/3/22 CXR:  No focal infiltrates or effusions, PPM present with leads in RA, RV, sternal wires present     Assessment:     Venu Lau is an 82 year old male with a PMH of CAD s/p CABG in 1990, Aflutter in 2011 with YAN cardioversion and PPM placement, HTN, well controlled type 2 DM, 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       Plan:     Chest pain   CAD s/p CABG in 1990  - Reports 2 day history of atypical CP, now resolved without intervention  - EKG Vpaced rhythm without ST segment changes, nonspecific T wave flattening througout all leads  - Initial troponin 0.019  - ASA loaded  in ED  - Pt follows with Dr. Bautista, most recent visits for pacemaker interrogations, was scheduled for EKG, TTE, and labs tomorrow  - Last TTE in 5/2021 with EF 55%, normal RV fx, severe L atrial enlargement   - Pt unsure what medications he is currently taking  - Trend EKGs, troponin q6  - Lipid panel, A1C pending  - Will continue home ASA, Lipitor for now    HTN  - Metoprolol 25mg BID in pt record, unsure if taking  - Normotensive here, will continue to monitor and add on antihypertensives as needed    Aflutter  PPM 2011  - History of atrial flutter/atrial fibrillation with YAN cardioversion in 2011 with subsequent placement of PPM placed in 2011; last interrogated 4/22  - Follows with Dr. Bautista  - CHADsVASc 7, HAS-BLED 6  - Previously on eliquis, but taken off secondary to repeat GIBs and frequent falls  - Was to be evaluated for ANA LUISA closure at some point    Microcytic Anemia  - Hgb 11, MCV 79; Baseline Hgb closer to 12-13  - Denies any overt blood loss  - Iron panel, ferritin, B12, folate pending  - No history of colonoscopy    Chronic Thrombocytopenia  - Dating back to 2005 in chart review  - Other markers of synthetic liver function intact    Chronic Leukopenia  - Chronic dating back to 2005 in chart review  - Denies any symptoms of infection    Elevated Alkaline Phosphatase  - History of HCC s/p left lobectomy in 2005  - Hisotry of hemobilia with stent, now removed per chart review  - AST/ALT, Tbili wnl  - GGT pending    HCC s/p left lobectomy 2005  - No further documentation in chart  - Pt unable to elaborate on history    Recurrent GIB  - Last EGD in 10/2021 with blood seen in duodenum with source believed to be hemobilia, small RHA vessel coil embolized by IR  - Continue home protonix    Bipolar 1 Disorder  - Continue home zyprexa 2.5mg daily  - Reports past of SI secondary to chronic; not currently endorsing.  Will continue to monitor and assess    Daily Alcohol Use  - Cessation encouraged    Tobacco  Use  - Cessation encouraged    Health Care Maintenance  - Influenza and Tdap, up to date; needs COVID booster, pneumovax  - Colonoscopy: not UTD      Code Status: full  DVT Prophylaxis: SCDs for now in setting of history of GI bleeds  Diet: cardiac  Disposition: Observation, ACS r/o    Valerie Mortensen MD  [facility], [facility]    [facility] Medicine Hospitalist Pager numbers:   [facility] Hospitalist Medicine Team A (Tereza/Flakita): [phone]  [facility] Hospitalist Medicine Team B (Brian/Michaela):  [phone]

## 2022-10-04 NOTE — PLAN OF CARE
SW noted recs for HH. SW spoke with pt via [employer] to confirm acceptance of service. Pt has PHN and insurance will identify accepting agency. Once SW notified SW to add contact information to AVS. SW to receive patient choice form and place in patient chart at nurses station.     Pt to have [facility]     10/04/22 1556   Post-Acute Status   Post-Acute Authorization Home Health   Home Health Status Referrals Sent   Hospital Resources/Appts/Education Provided Appointments scheduled and added to AVS   Discharge Delays None known at this time   Discharge Plan   Discharge Plan A Home with family;Home Health     Future Appointments   Date Time Provider Department Center   10/19/2022  9:00 AM Carlos Quiroga MD [facility] Case Management  [phone]

## 2022-10-04 NOTE — MEDICAL/APP STUDENT
"[facility] Internal Medicine Progress Note    Primary Team: [facility] Hospital Medicine A  Attending Physician: Leidy Starks MD  Resident: Ang Arellano MD  Intern: Emely Garcia MD    Venu Lau is a 82 y.o. male who is being followed by the U IM service at [facility] for substernal CP w/ associated SOB x 2 days.  Subjective:    Interview w/ pt limited 2/2 pt's chronic dysarthria. Pt c/o 1 episode of substernal CP last night before bed but could not describe this further. He also c/o fatigue and trouble sleeping the past several nights. Denies any other complaints including no SOB, cough, abdominal pain, N/V.      Objective:   Last 24 Hour Vital Signs:  BP  Min: 109/52  Max: 170/74  Temp  Av.2 °F (36.2 °C)  Min: 96 °F (35.6 °C)  Max: 98.2 °F (36.8 °C)  Pulse  Av.7  Min: 58  Max: 62  Resp  Av  Min: 14  Max: 22  SpO2  Av.2 %  Min: 95 %  Max: 99 %  Height  Av' 5" (165.1 cm)  Min: 5' 5" (165.1 cm)  Max: 5' 5" (165.1 cm)  Weight  Av.7 kg (160 lb 3.4 oz)  Min: 70.5 kg (155 lb 6.8 oz)  Max: 74.8 kg (165 lb)  No intake/output data recorded.    Physical Examination:  General: Patient sitting comfortably in NAD  Head: normocephalic, atraumatic  Eyes: EOMI, no conjunctival injections or icterus  Mouth: MMM, posterior oropharynx without erythema  Neck: No thyromegaly or masses, supple, trachea midline   Cardiac: RRR, 3/6 systolic murmur heard throughout  Pulmonary/Chest: CTAB, symmetric chest rise, no wheezing or crackles  GI: Soft, non tender, non distended, normoactive bowel sounds  Extremities: no edema, clubbing, or cyanosis  Skin: dry, warm, intact. Well healed surgical scars to midline chest and LLE  Neuro: Alert and oriented, moving all extremities; baseline dysarthria from previous CVA       Laboratory:  Laboratory Data   Recent Results (from the past 12 hour(s))   COVID-19 Rapid Screening    Collection Time: 10/03/22 10:47 PM   Result Value Ref Range    SARS-CoV-2 RNA, " Amplification, Qual Negative Negative   CBC Auto Differential    Collection Time: 10/03/22 11:02 PM   Result Value Ref Range    WBC 2.54 (L) 3.90 - 12.70 K/uL    RBC 4.47 (L) 4.60 - 6.20 M/uL    Hemoglobin 11.0 (L) 14.0 - 18.0 g/dL    Hematocrit 35.5 (L) 40.0 - 54.0 %    MCV 79 (L) 82 - 98 fL    MCH 24.6 (L) 27.0 - 31.0 pg    MCHC 31.0 (L) 32.0 - 36.0 g/dL    RDW 18.7 (H) 11.5 - 14.5 %    Platelets 111 (L) 150 - 450 K/uL    MPV 9.3 9.2 - 12.9 fL    Immature Granulocytes 0.4 0.0 - 0.5 %    Gran # (ANC) 0.8 (L) 1.8 - 7.7 K/uL    Immature Grans (Abs) 0.01 0.00 - 0.04 K/uL    Lymph # 1.2 1.0 - 4.8 K/uL    Mono # 0.5 0.3 - 1.0 K/uL    Eos # 0.0 0.0 - 0.5 K/uL    Baso # 0.01 0.00 - 0.20 K/uL    nRBC 0 0 /100 WBC    Gran % 31.5 (L) 38.0 - 73.0 %    Lymph % 48.4 (H) 18.0 - 48.0 %    Mono % 18.9 (H) 4.0 - 15.0 %    Eosinophil % 0.4 0.0 - 8.0 %    Basophil % 0.4 0.0 - 1.9 %    Differential Method Automated    Comprehensive Metabolic Panel    Collection Time: 10/03/22 11:02 PM   Result Value Ref Range    Sodium 139 136 - 145 mmol/L    Potassium 3.5 3.5 - 5.1 mmol/L    Chloride 107 95 - 110 mmol/L    CO2 17 (L) 23 - 29 mmol/L    Glucose 74 70 - 110 mg/dL    BUN 15 2 - 20 mg/dL    Creatinine 1.09 0.50 - 1.40 mg/dL    Calcium 8.7 8.7 - 10.5 mg/dL    Total Protein 7.5 6.0 - 8.4 g/dL    Albumin 4.4 3.5 - 5.2 g/dL    Total Bilirubin 0.4 0.1 - 1.0 mg/dL    Alkaline Phosphatase 140 (H) 38 - 126 U/L    AST 43 15 - 46 U/L    ALT 37 10 - 44 U/L    Anion Gap 15 8 - 16 mmol/L    eGFR >60.0 >60 mL/min/1.73 m^2   Troponin I    Collection Time: 10/03/22 11:02 PM   Result Value Ref Range    Troponin I 0.019 0.012 - 0.034 ng/mL   Urinalysis, Reflex to Urine Culture Urine, Clean Catch    Collection Time: 10/03/22 11:10 PM    Specimen: Urine   Result Value Ref Range    Specimen UA Urine, Clean Catch     Color, UA Straw Yellow, Straw, Honey    Appearance, UA Clear Clear    pH, UA 6.0 5.0 - 8.0    Specific Gravity, UA <=1.005 1.005 - 1.030     Protein, UA Negative Negative    Glucose, UA 1+ (A) Negative    Ketones, UA Negative Negative    Bilirubin (UA) Negative Negative    Occult Blood UA Negative Negative    Nitrite, UA Negative Negative    Urobilinogen, UA Negative <2.0 EU/dL    Leukocytes, UA Negative Negative   Troponin I    Collection Time: 10/04/22  4:02 AM   Result Value Ref Range    Troponin I 0.024 0.000 - 0.026 ng/mL   Hemoglobin A1c    Collection Time: 10/04/22  4:02 AM   Result Value Ref Range    Hemoglobin A1C 5.6 4.0 - 5.6 %    Estimated Avg Glucose 114 68 - 131 mg/dL   TSH    Collection Time: 10/04/22  4:02 AM   Result Value Ref Range    TSH 5.426 (H) 0.400 - 4.000 uIU/mL   Ferritin    Collection Time: 10/04/22  4:02 AM   Result Value Ref Range    Ferritin 25 20.0 - 300.0 ng/mL   Gamma GT    Collection Time: 10/04/22  4:02 AM   Result Value Ref Range     (H) 8 - 55 U/L   Magnesium    Collection Time: 10/04/22  4:02 AM   Result Value Ref Range    Magnesium 2.1 1.6 - 2.6 mg/dL   Lipid Panel    Collection Time: 10/04/22  4:02 AM   Result Value Ref Range    Cholesterol 100 (L) 120 - 199 mg/dL    Triglycerides 94 30 - 150 mg/dL    HDL 40 40 - 75 mg/dL    LDL Cholesterol 41.2 (L) 63.0 - 159.0 mg/dL    HDL/Cholesterol Ratio 40.0 20.0 - 50.0 %    Total Cholesterol/HDL Ratio 2.5 2.0 - 5.0    Non-HDL Cholesterol 60 mg/dL   T4, Free    Collection Time: 10/04/22  4:02 AM   Result Value Ref Range    Free T4 0.78 0.71 - 1.51 ng/dL       Microbiology Data   Microbiology Results (last 7 days)       ** No results found for the last 168 hours. **            Other Results:  EKG   Results for orders placed or performed during the hospital encounter of 10/03/22   EKG 12-lead    Collection Time: 10/04/22  4:06 AM    Narrative    Test Reason : R07.9,    Vent. Rate : 060 BPM     Atrial Rate : 068 BPM     P-R Int : 000 ms          QRS Dur : 168 ms      QT Int : 502 ms       P-R-T Axes : 000 -82 066 degrees     QTc Int : 502 ms    Electronic ventricular  pacemaker  When compared with ECG of 03-OCT-2022 22:14,  Electronic ventricular pacemaker has replaced Wide QRS rhythm    Referred By: [email or web address]   SELF           Confirmed By:          Radiology Data   X-Ray Chest AP Portable    Result Date: 10/3/2022  EXAMINATION: XR CHEST AP PORTABLE CLINICAL HISTORY: Chest Pain; TECHNIQUE: Single frontal view of the chest was performed. COMPARISON: Multiple priors. FINDINGS: Left chest cardiac pacing device. The lungs are clear, with normal appearance of pulmonary vasculature and no pleural effusion or pneumothorax. The cardiac silhouette is normal in size. The hilar and mediastinal contours are unremarkable. Bones are intact.     No acute abnormality. 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       Plan:     Chest pain   CAD s/p CABG in 1990  - Reports 2 day history of atypical CP, now resolved without intervention  - EKG Vpaced rhythm without ST segment changes, nonspecific T wave flattening througout all leads  - Initial troponin 0.019  - ASA loaded in ED  - Pt follows with Dr. Bautista, most recent visits for pacemaker interrogations, was scheduled for EKG, TTE, and labs tomorrow  - Last TTE in 5/2021 with EF 55%, normal RV fx, severe L atrial enlargement   - Pt unsure  what medications he is currently taking  - Trend EKGs, troponin q6  - Lipid panel, A1C pending  - Will continue home ASA, Lipitor for now     HTN  - Metoprolol 25mg BID in pt record, unsure if taking  - Normotensive here, will continue to monitor and add on antihypertensives as needed     Aflutter  PPM 2011  - History of atrial flutter/atrial fibrillation with YAN cardioversion in 2011 with subsequent placement of PPM placed in 2011; last interrogated 4/22  - Follows with Dr. Bautista  - [facility] 7, HAS-BLED 6  - Previously on eliquis, but taken off secondary to repeat GIBs and frequent falls  - Was to be evaluated for ANA LUISA closure at some point     Microcytic Anemia  - Hgb 11, MCV 79; Baseline Hgb closer to 12-13  - Denies any overt blood loss  - Iron panel, ferritin, B12, folate pending  - No history of colonoscopy     Chronic Thrombocytopenia  - Dating back to 2005 in chart review  - Other markers of synthetic liver function intact     Chronic Leukopenia  - Chronic dating back to 2005 in chart review  - Denies any symptoms of infection     Elevated Alkaline Phosphatase  - History of HCC s/p left lobectomy in 2005  - Hisotry of hemobilia with stent, now removed per chart review  - AST/ALT, Tbili wnl  -  on 10/4     HCC s/p left lobectomy 2005  - No further documentation in chart  - Pt unable to elaborate on history     Recurrent GIB  - Last EGD in 10/2021 with blood seen in duodenum with source believed to be hemobilia, small RHA vessel coil embolized by IR  - Continue home protonix     Bipolar 1 Disorder  - Continue home zyprexa 2.5mg daily  - Reports past of SI secondary to chronic; not currently endorsing.  Will continue to monitor and assess     Daily Alcohol Use  - Cessation encouraged     Tobacco Use  - Cessation encouraged     Health Care Maintenance  - Influenza and Tdap, up to date; needs COVID booster, pneumovax  - Colonoscopy: not UTD       Code Status: full  DVT Prophylaxis: SCDs for now in  setting of history of GI bleeds  Diet: cardiac  Disposition: D/c    Jaun Rodriguez, MS4  10/04/2022 8:09 AM    [facility] Medicine Hospitalist Pager numbers:   [facility] Hospitalist Medicine Team A (Tereza/Flakita): [phone]  [facility] Hospitalist Medicine Team B (Brian/Michaela):  [phone]

## 2022-10-04 NOTE — PLAN OF CARE
Problem: Physical Therapy  Goal: Physical Therapy Goal  Description: Goals to be met by: 22     Patient will increase functional independence with mobility by performin. Supine to sit with Modified [place]  2. Sit to stand transfer with Modified [place]  3. Bed to chair transfer with Modified [place] using Rolling Walker  4. Gait  x 200 feet with Modified [place] using Rolling Walker.   5. Ascend/Descend 3 steps with Supervision using B handrails.   6. Lower extremity exercise program x10 reps per handout, with independence    Outcome: Ongoing, Progressing     PT Eval completed, note to follow. Pt ambulated ~100 ft with RW and SBA. Pt demonstrating impaired balance without use of RW and needing CGA. Spoke with pt's wife over the phone who reports she took off of work this week and will be able to stay home to assist pt. Pt's spouse reports he has falls when not using AD. Pt owns all recommended equipment. Educated pt on strict use of AD, specifically RW for ambulation to prevent falls / decrease fall risk. Recommending  PT/OT upon d/c.

## 2022-10-04 NOTE — NURSING
Attempted to reach pt wife, Ela to resolve home medication list and phone number on file is not a working number, left a message for pt daughter Gaby to call the  office for home medication review.

## 2022-10-04 NOTE — PLAN OF CARE
Problem: Adult Inpatient Plan of Care  Goal: Plan of Care Review  Outcome: Ongoing, Progressing  Goal: Absence of Hospital-Acquired Illness or Injury  Outcome: Ongoing, Progressing  Goal: Optimal Comfort and Wellbeing  Outcome: Ongoing, Progressing  Goal: Readiness for Transition of Care  Outcome: Ongoing, Progressing     Problem: Diabetes Comorbidity  Goal: Blood Glucose Level Within Targeted Range  Outcome: Ongoing, Progressing

## 2022-10-04 NOTE — ED PROVIDER NOTES
"Encounter Date: 10/3/2022       History     Chief Complaint   Patient presents with    Chest Pain     Substernal, onset appx 1hr PTA. Denies SOB. +weakness/dizziness. Pt states "I can't hardly walk". Hx of CVA with slurred speech @ baseline. Pt emotional in triage, stating "I don't care if I die" +depression. When asked about thoughts of harming self pt states "I thought about it, I don't want to be like this". Denies HI     Patient currently presents with chief complaint of chest pain.  This began about 1 hour PTA.  This is localized to the mid chest region.  Patient denies shortness of breath, denies palpitations,  denies nausea, denies diaphoresis.  Radiation of pain:  none.  Patient denies aspirin use in the last 24 hours. Patient denies history of known CAD.  Cardiac risk factors include:  diabetes, hypertension, a history of coronary artery disease, PVD, and CVD.    Review of patient's allergies indicates:  No Known Allergies  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    GERD (gastroesophageal reflux disease)     Gout, unspecified     H/O: GI bleed     Hypertension     Liver cancer     PVD (peripheral vascular disease)     Sciatica     SSS (sick sinus syndrome)     Tachy-valentine syndrome     Thyroid disease      Past Surgical History:   Procedure Laterality Date    ANGIOGRAPHY Right 10/11/2021    Procedure: ANGIOGRAM-HEPATIC;  Surgeon: Phil Stuart II, MD;  Location: [facility] CATH LAB;  Service: Interventional Radiology;  Laterality: Right;    APPENDECTOMY      CHOLECYSTECTOMY      CORONARY ARTERY BYPASS GRAFT      ENDOSCOPIC ULTRASOUND OF UPPER GASTROINTESTINAL TRACT N/A 6/17/2020    Procedure: EUS;  Surgeon: Rei Humphrey MD;  Location: [facility] ENDO;  Service: " Endoscopy;  Laterality: N/A;    ERCP N/A 2020    Procedure: ERCP;  Surgeon: Rei Humphrey MD;  Location: [facility] ENDO;  Service: Endoscopy;  Laterality: N/A;    ERCP N/A 2020    Procedure: ERCP (ENDOSCOPIC RETROGRADE CHOLANGIOPANCREATOGRAPHY);  Surgeon: Adela Boyle MD;  Location: [facility] ENDO;  Service: Endoscopy;  Laterality: N/A;    ESOPHAGOGASTRODUODENOSCOPY N/A 10/11/2021    Procedure: EGD (ESOPHAGOGASTRODUODENOSCOPY);  Surgeon: Skip Varela MD;  Location: [facility];  Service: Gastroenterology;  Laterality: N/A;    INSERT / REPLACE / REMOVE PACEMAKER      LIVER RESECTION      LUMBAR DISC SURGERY       Family History   Problem Relation Age of Onset    Heart disease Mother     Pancreatic cancer Mother     Heart disease Father      Social History     Tobacco Use    Smoking status: Some Days     Packs/day: 0.25     Years: 37.00     Pack years: 9.25     Types: Cigarettes     Last attempt to quit: 1992     Years since quittin.8    Smokeless tobacco: Never    Tobacco comments:     Pt declines enrollment in the [employer]. Handout provided for Ambulatory Smoking Cessation program.    Substance Use Topics    Alcohol use: Yes     Alcohol/week: 5.0 standard drinks     Types: 5 Cans of beer per week    Drug use: No     Review of Systems   Constitutional:  Negative for chills and fever.   HENT:  Negative for congestion and sore throat.    Respiratory:  Negative for chest tightness and shortness of breath.    Cardiovascular:  Positive for chest pain. Negative for palpitations.   Gastrointestinal:  Negative for abdominal pain and vomiting.   Genitourinary:  Negative for difficulty urinating and dysuria.   Skin:  Negative for color change and rash.   Neurological:  Negative for weakness and numbness.   Hematological:  Negative for adenopathy.   All other systems reviewed and are negative.    Physical Exam     Initial Vitals   BP Pulse Resp Temp SpO2   10/03/22 2211 10/03/22 2211 10/03/22  2211 10/03/22 2219 10/03/22 2211   (!) 153/73 62 18 98 °F (36.7 °C) 96 %      MAP       --                Physical Exam    Constitutional: He appears well-developed and well-nourished. He is not diaphoretic. No distress.   HENT:   Head: Normocephalic and atraumatic.   Nose: Nose normal.   Mouth/Throat: Oropharynx is clear and moist.   Eyes: Conjunctivae are normal. No scleral icterus.   Neck: Neck supple. No JVD present.   Cardiovascular:  Normal rate, regular rhythm, normal heart sounds and intact distal pulses.           Pulmonary/Chest: Breath sounds normal. No respiratory distress.   Abdominal: Abdomen is soft. There is no abdominal tenderness.   Musculoskeletal:         General: No edema. Normal range of motion.      Cervical back: Neck supple.     Neurological: He is alert and oriented to person, place, and time. He has normal strength. GCS eye subscore is 4. GCS verbal subscore is 5. GCS motor subscore is 6.   Patient demonstrates slurred speech though this is at baseline per patient and accompanying family.  No additional cranial nerve deficits are appreciated.   Skin: Skin is warm and dry.       ED Course   Procedures  Labs Reviewed   CBC W/ AUTO DIFFERENTIAL - Abnormal; Notable for the following components:       Result Value    WBC 2.54 (*)     RBC 4.47 (*)     Hemoglobin 11.0 (*)     Hematocrit 35.5 (*)     MCV 79 (*)     MCH 24.6 (*)     MCHC 31.0 (*)     RDW 18.7 (*)     Platelets 111 (*)     Gran # (ANC) 0.8 (*)     Gran % 31.5 (*)     Lymph % 48.4 (*)     Mono % 18.9 (*)     All other components within normal limits   COMPREHENSIVE METABOLIC PANEL - Abnormal; Notable for the following components:    CO2 17 (*)     Alkaline Phosphatase 140 (*)     All other components within normal limits   URINALYSIS, REFLEX TO URINE CULTURE - Abnormal; Notable for the following components:    Glucose, UA 1+ (*)     All other components within normal limits    Narrative:     Preferred Collection Type->Urine, Clean  Catch  Specimen Source->Urine  Collection Type->Urine, Clean Catch   TROPONIN I   SARS-COV-2 RNA AMPLIFICATION, QUAL    Narrative:     Is the patient symptomatic?->Yes     EKG Readings: (Independently Interpreted)   Rhythm: Paced Rhythm. Heart Rate: 60. Ectopy: No Ectopy.     Imaging Results              X-Ray Chest AP Portable (Final result)  Result time 10/03/22 23:17:09      Final result by Everardo Roberts MD (10/03/22 23:17:09)                   Impression:      No acute abnormality.      Electronically signed by: Everardo Roberts  Date:    10/03/2022  Time:    23:17               Narrative:    EXAMINATION:  XR CHEST AP PORTABLE    CLINICAL HISTORY:  Chest Pain;    TECHNIQUE:  Single frontal view of the chest was performed.    COMPARISON:  Multiple priors.    FINDINGS:  Left chest cardiac pacing device.    The lungs are clear, with normal appearance of pulmonary vasculature and no pleural effusion or pneumothorax.    The cardiac silhouette is normal in size. The hilar and mediastinal contours are unremarkable.    Bones are intact.                                       Medications   gabapentin capsule 300 mg (has no administration in time range)   pantoprazole EC tablet 40 mg (has no administration in time range)   sodium chloride 0.9% flush 10 mL (has no administration in time range)   aspirin chewable tablet 81 mg (has no administration in time range)   melatonin tablet 6 mg (6 mg Oral Given 10/4/22 0328)   atorvastatin tablet 10 mg (has no administration in time range)   OLANZapine tablet 2.5 mg (has no administration in time range)   aspirin chewable tablet 324 mg (324 mg Oral Given 10/3/22 2245)     Medical Decision Making:   Independently Interpreted Test(s):   I have ordered and independently interpreted EKG Reading(s) - see prior notes  Clinical Tests:   Lab Tests: Ordered and Reviewed  Radiological Study: Ordered and Reviewed  ED Management:  Patient is notably somewhat emotionally labile.  At the time of  arrival, the patient is tearful and has concerns about his health this evening expressing that he does not care if he dies.  He seems to be expressing some depression regarding his chronic medical state.  Patient denies any intent for suicide.  On subsequent visits however, the patient is physically feeling much better and subsequently in a much better mental state.  He is jovial joking with myself and nursing staff.  He again denies suicidal ideation the simply voices frustration with his chronic medical issues.  Additional MDM:   Heart Score:    History:          Moderately suspicious.  ECG:             Nonspecific repolarisation disturbance  Age:               >65 years  Risk factors: >= 3 risk factors or history of atherosclerotic disease  Troponin:       Less than or equal to normal limit  Final Score: 6          ED Course as of 10/04/22 0408   Tue Oct 04, 2022   0014 All historical, clinical, radiographic, and laboratory findings were reviewed with the patient/family in detail along with the indications for transport to the facility in [place] in order to receive serial troponin levels and cardiac monitoring with consideration for cardiology consultation.  All remaining questions and concerns were addressed at this time and the patient/family communicates understanding and agrees to proceed accordingly.  Similarly, all pertinent details of the encounter were discussed with Dr. Starks via resident Dr Mortensen who agrees to receive the patient at [facility] for further care as outlined above.  The [facility] secondary to a need for ongoing cardiac monitoring en route.     [ST]      ED Course User Index  [ST] Abram Elliott MD                 Clinical Impression:   Final diagnoses:  [R07.9] Chest pain (Primary)        ED Disposition Condition    Observation Stable                Abram Elliott MD  10/04/22 0408

## 2022-10-05 ENCOUNTER — CLINICAL SUPPORT (OUTPATIENT)
Dept: SMOKING CESSATION | Facility: CLINIC | Age: 82
End: 2022-10-05
Payer: MEDICARE

## 2022-10-05 VITALS
SYSTOLIC BLOOD PRESSURE: 136 MMHG | DIASTOLIC BLOOD PRESSURE: 63 MMHG | TEMPERATURE: 96 F | HEIGHT: 65 IN | BODY MASS INDEX: 25.83 KG/M2 | WEIGHT: 155 LBS | HEART RATE: 70 BPM | OXYGEN SATURATION: 98 % | RESPIRATION RATE: 18 BRPM

## 2022-10-05 DIAGNOSIS — F17.210 CIGARETTE SMOKER: Primary | ICD-10-CM

## 2022-10-05 PROBLEM — D61.818 PANCYTOPENIA: Status: ACTIVE | Noted: 2022-10-05

## 2022-10-05 PROBLEM — R07.89 OTHER CHEST PAIN: Status: ACTIVE | Noted: 2022-10-04

## 2022-10-05 PROBLEM — I49.9 ARRHYTHMIA: Status: RESOLVED | Noted: 2021-05-27 | Resolved: 2022-10-05

## 2022-10-05 PROBLEM — R07.9 CHEST PAIN: Status: RESOLVED | Noted: 2022-10-04 | Resolved: 2022-10-05

## 2022-10-05 LAB
ALBUMIN SERPL BCP-MCNC: 4 G/DL (ref 3.5–5.2)
ALP SERPL-CCNC: 148 U/L (ref 55–135)
ALT SERPL W/O P-5'-P-CCNC: 31 U/L (ref 10–44)
ANION GAP SERPL CALC-SCNC: 10 MMOL/L (ref 8–16)
AST SERPL-CCNC: 40 U/L (ref 10–40)
BASOPHILS NFR BLD: 2 % (ref 0–1.9)
BILIRUB SERPL-MCNC: 0.7 MG/DL (ref 0.1–1)
BUN SERPL-MCNC: 19 MG/DL (ref 8–23)
CALCIUM SERPL-MCNC: 9.1 MG/DL (ref 8.7–10.5)
CHLORIDE SERPL-SCNC: 111 MMOL/L (ref 95–110)
CO2 SERPL-SCNC: 20 MMOL/L (ref 23–29)
CREAT SERPL-MCNC: 1.2 MG/DL (ref 0.5–1.4)
DIFFERENTIAL METHOD: ABNORMAL
EOSINOPHIL NFR BLD: 0 % (ref 0–8)
ERYTHROCYTE [DISTWIDTH] IN BLOOD BY AUTOMATED COUNT: 19.5 % (ref 11.5–14.5)
EST. GFR  (NO RACE VARIABLE): >60 ML/MIN/1.73 M^2
GLUCOSE SERPL-MCNC: 110 MG/DL (ref 70–110)
HCT VFR BLD AUTO: 37.1 % (ref 40–54)
HGB BLD-MCNC: 11.6 G/DL (ref 14–18)
IMM GRANULOCYTES # BLD AUTO: ABNORMAL K/UL (ref 0–0.04)
IMM GRANULOCYTES NFR BLD AUTO: ABNORMAL % (ref 0–0.5)
LYMPHOCYTES NFR BLD: 48 % (ref 18–48)
MCH RBC QN AUTO: 24.7 PG (ref 27–31)
MCHC RBC AUTO-ENTMCNC: 31.3 G/DL (ref 32–36)
MCV RBC AUTO: 79 FL (ref 82–98)
MONOCYTES NFR BLD: 10 % (ref 4–15)
NEUTROPHILS NFR BLD: 40 % (ref 38–73)
NRBC BLD-RTO: 0 /100 WBC
PLATELET # BLD AUTO: 129 K/UL (ref 150–450)
PLATELET BLD QL SMEAR: ABNORMAL
PMV BLD AUTO: 9.8 FL (ref 9.2–12.9)
POTASSIUM SERPL-SCNC: 3.9 MMOL/L (ref 3.5–5.1)
PROT SERPL-MCNC: 7.7 G/DL (ref 6–8.4)
RBC # BLD AUTO: 4.69 M/UL (ref 4.6–6.2)
SODIUM SERPL-SCNC: 141 MMOL/L (ref 136–145)
WBC # BLD AUTO: 1.88 K/UL (ref 3.9–12.7)

## 2022-10-05 PROCEDURE — 99900035 HC TECH TIME PER 15 MIN (STAT)

## 2022-10-05 PROCEDURE — 25000003 PHARM REV CODE 250

## 2022-10-05 PROCEDURE — 99406 BEHAV CHNG SMOKING 3-10 MIN: CPT | Mod: ,,,

## 2022-10-05 PROCEDURE — 85007 BL SMEAR W/DIFF WBC COUNT: CPT | Mod: NCS | Performed by: STUDENT IN AN ORGANIZED HEALTH CARE EDUCATION/TRAINING PROGRAM

## 2022-10-05 PROCEDURE — 85027 COMPLETE CBC AUTOMATED: CPT | Performed by: STUDENT IN AN ORGANIZED HEALTH CARE EDUCATION/TRAINING PROGRAM

## 2022-10-05 PROCEDURE — 80053 COMPREHEN METABOLIC PANEL: CPT | Performed by: STUDENT IN AN ORGANIZED HEALTH CARE EDUCATION/TRAINING PROGRAM

## 2022-10-05 PROCEDURE — 36415 COLL VENOUS BLD VENIPUNCTURE: CPT | Performed by: STUDENT IN AN ORGANIZED HEALTH CARE EDUCATION/TRAINING PROGRAM

## 2022-10-05 PROCEDURE — 94761 N-INVAS EAR/PLS OXIMETRY MLT: CPT

## 2022-10-05 PROCEDURE — 99406 PT REFUSED TOBACCO CESSATION: ICD-10-PCS | Mod: ,,,

## 2022-10-05 PROCEDURE — G0378 HOSPITAL OBSERVATION PER HR: HCPCS

## 2022-10-05 RX ORDER — NAPROXEN SODIUM 220 MG/1
81 TABLET, FILM COATED ORAL DAILY
Qty: 90 TABLET | Refills: 3 | Status: SHIPPED | OUTPATIENT
Start: 2022-10-06 | End: 2024-04-02

## 2022-10-05 RX ORDER — FERROUS SULFATE 325(65) MG
325 TABLET, DELAYED RELEASE (ENTERIC COATED) ORAL
Qty: 36 TABLET | Refills: 0 | Status: SHIPPED | OUTPATIENT
Start: 2022-10-05 | End: 2023-01-03

## 2022-10-05 RX ORDER — TRAZODONE HYDROCHLORIDE 50 MG/1
50 TABLET ORAL NIGHTLY
Status: DISCONTINUED | OUTPATIENT
Start: 2022-10-06 | End: 2022-10-05

## 2022-10-05 RX ORDER — TRAZODONE HYDROCHLORIDE 50 MG/1
50 TABLET ORAL NIGHTLY
Status: DISCONTINUED | OUTPATIENT
Start: 2022-10-05 | End: 2022-10-05 | Stop reason: HOSPADM

## 2022-10-05 RX ORDER — DOCUSATE SODIUM 250 MG
250 CAPSULE ORAL DAILY
Qty: 100 CAPSULE | Refills: 0 | Status: SHIPPED | OUTPATIENT
Start: 2022-10-05 | End: 2023-01-03

## 2022-10-05 RX ADMIN — ATORVASTATIN CALCIUM 40 MG: 40 TABLET, FILM COATED ORAL at 08:10

## 2022-10-05 RX ADMIN — METOPROLOL TARTRATE 25 MG: 25 TABLET, FILM COATED ORAL at 08:10

## 2022-10-05 RX ADMIN — ASPIRIN 81 MG: 81 TABLET, CHEWABLE ORAL at 08:10

## 2022-10-05 RX ADMIN — PANTOPRAZOLE SODIUM 40 MG: 40 TABLET, DELAYED RELEASE ORAL at 08:10

## 2022-10-05 RX ADMIN — OLANZAPINE 2.5 MG: 2.5 TABLET, FILM COATED ORAL at 08:10

## 2022-10-05 RX ADMIN — TRAZODONE HYDROCHLORIDE 25 MG: 50 TABLET ORAL at 12:10

## 2022-10-05 NOTE — PLAN OF CARE
D/C recs noted. Pt to have Hema/Onc, cards with outpatient stress test, PCP, and home health at time of discharge. Pt appointments have been requested. Schedulers report IB sent to [facility] HEM/ONC, will update once they reply.     At time of discharge pt will be transported home by daughter.    DME at discharge: none  Home Health: [facility] sent information via [facility] and added to AVS as well.     Pt has follow up appointments added to AVS.         10/05/22 1034   Final Note   Assessment Type Final Discharge Note   Anticipated Discharge Disposition Home-Health   What phone number can be called within the next 1-3 days to see how you are doing after discharge? [phone]   Hospital Resources/Appts/Education Provided Appointments scheduled and added to AVS;Appointments scheduled by Navigator/Coordinator   Post-Acute Status   Post-Acute Authorization Home Health   Home Health Status Set-up Complete/Auth obtained   Discharge Delays None known at this time     Future Appointments   Date Time Provider Department Center   10/19/2022  9:00 AM Carlos Quiroga MD [facility] CARD[facility]   10/12/22 @10:30am w/ Dr. Miller (PCP) [facility] location    [facility] Case Management  [phone]

## 2022-10-05 NOTE — DISCHARGE SUMMARY
[facility] Dis[facility] Summary    Primary Team: [facility] Hospitalist Team A  Attending Physician: Leidy Starks MD  Resident: Adan  Intern: Radha    Date of Admit: 10/3/2022  Date of Discharge: 10/5/2022    Discharge to: Home  Condition: Stable    Discharge Diagnoses     Patient Active Problem List   Diagnosis    Hx of CABG    Atrial flutter    Pacemaker    Villous adenoma    Coronary artery disease involving native coronary artery of native heart without angina pectoris    Type 2 diabetes mellitus, without long-term current use of insulin    Essential hypertension    Metabolic acidosis    Polypharmacy    Dyslipidemia    Oropharyngeal dysphagia    Anemia, chronic disease    Paroxysmal atrial fibrillation    Tobacco abuse    Subclinical hypothyroidism    Abnormal LFTs    Hemobilia    Hyperbilirubinemia    Other cirrhosis of liver    Aphasia    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     The lungs are clear, with normal appearance of pulmonary vasculature and no pleural effusion or pneumothorax.     The cardiac silhouette is normal in size. The hilar and mediastinal contours are unremarkable.     Bones are intact.     Impression:     No acute abnormality.        Brief History of Present Illness      ***    For the full HPI please refer to the History & Physical from this admission.  Exam:     Vitals:    10/05/22 0512 10/05/22 0736 10/05/22 0845 10/05/22 1040   BP: 137/64 (!) 148/68  136/63   BP Location:  Left arm  Left arm   Patient Position: Lying Lying  Lying   Pulse: 60 62  60   Resp: 18 18  18   Temp: 97.2 °F (36.2 °C) 98.5 °F (36.9 °C)  96 °F (35.6 °C)   TempSrc: Tympanic Oral  Oral   SpO2: 97% 99% 99% 98%   Weight:        Height:          Physical Exam     Hospital Course By Problem with Pertinent Findings     ***    Discharge Medications        Medication List        START taking these medications      aspirin 81 MG Chew  Take 1 tablet (81 mg total) by mouth once daily.  Start taking on: October 6, 2022     docusate sodium 250 MG capsule  Commonly known as: COLACE  Take 1 capsule (250 mg total) by mouth once daily.     ferrous sulfate 325 (65 FE) MG EC tablet  Take 1 tablet (325 mg total) by mouth 3 (three) times a week.            CHANGE how you take these medications      atorvastatin 40 MG tablet  Commonly known as: LIPITOR  What changed: Another medication with the same name was removed. Continue taking this medication, and follow the directions you see here.            CONTINUE taking these medications      amitriptyline 100 MG tablet  Commonly known as: ELAVIL     gabapentin 300 MG capsule  Commonly known as: NEURONTIN     glimepiride 2 MG tablet  Commonly known as: AMARYL     JARDIANCE 25 mg tablet  Generic drug: empagliflozin     LIDOcaine 5 %  Commonly known as: LIDODERM  Place 1 patch onto the skin once daily. Remove & Discard patch within 12 hours or as directed by MD     metoprolol tartrate 50 MG tablet  Commonly known as: LOPRESSOR  Take 0.5 tablets (25 mg total) by mouth 2 (two) times daily.     mirtazapine 15 MG tablet  Commonly known as: REMERON     OLANZapine 2.5 MG tablet  Commonly known as: ZyPREXA     oxyCODONE-acetaminophen  mg per tablet  Commonly known as: PERCOCET     pantoprazole 40 MG tablet  Commonly known as: PROTONIX  Take 1 tablet (40 mg total) by mouth once daily.     SOLUS V2 LANCETS 30 gauge Misc  Generic drug: lancets     SOLUS V2 TEST STRIPS Strp  Generic drug: blood sugar diagnostic               Where to Get Your Medications        These medications were sent to [facility]  [address] [id number]      Hours: Mon-Fri, 8a-5:30p Phone: [phone]    aspirin 81 MG Chew  docusate sodium 250 MG capsule  ferrous sulfate 325 (65 FE) MG EC tablet         Discharge Information:   Diet:  ***    Physical Activity:  ***             Instructions:  1. Take all medications as prescribed  2. Keep all follow-up appointments  3. Return to the hospital or call your primary care physicians if any worsening symptoms such as fever, chest pain, shortness of breath, return of symptoms, or any other concerns.    Follow-Up Appointments:  ***     Follow-up Information       Michaela Miller MD Follow up on 10/12/2022.    Specialty: Internal Medicine  Why: 10/12/22 @10:30am w/ Dr. Miller (PCP) [facility] location  Contact information:  [address] [place] [address] [phone]               [facility] Follow up.    Specialty: Home Health Services  Why: Staff will call you for scheduling.  Contact information:  [address] [facility] [address] [phone]                              Emely Garcia MD  U IM PGY-1  [facility] Hospitalist Medicine Team A

## 2022-10-05 NOTE — NURSING
Pt is being discharged. Iv removed. Cardiac monitor removed. Avs provided. Waiting for meds to be delivered from pharmacy. Spouse at the bedside.

## 2022-10-05 NOTE — PLAN OF CARE
Problem: Adult Inpatient Plan of Care  Goal: Plan of Care Review  Outcome: Ongoing, Progressing  Goal: Absence of Hospital-Acquired Illness or Injury  Outcome: Ongoing, Progressing  Goal: Optimal Comfort and Wellbeing  Outcome: Ongoing, Progressing  Goal: Readiness for Transition of Care  Outcome: Ongoing, Progressing     Problem: Fall Injury Risk  Goal: Absence of Fall and Fall-Related Injury  Outcome: Ongoing, Progressing     Problem: Chest Pain  Goal: Resolution of Chest Pain Symptoms  Outcome: Ongoing, Progressing

## 2022-10-05 NOTE — DISCHARGE SUMMARY
[facility] Medicine Discharge Summary    Primary Team: [facility] Hospitalist Team A  Attending Physician: Leidy Starks MD  Resident: Adan  Intern: Radha    Date of Admit: 10/3/2022  Date of Discharge: 10/5/2022    Discharge to: Home with wife  Condition: Stable     Discharge Diagnoses     Patient Active Problem List   Diagnosis    Hx of CABG    Atrial flutter    Pacemaker    Villous adenoma    Coronary artery disease involving native coronary artery of native heart without angina pectoris    Type 2 diabetes mellitus, without long-term current use of insulin    Essential hypertension    Metabolic acidosis    Polypharmacy    Dyslipidemia    Oropharyngeal dysphagia    Anemia, chronic disease    Paroxysmal atrial fibrillation    Tobacco abuse    Subclinical hypothyroidism    Abnormal LFTs    Hemobilia    Hyperbilirubinemia    Other cirrhosis of liver    Aphasia    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     The lungs are clear, with normal appearance of pulmonary vasculature and no pleural effusion or pneumothorax.     The cardiac silhouette is normal in size. The hilar and mediastinal contours are unremarkable.     Bones are intact.     Impression:     No acute abnormality.    TTE complete 10/4  The left ventricle is normal in size with mild concentric hypertrophy and normal systolic function.  The estimated ejection fraction is 60%.  Mild right ventricular enlargement with normal right ventricular systolic function.  Moderate tricuspid regurgitation.  Mild aortic regurgitation.  Mild mitral regurgitation.  There is moderate aortic valve stenosis.  Aortic valve area is 1.07 cm2; peak velocity is 2.99 m/s; mean gradient is 21 mmHg.  Severe left atrial  enlargement.  Intermediate central venous pressure (8 mmHg).  The estimated PA systolic pressure is 33 mmHg.    Brief History of Present Illness      HPI: Venu Lau is an 82 year old male with a PMH of CAD s/p CABG in 1990, Aflutter in 2011 with YAN cardioversion and PPM placement, HTN, well controlled type 2 DM, L MCA CVA 2021 with residual deficits of dysarthria, long term tobacco use, daily alcohol use, GERD, repeated GI bleeds, and HCC s/p left lobectomy in 2005 who presented to the ED with a chief complaint of chest pain and shortness of breath x 2 days.     Pt was in his normal state of health until 2 days before presentation when he was sitting around, drinking a beer.  He reports he felt a pain in the center of his chest that he describes as a burning pain.  The pain was mostly over the sternum, but did radiate to his left side near his pacemaker. He does endorse associated shortness of breath, but denies any associated N/V, or diaphoresis.  Denies any upper extremity pain or weakness.  States this is unlike the pain he experienced during his previous heart attack. The pain is now resolved during initial interview. He is complaining of recent issues with insomnia and states he has not slept in the last 4 days; he is unsure why.  He has had several presentations to the ED over the last year for back, leg, foot pain, and falls. At baseline, he has residual dysarthria from his previous stroke as well as difficulty with memory and word finding. He is unsure what medications he is currently taking, but does know that he has an appointment tomorrow for testing for a cardiology appointment.  He thinks this has to do with his pacemaker. He does endorse drinking 4-5 beers daily and continues to smoke 1-2 cigarettes a day.     Hospital Course: Patient was given full dose ASA in ED and admitted to [facility] medicine for ACS rule out. Troponin x3 was not elevated, EKG had no ST segment changes, and chest pain never  recurred throughout his hospitalization. Dr. Bautista was made aware of his hospitalization and it was agreed to complete the workup of the appt pt was missing while in the hospital. TTE with EF 60%, severe L atrial enlargement, moderate AV stenosis and TR. Nuclear stress test scheduled for today, but due to a broken scanner it will be completed outpatient. Patient remained stable throughout his admission.    For the full HPI please refer to the History & Physical from this admission.    Hospital Course By Problem with Pertinent Findings     Chest pain   CAD s/p CABG in 1990  - Reports 2 day history of atypical CP, now resolved without intervention  - EKG Vpaced rhythm without ST segment changes, nonspecific T wave flattening througout all leads  - Troponin 0.019>>0.024>>0.018  - ASA loaded in ED  - Pt follows with Dr. Bautista, most recent visits for pacemaker interrogations, was scheduled for EKG, TTE, and labs 10/4/22              - Dr. Bautista notified, complete EKG, TTE, labs inpatient  - Last TTE in 5/2021 with EF 55%, normal RV fx, severe L atrial enlargement   - TTE 10/4/22 with EF 60%,  severe L atrial enlargement, moderate AV stenosis and TR  - HbA1c 5.6  - Lipid panel: Chol 100 LDL 41.2 HDL 40 TG 94  - Continue home ASA, Lipitor   - Nuclear stress test outpatient     GERD  - Due to story (burning epigastric pain after drinking a beer) and negative ACS workup, pt likely experiencing GERD   - Counseled on diet and exercise  - Continue pantoprazole 40mg qd     HTN  - Metoprolol 25mg BID in pt record, unsure if taking  - Patient with episodes of hypertension during admission, normotensive  - Continue metoprolol 25mg BID outpatient     Aflutter  PPM 2011  - History of atrial flutter/atrial fibrillation with YAN cardioversion in 2011 with subsequent placement of PPM placed in 2011; last interrogated 4/22  - CHADsVASc 7, HAS-BLED 6  - Previously on eliquis, but taken off secondary to repeat GIBs and frequent falls  -  Follow up with Dr. Bautista     Pancytopenia  Iron deficiency anemia, leukopenia, thrombocytopenia  - Hgb 11, MCV 79; Baseline Hgb closer to 12-13  - Denies any overt blood loss  - Iron panel: Iron 50 Transferin 287 TIBC 425 Iron sat 12 Ferritin 25 indicative of DONNA  - B12 776  - Chronic thrombocytopenia dating back to 2005 in chart review  - Other markers of synthetic liver function intact  - Chronic Leukopenia dating back to 2005 in chart review  - Denies any symptoms of infection  - Hematology oncology referral on discharge  - Start iron supplementation, educated on side effects of iron and started on bowel regimen   - Colonoscopy not up to date, see PCP     Elevated Alkaline Phosphatase  - History of HCC s/p left lobectomy in 2005  - Hisotry of hemobilia with stent, now removed per chart review  - AST/ALT, Tbili wnl  -      HCC s/p left lobectomy 2005  - No further documentation in chart  - Pt unable to elaborate on history     Recurrent GIB  - Last EGD in 10/2021 with blood seen in duodenum with source believed to be hemobilia, small RHA vessel coil embolized by IR  - Continue home pantoprazole      Bipolar 1 Disorder  - Continue home zyprexa 2.5mg daily  - Reports past of SI secondary to chronic; not currently endorsing.     Insomnia  - Patient complained of insomnia during visit   - Continue remeron and melatonin  - Educated on sleep hygiene and FU with PCP     Daily Alcohol Use  - Cessation encouraged     Tobacco Use  - Cessation encouraged     Subclinical hypothyroidism  - Asymptomatic  - TSH 5.426, free T4 0.78  - CTM outpatient     Health Care Maintenance  - Influenza and Tdap, up to date; needs COVID booster, pneumovax  - HbA1c 5.6   - Continue jardiance 25mg daily, glimeperide 1mg BID  - Lipid panel: Chol 100 LDL 41.2 HDL 40 TG 94  - Colonoscopy: not UTD  - Follow up with PCP      Discharge Medications        Medication List        START taking these medications      aspirin 81 MG Chew  Take 1  tablet (81 mg total) by mouth once daily.  Start taking on: October 6, 2022     docusate sodium 250 MG capsule  Commonly known as: COLACE  Take 1 capsule (250 mg total) by mouth once daily.     ferrous sulfate 325 (65 FE) MG EC tablet  Take 1 tablet (325 mg total) by mouth 3 (three) times a week.            CHANGE how you take these medications      atorvastatin 40 MG tablet  Commonly known as: LIPITOR  What changed: Another medication with the same name was removed. Continue taking this medication, and follow the directions you see here.            CONTINUE taking these medications      amitriptyline 100 MG tablet  Commonly known as: ELAVIL     gabapentin 300 MG capsule  Commonly known as: NEURONTIN     glimepiride 2 MG tablet  Commonly known as: AMARYL     JARDIANCE 25 mg tablet  Generic drug: empagliflozin     LIDOcaine 5 %  Commonly known as: LIDODERM  Place 1 patch onto the skin once daily. Remove & Discard patch within 12 hours or as directed by MD     metoprolol tartrate 50 MG tablet  Commonly known as: LOPRESSOR  Take 0.5 tablets (25 mg total) by mouth 2 (two) times daily.     mirtazapine 15 MG tablet  Commonly known as: REMERON     OLANZapine 2.5 MG tablet  Commonly known as: ZyPREXA     oxyCODONE-acetaminophen  mg per tablet  Commonly known as: PERCOCET     pantoprazole 40 MG tablet  Commonly known as: PROTONIX  Take 1 tablet (40 mg total) by mouth once daily.     SOLUS V2 LANCETS 30 gauge Misc  Generic drug: lancets     SOLUS V2 TEST STRIPS Strp  Generic drug: blood sugar diagnostic               Where to Get Your Medications        These medications were sent to [facility]  [address], [place] [address]      Hours: Mon-Fri, 8a-5:30p Phone: [phone]   aspirin 81 MG Chew  docusate sodium 250 MG capsule  ferrous sulfate 325 (65 FE) MG EC tablet         Discharge Information:   Diet:  Cardiac    Physical Activity:  As tolerated by patient.              Instructions:  1.  Take all medications as prescribed  2. Keep all follow-up appointments  3. Return to the hospital or call your primary care physicians if any worsening symptoms such as fever, chest pain, shortness of breath, return of symptoms, or any other concerns.    Follow-Up Appointments:  Cardiology  PCP    Emely Garcia MD  [facility] PGY1

## 2022-10-05 NOTE — PLAN OF CARE
VN Note: Labs, notes, orders, care plan review will be available as needed.   Problem: Adult Inpatient Plan of Care  Goal: Plan of Care Review  10/5/2022 1257 by Angelica Koroma RN  Outcome: Met  10/5/2022 1029 by Angelica Koroma, RN  Outcome: Ongoing, Progressing

## 2022-10-05 NOTE — PLAN OF CARE
VN Note: labs, notes, orders, care plan review will be available as needed.   Problem: Adult Inpatient Plan of Care  Goal: Plan of Care Review  Outcome: Ongoing, Progressing

## 2022-10-05 NOTE — NURSING
Discharge orders noted. Additional clinical references attached. Patient's discharge instructions given by bedside RN and reviewed by this VN with patient. Education provided on home care instructions, new and previous medications, diagnosis, when to seek medical attention, and follow-up appointments. New medications delivered by pharmacy. Patient verbalized understanding and teach back method was used. Patient's family to provide ride/transportation home. All questions answered. Transport to [facility] requested. Floor nurse notified.

## 2022-10-05 NOTE — PROGRESS NOTES
Smoking cessation education provided. Pt states that he started smoking at the age of 15, smoked for several years and then quit for 28 years until he started smoking again last year. Pt states that he has cut down to 1 or 2 cigarettes per day and he denies nicotine withdrawal symptoms. He states that he will quit on his own.  Handout provided for Ambulatory Smoking Cessation program in the event pt should require additional resources following hospital discharge.

## 2022-10-05 NOTE — PROGRESS NOTES
[facility]                    Pharmacy       Discharge Medication Education    Patient ACCEPTED medication education. Pharmacy has provided education on the name, indication, and possible side effects of the medication(s) prescribed, using teach-back method.     The following medications have also been discussed, during this admission.        Medication List        START taking these medications      aspirin 81 MG Chew  Take 1 tablet (81 mg total) by mouth once daily.  Start taking on: October 6, 2022     docusate sodium 250 MG capsule  Commonly known as: COLACE  Take 1 capsule (250 mg total) by mouth once daily.     ferrous sulfate 325 (65 FE) MG EC tablet  Take 1 tablet (325 mg total) by mouth 3 (three) times a week.            CHANGE how you take these medications      atorvastatin 40 MG tablet  Commonly known as: LIPITOR  What changed: Another medication with the same name was removed. Continue taking this medication, and follow the directions you see here.            CONTINUE taking these medications      amitriptyline 100 MG tablet  Commonly known as: ELAVIL     gabapentin 300 MG capsule  Commonly known as: NEURONTIN     glimepiride 2 MG tablet  Commonly known as: AMARYL     JARDIANCE 25 mg tablet  Generic drug: empagliflozin     LIDOcaine 5 %  Commonly known as: LIDODERM  Place 1 patch onto the skin once daily. Remove & Discard patch within 12 hours or as directed by MD     metoprolol tartrate 50 MG tablet  Commonly known as: LOPRESSOR  Take 0.5 tablets (25 mg total) by mouth 2 (two) times daily.     mirtazapine 15 MG tablet  Commonly known as: REMERON     OLANZapine 2.5 MG tablet  Commonly known as: ZyPREXA     oxyCODONE-acetaminophen  mg per tablet  Commonly known as: PERCOCET     pantoprazole 40 MG tablet  Commonly known as: PROTONIX  Take 1 tablet (40 mg total) by mouth once daily.     SOLUS V2 LANCETS 30 gauge Misc  Generic drug: lancets     SOLUS V2 TEST STRIPS Strp  Generic  drug: blood sugar diagnostic               Where to Get Your Medications        These medications were sent to [facility] [address], [place] [address]      Hours: Mon-Fri, 8a-5:30p Phone: [phone]   aspirin 81 MG Chew  docusate sodium 250 MG capsule  ferrous sulfate 325 (65 FE) MG EC tablet          Thank you  Fortino Toussaint, PharmD

## 2022-10-19 ENCOUNTER — OFFICE VISIT (OUTPATIENT)
Dept: CARDIOLOGY | Facility: CLINIC | Age: 82
End: 2022-10-19
Payer: MEDICARE

## 2022-10-19 VITALS
WEIGHT: 155 LBS | HEIGHT: 65 IN | HEART RATE: 59 BPM | DIASTOLIC BLOOD PRESSURE: 62 MMHG | BODY MASS INDEX: 25.83 KG/M2 | SYSTOLIC BLOOD PRESSURE: 129 MMHG

## 2022-10-19 DIAGNOSIS — Z79.01 ANTICOAGULANT LONG-TERM USE: ICD-10-CM

## 2022-10-19 DIAGNOSIS — E78.5 DYSLIPIDEMIA: ICD-10-CM

## 2022-10-19 DIAGNOSIS — Z95.0 PACEMAKER: ICD-10-CM

## 2022-10-19 DIAGNOSIS — I25.10 CORONARY ARTERY DISEASE INVOLVING NATIVE CORONARY ARTERY OF NATIVE HEART WITHOUT ANGINA PECTORIS: Primary | ICD-10-CM

## 2022-10-19 DIAGNOSIS — Z72.0 TOBACCO ABUSE: ICD-10-CM

## 2022-10-19 DIAGNOSIS — K74.69 OTHER CIRRHOSIS OF LIVER: ICD-10-CM

## 2022-10-19 DIAGNOSIS — I35.0 NONRHEUMATIC AORTIC VALVE STENOSIS: ICD-10-CM

## 2022-10-19 DIAGNOSIS — I10 ESSENTIAL HYPERTENSION: ICD-10-CM

## 2022-10-19 DIAGNOSIS — Z95.1 HX OF CABG: ICD-10-CM

## 2022-10-19 DIAGNOSIS — I48.0 PAROXYSMAL ATRIAL FIBRILLATION: ICD-10-CM

## 2022-10-19 PROCEDURE — 99999 PR PBB SHADOW E&M-EST. PATIENT-LVL III: CPT | Mod: PBBFAC,,, | Performed by: INTERNAL MEDICINE

## 2022-10-19 PROCEDURE — 1100F PTFALLS ASSESS-DOCD GE2>/YR: CPT | Mod: CPTII,S$GLB,, | Performed by: INTERNAL MEDICINE

## 2022-10-19 PROCEDURE — 99999 PR PBB SHADOW E&M-EST. PATIENT-LVL III: ICD-10-PCS | Mod: PBBFAC,,, | Performed by: INTERNAL MEDICINE

## 2022-10-19 PROCEDURE — 99215 OFFICE O/P EST HI 40 MIN: CPT | Mod: S$GLB,,, | Performed by: INTERNAL MEDICINE

## 2022-10-19 PROCEDURE — 99215 PR OFFICE/OUTPT VISIT, EST, LEVL V, 40-54 MIN: ICD-10-PCS | Mod: S$GLB,,, | Performed by: INTERNAL MEDICINE

## 2022-10-19 PROCEDURE — 1159F PR MEDICATION LIST DOCUMENTED IN MEDICAL RECORD: ICD-10-PCS | Mod: CPTII,S$GLB,, | Performed by: INTERNAL MEDICINE

## 2022-10-19 PROCEDURE — 3288F PR FALLS RISK ASSESSMENT DOCUMENTED: ICD-10-PCS | Mod: CPTII,S$GLB,, | Performed by: INTERNAL MEDICINE

## 2022-10-19 PROCEDURE — 1126F PR PAIN SEVERITY QUANTIFIED, NO PAIN PRESENT: ICD-10-PCS | Mod: CPTII,S$GLB,, | Performed by: INTERNAL MEDICINE

## 2022-10-19 PROCEDURE — 3074F SYST BP LT 130 MM HG: CPT | Mod: CPTII,S$GLB,, | Performed by: INTERNAL MEDICINE

## 2022-10-19 PROCEDURE — 3074F PR MOST RECENT SYSTOLIC BLOOD PRESSURE < 130 MM HG: ICD-10-PCS | Mod: CPTII,S$GLB,, | Performed by: INTERNAL MEDICINE

## 2022-10-19 PROCEDURE — 3288F FALL RISK ASSESSMENT DOCD: CPT | Mod: CPTII,S$GLB,, | Performed by: INTERNAL MEDICINE

## 2022-10-19 PROCEDURE — 1126F AMNT PAIN NOTED NONE PRSNT: CPT | Mod: CPTII,S$GLB,, | Performed by: INTERNAL MEDICINE

## 2022-10-19 PROCEDURE — 3078F PR MOST RECENT DIASTOLIC BLOOD PRESSURE < 80 MM HG: ICD-10-PCS | Mod: CPTII,S$GLB,, | Performed by: INTERNAL MEDICINE

## 2022-10-19 PROCEDURE — 1159F MED LIST DOCD IN RCRD: CPT | Mod: CPTII,S$GLB,, | Performed by: INTERNAL MEDICINE

## 2022-10-19 PROCEDURE — 1100F PR PT FALLS ASSESS DOC 2+ FALLS/FALL W/INJURY/YR: ICD-10-PCS | Mod: CPTII,S$GLB,, | Performed by: INTERNAL MEDICINE

## 2022-10-19 PROCEDURE — 3078F DIAST BP <80 MM HG: CPT | Mod: CPTII,S$GLB,, | Performed by: INTERNAL MEDICINE

## 2022-10-19 NOTE — PROGRESS NOTES
Subjective:   Patient ID:  Venu Lau is a 82 y.o. male who presents for follow up of Valvular Heart Disease, Coronary Artery Disease, Hypertension, and Hyperlipidemia      HPI:       Venu Lau 82 y.o. male is here follow up and feeling well without any new complaints.  He is here for follow-up after a hospital admission October 2022 for chest discomfort.  EKG revealed a ventricular paced rhythm.  Troponin was negative x3.  Echocardiogram revealed normal ejection fraction with mild aortic stenosis with valve area of 1.07 centimeter sq and mean gradient of 21 mmHg.  Patient denies recurrent chest discomfort since his discharge.  Blood pressure is stable 129/62 mmHg with a heart rate of 59 beats per minute.  He is tolerating his medications well and is compliant with his medications.          He has PMH of PAF, CAD s/p CABG, HTN, HLD and chronic pain. He is compliant with and tolerating medications. He does have a pacemaker and PAF. CHadsVasc 5 but unable to place on blood thinners because of history of GI bleeding and high risk of falling. He has started smoking again.      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 05/27/2021    Alcohol use disorder, mild, abuse 05/27/2021    Aphasia 05/26/2021    Other cirrhosis of liver     Abnormal LFTs 06/17/2020    Hemobilia 06/17/2020    Hyperbilirubinemia 06/17/2020    Subclinical hypothyroidism 06/12/2020    Tobacco abuse  2020    Paroxysmal atrial fibrillation 2017    Oropharyngeal dysphagia 10/30/2016    Anemia, chronic disease 10/30/2016    Metabolic acidosis 10/29/2016    Polypharmacy 10/29/2016    Dyslipidemia 10/29/2016    Coronary artery disease involving native coronary artery of native heart without angina pectoris     Type 2 diabetes mellitus, without long-term current use of insulin     Essential hypertension     Hx of CABG 2012    Atrial flutter 2012    Pacemaker 2012    Villous adenoma 2012           Right Arm BP - Sittin/62  Left Arm BP - Sittin/64        LABS    LAST HbA1c  Lab Results   Component Value Date    HGBA1C 5.6 10/04/2022       Lipid panel  Lab Results   Component Value Date    CHOL 100 (L) 10/04/2022    CHOL 135 2021    CHOL 78 (L) 2021     Lab Results   Component Value Date    HDL 40 10/04/2022    HDL 33 (L) 2021    HDL 28 (L) 2021     Lab Results   Component Value Date    LDLCALC 41.2 (L) 10/04/2022    LDLCALC 73.4 2021    LDLCALC 29.2 (L) 2021     Lab Results   Component Value Date    TRIG 94 10/04/2022    TRIG 143 2021    TRIG 104 2021     Lab Results   Component Value Date    CHOLHDL 40.0 10/04/2022    CHOLHDL 24.4 2021    CHOLHDL 35.9 2021            Review of Systems   Constitutional: Negative for diaphoresis, night sweats, weight gain and weight loss.   HENT:  Negative for congestion.    Eyes:  Negative for blurred vision, discharge and double vision.   Cardiovascular:  Negative for chest pain, claudication, cyanosis, dyspnea on exertion, irregular heartbeat, leg swelling, near-syncope, orthopnea, palpitations, paroxysmal nocturnal dyspnea and syncope.   Respiratory:  Negative for cough, shortness of breath and wheezing.    Endocrine: Negative for cold intolerance, heat intolerance and polyphagia.   Hematologic/Lymphatic: Negative for adenopathy and bleeding problem. Does not bruise/bleed  easily.   Skin:  Negative for dry skin and nail changes.   Musculoskeletal:  Negative for arthritis, back pain, falls, joint pain, myalgias and neck pain.   Gastrointestinal:  Negative for bloating, abdominal pain, change in bowel habit and constipation.   Genitourinary:  Negative for bladder incontinence, dysuria, flank pain, genital sores and missed menses.   Neurological:  Negative for aphonia, brief paralysis, difficulty with concentration, dizziness and weakness.   Psychiatric/Behavioral:  Negative for altered mental status and memory loss. The patient does not have insomnia.    Allergic/Immunologic: Negative for environmental allergies.     Objective:   Physical Exam  Constitutional:       Appearance: He is well-developed.      Interventions: He is not intubated.  HENT:      Head: Normocephalic and atraumatic.      Right Ear: External ear normal.      Left Ear: External ear normal.   Eyes:      General: No scleral icterus.        Right eye: No discharge.         Left eye: No discharge.      Conjunctiva/sclera: Conjunctivae normal.      Pupils: Pupils are equal, round, and reactive to light.   Neck:      Thyroid: No thyromegaly.      Vascular: Normal carotid pulses. No carotid bruit, hepatojugular reflux or JVD.      Trachea: No tracheal deviation.   Cardiovascular:      Rate and Rhythm: Normal rate and regular rhythm. No extrasystoles are present.     Chest Wall: PMI is not displaced.      Pulses:           Carotid pulses are 2+ on the right side and 2+ on the left side.       Radial pulses are 2+ on the right side and 2+ on the left side.        Femoral pulses are 2+ on the right side and 2+ on the left side.       Popliteal pulses are 2+ on the right side and 2+ on the left side.        Dorsalis pedis pulses are 2+ on the right side and 2+ on the left side.        Posterior tibial pulses are 2+ on the right side and 2+ on the left side.      Heart sounds: S1 normal and S2 normal. Heart sounds not distant. No  midsystolic click. No murmur heard.    No friction rub. No gallop. No S3 sounds.   Pulmonary:      Effort: Pulmonary effort is normal. No tachypnea, bradypnea, accessory muscle usage or respiratory distress. He is not intubated.      Breath sounds: Normal breath sounds. No stridor. No decreased breath sounds, wheezing or rales.   Chest:      Chest wall: No tenderness.   Abdominal:      General: There is no distension or abdominal bruit.      Palpations: There is no mass or pulsatile mass.      Tenderness: There is no abdominal tenderness. There is no guarding or rebound.   Musculoskeletal:         General: No tenderness. Normal range of motion.      Cervical back: Normal range of motion and neck supple.   Lymphadenopathy:      Cervical: No cervical adenopathy.   Skin:     General: Skin is warm.      Coloration: Skin is not pale.      Findings: No erythema or rash.   Neurological:      Mental Status: He is alert and oriented to person, place, and time.      Cranial Nerves: No cranial nerve deficit.      Coordination: Coordination normal.      Deep Tendon Reflexes: Reflexes are normal and symmetric.   Psychiatric:         Behavior: Behavior normal.         Thought Content: Thought content normal.         Judgment: Judgment normal.       Assessment:     1. Coronary artery disease involving native coronary artery of native heart without angina pectoris    2. Hx of CABG    3. Pacemaker    4. Essential hypertension    5. Dyslipidemia    6. Paroxysmal atrial fibrillation    7. Nonrheumatic aortic valve stenosis    8. Anticoagulant long-term use    9. Other cirrhosis of liver    10. Tobacco abuse        Plan:         Reassurance regarding his chest discomfort with nonischemic evaluation thus far.  He denies recurrent symptoms.  Will continue with close monitoring and medical therapy for coronary disease.  Regarding atrial fibrillation and high risk of CVA without anticoagulation he will be evaluated for left atrial  appendage closure.    Smoking cessation  Target BP < 130/80 mmHg  Target LDL < 70  Yearly echocardiogram.  Start aspirin 81 mg once daily.  Pacemaker interrogation per protocol.        Continue with current medical plan and lifestyle changes.  Return sooner for concerns or questions. If symptoms persist go to the ED  I have reviewed all pertinent data on this patient       I have reviewed the patient's medical history in detail and updated the computerized patient record.    Orders Placed This Encounter   Procedures    CBC Auto Differential     Standing Status:   Future     Standing Expiration Date:   4/19/2024    Comprehensive Metabolic Panel     Standing Status:   Future     Standing Expiration Date:   4/19/2024    Lipid Panel     Standing Status:   Future     Standing Expiration Date:   4/19/2024    Echo     Standing Status:   Future     Standing Expiration Date:   10/19/2023     Order Specific Question:   Release to patient     Answer:   Immediate       Follow up as scheduled. Return sooner for concerns or questions            He expressed verbal understanding and agreed with the plan      -In today's visit, at least 4 established conditions that pose a risk to life or bodily function have been addressed and the conditions are severe.    -In today's visit, monitoring for drug toxicity was accomplished.      Patient's Medications   New Prescriptions    No medications on file   Previous Medications    ASPIRIN 81 MG CHEW    Take 1 tablet (81 mg total) by mouth once daily.    ATORVASTATIN (LIPITOR) 40 MG TABLET    Take 40 mg by mouth once daily.    DOCUSATE SODIUM (COLACE) 250 MG CAPSULE    Take 1 capsule (250 mg total) by mouth once daily.    FERROUS SULFATE 325 (65 FE) MG EC TABLET    Take 1 tablet (325 mg total) by mouth 3 (three) times a week.    GLIMEPIRIDE (AMARYL) 2 MG TABLET    Take 1 mg by mouth every morning.    JARDIANCE 25 MG TABLET    Take 25 mg by mouth once daily.    METOPROLOL TARTRATE (LOPRESSOR) 50  MG TABLET    Take 0.5 tablets (25 mg total) by mouth 2 (two) times daily.    MIRTAZAPINE (REMERON) 15 MG TABLET    15 mg once daily.    OLANZAPINE (ZYPREXA) 2.5 MG TABLET    2.5 mg once daily.    PANTOPRAZOLE (PROTONIX) 40 MG TABLET    Take 1 tablet (40 mg total) by mouth once daily.    [facility]        SOLUS V2 TEST STRIPS STRP       Modified Medications    No medications on file   Discontinued Medications

## 2022-10-25 ENCOUNTER — TELEPHONE (OUTPATIENT)
Dept: HEMATOLOGY/ONCOLOGY | Facility: CLINIC | Age: 82
End: 2022-10-25
Payer: MEDICARE

## 2022-10-25 NOTE — TELEPHONE ENCOUNTER
----- Message from Tara Queen sent at 10/25/2022  1:50 PM CDT -----  Type:  Sooner Apoointment Request    Caller is requesting a sooner appointment.  Caller declined first available appointment listed below.  Caller will not accept being placed on the waitlist and is requesting a message be sent to doctor.  Name of Caller:pt  When is the first available appointment?  Symptoms:  Would the patient rather a call back or a response via [facility]? Call   Best Call Back Number:[phone]  Additional Information: Pt needs to reschedule appt that is schedule for 10/26

## 2022-10-29 ENCOUNTER — HOSPITAL ENCOUNTER (EMERGENCY)
Facility: HOSPITAL | Age: 82
Discharge: HOME | End: 2022-10-29
Attending: EMERGENCY MEDICINE
Payer: MEDICARE

## 2022-10-29 VITALS
TEMPERATURE: 98 F | OXYGEN SATURATION: 99 % | RESPIRATION RATE: 18 BRPM | HEIGHT: 65 IN | BODY MASS INDEX: 23.82 KG/M2 | DIASTOLIC BLOOD PRESSURE: 58 MMHG | SYSTOLIC BLOOD PRESSURE: 112 MMHG | HEART RATE: 62 BPM | WEIGHT: 143 LBS

## 2022-10-29 DIAGNOSIS — S30.0XXA CONTUSION OF LOWER BACK, INITIAL ENCOUNTER: Primary | ICD-10-CM

## 2022-10-29 PROCEDURE — 99284 EMERGENCY DEPT VISIT MOD MDM: CPT | Mod: 25,ER

## 2022-10-29 PROCEDURE — 25000003 PHARM REV CODE 250: Mod: ER | Performed by: EMERGENCY MEDICINE

## 2022-10-29 RX ORDER — LORAZEPAM 1 MG/1
1 TABLET ORAL 2 TIMES DAILY
COMMUNITY

## 2022-10-29 RX ORDER — LIDOCAINE 50 MG/G
1 PATCH TOPICAL
Status: DISCONTINUED | OUTPATIENT
Start: 2022-10-29 | End: 2022-10-30 | Stop reason: HOSPADM

## 2022-10-29 RX ORDER — OLANZAPINE 5 MG/1
5 TABLET ORAL NIGHTLY
Status: ON HOLD | COMMUNITY
End: 2023-05-30 | Stop reason: HOSPADM

## 2022-10-29 RX ADMIN — LIDOCAINE 1 PATCH: 50 PATCH TOPICAL at 10:10

## 2022-10-30 NOTE — ED PROVIDER NOTES
Encounter Date: 10/29/2022       History     Chief Complaint   Patient presents with    Fall    Back Pain     Patient fell earlier today while out getting the mail and is c/o mid lumbar pain. Reports that he has not been asleep in 4 days. Saw his PCP yesterday and was prescribed something for sleep but cannot get it filled until Monday. Collette is at baseline mental status.      HPI  82 y.o.   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    GERD (gastroesophageal reflux disease)     Gout, unspecified     H/O: GI bleed     Hypertension     Liver cancer     PVD (peripheral vascular disease)     Sciatica     SSS (sick sinus syndrome)     Tachy-valentine syndrome     Thyroid disease      Past Surgical History:   Procedure Laterality Date    ANGIOGRAPHY Right 10/11/2021    Procedure: ANGIOGRAM-HEPATIC;  Surgeon: Phil Stuart II, MD;  Location: [facility] CATH LAB;  Service: Interventional Radiology;  Laterality: Right;    APPENDECTOMY      CHOLECYSTECTOMY      CORONARY ARTERY BYPASS GRAFT      ENDOSCOPIC ULTRASOUND OF UPPER GASTROINTESTINAL TRACT N/A 6/17/2020    Procedure: EUS;  Surgeon: Rei Humphrey MD;  Location: [facility];  Service: Endoscopy;  Laterality: N/A;    ERCP N/A 6/17/2020    Procedure: ERCP;  Surgeon: Rei Humphrey MD;  Location: [facility] ENDO;  Service: Endoscopy;  Laterality: N/A;    ERCP N/A 8/31/2020    Procedure: ERCP (ENDOSCOPIC RETROGRADE CHOLANGIOPANCREATOGRAPHY);  Surgeon: Adela Boyle MD;  Location: [facility] ENDO;  Service: Endoscopy;   Laterality: N/A;    ESOPHAGOGASTRODUODENOSCOPY N/A 10/11/2021    Procedure: EGD (ESOPHAGOGASTRODUODENOSCOPY);  Surgeon: Skip Varela MD;  Location: [facility];  Service: Gastroenterology;  Laterality: N/A;    INSERT / REPLACE / REMOVE PACEMAKER      LIVER RESECTION      LUMBAR DISC SURGERY       Family History   Problem Relation Age of Onset    Heart disease Mother     Pancreatic cancer Mother     Heart disease Father      Social History     Tobacco Use    Smoking status: Some Days     Packs/day: 0.25     Years: 37.00     Pack years: 9.25     Types: Cigarettes     Last attempt to quit: 1992     Years since quittin.9    Smokeless tobacco: Never    Tobacco comments:     Pt declines enrollment in the [employer]. Handout provided for Ambulatory Smoking Cessation program.    Substance Use Topics    Alcohol use: Yes     Alcohol/week: 5.0 standard drinks     Types: 5 Cans of beer per week    Drug use: No     Review of Systems  All systems were reviewed/examined and were negative except as noted in the HPI.    Physical Exam     Initial Vitals [10/29/22 2148]   BP Pulse Resp Temp SpO2   (!) 115/56 60 19 98.1 °F (36.7 °C) 98 %      MAP       --         Physical Exam    General: the patient is awake, alert, and in no apparent distress.  Head: normocephalic and atraumatic, sclera are clear  Neck: supple without meningismus nt  Chest:  no respiratory distress  Heart: regular rate and rhythm  ABD soft, nontender, nondistended, no peritoneal signs  Back nt in the midline     Left lumbar is tender just paraspinal/close to midline  Extremities: warm and well perfused, atraumatic  Skin: warm and dry  Psych conversant  Neuro: awake, alert, moving all extremities    ED Course   Procedures  Labs Reviewed - No data to display       Imaging Results              CT Lumbar Spine Without Contrast (Final result)  Result time 10/29/22 22:31:25      Final result by Carmita Turner MD (10/29/22 22:31:25)                    Impression:      As above    All CT scans at this facility are performed  using dose modulation techniques as appropriate to performed exam including the following:  automated exposure control; adjustment of mA and/or kV according to the patients size (this includes techniques or standardized protocols for targeted exams where dose is matched to indication/reason for exam: i.e. extremities or head);  iterative reconstruction technique.      Electronically signed by: Johnny Vizcarra  Date:    10/29/2022  Time:    22:31               Narrative:    EXAMINATION:  CT LUMBAR SPINE WITHOUT CONTRAST    CLINICAL HISTORY:  Low back pain, trauma;    TECHNIQUE:  CT lumbar spine without    COMPARISON:  None    FINDINGS:  Moderate multilevel degenerative disc disease.  No vertebral body compression deformity.  No spondylolisthesis..  Mild atherosclerotic changes of the aorta iliac vasculature.  Mild right-sided pleural effusion with associated nodular opacity the right lung base.  This nodule opacity measures 16 x 10 mm.  Recommend follow-up.  No acute fracture or dislocation.  GE junction thickening.                                       Medications - No data to display                 Medical Decision Making:    This is an emergent evaluation of a patient presenting to the ED.  Nursing notes were reviewed.  Imaging reviewed by me, personally and independently, and prelims if available.  No acute/emergent findings noted on radiologic studies ordered.  I reviewed radiology images personally along with interpretations.    I decided to obtain and review old medical records, which showed: ED visits and cardiology fu    Evaluation for Emergency Medical Condition  The patient received a medical screening exam and within a reasonable degree of clinical confidence an emergency medical condition has not been identified.  The patient is instructed on proper follow up and return precautions to the ED.      Vinh Sotelo MD, SUMAN               Clinical Impression:   Final diagnoses:  [S30.0XXA] Contusion of lower back, initial encounter (Primary)      ED Disposition Condition    Discharge Stable          ED Prescriptions    None       Follow-up Information       Follow up With Specialties Details Why Contact Info    Michaela Miller MD Internal Medicine Schedule an appointment as soon as possible for a visit   [address]  [address]  [place] [address]  [phone]            Discharged to home in stable condition, return to ED warnings given, follow up and patient care instructions given.      Vinh Sotelo MD, SUMAN, [facility]  Department of Emergency Medicine       Brandan Sotelo MD  10/31/22 [id number]

## 2022-11-07 ENCOUNTER — TELEPHONE (OUTPATIENT)
Dept: CARDIOLOGY | Facility: CLINIC | Age: 82
End: 2022-11-07
Payer: MEDICARE

## 2022-11-07 NOTE — TELEPHONE ENCOUNTER
Reached out to patient to schedule in office device interrogation.  No answer.  Left callback message.

## 2022-12-21 ENCOUNTER — CLINICAL SUPPORT (OUTPATIENT)
Dept: CARDIOLOGY | Facility: CLINIC | Age: 82
End: 2022-12-21
Payer: MEDICARE

## 2022-12-21 DIAGNOSIS — Z95.0 PACEMAKER: Primary | ICD-10-CM

## 2022-12-21 PROCEDURE — 93280 PR PROGRAM EVAL (IN PERSON) IMPLANT DEVICE,PACEMAKER,2 LEAD: ICD-10-PCS | Mod: 26,,, | Performed by: INTERNAL MEDICINE

## 2022-12-21 PROCEDURE — 93280 PM DEVICE PROGR EVAL DUAL: CPT | Mod: 26,,, | Performed by: INTERNAL MEDICINE

## 2023-02-02 ENCOUNTER — EXTERNAL HOME HEALTH (OUTPATIENT)
Dept: HOME HEALTH SERVICES | Facility: HOSPITAL | Age: 83
End: 2023-02-02
Payer: MEDICARE

## 2023-02-27 ENCOUNTER — TELEPHONE (OUTPATIENT)
Dept: CARDIOLOGY | Facility: CLINIC | Age: 83
End: 2023-02-27
Payer: MEDICARE

## 2023-02-27 NOTE — TELEPHONE ENCOUNTER
Returned call    In person device check arranged on March 15 at 1045 in [place] location    Read back complete

## 2023-02-27 NOTE — TELEPHONE ENCOUNTER
----- Message from Rudy Green sent at 2/27/2023  1:56 PM CST -----  Contact: wife  Type:  Sooner Appointment Request    Caller is requesting a sooner appointment.  Caller declined first available appointment listed below.  Caller will not accept being placed on the waitlist and is requesting a message be sent to doctor.  Name of Caller:wife  When is the first available appointment?June   Symptoms: 2 month F/U to check pace maker   Would the patient rather a call back or a response via [facility]? Call   Best Call Back Number:[phone]  Additional Information:   Books are closed until June   Wife stated provider recommend to see pt every 2 months

## 2023-03-15 ENCOUNTER — CLINICAL SUPPORT (OUTPATIENT)
Dept: CARDIOLOGY | Facility: CLINIC | Age: 83
End: 2023-03-15
Attending: INTERNAL MEDICINE
Payer: MEDICARE

## 2023-03-15 DIAGNOSIS — I25.10 CORONARY ARTERY DISEASE INVOLVING NATIVE CORONARY ARTERY OF NATIVE HEART WITHOUT ANGINA PECTORIS: ICD-10-CM

## 2023-05-01 ENCOUNTER — TELEPHONE (OUTPATIENT)
Dept: CARDIOLOGY | Facility: CLINIC | Age: 83
End: 2023-05-01
Payer: MEDICARE

## 2023-05-01 NOTE — TELEPHONE ENCOUNTER
This message was already handled .    Patient next Pacemaker Check is for 5-10-23    At the [facility] [place] location.        Nw                                  ----- Message from Doreen Forrester sent at 4/28/2023  2:51 PM CDT -----  Contact: Ela  Type:  Schedule     Who Called:pt wife     Does the patient know what this is regarding?:pace maker check up   Would the patient rather a call back or a response via [facility]? Call   Best Call Back Number:[phone]  Additional Information:      Pt wife stated he needs a 2 month check up on pace maker

## 2023-05-01 NOTE — TELEPHONE ENCOUNTER
----- Message from Nona Pritchett sent at 5/1/2023  2:13 PM CDT -----  Type:  Needs Medical Advice    Who Called: call  Symptoms (please be specific): needs to make appt to check his pacemaker was told he needed to be seen soon only had 10% left please call today   Would the patient rather a call back or a response via [facility]? call  Best Call Back Number: [phone]  Additional Information:

## 2023-05-01 NOTE — TELEPHONE ENCOUNTER
Reached out to and arranged an in person device interrogation appt on 5/10 with the "[employer] Rep at 1115    Verb understanding   Repeat back date and time complete

## 2023-05-01 NOTE — TELEPHONE ENCOUNTER
----- Message from Doreen Forrester sent at 4/28/2023  2:51 PM CDT -----  Contact: Ela  Type:  Schedule     Who Called:pt wife     Does the patient know what this is regarding?:pace maker check up   Would the patient rather a call back or a response via [facility]? Call   Best Call Back Number:[phone]  Additional Information:      Pt wife stated he needs a 2 month check up on pace maker

## 2023-05-10 ENCOUNTER — CLINICAL SUPPORT (OUTPATIENT)
Dept: CARDIOLOGY | Facility: CLINIC | Age: 83
End: 2023-05-10
Payer: MEDICARE

## 2023-05-10 DIAGNOSIS — Z95.0 PACEMAKER: Primary | ICD-10-CM

## 2023-05-10 PROCEDURE — 93280 PR PROGRAM EVAL (IN PERSON) IMPLANT DEVICE,PACEMAKER,2 LEAD: ICD-10-PCS | Mod: 26,,, | Performed by: INTERNAL MEDICINE

## 2023-05-10 PROCEDURE — 93280 PM DEVICE PROGR EVAL DUAL: CPT | Mod: 26,,, | Performed by: INTERNAL MEDICINE

## 2023-05-16 NOTE — PROGRESS NOTES
Pacemaker Evaluation Report    5/10/2023      Primary MD: Dr. Terrazas  Primary Cardiologist: Dr. Quiroga     : [employer] Model: Evia DR/Serial No.  [id number]  Implant date: Jan 13, 2011    Reason for evaluation:routine  Indication for pacemaker:  unknown    Measurements  Atrial sensing - P wave: 0.5 mV  Atrial capture: Maintained: 0.6 V @ 0.4 msec  Atrial lead impedance: 357 ohms  Ventricular sensing - R wave: 9 mV  Ventricular capture: RV Maintained: 1.4 V @ 0.4 ms   Ventricular lead impedance: right - 409 ohms   Underlying rhythm: AF with HR 39       Diagnostic Data  Atrial paced: 95 % Ventricular paced: 46 %  Mode switch: on  Other: 18 episodes of Afib burden - longest episode 27 days.    Sensor response:  CLS  Battery status: satisfactory Magnet rate: 90 bpm   20 % remaining capacity  Est. Longevity: less than a month        Final Parameters  Mode: DDD-CLS Lower rate: 60 bpm Upper rate: 110 bpm  Atrial - Amplitude: 1.6 V Pulse width: 0.4 ms sensitivity 0.8 ms  Polarity: Bipolar  Ventricular - Amplitude: 2.5 V Pulse width: 0.5 ms sensitivity 2.5 ms  Polarity: Bipolar  Changes made:  RV captured turned on.   Conclusions: generator change       Follow up: 6 months      Carlos Quiroga

## 2023-05-17 ENCOUNTER — TELEPHONE (OUTPATIENT)
Dept: CARDIOLOGY | Facility: CLINIC | Age: 83
End: 2023-05-17
Payer: MEDICARE

## 2023-05-17 NOTE — TELEPHONE ENCOUNTER
Called and scheduled patient for 5/24. Offered to come in tomorrow but patient has another appointment.

## 2023-05-17 NOTE — TELEPHONE ENCOUNTER
----- Message from Timi Meadows MD sent at 5/17/2023  1:01 PM CDT -----  Can you set him up for an appointment for me to see and get the procedure done.   ----- Message -----  From: Carlos Quiroga MD  Sent: 5/16/2023   4:52 PM CDT  To: Timi Meadows MD    [facility]  He needs a generator change asap  Less than a month less as 5/10/2023      Thanks      ZN

## 2023-05-24 ENCOUNTER — OFFICE VISIT (OUTPATIENT)
Dept: CARDIOLOGY | Facility: CLINIC | Age: 83
End: 2023-05-24
Payer: MEDICARE

## 2023-05-24 VITALS
BODY MASS INDEX: 26.35 KG/M2 | HEIGHT: 65 IN | OXYGEN SATURATION: 96 % | HEART RATE: 60 BPM | WEIGHT: 158.19 LBS | DIASTOLIC BLOOD PRESSURE: 77 MMHG | SYSTOLIC BLOOD PRESSURE: 165 MMHG

## 2023-05-24 DIAGNOSIS — Z45.010 PACEMAKER GENERATOR END OF LIFE: ICD-10-CM

## 2023-05-24 DIAGNOSIS — Z95.0 PACEMAKER: Primary | ICD-10-CM

## 2023-05-24 PROCEDURE — 3288F PR FALLS RISK ASSESSMENT DOCUMENTED: ICD-10-PCS | Mod: CPTII,S$GLB,, | Performed by: INTERNAL MEDICINE

## 2023-05-24 PROCEDURE — 1101F PR PT FALLS ASSESS DOC 0-1 FALLS W/OUT INJ PAST YR: ICD-10-PCS | Mod: CPTII,S$GLB,, | Performed by: INTERNAL MEDICINE

## 2023-05-24 PROCEDURE — 1160F RVW MEDS BY RX/DR IN RCRD: CPT | Mod: CPTII,S$GLB,, | Performed by: INTERNAL MEDICINE

## 2023-05-24 PROCEDURE — 99999 PR PBB SHADOW E&M-EST. PATIENT-LVL III: CPT | Mod: PBBFAC,,, | Performed by: INTERNAL MEDICINE

## 2023-05-24 PROCEDURE — 3078F DIAST BP <80 MM HG: CPT | Mod: CPTII,S$GLB,, | Performed by: INTERNAL MEDICINE

## 2023-05-24 PROCEDURE — 3288F FALL RISK ASSESSMENT DOCD: CPT | Mod: CPTII,S$GLB,, | Performed by: INTERNAL MEDICINE

## 2023-05-24 PROCEDURE — 1125F AMNT PAIN NOTED PAIN PRSNT: CPT | Mod: CPTII,S$GLB,, | Performed by: INTERNAL MEDICINE

## 2023-05-24 PROCEDURE — 3077F SYST BP >= 140 MM HG: CPT | Mod: CPTII,S$GLB,, | Performed by: INTERNAL MEDICINE

## 2023-05-24 PROCEDURE — 1159F PR MEDICATION LIST DOCUMENTED IN MEDICAL RECORD: ICD-10-PCS | Mod: CPTII,S$GLB,, | Performed by: INTERNAL MEDICINE

## 2023-05-24 PROCEDURE — 3077F PR MOST RECENT SYSTOLIC BLOOD PRESSURE >= 140 MM HG: ICD-10-PCS | Mod: CPTII,S$GLB,, | Performed by: INTERNAL MEDICINE

## 2023-05-24 PROCEDURE — 1159F MED LIST DOCD IN RCRD: CPT | Mod: CPTII,S$GLB,, | Performed by: INTERNAL MEDICINE

## 2023-05-24 PROCEDURE — 99999 PR PBB SHADOW E&M-EST. PATIENT-LVL III: ICD-10-PCS | Mod: PBBFAC,,, | Performed by: INTERNAL MEDICINE

## 2023-05-24 PROCEDURE — 1160F PR REVIEW ALL MEDS BY PRESCRIBER/CLIN PHARMACIST DOCUMENTED: ICD-10-PCS | Mod: CPTII,S$GLB,, | Performed by: INTERNAL MEDICINE

## 2023-05-24 PROCEDURE — 1125F PR PAIN SEVERITY QUANTIFIED, PAIN PRESENT: ICD-10-PCS | Mod: CPTII,S$GLB,, | Performed by: INTERNAL MEDICINE

## 2023-05-24 PROCEDURE — 3078F PR MOST RECENT DIASTOLIC BLOOD PRESSURE < 80 MM HG: ICD-10-PCS | Mod: CPTII,S$GLB,, | Performed by: INTERNAL MEDICINE

## 2023-05-24 PROCEDURE — 1101F PT FALLS ASSESS-DOCD LE1/YR: CPT | Mod: CPTII,S$GLB,, | Performed by: INTERNAL MEDICINE

## 2023-05-24 PROCEDURE — 99213 PR OFFICE/OUTPT VISIT, EST, LEVL III, 20-29 MIN: ICD-10-PCS | Mod: S$GLB,,, | Performed by: INTERNAL MEDICINE

## 2023-05-24 PROCEDURE — 99213 OFFICE O/P EST LOW 20 MIN: CPT | Mod: S$GLB,,, | Performed by: INTERNAL MEDICINE

## 2023-05-24 NOTE — H&P (VIEW-ONLY)
[facility] Cardiology 701     SUBJECTIVE:     History of Present Illness:  Patient is a 83 y.o. male presents with pacemaker at [facility]. Patient of .     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    GERD (gastroesophageal reflux disease)     Gout, unspecified     H/O: GI bleed     Hypertension     Liver cancer     PVD (peripheral vascular disease)     Sciatica     SSS (sick sinus syndrome)     Tachy-valentine syndrome     Thyroid disease        Past Surgical History:   Procedure Laterality Date    ANGIOGRAPHY Right 10/11/2021    Procedure: ANGIOGRAM-HEPATIC;  Surgeon: Phil Stuart II, MD;  Location: [facility], [place], operated by [facility] CATH LAB;  Service: Interventional Radiology;  Laterality: Right;    APPENDECTOMY      CHOLECYSTECTOMY      CORONARY ARTERY BYPASS GRAFT      ENDOSCOPIC ULTRASOUND OF UPPER GASTROINTESTINAL TRACT N/A 6/17/2020    Procedure: EUS;  Surgeon: Rei Humphrey MD;  Location: [facility] ENDO;  Service: Endoscopy;  Laterality: N/A;    ERCP N/A 6/17/2020    Procedure: ERCP;  Surgeon: Rei Humphrey MD;  Location: [facility] ENDO;  Service: Endoscopy;  Laterality: N/A;    ERCP N/A 8/31/2020    Procedure: ERCP (ENDOSCOPIC RETROGRADE CHOLANGIOPANCREATOGRAPHY);  Surgeon: Adela Boyle MD;  Location: [facility] ENDO;  Service: Endoscopy;  Laterality: N/A;    ESOPHAGOGASTRODUODENOSCOPY N/A 10/11/2021    Procedure: EGD (ESOPHAGOGASTRODUODENOSCOPY);  Surgeon: Skip Varela MD;  Location:  [facility];  Service: Gastroenterology;  Laterality: N/A;    INSERT / REPLACE / REMOVE PACEMAKER      LIVER RESECTION      LUMBAR DISC SURGERY         Family History   Problem Relation Age of Onset    Heart disease Mother     Pancreatic cancer Mother     Heart disease Father        Social History     Tobacco Use    Smoking status: Some Days     Packs/day: 0.25     Years: 37.00     Pack years: 9.25     Types: Cigarettes     Last attempt to quit: 1992     Years since quittin.5    Smokeless tobacco: Never    Tobacco comments:     Pt declines enrollment in the [employer]. Handout provided for Ambulatory Smoking Cessation program.    Substance Use Topics    Alcohol use: Yes     Alcohol/week: 5.0 standard drinks     Types: 5 Cans of beer per week    Drug use: No        Past Hospitalization:     Home meds:  Current Outpatient Medications on File Prior to Visit   Medication Sig Dispense Refill    aspirin 81 MG Chew Take 1 tablet (81 mg total) by mouth once daily. 90 tablet 3    atorvastatin (LIPITOR) 40 MG tablet Take 40 mg by mouth once daily.      JARDIANCE 25 mg tablet Take 25 mg by mouth once daily.      LORazepam (ATIVAN) 1 MG tablet Take 1 mg by mouth 2 (two) times daily.      metoprolol tartrate (LOPRESSOR) 50 MG tablet Take 0.5 tablets (25 mg total) by mouth 2 (two) times daily. 180 tablet 3    OLANZapine (ZYPREXA) 2.5 MG tablet 2.5 mg once daily.      pantoprazole (PROTONIX) 40 MG tablet Take 1 tablet (40 mg total) by mouth once daily. 30 tablet 2    glimepiride (AMARYL) 2 MG tablet Take 1 mg by mouth every morning.      mirtazapine (REMERON) 15 MG tablet 15 mg once daily.      OLANZapine (ZYPREXA) 5 MG tablet Take 5 mg by mouth every evening.      SOLUS V2 LANCETS 30 gauge Misc       SOLUS V2 TEST STRIPS Strp        No current facility-administered medications on file prior to visit.       Cardiac meds:  ASA 81 mg  Atorvastatin 40 mg  Metoprolol tartate 50 mg BIOD  Glimepiride         OBJECTIVE:  "    Vital Signs (Most Recent)  Vitals:    05/24/23 1046   BP: (!) 165/77   Pulse: 60   SpO2: 96%   Weight: 71.7 kg (158 lb 2.9 oz)   Height: 5' 5" (1.651 m)         Physical Exam:  Lungs:c lear  Heart: scar from bypass surgery; systolic ejection murmur ntoed         LABS    CMP  No results for input(s): K in the last 2160 hours.    Invalid input(s): GFR    LIPID  No results for input(s): HDL, CHOL, TRIG, LDLCALC, CHOLHDL, NONHDL, TOTALCHOLEST in the last 2160 hours.      Diagnostic Results:    EKG: 10/22: v paced    Echo: 10/22: normal EF. Mild AS     Chart review:        ASSESSMENT/PLAN:     Pacemaker with less than one month longevity    Plan: procedure of generator change explained to patient and family. Risks and benefits explained and will proceed on tuesay 2nd.     Timi Meadows MD    "

## 2023-05-24 NOTE — PROGRESS NOTES
[facility] Cardiology 701     SUBJECTIVE:     History of Present Illness:  Patient is a 83 y.o. male presents with pacemaker at [facility]. Patient of .     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    GERD (gastroesophageal reflux disease)     Gout, unspecified     H/O: GI bleed     Hypertension     Liver cancer     PVD (peripheral vascular disease)     Sciatica     SSS (sick sinus syndrome)     Tachy-valentine syndrome     Thyroid disease        Past Surgical History:   Procedure Laterality Date    ANGIOGRAPHY Right 10/11/2021    Procedure: ANGIOGRAM-HEPATIC;  Surgeon: Phil Stuart II, MD;  Location: [facility] CATH LAB;  Service: Interventional Radiology;  Laterality: Right;    APPENDECTOMY      CHOLECYSTECTOMY      CORONARY ARTERY BYPASS GRAFT      ENDOSCOPIC ULTRASOUND OF UPPER GASTROINTESTINAL TRACT N/A 6/17/2020    Procedure: EUS;  Surgeon: Rei Humphrey MD;  Location: [facility] ENDO;  Service: Endoscopy;  Laterality: N/A;    ERCP N/A 6/17/2020    Procedure: ERCP;  Surgeon: Rei Humphrey MD;  Location: [facility] ENDO;  Service: Endoscopy;  Laterality: N/A;    ERCP N/A 8/31/2020    Procedure: ERCP (ENDOSCOPIC RETROGRADE CHOLANGIOPANCREATOGRAPHY);  Surgeon: Adela Boyle MD;  Location: [facility] ENDO;  Service: Endoscopy;  Laterality: N/A;    ESOPHAGOGASTRODUODENOSCOPY N/A 10/11/2021    Procedure: EGD (ESOPHAGOGASTRODUODENOSCOPY);  Surgeon: Skip Varela MD;  Location:  [facility];  Service: Gastroenterology;  Laterality: N/A;    INSERT / REPLACE / REMOVE PACEMAKER      LIVER RESECTION      LUMBAR DISC SURGERY         Family History   Problem Relation Age of Onset    Heart disease Mother     Pancreatic cancer Mother     Heart disease Father        Social History     Tobacco Use    Smoking status: Some Days     Packs/day: 0.25     Years: 37.00     Pack years: 9.25     Types: Cigarettes     Last attempt to quit: 1992     Years since quittin.5    Smokeless tobacco: Never    Tobacco comments:     Pt declines enrollment in the [employer]. Handout provided for Ambulatory Smoking Cessation program.    Substance Use Topics    Alcohol use: Yes     Alcohol/week: 5.0 standard drinks     Types: 5 Cans of beer per week    Drug use: No        Past Hospitalization:     Home meds:  Current Outpatient Medications on File Prior to Visit   Medication Sig Dispense Refill    aspirin 81 MG Chew Take 1 tablet (81 mg total) by mouth once daily. 90 tablet 3    atorvastatin (LIPITOR) 40 MG tablet Take 40 mg by mouth once daily.      JARDIANCE 25 mg tablet Take 25 mg by mouth once daily.      LORazepam (ATIVAN) 1 MG tablet Take 1 mg by mouth 2 (two) times daily.      metoprolol tartrate (LOPRESSOR) 50 MG tablet Take 0.5 tablets (25 mg total) by mouth 2 (two) times daily. 180 tablet 3    OLANZapine (ZYPREXA) 2.5 MG tablet 2.5 mg once daily.      pantoprazole (PROTONIX) 40 MG tablet Take 1 tablet (40 mg total) by mouth once daily. 30 tablet 2    glimepiride (AMARYL) 2 MG tablet Take 1 mg by mouth every morning.      mirtazapine (REMERON) 15 MG tablet 15 mg once daily.      OLANZapine (ZYPREXA) 5 MG tablet Take 5 mg by mouth every evening.      SOLUS V2 LANCETS 30 gauge Misc       SOLUS V2 TEST STRIPS Strp        No current facility-administered medications on file prior to visit.       Cardiac meds:  ASA 81 mg  Atorvastatin 40 mg  Metoprolol tartate 50 mg BIOD  Glimepiride         OBJECTIVE:  "    Vital Signs (Most Recent)  Vitals:    05/24/23 1046   BP: (!) 165/77   Pulse: 60   SpO2: 96%   Weight: 71.7 kg (158 lb 2.9 oz)   Height: 5' 5" (1.651 m)         Physical Exam:  Lungs:c lear  Heart: scar from bypass surgery; systolic ejection murmur ntoed         LABS    CMP  No results for input(s): K in the last 2160 hours.    Invalid input(s): GFR    LIPID  No results for input(s): HDL, CHOL, TRIG, LDLCALC, CHOLHDL, NONHDL, TOTALCHOLEST in the last 2160 hours.      Diagnostic Results:    EKG: 10/22: v paced    Echo: 10/22: normal EF. Mild AS     Chart review:        ASSESSMENT/PLAN:     Pacemaker with less than one month longevity    Plan: procedure of generator change explained to patient and family. Risks and benefits explained and will proceed on tuesay 2nd.     Timi Meadows MD    "

## 2023-05-29 ENCOUNTER — LAB VISIT (OUTPATIENT)
Dept: LAB | Facility: HOSPITAL | Age: 83
End: 2023-05-29
Attending: INTERNAL MEDICINE
Payer: MEDICARE

## 2023-05-29 DIAGNOSIS — Z01.810 PRE-OPERATIVE CARDIOVASCULAR EXAMINATION: ICD-10-CM

## 2023-05-29 LAB
ANION GAP SERPL CALC-SCNC: 9 MMOL/L (ref 8–16)
BASOPHILS # BLD AUTO: 0.02 K/UL (ref 0–0.2)
BASOPHILS NFR BLD: 0.7 % (ref 0–1.9)
CALCIUM SERPL-MCNC: 9.3 MG/DL (ref 8.7–10.5)
CHLORIDE SERPL-SCNC: 112 MMOL/L (ref 95–110)
CO2 SERPL-SCNC: 23 MMOL/L (ref 23–29)
CREAT SERPL-MCNC: 1.06 MG/DL (ref 0.5–1.4)
DIFFERENTIAL METHOD: ABNORMAL
EOSINOPHIL # BLD AUTO: 0 K/UL (ref 0–0.5)
EOSINOPHIL NFR BLD: 0.7 % (ref 0–8)
ERYTHROCYTE [DISTWIDTH] IN BLOOD BY AUTOMATED COUNT: 21.1 % (ref 11.5–14.5)
EST. GFR  (NO RACE VARIABLE): >60 ML/MIN/1.73 M^2
GLUCOSE SERPL-MCNC: 94 MG/DL (ref 70–110)
HCT VFR BLD AUTO: 38.6 % (ref 40–54)
HGB BLD-MCNC: 12 G/DL (ref 14–18)
IMM GRANULOCYTES # BLD AUTO: 0.01 K/UL (ref 0–0.04)
IMM GRANULOCYTES NFR BLD AUTO: 0.3 % (ref 0–0.5)
LYMPHOCYTES # BLD AUTO: 1 K/UL (ref 1–4.8)
LYMPHOCYTES NFR BLD: 32.8 % (ref 18–48)
MCH RBC QN AUTO: 25.8 PG (ref 27–31)
MCHC RBC AUTO-ENTMCNC: 31.1 G/DL (ref 32–36)
MCV RBC AUTO: 83 FL (ref 82–98)
MONOCYTES # BLD AUTO: 0.6 K/UL (ref 0.3–1)
MONOCYTES NFR BLD: 18.8 % (ref 4–15)
NEUTROPHILS # BLD AUTO: 1.4 K/UL (ref 1.8–7.7)
NEUTROPHILS NFR BLD: 46.7 % (ref 38–73)
NRBC BLD-RTO: 0 /100 WBC
PLATELET # BLD AUTO: 99 K/UL (ref 150–450)
PMV BLD AUTO: 9 FL (ref 9.2–12.9)
POTASSIUM SERPL-SCNC: 4 MMOL/L (ref 3.5–5.1)
RBC # BLD AUTO: 4.66 M/UL (ref 4.6–6.2)
SODIUM SERPL-SCNC: 144 MMOL/L (ref 136–145)
UUN UR-MCNC: 16 MG/DL (ref 2–20)
WBC # BLD AUTO: 2.93 K/UL (ref 3.9–12.7)

## 2023-05-29 PROCEDURE — 85025 COMPLETE CBC W/AUTO DIFF WBC: CPT | Mod: PO | Performed by: INTERNAL MEDICINE

## 2023-05-29 PROCEDURE — 36415 COLL VENOUS BLD VENIPUNCTURE: CPT | Mod: PO | Performed by: INTERNAL MEDICINE

## 2023-05-29 PROCEDURE — 80048 BASIC METABOLIC PNL TOTAL CA: CPT | Mod: PO | Performed by: INTERNAL MEDICINE

## 2023-05-30 ENCOUNTER — HOSPITAL ENCOUNTER (OUTPATIENT)
Facility: HOSPITAL | Age: 83
Discharge: HOME | End: 2023-05-30
Attending: INTERNAL MEDICINE | Admitting: INTERNAL MEDICINE
Payer: MEDICARE

## 2023-05-30 DIAGNOSIS — Z01.810 PRE-OPERATIVE CARDIOVASCULAR EXAMINATION: Primary | ICD-10-CM

## 2023-05-30 DIAGNOSIS — Z45.010 PACEMAKER GENERATOR END OF LIFE: ICD-10-CM

## 2023-05-30 DIAGNOSIS — Z01.818 PREOP TESTING: ICD-10-CM

## 2023-05-30 LAB — POCT GLUCOSE: 77 MG/DL (ref 70–110)

## 2023-05-30 PROCEDURE — 93010 ELECTROCARDIOGRAM REPORT: CPT | Mod: ,,, | Performed by: INTERNAL MEDICINE

## 2023-05-30 PROCEDURE — 25000003 PHARM REV CODE 250: Performed by: INTERNAL MEDICINE

## 2023-05-30 PROCEDURE — 33228 REMV&REPLC PM GEN DUAL LEAD: CPT | Performed by: INTERNAL MEDICINE

## 2023-05-30 PROCEDURE — C1785 PMKR, DUAL, RATE-RESP: HCPCS | Performed by: INTERNAL MEDICINE

## 2023-05-30 PROCEDURE — 93005 ELECTROCARDIOGRAM TRACING: CPT | Mod: 59

## 2023-05-30 PROCEDURE — 99153 MOD SED SAME PHYS/QHP EA: CPT | Performed by: INTERNAL MEDICINE

## 2023-05-30 PROCEDURE — C1894 INTRO/SHEATH, NON-LASER: HCPCS | Performed by: INTERNAL MEDICINE

## 2023-05-30 PROCEDURE — 99152 MOD SED SAME PHYS/QHP 5/>YRS: CPT | Performed by: INTERNAL MEDICINE

## 2023-05-30 PROCEDURE — 93010 EKG 12-LEAD: ICD-10-PCS | Mod: ,,, | Performed by: INTERNAL MEDICINE

## 2023-05-30 PROCEDURE — 27201423 OPTIME MED/SURG SUP & DEVICES STERILE SUPPLY: Performed by: INTERNAL MEDICINE

## 2023-05-30 PROCEDURE — 63600175 PHARM REV CODE 636 W HCPCS: Performed by: INTERNAL MEDICINE

## 2023-05-30 DEVICE — PULSE GENERATOR
Type: IMPLANTABLE DEVICE | Site: CHEST | Status: FUNCTIONAL
Brand: ASSURITY MRI™

## 2023-05-30 RX ORDER — LIDOCAINE HYDROCHLORIDE 10 MG/ML
INJECTION, SOLUTION EPIDURAL; INFILTRATION; INTRACAUDAL; PERINEURAL
Status: DISCONTINUED | OUTPATIENT
Start: 2023-05-30 | End: 2023-05-30 | Stop reason: HOSPADM

## 2023-05-30 RX ORDER — MIDAZOLAM HYDROCHLORIDE 1 MG/ML
INJECTION, SOLUTION INTRAMUSCULAR; INTRAVENOUS
Status: DISCONTINUED | OUTPATIENT
Start: 2023-05-30 | End: 2023-05-30 | Stop reason: HOSPADM

## 2023-05-30 RX ORDER — CEFAZOLIN SODIUM 1 G/3ML
INJECTION, POWDER, FOR SOLUTION INTRAMUSCULAR; INTRAVENOUS
Status: DISCONTINUED | OUTPATIENT
Start: 2023-05-30 | End: 2023-05-30 | Stop reason: HOSPADM

## 2023-05-30 RX ORDER — CEPHALEXIN 500 MG/1
500 CAPSULE ORAL EVERY 8 HOURS
Qty: 15 CAPSULE | Refills: 0 | Status: SHIPPED | OUTPATIENT
Start: 2023-05-30 | End: 2023-06-04

## 2023-05-30 RX ORDER — HEPARIN SODIUM 200 [USP'U]/100ML
INJECTION, SOLUTION INTRAVENOUS
Status: DISCONTINUED | OUTPATIENT
Start: 2023-05-30 | End: 2023-05-30 | Stop reason: HOSPADM

## 2023-05-30 RX ORDER — VANCOMYCIN HYDROCHLORIDE 1 G/20ML
INJECTION, POWDER, LYOPHILIZED, FOR SOLUTION INTRAVENOUS
Status: SHIPPED | OUTPATIENT
Start: 2023-05-30

## 2023-05-30 RX ORDER — FENTANYL CITRATE 50 UG/ML
INJECTION, SOLUTION INTRAMUSCULAR; INTRAVENOUS
Status: DISCONTINUED | OUTPATIENT
Start: 2023-05-30 | End: 2023-05-30 | Stop reason: HOSPADM

## 2023-05-30 NOTE — DISCHARGE INSTRUCTIONS
Detail Level: Zone
REMOVE the dressing to Left Groin site after 24 hours and may apply a bandaid for 2-3 days; DO NOT get the dressing wet to left chest and keep in place as instructed.  
Detail Level: Detailed

## 2023-05-30 NOTE — PLAN OF CARE
Patient received in cath recovery per lucinda w/ nurse Boles and received bedside report; Patient drowsy in no distress and has no complaints; Patient connected to monitors----Paced; Skin wm dry color pk; drsg cdi to left chest device site and to left groin access sites and no bleeding nor hematoma noted; radial pulses +2 bilat; dp pulses audible w/ doppler; spouse in waiit room and updated; iv access maintained; Ice pk to left chest applied; Will continue to monitor

## 2023-05-30 NOTE — DISCHARGE SUMMARY
[facility] - Cath Lab (Hospital)  Discharge Note  Short Stay    Procedure(s) (LRB):  REPLACEMENT, PACEMAKER GENERATOR (N/A)  INSERTION, PACEMAKER, TEMPORARY (Left)      OUTCOME: Condition has improved and patient is now ready for discharge.    DISPOSITION: Home or Self Care    FINAL DIAGNOSIS:  pacemaker generator end of life     FOLLOWUP: In clinic    DISCHARGE INSTRUCTIONS:   Clinic visit for wound check one week  Antibiotics for 3 days (keflex ordered)    Discharge Procedure Orders   CBC W/ AUTO DIFFERENTIAL   Standing Status: Future Number of Occurrences: 1 Standing Exp. Date: 07/23/24     BASIC METABOLIC PANEL   Standing Status: Future Number of Occurrences: 1 Standing Exp. Date: 07/23/24         Clinical Reference Documents Added to Patient Instructions         Document    PACEMAKER GENERATOR CHANGE (ENGLISH)    PACEMAKER GENERATOR CHANGE DISCHARGE INSTRUCTIONS (ENGLISH)    PROCEDURAL SEDATION, ADULT ED (ENGLISH)            TIME SPENT ON DISCHARGE: 10 minutes

## 2023-05-30 NOTE — Clinical Note
The groin and left chest was prepped. The site was prepped with ChloraPrep. The site was clipped. The patient was draped. The patient was positioned supine.

## 2023-05-30 NOTE — Clinical Note
The catheter was inserted in the right ventricle. The transvenous pacing lead was secured in place in the RV and was placed and capture was confirmed. The pacer cardiovascular device was off.

## 2023-05-30 NOTE — PLAN OF CARE
Patient lying in bed no distress. V/s stable  Procedure and recovery process discussed with patient. All question answered and patient understood.Will continue to monitor

## 2023-05-30 NOTE — BRIEF OP NOTE
Pacemaker generator change:   Complications: none  Blood loss< 50 cc  Thresholds good  Temporary pacemaker placed and removed after procedure    Keflex 500 mg TID for 5 days  Wound check in one week   Continue all home medications

## 2023-05-30 NOTE — Clinical Note
The catheter was removed in the right ventricle. The transvenous pacing lead was removed from the RV. The pacer was paced at 40 BPM 10 mA 2 mV.

## 2023-05-30 NOTE — NURSING
Spouse to bedside for discharge; Reviewed discharge homme care and follow up with patient and spouse and questions answered and verbalized understanding; Rx med from pharmacy given to pt/spouse and to begin this evening; Assisted pt up to bathroom per whch and spouse at bedside to assist pt to dress; Patient has belongings; Drsg cdi to left chest battery change site; drsg cdi to left groin; Patient AAO and in no distress and no pain reported

## 2023-05-31 VITALS
TEMPERATURE: 97 F | WEIGHT: 156 LBS | HEIGHT: 65 IN | DIASTOLIC BLOOD PRESSURE: 66 MMHG | SYSTOLIC BLOOD PRESSURE: 139 MMHG | BODY MASS INDEX: 25.99 KG/M2 | OXYGEN SATURATION: 100 % | RESPIRATION RATE: 22 BRPM | HEART RATE: 60 BPM

## 2023-06-01 ENCOUNTER — NURSE TRIAGE (OUTPATIENT)
Dept: ADMINISTRATIVE | Facility: CLINIC | Age: 83
End: 2023-06-01
Payer: MEDICARE

## 2023-06-01 NOTE — TELEPHONE ENCOUNTER
Pt had a pacemaker placed on 5/30/23 and does not have f/u until 6/5/23. Pt's wife asks if his chest bandage needs to be changed prior to his follow-up. No specific instructions could be found in pt's AVS or discharge note.     OCP, Dr. Shelton is called for further guidance. Dr. Shelton advises that pt leave chest dressing on for tonight and f/u with Dr. Meadows's staff in the morning for further guidance on when to remove dressing. Pt's wife verbalizes understanding and is instructed to call back if pt develops any new/worsening sxs, or if they have any additional questions or concerns.   Reason for Disposition   Nursing judgment    Protocols used: No Protocol [id number]

## 2023-06-02 ENCOUNTER — TELEPHONE (OUTPATIENT)
Dept: CARDIOLOGY | Facility: CLINIC | Age: 83
End: 2023-06-02
Payer: MEDICARE

## 2023-06-02 NOTE — TELEPHONE ENCOUNTER
This NT advises pt's wife of the following:  James Lyons MA  You 1 hour ago (2:56 PM)     AD  Hello , you can let the pt know to keep the bandage on until the pt comes into clinic .     James BURNETT MA          Pt's wife verbalizes understanding and is instructed to call back if pt develops any new/worsening sxs, questions, or concerns.

## 2023-06-02 NOTE — TELEPHONE ENCOUNTER
Pt had a pacemaker placed on 5-23-23  and wanted to know if to remove the bandage or leave it on I instructed the pt to leave it on until pt returns to clinic.

## 2023-06-04 NOTE — PROGRESS NOTES
[facility] Cardiology 701     SUBJECTIVE:     History of Present Illness:  Patient is a 83 y.o. male presents with pacemaker at [facility]. Patient of . 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    GERD (gastroesophageal reflux disease)     Gout, unspecified     H/O: GI bleed     Hypertension     Liver cancer     PVD (peripheral vascular disease)     Sciatica     SSS (sick sinus syndrome)     Tachy-valentine syndrome     Thyroid disease        Past Surgical History:   Procedure Laterality Date    ANGIOGRAPHY Right 10/11/2021    Procedure: ANGIOGRAM-HEPATIC;  Surgeon: Phil Stuart II, MD;  Location: [facility] CATH LAB;  Service: Interventional Radiology;  Laterality: Right;    APPENDECTOMY      CHOLECYSTECTOMY      CORONARY ARTERY BYPASS GRAFT      ENDOSCOPIC ULTRASOUND OF UPPER GASTROINTESTINAL TRACT N/A 6/17/2020    Procedure: EUS;  Surgeon: Rei Humphrey MD;  Location: [facility] ENDO;  Service: Endoscopy;  Laterality: N/A;    ERCP N/A 6/17/2020    Procedure: ERCP;  Surgeon: Rei Humphrey MD;  Location: [facility] ENDO;  Service: Endoscopy;  Laterality: N/A;    ERCP N/A 8/31/2020    Procedure: ERCP (ENDOSCOPIC RETROGRADE CHOLANGIOPANCREATOGRAPHY);  Surgeon: Adela Boyle MD;  Location: [facility] ENDO;  Service: Endoscopy;  Laterality: N/A;    ESOPHAGOGASTRODUODENOSCOPY N/A 10/11/2021    Procedure: EGD (ESOPHAGOGASTRODUODENOSCOPY);  Surgeon: Skip DUDLEY  MD Nichole;  Location: [facility];  Service: Gastroenterology;  Laterality: N/A;    INSERT / REPLACE / REMOVE PACEMAKER      INSERTION OF TEMPORARY PACEMAKER Left 2023    Procedure: INSERTION, PACEMAKER, TEMPORARY;  Surgeon: Timi Meadows MD;  Location: [facility] CATH LAB/EP;  Service: Cardiology;  Laterality: Left;    LIVER RESECTION      LUMBAR DISC SURGERY      REPLACEMENT OF PACEMAKER GENERATOR N/A 2023    Procedure: REPLACEMENT, PACEMAKER GENERATOR;  Surgeon: Timi eMadows MD;  Location: [facility] CATH LAB/EP;  Service: Cardiology;  Laterality: N/A;       Family History   Problem Relation Age of Onset    Heart disease Mother     Pancreatic cancer Mother     Heart disease Father        Social History     Tobacco Use    Smoking status: Some Days     Packs/day: 0.25     Years: 37.00     Pack years: 9.25     Types: Cigarettes     Last attempt to quit: 1992     Years since quittin.5    Smokeless tobacco: Never    Tobacco comments:     Pt declines enrollment in the [employer]. Handout provided for Ambulatory Smoking Cessation program.    Substance Use Topics    Alcohol use: Yes     Alcohol/week: 5.0 standard drinks     Types: 5 Cans of beer per week    Drug use: No        Past Hospitalization:     Home meds:  Current Outpatient Medications on File Prior to Visit   Medication Sig Dispense Refill    aspirin 81 MG Chew Take 1 tablet (81 mg total) by mouth once daily. 90 tablet 3    atorvastatin (LIPITOR) 40 MG tablet Take 40 mg by mouth once daily.      [] cephALEXin (KEFLEX) 500 MG capsule Take 1 capsule (500 mg total) by mouth every 8 (eight) hours. for 3 days 15 capsule 0    glimepiride (AMARYL) 2 MG tablet Take 1 mg by mouth every morning.      JARDIANCE 25 mg tablet Take 25 mg by mouth once daily.      LORazepam (ATIVAN) 1 MG tablet Take 1 mg by mouth 2 (two) times daily.      metoprolol tartrate (LOPRESSOR) 50 MG tablet Take 0.5 tablets (25 mg total) by mouth 2 (two) times  "daily. 180 tablet 3    mirtazapine (REMERON) 15 MG tablet 15 mg once daily.      pantoprazole (PROTONIX) 40 MG tablet Take 1 tablet (40 mg total) by mouth once daily. 30 tablet 2    SOLUS V2 LANCETS 30 gauge Misc       SOLUS V2 TEST STRIPS Strp        Current Facility-Administered Medications on File Prior to Visit   Medication Dose Route Frequency Provider Last Rate Last Admin    vancomycin injection    PRN Timi Meadows MD   1 g at 05/30/23 0836       Cardiac meds:  ASA 81 mg  Atorvastatin 40 mg  Metoprolol tartate 50 mg BIOD  Glimepiride         OBJECTIVE:     Vital Signs (Most Recent)  Vitals:    06/05/23 0856   BP: (!) 162/83   Pulse: 60   SpO2: 98%   Weight: 70.1 kg (154 lb 10.4 oz)   Height: 5' 5" (1.651 m)           Physical Exam:  Lungs:c lear  Heart: scar from bypass surgery; systolic ejection murmur noted   Pacemaker site: normal; steristrips removed         LABS    CMP  Recent Labs   Lab Result Units 05/29/23  1335   Potassium mmol/L 4.0       LIPID  No results for input(s): HDL, CHOL, TRIG, LDLCALC, CHOLHDL, NONHDL, TOTALCHOLEST in the last 2160 hours.      Diagnostic Results:    EKG: 10/22: v paced    Echo: 10/22: normal EF. Mild AS     Chart review:        ASSESSMENT/PLAN:     Pacemaker - s/p battery change     Plan: followup with   Return as needed     Timi Meadows MD      "

## 2023-06-05 ENCOUNTER — OFFICE VISIT (OUTPATIENT)
Dept: CARDIOLOGY | Facility: CLINIC | Age: 83
End: 2023-06-05
Payer: MEDICARE

## 2023-06-05 ENCOUNTER — TELEPHONE (OUTPATIENT)
Dept: CARDIOLOGY | Facility: CLINIC | Age: 83
End: 2023-06-05
Payer: MEDICARE

## 2023-06-05 VITALS
SYSTOLIC BLOOD PRESSURE: 162 MMHG | HEIGHT: 65 IN | OXYGEN SATURATION: 98 % | BODY MASS INDEX: 25.76 KG/M2 | HEART RATE: 60 BPM | WEIGHT: 154.63 LBS | DIASTOLIC BLOOD PRESSURE: 83 MMHG

## 2023-06-05 DIAGNOSIS — Z95.0 PACEMAKER: ICD-10-CM

## 2023-06-05 DIAGNOSIS — Z45.010 PACEMAKER GENERATOR END OF LIFE: Primary | ICD-10-CM

## 2023-06-05 PROCEDURE — 1159F MED LIST DOCD IN RCRD: CPT | Mod: CPTII,S$GLB,, | Performed by: INTERNAL MEDICINE

## 2023-06-05 PROCEDURE — 1160F RVW MEDS BY RX/DR IN RCRD: CPT | Mod: CPTII,S$GLB,, | Performed by: INTERNAL MEDICINE

## 2023-06-05 PROCEDURE — 1101F PR PT FALLS ASSESS DOC 0-1 FALLS W/OUT INJ PAST YR: ICD-10-PCS | Mod: CPTII,S$GLB,, | Performed by: INTERNAL MEDICINE

## 2023-06-05 PROCEDURE — 3079F DIAST BP 80-89 MM HG: CPT | Mod: CPTII,S$GLB,, | Performed by: INTERNAL MEDICINE

## 2023-06-05 PROCEDURE — 1101F PT FALLS ASSESS-DOCD LE1/YR: CPT | Mod: CPTII,S$GLB,, | Performed by: INTERNAL MEDICINE

## 2023-06-05 PROCEDURE — 1159F PR MEDICATION LIST DOCUMENTED IN MEDICAL RECORD: ICD-10-PCS | Mod: CPTII,S$GLB,, | Performed by: INTERNAL MEDICINE

## 2023-06-05 PROCEDURE — 1125F AMNT PAIN NOTED PAIN PRSNT: CPT | Mod: CPTII,S$GLB,, | Performed by: INTERNAL MEDICINE

## 2023-06-05 PROCEDURE — 99999 PR PBB SHADOW E&M-EST. PATIENT-LVL III: CPT | Mod: PBBFAC,,, | Performed by: INTERNAL MEDICINE

## 2023-06-05 PROCEDURE — 1125F PR PAIN SEVERITY QUANTIFIED, PAIN PRESENT: ICD-10-PCS | Mod: CPTII,S$GLB,, | Performed by: INTERNAL MEDICINE

## 2023-06-05 PROCEDURE — 99999 PR PBB SHADOW E&M-EST. PATIENT-LVL III: ICD-10-PCS | Mod: PBBFAC,,, | Performed by: INTERNAL MEDICINE

## 2023-06-05 PROCEDURE — 3288F PR FALLS RISK ASSESSMENT DOCUMENTED: ICD-10-PCS | Mod: CPTII,S$GLB,, | Performed by: INTERNAL MEDICINE

## 2023-06-05 PROCEDURE — 3288F FALL RISK ASSESSMENT DOCD: CPT | Mod: CPTII,S$GLB,, | Performed by: INTERNAL MEDICINE

## 2023-06-05 PROCEDURE — 1160F PR REVIEW ALL MEDS BY PRESCRIBER/CLIN PHARMACIST DOCUMENTED: ICD-10-PCS | Mod: CPTII,S$GLB,, | Performed by: INTERNAL MEDICINE

## 2023-06-05 PROCEDURE — 3077F PR MOST RECENT SYSTOLIC BLOOD PRESSURE >= 140 MM HG: ICD-10-PCS | Mod: CPTII,S$GLB,, | Performed by: INTERNAL MEDICINE

## 2023-06-05 PROCEDURE — 3077F SYST BP >= 140 MM HG: CPT | Mod: CPTII,S$GLB,, | Performed by: INTERNAL MEDICINE

## 2023-06-05 PROCEDURE — 99024 PR POST-OP FOLLOW-UP VISIT: ICD-10-PCS | Mod: S$GLB,,, | Performed by: INTERNAL MEDICINE

## 2023-06-05 PROCEDURE — 99024 POSTOP FOLLOW-UP VISIT: CPT | Mod: S$GLB,,, | Performed by: INTERNAL MEDICINE

## 2023-06-05 PROCEDURE — 3079F PR MOST RECENT DIASTOLIC BLOOD PRESSURE 80-89 MM HG: ICD-10-PCS | Mod: CPTII,S$GLB,, | Performed by: INTERNAL MEDICINE

## 2023-06-05 NOTE — TELEPHONE ENCOUNTER
Pt was seen in clinic on today and I spoke to pt 's wife about the message she left regarding pt's PM site pt's wife stated her message was answered.

## 2023-07-04 NOTE — PROGRESS NOTES
Pacemaker Evaluation Report    12/21/2022      Primary MD: Dr. Terrazas  Primary Cardiologist: Dr. Quiroga     : CellayroniRadialpoint Model: Evia DR/Serial No.  [id number]  Implant date: Jan 13, 2011    Reason for evaluation:routine  Indication for pacemaker:  unknown    Measurements  Atrial sensing - P wave: 0.5 mV  Atrial capture: Maintained: 0.8 V @ 0.5 msec  Atrial lead impedance: 331 ohms  Ventricular sensing - R wave: 9 mV  Ventricular capture: RV Maintained: 1.5 V @ 0.4 ms   Ventricular lead impedance: right - 429 ohms   Underlying rhythm: AF with HR 39       Diagnostic Data  Atrial paced: 95 % Ventricular paced: 46 %  Mode switch: on  Other: 18 episodes of Afib burden - longest episode 129 hours on 10/21/2021 then chronic AF since 10/21/2021    Sensor response:  CLS  Battery status: satisfactory Magnet rate: 90 bpm   20 % remaining capacity  Est. Longevity 7 months       Final Parameters  Mode: DDD-CLS Lower rate: 60 bpm Upper rate: 110 bpm  Atrial - Amplitude: 1.6 V Pulse width: 0.4 ms sensitivity 0.8 ms  Polarity: Bipolar  Ventricular - Amplitude: 2.5 V Pulse width: 0.5 ms sensitivity 2.5 ms  Polarity: Bipolar  Changes made:  RV captured turned on.   Conclusions: Normal device function.      Follow up: 2 months      Carlos Quiroga

## 2023-07-05 ENCOUNTER — OFFICE VISIT (OUTPATIENT)
Dept: CARDIOLOGY | Facility: CLINIC | Age: 83
End: 2023-07-05
Payer: MEDICARE

## 2023-07-05 VITALS
SYSTOLIC BLOOD PRESSURE: 138 MMHG | DIASTOLIC BLOOD PRESSURE: 76 MMHG | WEIGHT: 152 LBS | HEIGHT: 65 IN | HEART RATE: 80 BPM | BODY MASS INDEX: 25.33 KG/M2

## 2023-07-05 DIAGNOSIS — Z79.899 POLYPHARMACY: ICD-10-CM

## 2023-07-05 DIAGNOSIS — I48.92 ATRIAL FLUTTER, UNSPECIFIED TYPE: ICD-10-CM

## 2023-07-05 DIAGNOSIS — Z72.0 TOBACCO ABUSE: ICD-10-CM

## 2023-07-05 DIAGNOSIS — E78.5 DYSLIPIDEMIA: ICD-10-CM

## 2023-07-05 DIAGNOSIS — E11.9 TYPE 2 DIABETES MELLITUS WITHOUT COMPLICATION, WITHOUT LONG-TERM CURRENT USE OF INSULIN: ICD-10-CM

## 2023-07-05 DIAGNOSIS — E78.2 MIXED HYPERLIPIDEMIA: ICD-10-CM

## 2023-07-05 DIAGNOSIS — I48.0 PAROXYSMAL ATRIAL FIBRILLATION: ICD-10-CM

## 2023-07-05 DIAGNOSIS — Z95.0 PACEMAKER: ICD-10-CM

## 2023-07-05 DIAGNOSIS — I10 ESSENTIAL HYPERTENSION: ICD-10-CM

## 2023-07-05 DIAGNOSIS — I63.432 EMBOLIC STROKE INVOLVING LEFT POSTERIOR CEREBRAL ARTERY: ICD-10-CM

## 2023-07-05 DIAGNOSIS — I35.0 NONRHEUMATIC AORTIC VALVE STENOSIS: ICD-10-CM

## 2023-07-05 DIAGNOSIS — I25.10 CORONARY ARTERY DISEASE WITHOUT ANGINA PECTORIS, UNSPECIFIED VESSEL OR LESION TYPE, UNSPECIFIED WHETHER NATIVE OR TRANSPLANTED HEART: Primary | ICD-10-CM

## 2023-07-05 DIAGNOSIS — I25.10 CORONARY ARTERY DISEASE INVOLVING NATIVE CORONARY ARTERY OF NATIVE HEART WITHOUT ANGINA PECTORIS: ICD-10-CM

## 2023-07-05 PROCEDURE — 1101F PR PT FALLS ASSESS DOC 0-1 FALLS W/OUT INJ PAST YR: ICD-10-PCS | Mod: CPTII,S$GLB,, | Performed by: INTERNAL MEDICINE

## 2023-07-05 PROCEDURE — 1159F MED LIST DOCD IN RCRD: CPT | Mod: CPTII,S$GLB,, | Performed by: INTERNAL MEDICINE

## 2023-07-05 PROCEDURE — 3288F FALL RISK ASSESSMENT DOCD: CPT | Mod: CPTII,S$GLB,, | Performed by: INTERNAL MEDICINE

## 2023-07-05 PROCEDURE — 1160F PR REVIEW ALL MEDS BY PRESCRIBER/CLIN PHARMACIST DOCUMENTED: ICD-10-PCS | Mod: CPTII,S$GLB,, | Performed by: INTERNAL MEDICINE

## 2023-07-05 PROCEDURE — 1160F RVW MEDS BY RX/DR IN RCRD: CPT | Mod: CPTII,S$GLB,, | Performed by: INTERNAL MEDICINE

## 2023-07-05 PROCEDURE — 99215 OFFICE O/P EST HI 40 MIN: CPT | Mod: S$GLB,,, | Performed by: INTERNAL MEDICINE

## 2023-07-05 PROCEDURE — 1126F AMNT PAIN NOTED NONE PRSNT: CPT | Mod: CPTII,S$GLB,, | Performed by: INTERNAL MEDICINE

## 2023-07-05 PROCEDURE — 3075F PR MOST RECENT SYSTOLIC BLOOD PRESS GE 130-139MM HG: ICD-10-PCS | Mod: CPTII,S$GLB,, | Performed by: INTERNAL MEDICINE

## 2023-07-05 PROCEDURE — 99999 PR PBB SHADOW E&M-EST. PATIENT-LVL IV: CPT | Mod: PBBFAC,,, | Performed by: INTERNAL MEDICINE

## 2023-07-05 PROCEDURE — 3078F PR MOST RECENT DIASTOLIC BLOOD PRESSURE < 80 MM HG: ICD-10-PCS | Mod: CPTII,S$GLB,, | Performed by: INTERNAL MEDICINE

## 2023-07-05 PROCEDURE — 99999 PR PBB SHADOW E&M-EST. PATIENT-LVL IV: ICD-10-PCS | Mod: PBBFAC,,, | Performed by: INTERNAL MEDICINE

## 2023-07-05 PROCEDURE — 99215 PR OFFICE/OUTPT VISIT, EST, LEVL V, 40-54 MIN: ICD-10-PCS | Mod: S$GLB,,, | Performed by: INTERNAL MEDICINE

## 2023-07-05 PROCEDURE — 1159F PR MEDICATION LIST DOCUMENTED IN MEDICAL RECORD: ICD-10-PCS | Mod: CPTII,S$GLB,, | Performed by: INTERNAL MEDICINE

## 2023-07-05 PROCEDURE — 1126F PR PAIN SEVERITY QUANTIFIED, NO PAIN PRESENT: ICD-10-PCS | Mod: CPTII,S$GLB,, | Performed by: INTERNAL MEDICINE

## 2023-07-05 PROCEDURE — 3288F PR FALLS RISK ASSESSMENT DOCUMENTED: ICD-10-PCS | Mod: CPTII,S$GLB,, | Performed by: INTERNAL MEDICINE

## 2023-07-05 PROCEDURE — 3075F SYST BP GE 130 - 139MM HG: CPT | Mod: CPTII,S$GLB,, | Performed by: INTERNAL MEDICINE

## 2023-07-05 PROCEDURE — 3078F DIAST BP <80 MM HG: CPT | Mod: CPTII,S$GLB,, | Performed by: INTERNAL MEDICINE

## 2023-07-05 PROCEDURE — 1101F PT FALLS ASSESS-DOCD LE1/YR: CPT | Mod: CPTII,S$GLB,, | Performed by: INTERNAL MEDICINE

## 2023-07-05 NOTE — PROGRESS NOTES
Subjective:   Patient ID:  Venu Lau is a 83 y.o. male who presents for follow up of Coronary Artery Disease, Hypertension, Hyperlipidemia, and Atrial Fibrillation        HPI:       Venu Lau 83 y.o. male is here follow up and feeling well without any new complaints. He is tolerating his medications well and is compliant with his medications.  He had a generator change for his pacemaker May 2023.  He tolerated procedure very well.          He has PMH of PAF, CAD s/p CABG, HTN, HLD and chronic pain. He is compliant with and tolerating medications. He does have a pacemaker and PAF. CHadsVasc 5 but unable to place on blood thinners because of history of GI bleeding and high risk of falling. He has started smoking again.      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 05/27/2021    Alcohol use disorder, mild, abuse 05/27/2021    Aphasia 05/26/2021    Other cirrhosis of liver     Abnormal LFTs 06/17/2020    Hemobilia 06/17/2020    Hyperbilirubinemia 06/17/2020    Subclinical hypothyroidism 06/12/2020    Tobacco abuse 01/29/2020    Paroxysmal atrial fibrillation 02/13/2017    Oropharyngeal dysphagia 10/30/2016    Anemia, chronic disease 10/30/2016    Metabolic acidosis 10/29/2016    Polypharmacy 10/29/2016    Dyslipidemia 10/29/2016    Coronary artery disease involving native coronary artery of native heart without angina  pectoris     Type 2 diabetes mellitus, without long-term current use of insulin     Essential hypertension     Hx of CABG 2012    Atrial flutter 2012    Pacemaker 2012    Villous adenoma 2012           Right Arm BP - Sittin/75  Left Arm BP - Sittin/76        LABS    LAST HbA1c  Lab Results   Component Value Date    HGBA1C 5.6 10/04/2022       Lipid panel  Lab Results   Component Value Date    CHOL 100 (L) 10/04/2022    CHOL 135 2021    CHOL 78 (L) 2021     Lab Results   Component Value Date    HDL 40 10/04/2022    HDL 33 (L) 2021    HDL 28 (L) 2021     Lab Results   Component Value Date    LDLCALC 41.2 (L) 10/04/2022    LDLCALC 73.4 2021    LDLCALC 29.2 (L) 2021     Lab Results   Component Value Date    TRIG 94 10/04/2022    TRIG 143 2021    TRIG 104 2021     Lab Results   Component Value Date    CHOLHDL 40.0 10/04/2022    CHOLHDL 24.4 2021    CHOLHDL 35.9 2021            Review of Systems   Constitutional: Negative for diaphoresis, night sweats, weight gain and weight loss.   HENT:  Negative for congestion.    Eyes:  Negative for blurred vision, discharge and double vision.   Cardiovascular:  Negative for chest pain, claudication, cyanosis, dyspnea on exertion, irregular heartbeat, leg swelling, near-syncope, orthopnea, palpitations, paroxysmal nocturnal dyspnea and syncope.   Respiratory:  Negative for cough, shortness of breath and wheezing.    Endocrine: Negative for cold intolerance, heat intolerance and polyphagia.   Hematologic/Lymphatic: Negative for adenopathy and bleeding problem. Does not bruise/bleed easily.   Skin:  Negative for dry skin and nail changes.   Musculoskeletal:  Negative for arthritis, back pain, falls, joint pain, myalgias and neck pain.   Gastrointestinal:  Negative for bloating, abdominal pain, change in bowel habit and constipation.   Genitourinary:  Negative for bladder incontinence,  dysuria, flank pain, genital sores and missed menses.   Neurological:  Negative for aphonia, brief paralysis, difficulty with concentration, dizziness and weakness.   Psychiatric/Behavioral:  Negative for altered mental status and memory loss. The patient does not have insomnia.    Allergic/Immunologic: Negative for environmental allergies.     Objective:   Physical Exam  Constitutional:       Appearance: He is well-developed.      Interventions: He is not intubated.  HENT:      Head: Normocephalic and atraumatic.      Right Ear: External ear normal.      Left Ear: External ear normal.   Eyes:      General: No scleral icterus.        Right eye: No discharge.         Left eye: No discharge.      Conjunctiva/sclera: Conjunctivae normal.      Pupils: Pupils are equal, round, and reactive to light.   Neck:      Thyroid: No thyromegaly.      Vascular: Normal carotid pulses. No carotid bruit, hepatojugular reflux or JVD.      Trachea: No tracheal deviation.   Cardiovascular:      Rate and Rhythm: Normal rate and regular rhythm. No extrasystoles are present.     Chest Wall: PMI is not displaced.      Pulses:           Carotid pulses are 2+ on the right side and 2+ on the left side.       Radial pulses are 2+ on the right side and 2+ on the left side.        Femoral pulses are 2+ on the right side and 2+ on the left side.       Popliteal pulses are 2+ on the right side and 2+ on the left side.        Dorsalis pedis pulses are 2+ on the right side and 2+ on the left side.        Posterior tibial pulses are 2+ on the right side and 2+ on the left side.      Heart sounds: S1 normal and S2 normal. Heart sounds not distant. No midsystolic click. No murmur heard.    No friction rub. No gallop. No S3 sounds.   Pulmonary:      Effort: Pulmonary effort is normal. No tachypnea, bradypnea, accessory muscle usage or respiratory distress. He is not intubated.      Breath sounds: Normal breath sounds. No stridor. No decreased breath  sounds, wheezing or rales.   Chest:      Chest wall: No tenderness.   Abdominal:      General: There is no distension or abdominal bruit.      Palpations: There is no mass or pulsatile mass.      Tenderness: There is no abdominal tenderness. There is no guarding or rebound.   Musculoskeletal:         General: No tenderness. Normal range of motion.      Cervical back: Normal range of motion and neck supple.   Lymphadenopathy:      Cervical: No cervical adenopathy.   Skin:     General: Skin is warm.      Coloration: Skin is not pale.      Findings: No erythema or rash.   Neurological:      Mental Status: He is alert and oriented to person, place, and time.      Cranial Nerves: No cranial nerve deficit.      Coordination: Coordination normal.      Deep Tendon Reflexes: Reflexes are normal and symmetric.   Psychiatric:         Behavior: Behavior normal.         Thought Content: Thought content normal.         Judgment: Judgment normal.       Assessment:     1. Coronary artery disease without angina pectoris, unspecified vessel or lesion type, unspecified whether native or transplanted heart    2. Mixed hyperlipidemia    3. 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 Coronary artery disease involving native coronary artery of native heart without angina pectoris    5. Pacemaker    6. Atrial flutter, unspecified type    7. Paroxysmal atrial fibrillation    8. Essential hypertension    9. Dyslipidemia    10. Nonrheumatic aortic valve stenosis    11. Type 2 diabetes mellitus without complication, without long-term current use of insulin    12. Polypharmacy    13. Tobacco abuse        Plan:         Will continue with close monitoring and medical therapy for coronary disease.  Regarding atrial fibrillation and high risk of CVA without anticoagulation he will be evaluated for left atrial appendage closure.        Smoking cessation  Target BP < 130/80 mmHg  Target LDL < 70  Yearly echocardiogram.  Start  aspirin 81 mg once daily.  Pacemaker interrogation per protocol.        Continue with current medical plan and lifestyle changes.  Return sooner for concerns or questions. If symptoms persist go to the ED  I have reviewed all pertinent data on this patient       I have reviewed the patient's medical history in detail and updated the computerized patient record.    Orders Placed This Encounter   Procedures    CBC Auto Differential     Standing Status:   Future     Standing Expiration Date:   1/5/2025    Comprehensive Metabolic Panel     Standing Status:   Future     Standing Expiration Date:   1/5/2025    Lipid Panel     Standing Status:   Future     Standing Expiration Date:   1/5/2025    Ambulatory referral/consult to Interventional Cardiology     Standing Status:   Future     Standing Expiration Date:   8/5/2024     Referral Priority:   Routine     Referral Type:   Consultation     Referral Reason:   Specialty Services Required     Referred to Provider:   Aren Cortes MD     Requested Specialty:   Interventional Cardiology     Number of Visits Requested:   1    Echo     Standing Status:   Future     Standing Expiration Date:   7/5/2024     Order Specific Question:   Release to patient     Answer:   Immediate       Follow up as scheduled. Return sooner for concerns or questions            He expressed verbal understanding and agreed with the plan      -In today's visit, at least 4 established conditions that pose a risk to life or bodily function have been addressed and the conditions are severe.    -In today's visit, monitoring for drug toxicity was accomplished.      Patient's Medications   New Prescriptions    No medications on file   Previous Medications    ASPIRIN 81 MG CHEW    Take 1 tablet (81 mg total) by mouth once daily.    ATORVASTATIN (LIPITOR) 40 MG TABLET    Take 40 mg by mouth once daily.    GLIMEPIRIDE (AMARYL) 2 MG TABLET    Take 1 mg by mouth every morning.    JARDIANCE 25 MG TABLET    Take  25 mg by mouth once daily.    LORAZEPAM (ATIVAN) 1 MG TABLET    Take 1 mg by mouth 2 (two) times daily.    METOPROLOL TARTRATE (LOPRESSOR) 50 MG TABLET    Take 0.5 tablets (25 mg total) by mouth 2 (two) times daily.    MIRTAZAPINE (REMERON) 15 MG TABLET    15 mg once daily.    PANTOPRAZOLE (PROTONIX) 40 MG TABLET    Take 1 tablet (40 mg total) by mouth once daily.    SOLUS V2 LANCETS 30 GAUGE MISC        SOLUS V2 TEST STRIPS STRP       Modified Medications    No medications on file   Discontinued Medications    No medications on file

## 2023-07-06 DIAGNOSIS — I48.0 PAROXYSMAL ATRIAL FIBRILLATION: Primary | ICD-10-CM

## 2023-08-31 ENCOUNTER — CLINICAL SUPPORT (OUTPATIENT)
Dept: CARDIOLOGY | Facility: CLINIC | Age: 83
End: 2023-08-31
Payer: MEDICARE

## 2023-08-31 DIAGNOSIS — Z95.0 PACEMAKER: Primary | ICD-10-CM

## 2023-08-31 PROCEDURE — 93296 PR INTERROG EVAL, REMOTE, UP TO 90 DAYS, PACER/DEFIB,TECH REVIEW: ICD-10-PCS | Mod: S$GLB,,, | Performed by: INTERNAL MEDICINE

## 2023-08-31 PROCEDURE — 93294 REM INTERROG EVL PM/LDLS PM: CPT | Mod: S$GLB,,, | Performed by: INTERNAL MEDICINE

## 2023-08-31 PROCEDURE — 93294 PR INTERROG EVAL, REMOTE, UP TO 90 DAYS,PACEMAKER: ICD-10-PCS | Mod: S$GLB,,, | Performed by: INTERNAL MEDICINE

## 2023-08-31 PROCEDURE — 93296 REM INTERROG EVL PM/IDS: CPT | Mod: S$GLB,,, | Performed by: INTERNAL MEDICINE

## 2023-09-06 NOTE — PROGRESS NOTES
Subjective:      Patient ID: Venu Lau is a 83 y.o. male.    Chief Complaint: No chief complaint on file.    HPI:    ROS Napoles remote pacemaker interrogation downloaded and prepared by medical assistant and interpreted by me and full report scanned into Epic media.  Battery OK with PJ 8.9 years.  Leads OK .  19% a fib burden (on metoprolol and ASA)-- pt is not anticoagulated due to a hx of GI bleeding.  Pt has been referred to ortega Cortes for consideration for a Watchman device.  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    GERD (gastroesophageal reflux disease)     Gout, unspecified     H/O: GI bleed     Hypertension     Liver cancer     PVD (peripheral vascular disease)     Sciatica     SSS (sick sinus syndrome)     Tachy-valentine syndrome     Thyroid disease         Past Surgical History:   Procedure Laterality Date    ANGIOGRAPHY Right 10/11/2021    Procedure: ANGIOGRAM-HEPATIC;  Surgeon: Phil Stuart II, MD;  Location: [facility] CATH LAB;  Service: Interventional Radiology;  Laterality: Right;    APPENDECTOMY      CHOLECYSTECTOMY      CORONARY ARTERY BYPASS GRAFT      ENDOSCOPIC ULTRASOUND OF UPPER GASTROINTESTINAL TRACT N/A 6/17/2020    Procedure: EUS;  Surgeon: Rei Humphrey MD;  Location: [facility] ENDO;  Service: Endoscopy;  Laterality: N/A;    ERCP N/A 6/17/2020    Procedure: ERCP;  Surgeon: Rei Humphrey MD;  Location: [facility] ENDO;  Service: Endoscopy;  Laterality: N/A;    ERCP N/A 8/31/2020    Procedure: ERCP (ENDOSCOPIC RETROGRADE CHOLANGIOPANCREATOGRAPHY);  Surgeon: Adela Boyle MD;  Location: [facility] ENDO;  Service: Endoscopy;  Laterality: N/A;    ESOPHAGOGASTRODUODENOSCOPY N/A 10/11/2021    Procedure: EGD  (ESOPHAGOGASTRODUODENOSCOPY);  Surgeon: Skip Varela MD;  Location: [facility];  Service: Gastroenterology;  Laterality: N/A;    INSERT / REPLACE / REMOVE PACEMAKER      INSERTION OF TEMPORARY PACEMAKER Left 2023    Procedure: INSERTION, PACEMAKER, TEMPORARY;  Surgeon: Timi Meadows MD;  Location: [facility] CATH LAB/EP;  Service: Cardiology;  Laterality: Left;    LIVER RESECTION      LUMBAR DISC SURGERY      REPLACEMENT OF PACEMAKER GENERATOR N/A 2023    Procedure: REPLACEMENT, PACEMAKER GENERATOR;  Surgeon: Timi Meadows MD;  Location: [facility] CATH LAB/EP;  Service: Cardiology;  Laterality: N/A;       Family History   Problem Relation Age of Onset    Heart disease Mother     Pancreatic cancer Mother     Heart disease Father        Social History     Socioeconomic History    Marital status:    Tobacco Use    Smoking status: Some Days     Current packs/day: 0.00     Average packs/day: 0.3 packs/day for 37.0 years (9.3 ttl pk-yrs)     Types: Cigarettes     Start date: 1955     Last attempt to quit: 1992     Years since quittin.8    Smokeless tobacco: Never    Tobacco comments:     Pt declines enrollment in the [employer]. Handout provided for Ambulatory Smoking Cessation program.    Substance and Sexual Activity    Alcohol use: Yes     Alcohol/week: 5.0 standard drinks of alcohol     Types: 5 Cans of beer per week    Drug use: No    Sexual activity: Not Currently       Current Outpatient Medications on File Prior to Visit   Medication Sig Dispense Refill    aspirin 81 MG Chew Take 1 tablet (81 mg total) by mouth once daily. 90 tablet 3    atorvastatin (LIPITOR) 40 MG tablet Take 40 mg by mouth once daily.      glimepiride (AMARYL) 2 MG tablet Take 1 mg by mouth every morning.      JARDIANCE 25 mg tablet Take 25 mg by mouth once daily.      LORazepam (ATIVAN) 1 MG tablet Take 1 mg by mouth 2 (two) times daily.      metoprolol tartrate (LOPRESSOR) 50 MG tablet Take  0.5 tablets (25 mg total) by mouth 2 (two) times daily. 180 tablet 3    mirtazapine (REMERON) 15 MG tablet 15 mg once daily.      pantoprazole (PROTONIX) 40 MG tablet Take 1 tablet (40 mg total) by mouth once daily. 30 tablet 2    SOLUS V2 LANCETS 30 gauge Misc       SOLUS V2 TEST STRIPS Strp        Current Facility-Administered Medications on File Prior to Visit   Medication Dose Route Frequency Provider Last Rate Last Admin    vancomycin injection    PRN Timi Meadows MD   1 g at 05/30/23 0836       Review of patient's allergies indicates:  No Known Allergies  Objective:   There were no vitals filed for this visit.     Physical Exam     Assessment:     1. Pacemaker      Plan:   Diagnoses and all orders for this visit:    Pacemaker         No follow-ups on file.

## 2023-09-26 ENCOUNTER — TELEPHONE (OUTPATIENT)
Dept: CARDIOLOGY | Facility: CLINIC | Age: 83
End: 2023-09-26
Payer: MEDICAID

## 2023-09-26 NOTE — TELEPHONE ENCOUNTER
Spoke with spouse.  Appointment scheduled for device interrogation in [place].    ----- Message from Prudence Benson MA sent at 9/22/2023  2:40 PM CDT -----  Regarding: FW: appt  Contact: [phone]/wife Eal  Can you please help me with this I can't figure out what device he has.  ----- Message -----  From: Prudence Benson MA  Sent: 9/15/2023   2:31 PM CDT  To: Prudence Benson MA  Subject: FW: appt                                           ----- Message -----  From: Nina Diez  Sent: 9/15/2023   8:59 AM CDT  To: [facility] Cardiology Clinical Support Staff  Subject: appt                                             Patient is requesting a call back regarding scheduling an appt for his pacemaker  Would the patient rather a call back or a response via [facility]?  Call   Best Call Back Number:  [phone]/wife Ela  Additional Information:  in [place] only

## 2023-10-16 ENCOUNTER — CLINICAL SUPPORT (OUTPATIENT)
Dept: CARDIOLOGY | Facility: CLINIC | Age: 83
End: 2023-10-16
Payer: MEDICARE

## 2023-10-16 DIAGNOSIS — Z95.0 PACEMAKER: Primary | ICD-10-CM

## 2023-10-16 PROCEDURE — 93280 PM DEVICE PROGR EVAL DUAL: CPT | Mod: 26,,, | Performed by: INTERNAL MEDICINE

## 2023-10-16 PROCEDURE — 93280 PR PROGRAM EVAL (IN PERSON) IMPLANT DEVICE,PACEMAKER,2 LEAD: ICD-10-PCS | Mod: 26,,, | Performed by: INTERNAL MEDICINE

## 2023-10-29 NOTE — PROGRESS NOTES
Pacemaker Evaluation Report    10/2023    Primary MD: Dr. Terrazas  Primary Cardiologist: Dr. Quiroga    Implant date: Jan 13, 2011    Reason for evaluation:routine  Indication for pacemaker:  unknown    Measurements  Atrial sensing - P wave: 0.3 mV  Atrial capture: Maintained: 1.0 V @ 0.4 msec  Atrial lead impedance: 360 ohms  Ventricular sensing - R wave: 5.6 mV  Ventricular capture: RV Maintained: 1.5 V @ 0.4 ms   Ventricular lead impedance: right - 409 ohms   Underlying rhythm: PAF      Diagnostic Data  Atrial paced: 58 % Ventricular paced: 55%  Mode switch: on  AMS 25%  Afib/flutter burden 21%  Sensor response:  CLS  Battery status: satisfactory Magnet rate: 90 bpm   Battery 9 years      Not on OAC due to GI bleeding     Follow up: 6 months

## 2023-11-08 ENCOUNTER — TELEPHONE (OUTPATIENT)
Dept: CARDIOLOGY | Facility: CLINIC | Age: 83
End: 2023-11-08
Payer: MEDICARE

## 2023-11-30 ENCOUNTER — CLINICAL SUPPORT (OUTPATIENT)
Dept: CARDIOLOGY | Facility: CLINIC | Age: 83
End: 2023-11-30
Payer: MEDICARE

## 2023-11-30 DIAGNOSIS — Z95.0 PACEMAKER: Primary | ICD-10-CM

## 2023-11-30 PROCEDURE — 93294 REM INTERROG EVL PM/LDLS PM: CPT | Mod: S$GLB,,, | Performed by: INTERNAL MEDICINE

## 2023-11-30 PROCEDURE — 93296 REM INTERROG EVL PM/IDS: CPT | Mod: S$GLB,,, | Performed by: INTERNAL MEDICINE

## 2023-11-30 PROCEDURE — 93294 PR INTERROG EVAL, REMOTE, UP TO 90 DAYS,PACEMAKER: ICD-10-PCS | Mod: S$GLB,,, | Performed by: INTERNAL MEDICINE

## 2023-11-30 PROCEDURE — 93296 PR INTERROG EVAL, REMOTE, UP TO 90 DAYS, PACER/DEFIB,TECH REVIEW: ICD-10-PCS | Mod: S$GLB,,, | Performed by: INTERNAL MEDICINE

## 2023-12-06 NOTE — PROGRESS NOTES
Subjective:      Patient ID: Venu Lau is a 83 y.o. male.    Chief Complaint: No chief complaint on file.    HPI:    ROS Napoles pacemaker remote interrogation report downloaded and prepared by medical assistant and interpreted by me and full report scanned into Epic media.   .Battery OK with PJ at least 8.4 years.  P wave is low.   4.8% burden of atrial fibrillation.  Pt was thought to be at high risk of bleeding and has been referred to Dr Cortes for consideration for Watchman device.  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    GERD (gastroesophageal reflux disease)     Gout, unspecified     H/O: GI bleed     Hypertension     Liver cancer     PVD (peripheral vascular disease)     Sciatica     SSS (sick sinus syndrome)     Tachy-valentine syndrome     Thyroid disease         Past Surgical History:   Procedure Laterality Date    ANGIOGRAPHY Right 10/11/2021    Procedure: ANGIOGRAM-HEPATIC;  Surgeon: Phil Stuart II, MD;  Location: [facility] CATH LAB;  Service: Interventional Radiology;  Laterality: Right;    APPENDECTOMY      CHOLECYSTECTOMY      CORONARY ARTERY BYPASS GRAFT      ENDOSCOPIC ULTRASOUND OF UPPER GASTROINTESTINAL TRACT N/A 6/17/2020    Procedure: EUS;  Surgeon: Rei Humphrey MD;  Location: [facility] ENDO;  Service: Endoscopy;  Laterality: N/A;    ERCP N/A 6/17/2020    Procedure: ERCP;  Surgeon: Rei Humphrey MD;  Location: [facility] ENDO;  Service: Endoscopy;  Laterality: N/A;    ERCP N/A 8/31/2020    Procedure: ERCP (ENDOSCOPIC RETROGRADE CHOLANGIOPANCREATOGRAPHY);  Surgeon: Adela Boyle MD;  Location: [facility] ENDO;  Service: Endoscopy;  Laterality: N/A;    ESOPHAGOGASTRODUODENOSCOPY N/A 10/11/2021     Procedure: EGD (ESOPHAGOGASTRODUODENOSCOPY);  Surgeon: Skip Varela MD;  Location: [facility];  Service: Gastroenterology;  Laterality: N/A;    INSERT / REPLACE / REMOVE PACEMAKER      INSERTION OF TEMPORARY PACEMAKER Left 2023    Procedure: INSERTION, PACEMAKER, TEMPORARY;  Surgeon: Timi Meadows MD;  Location: [facility] CATH LAB/EP;  Service: Cardiology;  Laterality: Left;    LIVER RESECTION      LUMBAR DISC SURGERY      REPLACEMENT OF PACEMAKER GENERATOR N/A 2023    Procedure: REPLACEMENT, PACEMAKER GENERATOR;  Surgeon: Timi Meadows MD;  Location: [facility] CATH LAB/EP;  Service: Cardiology;  Laterality: N/A;       Family History   Problem Relation Age of Onset    Heart disease Mother     Pancreatic cancer Mother     Heart disease Father        Social History     Socioeconomic History    Marital status:    Tobacco Use    Smoking status: Some Days     Current packs/day: 0.00     Average packs/day: 0.3 packs/day for 37.0 years (9.3 ttl pk-yrs)     Types: Cigarettes     Start date: 1955     Last attempt to quit: 1992     Years since quittin.0    Smokeless tobacco: Never    Tobacco comments:     Pt declines enrollment in the [employer]. Handout provided for Ambulatory Smoking Cessation program.    Substance and Sexual Activity    Alcohol use: Yes     Alcohol/week: 5.0 standard drinks of alcohol     Types: 5 Cans of beer per week    Drug use: No    Sexual activity: Not Currently       Current Outpatient Medications on File Prior to Visit   Medication Sig Dispense Refill    aspirin 81 MG Chew Take 1 tablet (81 mg total) by mouth once daily. 90 tablet 3    atorvastatin (LIPITOR) 40 MG tablet Take 40 mg by mouth once daily.      glimepiride (AMARYL) 2 MG tablet Take 1 mg by mouth every morning.      JARDIANCE 25 mg tablet Take 25 mg by mouth once daily.      LORazepam (ATIVAN) 1 MG tablet Take 1 mg by mouth 2 (two) times daily.      metoprolol tartrate (LOPRESSOR) 50 MG  tablet Take 0.5 tablets (25 mg total) by mouth 2 (two) times daily. 180 tablet 3    mirtazapine (REMERON) 15 MG tablet 15 mg once daily.      pantoprazole (PROTONIX) 40 MG tablet Take 1 tablet (40 mg total) by mouth once daily. 30 tablet 2    SOLUS V2 LANCETS 30 gauge Misc       SOLUS V2 TEST STRIPS Strp        Current Facility-Administered Medications on File Prior to Visit   Medication Dose Route Frequency Provider Last Rate Last Admin    vancomycin injection    PRN Timi Meadows MD   1 g at 05/30/23 0836       Review of patient's allergies indicates:  No Known Allergies  Objective:   There were no vitals filed for this visit.     Physical Exam     Assessment:     1. Pacemaker      Plan:   Diagnoses and all orders for this visit:    Pacemaker         No follow-ups on file.

## 2024-01-03 ENCOUNTER — TELEPHONE (OUTPATIENT)
Dept: CARDIOLOGY | Facility: CLINIC | Age: 84
End: 2024-01-03
Payer: MEDICARE

## 2024-01-03 NOTE — TELEPHONE ENCOUNTER
"Receiving remote alerts from Merlin for Afib. (Scanned in media)  Per Dr Boo, pt needs to see Dr Cortes for possible Watchman device.  Reached out to patient yesterday.  Spoke with wife.  She stated she was "in the middle of something and to call back".  Tried again today to reach the patient's wife.  No answer.  Left callback message.      "

## 2024-01-08 ENCOUNTER — TELEPHONE (OUTPATIENT)
Dept: CARDIOLOGY | Facility: CLINIC | Age: 84
End: 2024-01-08
Payer: MEDICARE

## 2024-01-08 NOTE — TELEPHONE ENCOUNTER
Reached out to patient to schedule an appointment with Dr Cortes for possible Watchman.  No answer.  Left callback message.

## 2024-01-09 ENCOUNTER — LAB VISIT (OUTPATIENT)
Dept: LAB | Facility: HOSPITAL | Age: 84
End: 2024-01-09
Attending: INTERNAL MEDICINE
Payer: MEDICARE

## 2024-01-09 DIAGNOSIS — E11.49 TYPE 2 DIABETES MELLITUS WITH OTHER DIABETIC NEUROLOGICAL COMPLICATION: Primary | ICD-10-CM

## 2024-01-09 DIAGNOSIS — I10 ESSENTIAL (PRIMARY) HYPERTENSION: ICD-10-CM

## 2024-01-09 LAB
ALBUMIN SERPL BCP-MCNC: 4.5 G/DL (ref 3.5–5.2)
ALP SERPL-CCNC: 166 U/L (ref 38–126)
ALT SERPL W/O P-5'-P-CCNC: 47 U/L (ref 10–44)
ANION GAP SERPL CALC-SCNC: 11 MMOL/L (ref 8–16)
AST SERPL-CCNC: 58 U/L (ref 15–46)
BASOPHILS # BLD AUTO: 0.03 K/UL (ref 0–0.2)
BASOPHILS NFR BLD: 0.9 % (ref 0–1.9)
BILIRUB SERPL-MCNC: 0.5 MG/DL (ref 0.1–1)
CALCIUM SERPL-MCNC: 9.3 MG/DL (ref 8.7–10.5)
CHLORIDE SERPL-SCNC: 107 MMOL/L (ref 95–110)
CO2 SERPL-SCNC: 22 MMOL/L (ref 23–29)
CREAT SERPL-MCNC: 1.05 MG/DL (ref 0.5–1.4)
DIFFERENTIAL METHOD BLD: ABNORMAL
EOSINOPHIL # BLD AUTO: 0 K/UL (ref 0–0.5)
EOSINOPHIL NFR BLD: 0.3 % (ref 0–8)
ERYTHROCYTE [DISTWIDTH] IN BLOOD BY AUTOMATED COUNT: 19.5 % (ref 11.5–14.5)
EST. GFR  (NO RACE VARIABLE): >60 ML/MIN/1.73 M^2
ESTIMATED AVG GLUCOSE: 154 MG/DL (ref 68–131)
GLUCOSE SERPL-MCNC: 178 MG/DL (ref 70–110)
HBA1C MFR BLD: 7 % (ref 4–5.6)
HCT VFR BLD AUTO: 37.4 % (ref 40–54)
HGB BLD-MCNC: 11.7 G/DL (ref 14–18)
IMM GRANULOCYTES # BLD AUTO: 0.03 K/UL (ref 0–0.04)
IMM GRANULOCYTES NFR BLD AUTO: 0.9 % (ref 0–0.5)
LYMPHOCYTES # BLD AUTO: 1 K/UL (ref 1–4.8)
LYMPHOCYTES NFR BLD: 28.2 % (ref 18–48)
MCH RBC QN AUTO: 25.9 PG (ref 27–31)
MCHC RBC AUTO-ENTMCNC: 31.3 G/DL (ref 32–36)
MCV RBC AUTO: 83 FL (ref 82–98)
MONOCYTES # BLD AUTO: 0.6 K/UL (ref 0.3–1)
MONOCYTES NFR BLD: 15.8 % (ref 4–15)
NEUTROPHILS # BLD AUTO: 1.9 K/UL (ref 1.8–7.7)
NEUTROPHILS NFR BLD: 53.9 % (ref 38–73)
NRBC BLD-RTO: 0 /100 WBC
PLATELET # BLD AUTO: 132 K/UL (ref 150–450)
PMV BLD AUTO: 9.8 FL (ref 9.2–12.9)
POTASSIUM SERPL-SCNC: 4 MMOL/L (ref 3.5–5.1)
PROT SERPL-MCNC: 8.3 G/DL (ref 6–8.4)
RBC # BLD AUTO: 4.52 M/UL (ref 4.6–6.2)
SODIUM SERPL-SCNC: 140 MMOL/L (ref 136–145)
T4 FREE SERPL-MCNC: 0.81 NG/DL (ref 0.71–1.51)
TSH SERPL DL<=0.005 MIU/L-ACNC: 4.67 UIU/ML (ref 0.4–4)
UUN UR-MCNC: 22 MG/DL (ref 2–20)
WBC # BLD AUTO: 3.48 K/UL (ref 3.9–12.7)

## 2024-01-09 PROCEDURE — 85025 COMPLETE CBC W/AUTO DIFF WBC: CPT | Mod: PN | Performed by: INTERNAL MEDICINE

## 2024-01-09 PROCEDURE — 84439 ASSAY OF FREE THYROXINE: CPT | Performed by: INTERNAL MEDICINE

## 2024-01-09 PROCEDURE — 82043 UR ALBUMIN QUANTITATIVE: CPT | Mod: PN | Performed by: INTERNAL MEDICINE

## 2024-01-09 PROCEDURE — 84443 ASSAY THYROID STIM HORMONE: CPT | Mod: PN | Performed by: INTERNAL MEDICINE

## 2024-01-09 PROCEDURE — 80053 COMPREHEN METABOLIC PANEL: CPT | Mod: PN | Performed by: INTERNAL MEDICINE

## 2024-01-09 PROCEDURE — 83036 HEMOGLOBIN GLYCOSYLATED A1C: CPT | Performed by: INTERNAL MEDICINE

## 2024-01-09 PROCEDURE — 36415 COLL VENOUS BLD VENIPUNCTURE: CPT | Mod: PN | Performed by: INTERNAL MEDICINE

## 2024-01-10 LAB
ALBUMIN/CREAT UR: 21.6 UG/MG (ref 0–30)
CREAT UR-MCNC: 51 MG/DL (ref 23–375)
MICROALBUMIN UR DL<=1MG/L-MCNC: 11 UG/ML

## 2024-02-06 ENCOUNTER — TELEPHONE (OUTPATIENT)
Dept: CARDIOLOGY | Facility: CLINIC | Age: 84
End: 2024-02-06
Payer: MEDICARE

## 2024-02-06 NOTE — TELEPHONE ENCOUNTER
Reached out to patient regarding his remote monitoring.  Remote monitor is not transmitting.  No answer.  Left callback message.

## 2024-02-08 ENCOUNTER — TELEPHONE (OUTPATIENT)
Dept: CARDIOLOGY | Facility: CLINIC | Age: 84
End: 2024-02-08
Payer: MEDICARE

## 2024-02-08 NOTE — TELEPHONE ENCOUNTER
"Patient with Afib/Aflutter on remote device alert.  Patient was scheduled to see Dr Cortes for possible watchman and canceled the appointment.   Per wife, she states the patient said "[place] too far to travel and he feels fine".  Explained to wife the reasoning for the appointment with Dr Cortes.  She states she will explain to her  the importance of the appointment with Dr Cortes.  Also tried to schedule an appointment for general cardiology.  Wife states she will call back to schedule.        "

## 2024-02-29 NOTE — TELEPHONE ENCOUNTER
Spoke with patient's wife.  Appointment scheduled and confirmed for the [place] location.  Wife states she is not sure if the patient will come to [place].  She will call to cancel if patient refuses to come to clinic.

## 2024-03-01 ENCOUNTER — CLINICAL SUPPORT (OUTPATIENT)
Dept: CARDIOLOGY | Facility: CLINIC | Age: 84
End: 2024-03-01
Payer: MEDICARE

## 2024-03-01 DIAGNOSIS — Z95.0 PACEMAKER: Primary | ICD-10-CM

## 2024-03-01 PROCEDURE — 93296 REM INTERROG EVL PM/IDS: CPT | Mod: S$GLB,,, | Performed by: INTERNAL MEDICINE

## 2024-03-01 PROCEDURE — 93294 REM INTERROG EVL PM/LDLS PM: CPT | Mod: S$GLB,,, | Performed by: INTERNAL MEDICINE

## 2024-03-06 NOTE — PROGRESS NOTES
Subjective:      Patient ID: Venu Lau is a 84 y.o. male.    Chief Complaint: No chief complaint on file.    HPI:    ROS Napoles pacemaker remote interrogation report downloaded and prepared by medical assistant and interpreted by me and full report scanned into Epic media.  Battery OK with PJ at least 8.4 years. Leads OK.  55% burden of atrial flutter.  Normal device function.  Note that Dr Quiroga had referred pt to Dr Cortes for a Watchman since he thought pt was not a candidate for chronic anticoagulation due to hx of GI bleeding and risk of falls.  Pt cancelled his appts both with Dr Cortes and with general cardiology in [place].  I left message at [phone] explaining pt's high risk of stroke and paralysis and his high risk of bleeding from chronic anticoagulation and that the recommended treatment was a Watchman device by Dr Cortes at the main [place].  Nobody answered any of the calls place to all the numbers listed in pt's chart for pt, spouse, and daughters.    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    GERD (gastroesophageal reflux disease)     Gout, unspecified     H/O: GI bleed     Hypertension     Liver cancer     PVD (peripheral vascular disease)     Sciatica     SSS (sick sinus syndrome)     Tachy-valentine syndrome     Thyroid disease         Past Surgical History:   Procedure Laterality Date    ANGIOGRAPHY Right 10/11/2021    Procedure: ANGIOGRAM-HEPATIC;  Surgeon: Phil Stuart II, MD;  Location: [facility] CATH LAB;  Service: Interventional Radiology;  Laterality: Right;    APPENDECTOMY      CHOLECYSTECTOMY      CORONARY ARTERY BYPASS GRAFT      ENDOSCOPIC ULTRASOUND OF UPPER GASTROINTESTINAL TRACT N/A 6/17/2020    Procedure: EUS;  Surgeon:  Rei Humphrey MD;  Location: [facility];  Service: Endoscopy;  Laterality: N/A;    ERCP N/A 2020    Procedure: ERCP;  Surgeon: Rei Humphrey MD;  Location: [facility];  Service: Endoscopy;  Laterality: N/A;    ERCP N/A 2020    Procedure: ERCP (ENDOSCOPIC RETROGRADE CHOLANGIOPANCREATOGRAPHY);  Surgeon: Adela Boyle MD;  Location: [facility];  Service: Endoscopy;  Laterality: N/A;    ESOPHAGOGASTRODUODENOSCOPY N/A 10/11/2021    Procedure: EGD (ESOPHAGOGASTRODUODENOSCOPY);  Surgeon: Skip Varela MD;  Location: [facility];  Service: Gastroenterology;  Laterality: N/A;    INSERT / REPLACE / REMOVE PACEMAKER      INSERTION OF TEMPORARY PACEMAKER Left 2023    Procedure: INSERTION, PACEMAKER, TEMPORARY;  Surgeon: Timi Meadows MD;  Location: [facility] CATH LAB/EP;  Service: Cardiology;  Laterality: Left;    LIVER RESECTION      LUMBAR DISC SURGERY      REPLACEMENT OF PACEMAKER GENERATOR N/A 2023    Procedure: REPLACEMENT, PACEMAKER GENERATOR;  Surgeon: Timi Meadows MD;  Location: [facility] CATH LAB/EP;  Service: Cardiology;  Laterality: N/A;       Family History   Problem Relation Age of Onset    Heart disease Mother     Pancreatic cancer Mother     Heart disease Father        Social History     Socioeconomic History    Marital status:    Tobacco Use    Smoking status: Some Days     Current packs/day: 0.00     Average packs/day: 0.3 packs/day for 37.0 years (9.3 ttl pk-yrs)     Types: Cigarettes     Start date: 1955     Last attempt to quit: 1992     Years since quittin.3    Smokeless tobacco: Never    Tobacco comments:     Pt declines enrollment in the [employer]. Handout provided for Ambulatory Smoking Cessation program.    Substance and Sexual Activity    Alcohol use: Yes     Alcohol/week: 5.0 standard drinks of alcohol     Types: 5 Cans of beer per week    Drug use: No    Sexual activity: Not Currently       Current Outpatient Medications on  File Prior to Visit   Medication Sig Dispense Refill    aspirin 81 MG Chew Take 1 tablet (81 mg total) by mouth once daily. 90 tablet 3    atorvastatin (LIPITOR) 40 MG tablet Take 40 mg by mouth once daily.      glimepiride (AMARYL) 2 MG tablet Take 1 mg by mouth every morning.      JARDIANCE 25 mg tablet Take 25 mg by mouth once daily.      LORazepam (ATIVAN) 1 MG tablet Take 1 mg by mouth 2 (two) times daily.      metoprolol tartrate (LOPRESSOR) 50 MG tablet Take 0.5 tablets (25 mg total) by mouth 2 (two) times daily. 180 tablet 3    mirtazapine (REMERON) 15 MG tablet 15 mg once daily.      pantoprazole (PROTONIX) 40 MG tablet Take 1 tablet (40 mg total) by mouth once daily. 30 tablet 2    SOLUS V2 LANCETS 30 gauge [facility]       SOLUS V2 TEST STRIPS Strp        Current Facility-Administered Medications on File Prior to Visit   Medication Dose Route Frequency Provider Last Rate Last Admin    vancomycin injection    PRN Timi Meadows MD   1 g at 05/30/23 0836       Review of patient's allergies indicates:  No Known Allergies  Objective:   There were no vitals filed for this visit.     Physical Exam     Assessment:     1. Pacemaker      Plan:   Diagnoses and all orders for this visit:    Pacemaker         No follow-ups on file.

## 2024-03-20 NOTE — HIM RECORD RETIREMENT NOTE
FDC of Incomplete Medical Record    3/20/24    Patient Name: Venu Lau  Contact Serial # (CSN): [id number]  Patient Medical Record # (MRN): [id number]  Date of Service: Refill on 4/21/2022  Physician Name: Anupam Marques     This record has been reviewed and is being retired as incomplete by the approval of the  Medical Executive Committee ([facility])     On 2/8/2023., due to:  Unavailability of Provider     Missing Information/Comments:  []    Discharge Summary   []    DC Note/Short Stay Summary   []    ED Provider Note   []    Delivery Note   []    History & Physical   []   Operative Note   []     Procedure Note   []     Physician Order   [x]     Verbal Order   []       Other, specify:

## 2024-04-02 ENCOUNTER — HOSPITAL ENCOUNTER (OUTPATIENT)
Dept: RADIOLOGY | Facility: HOSPITAL | Age: 84
Discharge: HOME | End: 2024-04-02
Attending: INTERNAL MEDICINE
Payer: MEDICARE

## 2024-04-02 ENCOUNTER — OFFICE VISIT (OUTPATIENT)
Dept: CARDIOLOGY | Facility: CLINIC | Age: 84
End: 2024-04-02
Payer: MEDICARE

## 2024-04-02 VITALS
SYSTOLIC BLOOD PRESSURE: 140 MMHG | HEIGHT: 65 IN | DIASTOLIC BLOOD PRESSURE: 63 MMHG | BODY MASS INDEX: 24.79 KG/M2 | HEART RATE: 79 BPM | OXYGEN SATURATION: 98 % | WEIGHT: 148.81 LBS

## 2024-04-02 DIAGNOSIS — D63.8 ANEMIA, CHRONIC DISEASE: ICD-10-CM

## 2024-04-02 DIAGNOSIS — I63.432 EMBOLIC STROKE INVOLVING LEFT POSTERIOR CEREBRAL ARTERY: ICD-10-CM

## 2024-04-02 DIAGNOSIS — I48.0 PAROXYSMAL ATRIAL FIBRILLATION: Primary | ICD-10-CM

## 2024-04-02 DIAGNOSIS — I48.0 PAROXYSMAL ATRIAL FIBRILLATION: ICD-10-CM

## 2024-04-02 DIAGNOSIS — Z95.1 HX OF CABG: ICD-10-CM

## 2024-04-02 LAB
CREAT SERPL-MCNC: 1.2 MG/DL (ref 0.5–1.4)
SAMPLE: NORMAL

## 2024-04-02 PROCEDURE — 3078F DIAST BP <80 MM HG: CPT | Mod: CPTII,S$GLB,, | Performed by: INTERNAL MEDICINE

## 2024-04-02 PROCEDURE — 1159F MED LIST DOCD IN RCRD: CPT | Mod: CPTII,S$GLB,, | Performed by: INTERNAL MEDICINE

## 2024-04-02 PROCEDURE — 3077F SYST BP >= 140 MM HG: CPT | Mod: CPTII,S$GLB,, | Performed by: INTERNAL MEDICINE

## 2024-04-02 PROCEDURE — 3288F FALL RISK ASSESSMENT DOCD: CPT | Mod: CPTII,S$GLB,, | Performed by: INTERNAL MEDICINE

## 2024-04-02 PROCEDURE — 1126F AMNT PAIN NOTED NONE PRSNT: CPT | Mod: CPTII,S$GLB,, | Performed by: INTERNAL MEDICINE

## 2024-04-02 PROCEDURE — 74174 CTA ABD&PLVS W/CONTRAST: CPT | Mod: 26,,, | Performed by: INTERNAL MEDICINE

## 2024-04-02 PROCEDURE — 99214 OFFICE O/P EST MOD 30 MIN: CPT | Mod: S$GLB,,, | Performed by: INTERNAL MEDICINE

## 2024-04-02 PROCEDURE — 74174 CTA ABD&PLVS W/CONTRAST: CPT | Mod: TC

## 2024-04-02 PROCEDURE — 1160F RVW MEDS BY RX/DR IN RCRD: CPT | Mod: CPTII,S$GLB,, | Performed by: INTERNAL MEDICINE

## 2024-04-02 PROCEDURE — 1101F PT FALLS ASSESS-DOCD LE1/YR: CPT | Mod: CPTII,S$GLB,, | Performed by: INTERNAL MEDICINE

## 2024-04-02 PROCEDURE — 71275 CT ANGIOGRAPHY CHEST: CPT | Mod: 26,,, | Performed by: INTERNAL MEDICINE

## 2024-04-02 PROCEDURE — 99999 PR PBB SHADOW E&M-EST. PATIENT-LVL IV: CPT | Mod: PBBFAC,,, | Performed by: INTERNAL MEDICINE

## 2024-04-02 PROCEDURE — 25500020 PHARM REV CODE 255: Performed by: INTERNAL MEDICINE

## 2024-04-02 RX ADMIN — IOHEXOL 100 ML: 350 INJECTION, SOLUTION INTRAVENOUS at 03:04

## 2024-04-02 NOTE — PROGRESS NOTES
Subjective:    Patient ID:  Venu Lau is a 84 y.o. male who presents for evaluation of left atrial occluder device in the setting of Atrial Fibrillation      Referring physician: Dr. Carlos Quiroga     Atrial Fibrillation  Past medical history includes atrial fibrillation.     Patient is accompanied by his wife who is also assisting with history for which he has a PMH of PAF, Tachy-valentine synd s/p PPM. CAD s/p CABG, embolic stroke, HTN, HLD, chronic anemia and chronic pain. He has a HAS-BLED of 5 and a CHadsVasc 5 but unable to place on blood thinners because of history of recurrent GI bleeding and high risk of falling for which he was referred to us. Patient missed his am appointment for cardiac CT. He denies any chest pain, sob, edema, orthopnea, syncope. He walks with a stick/cane due to unstable gait.       TTE 2022  The left ventricle is normal in size with mild concentric hypertrophy and normal systolic function.  The estimated ejection fraction is 60%.  Mild right ventricular enlargement with normal right ventricular systolic function.  Moderate tricuspid regurgitation.  Mild aortic regurgitation.  Mild mitral regurgitation.  There is moderate aortic valve stenosis.  Aortic valve area is 1.07 cm2; peak velocity is 2.99 m/s; mean gradient is 21 mmHg.  Severe left atrial enlargement.  Intermediate central venous pressure (8 mmHg).  The estimated PA systolic pressure is 33 mmHg.         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    GERD (gastroesophageal reflux disease)     Gout, unspecified     H/O: GI bleed     Hypertension     Liver cancer     PVD (peripheral vascular disease)     Sciatica     SSS (sick sinus syndrome)     Tachy-valentine syndrome     Thyroid  disease        Past Surgical History:   Procedure Laterality Date    ANGIOGRAPHY Right 10/11/2021    Procedure: ANGIOGRAM-HEPATIC;  Surgeon: Phil Stuart II, MD;  Location: [facility] [place] CATH LAB;  Service: Interventional Radiology;  Laterality: Right;    APPENDECTOMY      CHOLECYSTECTOMY      CORONARY ARTERY BYPASS GRAFT      ENDOSCOPIC ULTRASOUND OF UPPER GASTROINTESTINAL TRACT N/A 6/17/2020    Procedure: EUS;  Surgeon: Rei Humphrey MD;  Location: [facility] ENDO;  Service: Endoscopy;  Laterality: N/A;    ERCP N/A 6/17/2020    Procedure: ERCP;  Surgeon: Rei Humphrey MD;  Location: [facility] ENDO;  Service: Endoscopy;  Laterality: N/A;    ERCP N/A 8/31/2020    Procedure: ERCP (ENDOSCOPIC RETROGRADE CHOLANGIOPANCREATOGRAPHY);  Surgeon: Adela Boyle MD;  Location: [facility];  Service: Endoscopy;  Laterality: N/A;    ESOPHAGOGASTRODUODENOSCOPY N/A 10/11/2021    Procedure: EGD (ESOPHAGOGASTRODUODENOSCOPY);  Surgeon: Skip Varela MD;  Location: [facility];  Service: Gastroenterology;  Laterality: N/A;    INSERT / REPLACE / REMOVE PACEMAKER      INSERTION OF TEMPORARY PACEMAKER Left 5/30/2023    Procedure: INSERTION, PACEMAKER, TEMPORARY;  Surgeon: Timi Meadows MD;  Location: [facility] CATH LAB/EP;  Service: Cardiology;  Laterality: Left;    LIVER RESECTION      LUMBAR DISC SURGERY      REPLACEMENT OF PACEMAKER GENERATOR N/A 5/30/2023    Procedure: REPLACEMENT, PACEMAKER GENERATOR;  Surgeon: Timi Meadows MD;  Location: [facility] CATH LAB/EP;  Service: Cardiology;  Laterality: N/A;       Current Outpatient Medications on File Prior to Visit   Medication Sig Dispense Refill    aspirin 81 MG Chew Take 1 tablet (81 mg total) by mouth once daily. 90 tablet 3    atorvastatin (LIPITOR) 40 MG tablet Take 40 mg by mouth once daily.      glimepiride (AMARYL) 2 MG tablet Take 1 mg by mouth every morning.      JARDIANCE 25 mg tablet Take 25 mg by mouth once daily.      LORazepam (ATIVAN) 1 MG tablet Take 1 mg by  "mouth 2 (two) times daily.      metoprolol tartrate (LOPRESSOR) 50 MG tablet Take 0.5 tablets (25 mg total) by mouth 2 (two) times daily. 180 tablet 3    mirtazapine (REMERON) 15 MG tablet 15 mg once daily.      pantoprazole (PROTONIX) 40 MG tablet Take 1 tablet (40 mg total) by mouth once daily. 30 tablet 2    SOLUS V2 LANCETS 30 gauge Misc       SOLUS V2 TEST STRIPS Strp        Current Facility-Administered Medications on File Prior to Visit   Medication Dose Route Frequency Provider Last Rate Last Admin    vancomycin injection    PRN Timi Meadows MD   1 g at 23 0836       Review of patient's allergies indicates:  No Known Allergies    Social History     Tobacco Use    Smoking status: Some Days     Current packs/day: 0.00     Average packs/day: 0.3 packs/day for 37.0 years (9.3 ttl pk-yrs)     Types: Cigarettes     Start date: 1955     Last attempt to quit: 1992     Years since quittin.3    Smokeless tobacco: Never    Tobacco comments:     Pt declines enrollment in the [employer]. Handout provided for Ambulatory Smoking Cessation program.    Substance Use Topics    Alcohol use: Yes     Alcohol/week: 5.0 standard drinks of alcohol     Types: 5 Cans of beer per week    Drug use: No       Family History   Problem Relation Age of Onset    Heart disease Mother     Pancreatic cancer Mother     Heart disease Father          Review of Systems   All other systems reviewed and are negative.     Objective:     Vitals:    24 1355 24 1356   BP: (!) 142/65 (!) 140/63   BP Location: Left arm Right arm   Patient Position: Sitting Sitting   BP Method: Large (Automatic) Large (Automatic)   Pulse: 80 79   SpO2: 98% 98%   Weight: 67.5 kg (148 lb 13 oz)    Height: 5' 5" (1.651 m)         Physical Exam  Vitals and nursing note reviewed.   Constitutional:       General: He is not in acute distress.     Appearance: He is not toxic-appearing or diaphoretic.      Comments: frail   HENT:      " Head: Normocephalic and atraumatic.      Nose: No congestion.      Mouth/Throat:      Mouth: Mucous membranes are moist.      Pharynx: Oropharynx is clear.   Eyes:      General: No scleral icterus.     Extraocular Movements: Extraocular movements intact.   Cardiovascular:      Rate and Rhythm: Normal rate and regular rhythm.      Pulses:           Carotid pulses are 2+ on the right side and 2+ on the left side.       Radial pulses are 1+ on the right side and 1+ on the left side.        Dorsalis pedis pulses are 1+ on the right side and 1+ on the left side.        Posterior tibial pulses are 1+ on the right side and 1+ on the left side.      Heart sounds: No murmur heard.  Pulmonary:      Effort: Pulmonary effort is normal. No respiratory distress.   Abdominal:      General: Abdomen is flat. There is no distension.   Musculoskeletal:         General: No swelling or tenderness. Normal range of motion.      Cervical back: Normal range of motion and neck supple.   Skin:     General: Skin is warm and dry.      Capillary Refill: Capillary refill takes less than 2 seconds.      Coloration: Skin is pale.      Findings: Bruising and erythema present.   Neurological:      General: No focal deficit present.      Mental Status: He is alert and oriented to person, place, and time.      Gait: Gait abnormal.   Psychiatric:         Behavior: Behavior normal.      Comments: Irritable        Assessment:       1. Paroxysmal atrial fibrillation    2. 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 Anemia, chronic disease    4. Hx of CABG         Plan:       In summary Mr. Lau is a 83 y/o M who has a history of PAF, tachy-valentine synd s/p pacemaker, hx of embolic cva, mvCAD s/p CABG was referred to see us for left atrial occluder device evaluation in the setting of recurrent GIB and high risk of fall. [id number] score: 5 and HAS-BLED score: 5    We will reschedule his cardiac CTA for later today.     Patient has an  appropriate rationale to seek a non-pharmacological alternative to OACs. Specific factors include: History of bleeding or increased bleeding risk and History of risk of falls.   Risk and benefits of LAAO device explained in details to both patient and wife. All questions were addressed and consent forms were obtained. We will schedule for amulet device placement based on cardiac CTA findings.      Plan discussed with Dr. Cortes.       Audelia Ortiz MD  Cardiology fellow      I have seen the patient, reviewed the Fellow's history and physical, assessment and plan. I have personally interviewed and examined the patient and agree with the findings.     EMMA Cortes MD

## 2024-04-09 DIAGNOSIS — I48.0 PAROXYSMAL ATRIAL FIBRILLATION: Primary | ICD-10-CM

## 2024-04-10 ENCOUNTER — DOCUMENTATION ONLY (OUTPATIENT)
Dept: CARDIOLOGY | Facility: CLINIC | Age: 84
End: 2024-04-10
Payer: MEDICARE

## 2024-04-10 NOTE — PROGRESS NOTES
You have been scheduled for your Watchman/Amulet procedure on Wednesday, May 8, 2024.  Please report to the Cardiology Waiting Area on the Third floor of the hospital and check in at 6 AM. You will then be taken to the SSCU (Short Stay Cardiac Unit) and prepared for your procedure. Please be aware that this is not the time of your procedure but the time that you are to arrive.     Preperations for your procedure:  Shower with Dial soap the night before and the morning of your procedure.  No solid foods 8 hours prior to your procedure.  You may have clear liquids until the time of your admission which should be 2 hours prior to your procedure.  You are encouraged to have at least 8 ounces of clear liquids prior to your admission to SSCU.  Patients with gastric emptying issues should be fasting for 6- 8 hours prior to the procedure.  Clear liquids include water, black coffee, clear juices, and performance drinks - no pulp or milk.    Heart failure or dialysis patients will be limited to 8 ounces (1 cup) of clear liquids up until 2 hours of the procedure.  You may take your regular morning medications         with as much water as necessary.   Bring your cane and/or walker with you to the hospital.  Bring CPAP/BiPAP, or oxygen if you are on oxygen at home.  Call the office for any signs of infection ( fever, cough, pneumonia, urinary tract, etc.) We cannot implant a device in an infected patient.  If you are on Pradaxa, Eliquis, Xeralto, or Coumadin, you do not have to stop them.    The entire procedure may take up to three hours. After the procedure, you will return to your room on the third floor where you will be monitored closely for the next several hours.     Your length of stay in the hospital will be determined by your physician. You may be discharged the same day if your physician is satisfied with your progress.     Follow up After Your Procedure  About 45 days after your procedure you will be required to  "have a Transesophageal echo and a clinic visit with your physician at [facility] in [place].   At 6 months, 1 year, and 2 years after your procedure, you will receive an appointment for a virtual visit for your follow up.  You can follow up with your regular Cardiologist regarding any other heart issues.     If you should have any questions, concerns, or need to change the date of your procedure, please call "TALI Fonseca @ [phone]            Special Instructions:    Do not take your Jardiance the morning of the procedure.    Do not take your Amaryl the morning of the procedure.    Continue to take your other prescribed medications.      THE ABOVE INSTRUCTIONS WERE GIVEN TO THE PATIENT VERBALLY AND THEY VERBALIZED UNDERSTANDING.  THEY DO NOT REQUIRE ANY SPECIAL NEEDS AND DO NOT HAVE ANY LEARNING BARRIERS.          Directions for Reporting to Cardiology Waiting Area in the Hospital  If you park in the Parking Garage:  Take elevators to the1st floor of the parking garage.  Continue past the gift shop, coffee shop, and piano.  Take a right and go to the gold elevators. (Elevator B)  Take the elevator to the 3rd floor.  Follow the arrow on the sign on the wall that says Cath Lab Registration/EP Lab Registration.  Follow the long hallway all the way around until you come to a big open area.  This is the registration area.  Check in at Reception Desk.    OR    If family is dropping you off:  Have them drop you off at the front of the Hospital under the green overhang.  Enter through the doors and take a right.  Take the E elevators to the 3rd floor Cardiology Waiting Area.  Check in at the Reception Desk in the waiting room.            "

## 2024-04-12 NOTE — INTERVAL H&P NOTE
The patient has been examined and the H&P has been reviewed:    No change from clinic notes     Procedure risks, benefits and alternative options discussed and understood by patient/family.          There are no hospital problems to display for this patient.     12-year-old male seen by myself yesterday for chest pain in the setting of school stress returns after hyperventilating at the nurses office today, currently feels well, exam unremarkable, repeat EKG normal, do not suspect cardiopulmonary etiology of pain, will discharge with outpatient follow-up.  Mom counseled regarding conditions which should prompt return.

## 2024-05-07 ENCOUNTER — ANESTHESIA EVENT (OUTPATIENT)
Dept: MEDSURG UNIT | Facility: HOSPITAL | Age: 84
DRG: 274 | End: 2024-05-07
Payer: MEDICARE

## 2024-05-07 NOTE — ANESTHESIA PREPROCEDURE EVALUATION
[facility]  Cardiac Anesthesia Pre-Operative Evaluation        Patient Name: Venu Lau  YOB: 1940  MRN: [id number]    SUBJECTIVE:     Pre-operative Evaluation for Procedure(s) (LRB):  Left atrial appendage closure device (N/A)  ECHOCARDIOGRAM, TRANSESOPHAGEAL (N/A)     06/05/2024    Venu Lau is a 84 y.o. male with a PMHx significant for HTN, CAD (s/p CABG), tachy-valentine syndrome (s/p dual-chamber PPM), T2DM (HgbA1c = 7%), hypothyroidism, and embolic stroke (residual deficits?) with paroxsymal AFib and CHADsVASc of 5, however unable to tolerate anticoagulation due to recurrent GI bleeding for which a left atrial appendage occlusion device was recommended.      He now presents for the above procedure(s) with Interventional Cardiology - Dr. Cortes    Previous Airway: None documented.    Pertinent Cardiac Studies:  TTE (10/04/2022)   Interpretation Summary  · The left ventricle is normal in size with mild concentric hypertrophy and normal systolic function.  · The estimated ejection fraction is 60%.  · Mild right ventricular enlargement with normal right ventricular systolic function.  · Moderate tricuspid regurgitation.  · Mild aortic regurgitation.  · Mild mitral regurgitation.  · There is moderate aortic valve stenosis.  · Aortic valve area is 1.07 cm2; peak velocity is 2.99 m/s; mean gradient is 21 mmHg.  · Severe left atrial enlargement.  · Intermediate central venous pressure (8 mmHg).  · The estimated PA systolic pressure is 33 mmHg.       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    Alcohol use disorder, mild, abuse    Other chest pain    Pancytopenia    Nonrheumatic aortic valve stenosis    Pacemaker generator end of life       Review of patient's allergies indicates:  No Known Allergies    Current Outpatient Medications   Medication Instructions    aspirin 81 mg, Oral, Daily    atorvastatin (LIPITOR) 40 mg, Oral, Daily    glimepiride (AMARYL) 1 mg, Oral, Every morning    JARDIANCE 25 mg, Oral, Daily    LORazepam (ATIVAN) 1 mg, Oral, 2 times daily    metoprolol tartrate (LOPRESSOR) 25 mg, Oral, 2 times daily    mirtazapine (REMERON) 15 mg, Daily    pantoprazole (PROTONIX) 40 mg, Oral, Daily    SOLUS V2 LANCETS 30 gauge Misc No dose, route, or frequency recorded.    SOLUS V2 TEST STRIPS Strp No dose, route, or frequency recorded.       Past Surgical History:   Procedure Laterality Date    ANGIOGRAPHY Right 10/11/2021    Procedure: ANGIOGRAM-HEPATIC;  Surgeon: Phil Stuart II, MD;  Location: [facility] CATH LAB;  Service: Interventional Radiology;  Laterality: Right;    APPENDECTOMY      CHOLECYSTECTOMY      CORONARY ARTERY BYPASS GRAFT      ENDOSCOPIC ULTRASOUND OF UPPER GASTROINTESTINAL TRACT N/A 6/17/2020    Procedure: EUS;  Surgeon: Rei Humphrey MD;  Location: [facility] ENDO;  Service: Endoscopy;  Laterality: N/A;    ERCP N/A 6/17/2020    Procedure: ERCP;  Surgeon: Rei Humphrey MD;  Location: [facility] ENDO;  Service: Endoscopy;  Laterality: N/A;    ERCP N/A 8/31/2020    Procedure: ERCP (ENDOSCOPIC RETROGRADE CHOLANGIOPANCREATOGRAPHY);  Surgeon: Adela Boyle MD;  Location: [facility] ENDO;  Service: Endoscopy;  Laterality: N/A;    ESOPHAGOGASTRODUODENOSCOPY N/A 10/11/2021    Procedure: EGD (ESOPHAGOGASTRODUODENOSCOPY);  Surgeon: Skip Varela MD;  Location: [facility]  ENDO;  Service: Gastroenterology;  Laterality: N/A;    INSERT / REPLACE / REMOVE PACEMAKER      INSERTION OF TEMPORARY PACEMAKER Left 5/30/2023    Procedure: INSERTION, PACEMAKER, TEMPORARY;  Surgeon: Timi Meadows MD;  Location: [facility] CATH LAB/EP;  Service: Cardiology;  Laterality: Left;    LIVER RESECTION      LUMBAR DISC SURGERY      REPLACEMENT OF PACEMAKER GENERATOR N/A 5/30/2023    Procedure: REPLACEMENT, PACEMAKER GENERATOR;  Surgeon: Timi Meadows MD;  Location: [facility] CATH LAB/EP;  Service: Cardiology;  Laterality: N/A;       Social History     Substance and Sexual Activity   Drug Use No     Alcohol Use: Not on file     Tobacco Use: High Risk (4/2/2024)    Patient History     Smoking Tobacco Use: Some Days     Smokeless Tobacco Use: Never     Passive Exposure: Not on file       OBJECTIVE:     Vital Signs Range (Last 24H):  BP: ()/()   Arterial Line BP: ()/()       Significant Labs    Heme Profile  Lab Results   Component Value Date    WBC 3.48 (L) 01/09/2024    HGB 11.7 (L) 01/09/2024    HCT 37.4 (L) 01/09/2024     (L) 01/09/2024       Coagulation Studies  Lab Results   Component Value Date    LABPROT 10.5 10/27/2021    INR 1.0 10/27/2021    APTT 29.2 10/27/2021       BMP  Lab Results   Component Value Date     01/09/2024    K 4.0 01/09/2024     01/09/2024    CO2 22 (L) 01/09/2024    BUN 22 (H) 01/09/2024    CREATININE 1.05 01/09/2024    MG 2.1 10/04/2022    PHOS 2.0 (L) 10/11/2021       Liver Function Tests  Lab Results   Component Value Date    AST 58 (H) 01/09/2024    ALT 47 (H) 01/09/2024    ALKPHOS 166 (H) 01/09/2024    BILITOT 0.5 01/09/2024    PROT 8.3 01/09/2024    ALBUMIN 4.5 01/09/2024       Lipid Profile  Lab Results   Component Value Date    CHOL 100 (L) 10/04/2022    HDL 40 10/04/2022    TRIG 94 10/04/2022       Endocrine Profile  Lab Results   Component Value Date    HGBA1C 7.0 (H) 01/09/2024    TSH 4.670 (H) 01/09/2024       Diagnostic Studies    CTA Cardiac  TAVR (04/02/2024)  TAVR measurements as above.     Small right pleural effusion.     3.4 cm masslike opacity in the right lung base.  Differential considerations include infection, round atelectasis, and neoplasm.  Follow-up chest CT in 3 months is recommended.     Mediastinal lymphadenopathy, which could be reactive or neoplastic.     Other findings as described.    Cardiac Studies    EKG:   Results for orders placed or performed during the hospital encounter of 05/30/23   EKG 12-lead    Collection Time: 05/30/23  7:08 AM    Narrative    Test Reason : Z01.818,    Vent. Rate : 058 BPM     Atrial Rate : 058 BPM     P-R Int : 096 ms          QRS Dur : 168 ms      QT Int : 510 ms       P-R-T Axes : 000 -75 068 degrees     QTc Int : 500 ms    AV dual-paced rhythm  Abnormal ECG  When compared with ECG of 04-OCT-2022 04:06,  Vent. rate has decreased BY   2 BPM  Confirmed by Venu Loco MD ([id number]) on 6/1/2023 5:20:22 PM    Referred By: GRACIE COREA           Confirmed By:Venu Loco MD         Transthoracic Echo (10/04/2022):  Interpretation Summary  · The left ventricle is normal in size with mild concentric hypertrophy and normal systolic function.  · The estimated ejection fraction is 60%.  · Mild right ventricular enlargement with normal right ventricular systolic function.  · Moderate tricuspid regurgitation.  · Mild aortic regurgitation.  · Mild mitral regurgitation.  · There is moderate aortic valve stenosis.  · Aortic valve area is 1.07 cm2; peak velocity is 2.99 m/s; mean gradient is 21 mmHg.  · Severe left atrial enlargement.  · Intermediate central venous pressure (8 mmHg).  · The estimated PA systolic pressure is 33 mmHg.    ASSESSMENT/PLAN:     Venu Lau is a 84 y.o. male with atrial fibrillation presenting for Watchman procedure.       Pre-op Assessment    I have reviewed the Patient Summary Reports.     I have reviewed the Nursing Notes. I have reviewed the NPO Status.   I have  reviewed the Medications.     Review of Systems  Anesthesia Hx:  No problems with previous Anesthesia   History of prior surgery of interest to airway management or planning:          Denies Family Hx of Anesthesia complications.    Denies Personal Hx of Anesthesia complications.                    Social:  Smoker       Hematology/Oncology:       -- Anemia:                  Denies Current/Recent Cancer                Cardiovascular:  Exercise tolerance: poor  Pacemaker Hypertension Valvular problems/Murmurs  CAD   CABG/stent Dysrhythmias atrial fibrillation   Denies CHF.    no hyperlipidemia   ECG has been reviewed.    Coronary Artery Disease:         S/P Aorto-Coronary Bypass Graft Surgery (CABG):    Valvular Heart Disease: Aortic Stenosis (AS), moderate  Tricuspid Regurgitation (TR), moderate     Denies Congestive Heart Failure (CHF)                Hypertension     Disorder of Cardiac Rhythm, Atrial Fibrillation, Paroxysmal Atrial Fibrillation  Disorder of Cardiac Conduction Tachy-valentine syndrome s/p dual-chamber PPM   Pulmonary:    Denies COPD.  Denies Asthma.     Denies Sleep Apnea.                Renal/:   Denies Chronic Renal Disease.                Hepatic/GI:     GERD Liver Disease,            Neurological:   CVA, residual symptoms    Denies Headaches. Denies Seizures.                                Endocrine:  Diabetes, type 2 Hypothyroidism   Diabetes, Type 2 Diabetes     , most recent HgA1c value was 7.0%               Denies Obesity / BMI > 30  Psych:  Psychiatric History                  Physical Exam  General: Well nourished, Cooperative, Alert and Oriented    Airway:  Mallampati: II / II  Mouth Opening: Normal  TM Distance: Normal  Tongue: Normal  Neck ROM: Normal ROM    Dental:  Edentulous        Anesthesia Plan  Type of Anesthesia, risks & benefits discussed:    Anesthesia Type: Gen ETT  Intra-op Monitoring Plan: Standard ASA Monitors and Art Line  Post Op Pain Control Plan: multimodal analgesia  and IV/PO Opioids PRN  Induction:  IV  Airway Plan: Video, Post-Induction  Informed Consent: Informed consent signed with the Patient and all parties understand the risks and agree with anesthesia plan.  All questions answered. Patient consented to blood products? Yes  ASA Score: 4  Day of Surgery Review of History & Physical: H&P Update referred to the surgeon/provider.    Ready For Surgery From Anesthesia Perspective.     .

## 2024-05-08 ENCOUNTER — ANESTHESIA (OUTPATIENT)
Dept: MEDSURG UNIT | Facility: HOSPITAL | Age: 84
DRG: 274 | End: 2024-05-08
Payer: MEDICARE

## 2024-05-13 NOTE — PROGRESS NOTES
You have been scheduled for your Watchman/Amulet procedure on Thursday, June 8, 2024.  Please report to the Cardiology Waiting Area on the Third floor of the hospital and check in at 10 AM. You will then be taken to the SSCU (Short Stay Cardiac Unit) and prepared for your procedure. Please be aware that this is not the time of your procedure but the time that you are to arrive.      Preperations for your procedure:  Shower with Dial soap the night before and the morning of your procedure.  No solid foods 8 hours prior to your procedure.  You may have clear liquids until the time of your admission which should be 2 hours prior to your procedure.  You are encouraged to have at least 8 ounces of clear liquids prior to your admission to SSCU.  Patients with gastric emptying issues should be fasting for 6- 8 hours prior to the procedure.  Clear liquids include water, black coffee, clear juices, and performance drinks - no pulp or milk.    Heart failure or dialysis patients will be limited to 8 ounces (1 cup) of clear liquids up until 2 hours of the procedure.  You may take your regular morning medications         with as much water as necessary.   Bring your cane and/or walker with you to the hospital.  Bring CPAP/BiPAP, or oxygen if you are on oxygen at home.  Call the office for any signs of infection ( fever, cough, pneumonia, urinary tract, etc.) We cannot implant a device in an infected patient.  If you are on Pradaxa, Eliquis, Xeralto, or Coumadin, you do not have to stop them.     The entire procedure may take up to three hours. After the procedure, you will return to your room on the third floor where you will be monitored closely for the next several hours.      Your length of stay in the hospital will be determined by your physician. You may be discharged the same day if your physician is satisfied with your progress.      Follow up After Your Procedure  About 45 days after your procedure you will be required  "to have a Transesophageal echo and a clinic visit with your physician at [facility] in [place].   At 6 months, 1 year, and 2 years after your procedure, you will receive an appointment for a virtual visit for your follow up.  You can follow up with your regular Cardiologist regarding any other heart issues.      If you should have any questions, concerns, or need to change the date of your procedure, please call "TALI Fonseca @ [phone]                 Special Instructions:     Do not take your Jardiance the morning of the procedure.     Do not take your Amaryl the morning of the procedure.     Continue to take your other prescribed medications.        THE ABOVE INSTRUCTIONS WERE GIVEN TO THE PATIENT VERBALLY AND THEY VERBALIZED UNDERSTANDING.  THEY DO NOT REQUIRE ANY SPECIAL NEEDS AND DO NOT HAVE ANY LEARNING BARRIERS.              Directions for Reporting to Cardiology Waiting Area in the Hospital  If you park in the Parking Garage:  Take elevators to the1st floor of the parking garage.  Continue past the gift shop, coffee shop, and piano.  Take a right and go to the gold elevators. (Elevator B)  Take the elevator to the 3rd floor.  Follow the arrow on the sign on the wall that says Cath Lab Registration/EP Lab Registration.  Follow the long hallway all the way around until you come to a big open area.  This is the registration area.  Check in at Reception Desk.     OR     If family is dropping you off:  Have them drop you off at the front of the Hospital under the green overhang.  Enter through the doors and take a right.  Take the E elevators to the 3rd floor Cardiology Waiting Area.  Check in at the Reception Desk in the waiting room.           "

## 2024-06-03 ENCOUNTER — CLINICAL SUPPORT (OUTPATIENT)
Dept: CARDIOLOGY | Facility: CLINIC | Age: 84
End: 2024-06-03
Payer: MEDICARE

## 2024-06-03 DIAGNOSIS — Z95.1 HX OF CABG: ICD-10-CM

## 2024-06-03 DIAGNOSIS — I48.0 PAROXYSMAL ATRIAL FIBRILLATION: Primary | ICD-10-CM

## 2024-06-03 DIAGNOSIS — Z95.0 PACEMAKER: ICD-10-CM

## 2024-06-03 PROCEDURE — 93294 REM INTERROG EVL PM/LDLS PM: CPT | Mod: S$GLB,,, | Performed by: INTERNAL MEDICINE

## 2024-06-03 PROCEDURE — 93296 REM INTERROG EVL PM/IDS: CPT | Mod: S$GLB,,, | Performed by: INTERNAL MEDICINE

## 2024-06-03 NOTE — PROGRESS NOTES
Remote check today-pacemaker dual  PJ:8.2 years  Impedances normal  P wave low  R wave good  Atrial fibrillation noted but pacemaker dependent - not on anticoagulation due to GI bleeds and scheduled of HAWA device

## 2024-06-05 RX ORDER — SODIUM CHLORIDE 0.9 % (FLUSH) 0.9 %
SYRINGE (ML) INJECTION
Status: CANCELLED | OUTPATIENT
Start: 2024-06-05

## 2024-06-06 ENCOUNTER — DOCUMENTATION ONLY (OUTPATIENT)
Dept: CARDIOLOGY | Facility: CLINIC | Age: 84
End: 2024-06-06

## 2024-06-06 ENCOUNTER — HOSPITAL ENCOUNTER (INPATIENT)
Facility: HOSPITAL | Age: 84
LOS: 1 days | Discharge: HOME | DRG: 274 | End: 2024-06-06
Attending: INTERNAL MEDICINE | Admitting: INTERNAL MEDICINE
Payer: MEDICARE

## 2024-06-06 ENCOUNTER — HOSPITAL ENCOUNTER (OUTPATIENT)
Dept: CARDIOLOGY | Facility: HOSPITAL | Age: 84
Discharge: HOME | DRG: 274 | End: 2024-06-06
Attending: INTERNAL MEDICINE | Admitting: INTERNAL MEDICINE
Payer: MEDICARE

## 2024-06-06 VITALS
OXYGEN SATURATION: 100 % | BODY MASS INDEX: 24.32 KG/M2 | TEMPERATURE: 98 F | SYSTOLIC BLOOD PRESSURE: 141 MMHG | DIASTOLIC BLOOD PRESSURE: 68 MMHG | HEIGHT: 65 IN | WEIGHT: 146 LBS | RESPIRATION RATE: 14 BRPM | HEART RATE: 80 BPM

## 2024-06-06 VITALS
SYSTOLIC BLOOD PRESSURE: 178 MMHG | HEART RATE: 80 BPM | BODY MASS INDEX: 24.32 KG/M2 | HEIGHT: 65 IN | DIASTOLIC BLOOD PRESSURE: 83 MMHG | WEIGHT: 146 LBS

## 2024-06-06 DIAGNOSIS — Z00.6 EXAMINATION FOR NORMAL COMPARISON OR CONTROL IN CLINICAL RESEARCH: Primary | ICD-10-CM

## 2024-06-06 DIAGNOSIS — I48.0 PAF (PAROXYSMAL ATRIAL FIBRILLATION): ICD-10-CM

## 2024-06-06 DIAGNOSIS — Z95.818 PRESENCE OF AMULET LEFT ATRIAL APPENDAGE CLOSURE DEVICE: Primary | ICD-10-CM

## 2024-06-06 DIAGNOSIS — I48.0 PAROXYSMAL ATRIAL FIBRILLATION: ICD-10-CM

## 2024-06-06 DIAGNOSIS — Z98.890 STATUS POST LIGATION OF LEFT ATRIAL APPENDAGE: ICD-10-CM

## 2024-06-06 DIAGNOSIS — Z01.89 ENCOUNTER FOR OTHER SPECIFIED SPECIAL EXAMINATIONS: ICD-10-CM

## 2024-06-06 LAB
ABO + RH BLD: NORMAL
ALBUMIN SERPL BCP-MCNC: 4.2 G/DL (ref 3.5–5.2)
ALP SERPL-CCNC: 164 U/L (ref 55–135)
ALT SERPL W/O P-5'-P-CCNC: 20 U/L (ref 10–44)
ANION GAP SERPL CALC-SCNC: 12 MMOL/L (ref 8–16)
ASCENDING AORTA: 3.6 CM
AST SERPL-CCNC: 38 U/L (ref 10–40)
BASOPHILS # BLD AUTO: 0.02 K/UL (ref 0–0.2)
BASOPHILS NFR BLD: 0.6 % (ref 0–1.9)
BILIRUB SERPL-MCNC: 1.1 MG/DL (ref 0.1–1)
BLD GP AB SCN CELLS X3 SERPL QL: NORMAL
BSA FOR ECHO PROCEDURE: 1.74 M2
BUN SERPL-MCNC: 17 MG/DL (ref 8–23)
CALCIUM SERPL-MCNC: 10.4 MG/DL (ref 8.7–10.5)
CHLORIDE SERPL-SCNC: 111 MMOL/L (ref 95–110)
CO2 SERPL-SCNC: 17 MMOL/L (ref 23–29)
CREAT SERPL-MCNC: 1.2 MG/DL (ref 0.5–1.4)
DIFFERENTIAL METHOD BLD: ABNORMAL
EJECTION FRACTION: 55 %
EOSINOPHIL # BLD AUTO: 0 K/UL (ref 0–0.5)
EOSINOPHIL NFR BLD: 0.3 % (ref 0–8)
ERYTHROCYTE [DISTWIDTH] IN BLOOD BY AUTOMATED COUNT: 18.5 % (ref 11.5–14.5)
EST. GFR  (NO RACE VARIABLE): 59.6 ML/MIN/1.73 M^2
GLUCOSE SERPL-MCNC: 117 MG/DL (ref 70–110)
HCT VFR BLD AUTO: 38.4 % (ref 40–54)
HGB BLD-MCNC: 12.2 G/DL (ref 14–18)
IMM GRANULOCYTES # BLD AUTO: 0.01 K/UL (ref 0–0.04)
IMM GRANULOCYTES NFR BLD AUTO: 0.3 % (ref 0–0.5)
INR PPP: 1.1 (ref 0.8–1.2)
LAA PV: 20 CM/S
LYMPHOCYTES # BLD AUTO: 0.9 K/UL (ref 1–4.8)
LYMPHOCYTES NFR BLD: 26.1 % (ref 18–48)
MCH RBC QN AUTO: 25.8 PG (ref 27–31)
MCHC RBC AUTO-ENTMCNC: 31.8 G/DL (ref 32–36)
MCV RBC AUTO: 81 FL (ref 82–98)
MONOCYTES # BLD AUTO: 0.5 K/UL (ref 0.3–1)
MONOCYTES NFR BLD: 14.7 % (ref 4–15)
NEUTROPHILS # BLD AUTO: 2 K/UL (ref 1.8–7.7)
NEUTROPHILS NFR BLD: 58 % (ref 38–73)
NRBC BLD-RTO: 0 /100 WBC
OHS QRS DURATION: 166 MS
OHS QTC CALCULATION: 530 MS
PLATELET # BLD AUTO: 147 K/UL (ref 150–450)
PMV BLD AUTO: 9.9 FL (ref 9.2–12.9)
POCT GLUCOSE: 111 MG/DL (ref 70–110)
POCT GLUCOSE: 145 MG/DL (ref 70–110)
POTASSIUM SERPL-SCNC: 4.8 MMOL/L (ref 3.5–5.1)
PROT SERPL-MCNC: 8.5 G/DL (ref 6–8.4)
PROTHROMBIN TIME: 11.7 SEC (ref 9–12.5)
RBC # BLD AUTO: 4.72 M/UL (ref 4.6–6.2)
RIGHT VENTRICULAR END-DIASTOLIC DIMENSION: 4.4 CM
SINUS: 3.2 CM
SODIUM SERPL-SCNC: 140 MMOL/L (ref 136–145)
STJ: 2.9 CM
WBC # BLD AUTO: 3.48 K/UL (ref 3.9–12.7)

## 2024-06-06 PROCEDURE — 27201423 OPTIME MED/SURG SUP & DEVICES STERILE SUPPLY: Performed by: INTERNAL MEDICINE

## 2024-06-06 PROCEDURE — 25500020 PHARM REV CODE 255: Performed by: INTERNAL MEDICINE

## 2024-06-06 PROCEDURE — 25000003 PHARM REV CODE 250: Performed by: INTERNAL MEDICINE

## 2024-06-06 PROCEDURE — D9220A PRA ANESTHESIA: Mod: ,,, | Performed by: ANESTHESIOLOGY

## 2024-06-06 PROCEDURE — 93355 ECHO TRANSESOPHAGEAL (TEE): CPT

## 2024-06-06 PROCEDURE — 85025 COMPLETE CBC W/AUTO DIFF WBC: CPT | Performed by: INTERNAL MEDICINE

## 2024-06-06 PROCEDURE — 99233 SBSQ HOSP IP/OBS HIGH 50: CPT | Mod: ,,, | Performed by: INTERNAL MEDICINE

## 2024-06-06 PROCEDURE — 93005 ELECTROCARDIOGRAM TRACING: CPT

## 2024-06-06 PROCEDURE — 33340 PERQ CLSR TCAT L ATR APNDGE: CPT | Performed by: INTERNAL MEDICINE

## 2024-06-06 PROCEDURE — C1769 GUIDE WIRE: HCPCS | Performed by: INTERNAL MEDICINE

## 2024-06-06 PROCEDURE — 33340 PERQ CLSR TCAT L ATR APNDGE: CPT | Mod: Q0,,, | Performed by: INTERNAL MEDICINE

## 2024-06-06 PROCEDURE — C1894 INTRO/SHEATH, NON-LASER: HCPCS | Performed by: INTERNAL MEDICINE

## 2024-06-06 PROCEDURE — 93010 ELECTROCARDIOGRAM REPORT: CPT | Mod: ,,, | Performed by: INTERNAL MEDICINE

## 2024-06-06 PROCEDURE — 02L73DK OCCLUSION OF LEFT ATRIAL APPENDAGE WITH INTRALUMINAL DEVICE, PERCUTANEOUS APPROACH: ICD-10-PCS | Performed by: INTERNAL MEDICINE

## 2024-06-06 PROCEDURE — 63600175 PHARM REV CODE 636 W HCPCS

## 2024-06-06 PROCEDURE — 63600175 PHARM REV CODE 636 W HCPCS: Mod: JZ,JG

## 2024-06-06 PROCEDURE — 82962 GLUCOSE BLOOD TEST: CPT | Performed by: INTERNAL MEDICINE

## 2024-06-06 PROCEDURE — 93355 ECHO TRANSESOPHAGEAL (TEE): CPT | Mod: 26,,, | Performed by: INTERNAL MEDICINE

## 2024-06-06 PROCEDURE — 25000003 PHARM REV CODE 250

## 2024-06-06 PROCEDURE — 11000001 HC ACUTE MED/SURG PRIVATE ROOM

## 2024-06-06 PROCEDURE — 36620 INSERTION CATHETER ARTERY: CPT | Mod: 59,,, | Performed by: ANESTHESIOLOGY

## 2024-06-06 PROCEDURE — 86850 RBC ANTIBODY SCREEN: CPT | Performed by: INTERNAL MEDICINE

## 2024-06-06 PROCEDURE — 37000009 HC ANESTHESIA EA ADD 15 MINS: Performed by: INTERNAL MEDICINE

## 2024-06-06 PROCEDURE — 85610 PROTHROMBIN TIME: CPT | Performed by: INTERNAL MEDICINE

## 2024-06-06 PROCEDURE — 37000008 HC ANESTHESIA 1ST 15 MINUTES: Performed by: INTERNAL MEDICINE

## 2024-06-06 PROCEDURE — 80053 COMPREHEN METABOLIC PANEL: CPT | Performed by: INTERNAL MEDICINE

## 2024-06-06 DEVICE — LEFT ATRIAL APPENDAGE OCCLUDER
Type: IMPLANTABLE DEVICE | Site: HEART | Status: FUNCTIONAL
Brand: AMPLATZER™ AMULET™

## 2024-06-06 RX ORDER — PROPOFOL 10 MG/ML
VIAL (ML) INTRAVENOUS
Status: DISCONTINUED | OUTPATIENT
Start: 2024-06-06 | End: 2024-06-06

## 2024-06-06 RX ORDER — FENTANYL CITRATE 50 UG/ML
INJECTION, SOLUTION INTRAMUSCULAR; INTRAVENOUS
Status: DISCONTINUED | OUTPATIENT
Start: 2024-06-06 | End: 2024-06-06

## 2024-06-06 RX ORDER — ONDANSETRON 8 MG/1
8 TABLET, ORALLY DISINTEGRATING ORAL EVERY 8 HOURS PRN
Status: DISCONTINUED | OUTPATIENT
Start: 2024-06-06 | End: 2024-06-06 | Stop reason: HOSPADM

## 2024-06-06 RX ORDER — PROTAMINE SULFATE 10 MG/ML
INJECTION, SOLUTION INTRAVENOUS
Status: DISCONTINUED | OUTPATIENT
Start: 2024-06-06 | End: 2024-06-06

## 2024-06-06 RX ORDER — NOREPINEPHRINE BITARTRATE 1 MG/ML
INJECTION, SOLUTION INTRAVENOUS
Status: DISCONTINUED | OUTPATIENT
Start: 2024-06-06 | End: 2024-06-06

## 2024-06-06 RX ORDER — HEPARIN SODIUM 1000 [USP'U]/ML
INJECTION, SOLUTION INTRAVENOUS; SUBCUTANEOUS
Status: DISCONTINUED | OUTPATIENT
Start: 2024-06-06 | End: 2024-06-06

## 2024-06-06 RX ORDER — ESMOLOL HYDROCHLORIDE 10 MG/ML
INJECTION INTRAVENOUS
Status: DISCONTINUED | OUTPATIENT
Start: 2024-06-06 | End: 2024-06-06

## 2024-06-06 RX ORDER — ROCURONIUM BROMIDE 10 MG/ML
INJECTION, SOLUTION INTRAVENOUS
Status: DISCONTINUED | OUTPATIENT
Start: 2024-06-06 | End: 2024-06-06

## 2024-06-06 RX ORDER — SODIUM CHLORIDE 9 MG/ML
INJECTION, SOLUTION INTRAVENOUS CONTINUOUS
Status: DISCONTINUED | OUTPATIENT
Start: 2024-06-06 | End: 2024-06-06 | Stop reason: HOSPADM

## 2024-06-06 RX ORDER — CLOPIDOGREL BISULFATE 75 MG/1
75 TABLET ORAL DAILY
Qty: 90 TABLET | Refills: 1 | Status: SHIPPED | OUTPATIENT
Start: 2024-06-06

## 2024-06-06 RX ORDER — HYDROMORPHONE HYDROCHLORIDE 1 MG/ML
0.2 INJECTION, SOLUTION INTRAMUSCULAR; INTRAVENOUS; SUBCUTANEOUS EVERY 5 MIN PRN
Status: DISCONTINUED | OUTPATIENT
Start: 2024-06-06 | End: 2024-06-06 | Stop reason: HOSPADM

## 2024-06-06 RX ORDER — SODIUM CHLORIDE 9 MG/ML
INJECTION, SOLUTION INTRAVENOUS CONTINUOUS
Status: ACTIVE | OUTPATIENT
Start: 2024-06-06

## 2024-06-06 RX ORDER — HEPARIN SOD,PORCINE/0.9 % NACL 1000/500ML
INTRAVENOUS SOLUTION INTRAVENOUS
Status: DISCONTINUED | OUTPATIENT
Start: 2024-06-06 | End: 2024-06-06 | Stop reason: HOSPADM

## 2024-06-06 RX ORDER — LIDOCAINE HYDROCHLORIDE 20 MG/ML
INJECTION, SOLUTION EPIDURAL; INFILTRATION; INTRACAUDAL; PERINEURAL
Status: DISCONTINUED | OUTPATIENT
Start: 2024-06-06 | End: 2024-06-06

## 2024-06-06 RX ORDER — LIDOCAINE HYDROCHLORIDE 20 MG/ML
INJECTION, SOLUTION INFILTRATION; PERINEURAL
Status: DISCONTINUED | OUTPATIENT
Start: 2024-06-06 | End: 2024-06-06 | Stop reason: HOSPADM

## 2024-06-06 RX ORDER — ACETAMINOPHEN 325 MG/1
650 TABLET ORAL EVERY 4 HOURS PRN
Status: DISCONTINUED | OUTPATIENT
Start: 2024-06-06 | End: 2024-06-06 | Stop reason: HOSPADM

## 2024-06-06 RX ORDER — DIPHENHYDRAMINE HCL 50 MG
50 CAPSULE ORAL ONCE
Status: COMPLETED | OUTPATIENT
Start: 2024-06-06 | End: 2024-06-06

## 2024-06-06 RX ORDER — PROCHLORPERAZINE EDISYLATE 5 MG/ML
5 INJECTION INTRAMUSCULAR; INTRAVENOUS EVERY 30 MIN PRN
Status: DISCONTINUED | OUTPATIENT
Start: 2024-06-06 | End: 2024-06-06 | Stop reason: HOSPADM

## 2024-06-06 RX ORDER — ACETAMINOPHEN 500 MG
1000 TABLET ORAL
Status: COMPLETED | OUTPATIENT
Start: 2024-06-06 | End: 2024-06-06

## 2024-06-06 RX ADMIN — FENTANYL CITRATE 100 MCG: 50 INJECTION, SOLUTION INTRAMUSCULAR; INTRAVENOUS at 01:06

## 2024-06-06 RX ADMIN — DEXTROSE 2 G: 50 INJECTION, SOLUTION INTRAVENOUS at 01:06

## 2024-06-06 RX ADMIN — SODIUM CHLORIDE, SODIUM GLUCONATE, SODIUM ACETATE, POTASSIUM CHLORIDE, MAGNESIUM CHLORIDE, SODIUM PHOSPHATE, DIBASIC, AND POTASSIUM PHOSPHATE: .53; .5; .37; .037; .03; .012; .00082 INJECTION, SOLUTION INTRAVENOUS at 01:06

## 2024-06-06 RX ADMIN — SODIUM CHLORIDE: 0.9 INJECTION, SOLUTION INTRAVENOUS at 01:06

## 2024-06-06 RX ADMIN — NOREPINEPHRINE BITARTRATE 8 MCG: 1 INJECTION, SOLUTION, CONCENTRATE INTRAVENOUS at 02:06

## 2024-06-06 RX ADMIN — HEPARIN SODIUM 7500 UNITS: 1000 INJECTION, SOLUTION INTRAVENOUS; SUBCUTANEOUS at 02:06

## 2024-06-06 RX ADMIN — SUGAMMADEX 200 MG: 100 INJECTION, SOLUTION INTRAVENOUS at 03:06

## 2024-06-06 RX ADMIN — LIDOCAINE HYDROCHLORIDE 10 MG: 20 INJECTION, SOLUTION EPIDURAL; INFILTRATION; INTRACAUDAL; PERINEURAL at 01:06

## 2024-06-06 RX ADMIN — PROPOFOL 110 MG: 10 INJECTION, EMULSION INTRAVENOUS at 01:06

## 2024-06-06 RX ADMIN — PROTAMINE SULFATE 5 MG: 10 INJECTION, SOLUTION INTRAVENOUS at 03:06

## 2024-06-06 RX ADMIN — DIPHENHYDRAMINE HYDROCHLORIDE 50 MG: 50 CAPSULE ORAL at 12:06

## 2024-06-06 RX ADMIN — NOREPINEPHRINE BITARTRATE 8 MCG: 1 INJECTION, SOLUTION, CONCENTRATE INTRAVENOUS at 01:06

## 2024-06-06 RX ADMIN — PROTAMINE SULFATE 70 MG: 10 INJECTION, SOLUTION INTRAVENOUS at 03:06

## 2024-06-06 RX ADMIN — ACETAMINOPHEN 1000 MG: 500 TABLET ORAL at 12:06

## 2024-06-06 RX ADMIN — NOREPINEPHRINE BITARTRATE 0.04 MCG/KG/MIN: 1 INJECTION, SOLUTION, CONCENTRATE INTRAVENOUS at 01:06

## 2024-06-06 RX ADMIN — ESMOLOL HYDROCHLORIDE 30 MG: 100 INJECTION, SOLUTION INTRAVENOUS at 01:06

## 2024-06-06 RX ADMIN — SODIUM CHLORIDE: 9 INJECTION, SOLUTION INTRAVENOUS at 03:06

## 2024-06-06 RX ADMIN — ROCURONIUM BROMIDE 20 MG: 10 INJECTION, SOLUTION INTRAVENOUS at 02:06

## 2024-06-06 RX ADMIN — ROCURONIUM BROMIDE 50 MG: 10 INJECTION, SOLUTION INTRAVENOUS at 01:06

## 2024-06-06 NOTE — ASSESSMENT & PLAN NOTE
Here today for LAAO with Amulet device with YAN guidance  -No absolute contraindications of esophageal stricture, tumor, perforation, laceration,or diverticulum and/or active GI bleed  -The risks, benefits & alternatives of the procedure were explained to the patient.   -The risks of transesophageal echo include but are not limited to:  Dental trauma, esophageal trauma/perforation, bleeding, laryngospasm/brochospasm, aspiration, sore throat/hoarseness, & dislodgement of the endotracheal tube/nasogastric tube (where applicable).    -The risks of ANES monitored sedation include hypotension, respiratory depression, arrhythmias, bronchospasm, & death.    -Informed consent was obtained. The patient is agreeable to proceed with the procedure and all questions and concerns addressed.    Case discussed with an attending in echocardiography lab.    Further recommendations per attending addendum

## 2024-06-06 NOTE — PLAN OF CARE
Vss. Pt vpaced on cm.  [employer] pacemaker dddr low limit 60. Unable to get in touch with family, updated over text messaging system. Pt aaox4 follows commands. Pt uses cane at home. Dr wilder cath lab fellow updated pt in ep pacu 3, verbalizes understanding. Pharmacy delivered plavix prescription, at bedside.  S/p amulet procedure. Pt arrived with right groin sutures/stopcock naren, well approx. No dressing noted. Sutures/stopcock placed at 1500.  Per md order, remove sutures in 4hrs at 1900.  Bedrest done at same time 1900.  Ns at 150ml/hr iv x4hrs, see mar/pump set for volume.  Pt's glasses on. No EKG ordered.  Right groin naren, well approx. No hematoma or drainage.  Pt has razor burn noted to scott groins, naren.  Palpable pulses noted. Bg 145.  Pt tolerating sips of water. Awaiting sscu bed, remains in ep pacu 3. See flowsheet for full assessment.

## 2024-06-06 NOTE — ANESTHESIA PROCEDURE NOTES
Intubation    Date/Time: 6/6/2024 1:49 PM    Performed by: Saulo Townsend MD  Authorized by: Kp Peterson MD    Intubation:     Induction:  Intravenous    Intubated:  Postinduction    Mask Ventilation:  Easy with oral airway    Attempts:  1    Attempted By:  Resident anesthesiologist    Method of Intubation:  Direct    Blade:  Giordano 2    Laryngeal View Grade: Grade I - full view of cords      Difficult Airway Encountered?: No      Complications:  None    Airway Device:  Oral endotracheal tube    Airway Device Size:  7.5    Style/Cuff Inflation:  Cuffed (inflated to minimal occlusive pressure)    Tube secured:  23    Secured at:  The lips    Placement Verified By:  Capnometry    Complicating Factors:  None    Findings Post-Intubation:  BS equal bilateral and atraumatic/condition of teeth unchanged

## 2024-06-06 NOTE — NURSING TRANSFER
Nursing Transfer Note      6/6/2024   4:45 PM    Nurse giving handoff:gardeniarn   Nurse receiving handoff:sayra sscu rn    Reason patient is being transferred: ep pacu 3 to sscu 8/home    Transfer To: ep pacu 3 to sscu 8    Transfer via stretcher    Transfer with cardiac monitoring, tele box on confirmed by tele tech    Transported by gardenia,rn    Transfer Vital Signs:  Blood Pressure:143/79  Heart Rate:80  O2:100  Temperature:98.1  Respirations:18    Telemetry: Box Number [id number], Rate 80, Rhythm paced, and Telemetry  kriss  Order for Tele Monitor? Yes    Additional Lines: none    4eyes on Skin: yes    Medicines sent: ns at 150ml/hr iv x4hrs,  plavix prescription delivered to bedside    Any special needs or follow-up needed: bedrest x4hrs.  Sutures/stopcock removal at 1900.  Bedrest done at same time 1900    Patient belongings transferred with patient: Yes glasses on. Sscu locker.  Pt left dentures at home    Chart send with patient: Yes    Notified: spouse    Patient reassessed at: 6/6/24 1630. Next due 1700  Upon arrival to floor: cardiac monitor applied, patient oriented to room, call bell in reach, and bed in lowest position

## 2024-06-06 NOTE — Clinical Note
The sheath was exchanged in the right femoral vein. Hemodynamics of left atrium performed from sheath tip.

## 2024-06-06 NOTE — NURSING
Report received from Irish DAVISON RN at bedside. Pt's vs taken and wnl. Pt is free from s/s of distress. Pt's right groin suture is intact and site is free from s/s of bleeding. Safety measures in place. Pt understands when to call for assistance. Pt's daughters called to bedside.

## 2024-06-06 NOTE — ANESTHESIA PROCEDURE NOTES
Arterial    Diagnosis: Atrial fibrillation    Patient location during procedure: done in OR    Staffing  Authorizing Provider: Kp Peterson MD  Performing Provider: Saulo Townsend MD    Staffing  Performed by: Saulo Townsend MD  Authorized by: Kp Peterson MD    Anesthesiologist was present at the time of the procedure.    Preanesthetic Checklist  Completed: patient identified, IV checked, site marked, risks and benefits discussed, surgical consent, monitors and equipment checked, pre-op evaluation, timeout performed and anesthesia consent givenArterial  Skin Prep: chlorhexidine gluconate and isopropyl alcohol  Local Infiltration: none  Orientation: right  Location: radial    Catheter Size: 20 G  Catheter placement by Anatomical landmarks. Heme positive aspiration all ports. Insertion Attempts: 1  Assessment  Dressing: secured with tape and tegaderm  Patient: Tolerated well

## 2024-06-06 NOTE — HPI
Mr. Lau is an 83 y/o man with a PMH of PAF, SSS s/p PPM, CAD s/p CABG, embolic stroke, HTN, HLD, chronic anemia and chronic pain. He has a HAS-BLED of 5 and a CHadsVasc 5 but unable to place on blood thinners because of history of recurrent GI bleeding and high risk of falling. He presents today for LAAO with Amulet device.     TTE 10/4/2022  The left ventricle is normal in size with mild concentric hypertrophy and normal systolic function.  The estimated ejection fraction is 60%.  Mild right ventricular enlargement with normal right ventricular systolic function.  Moderate tricuspid regurgitation.  Mild aortic regurgitation.  Mild mitral regurgitation.  There is moderate aortic valve stenosis.  Aortic valve area is 1.07 cm2; peak velocity is 2.99 m/s; mean gradient is 21 mmHg.  Severe left atrial enlargement.  Intermediate central venous pressure (8 mmHg).  The estimated PA systolic pressure is 33 mmHg.    Anticoagulant/antiplatelets: None  ECG: Pending    Dysphagia or odynophagia:  No  Liver Disease, esophageal disease, or known varices:  No  Upper GI Bleeding:  Hx of recurrent GI bleeding due to hemobilia. Esophagus and stomach normal on EGD  Snoring:  No  Sleep Apnea:  No  Prior neck surgery or radiation:  No  Able to move neck in all directions:  Yes  History of anesthetic difficulties:  No  Family history of anesthetic difficulties:  No  Last oral intake: yesterday before midnight  GLP-1 use: No    Platelet count: Pending  INR:  Pending

## 2024-06-06 NOTE — PROCEDURES
Brief Operative Note:    : Aren Cortes MD     Referring Physician: Carlos Quiroga     All Operators: Surgeon(s):  Umu Sarah MD Shahjehan, Bladimir BURNETT MD  , MD Beth Corrales Joseph A. III, MD Jenkins, James S., MD     Preoperative Diagnosis: PAF (paroxysmal atrial fibrillation) [I48.0]     Postop Diagnosis: PAF (paroxysmal atrial fibrillation) [I48.0]    Treatments/Procedures: Procedure(s) (LRB):  Left atrial appendage closure device (N/A)  ECHOCARDIOGRAM, TRANSESOPHAGEAL (N/A)    Access:R CFV    Intervention: Amulet LAAO implant w/ YAN guidance    See catheterization report for full details.    Closure device: Fig of 8 suture      Plan:  - Post cath protocol   - IVF @ 150 cc/hr x 4 hours  - Bed rest x 4 hours   - DAPT and D/C anticoagulation     Estimated Blood loss: 20 cc    Specimens removed: No    Bladimir Winters MD  Interventional Cardiology PGY-5  06/06/2024

## 2024-06-06 NOTE — PROGRESS NOTES
You have been scheduled for a procedure in the echo lab on Tuesday, July 23, 2024.     Please report to the Cardiology Waiting Area on the Third floor of the hospital and check in at 8:30 AM.     You will then be taken to the SSCU (Short Stay Cardiac Unit) and prepared for your procedure. Please be aware that this is not the time of your procedure but the time you are to arrive. The procedures are scheduled on an hourly basis; however, emergency cases take precedence over all other cases.     You may not have anything to eat or drink after midnight the night before your test.   You may take your regular morning medications with water.  If you take a weekly GLP-1 Inhibitor (Ozempic, Semaglutide, etc) You must be off that medication for one full week prior to your procedure.  Anesthesia will cancel your procedure if you do not hold this medication for a minimum of 7 days prior to procedure.    The procedure will take 1-2 hours to perform. After the procedure, you will return to SSCU on the third floor of the hospital. Your family may remain in the room with you during this time.     You will be discharged home that same afternoon. You must have someone to drive you home.  Your doctor will determine, based on your progress, the time of your discharge. The results of your procedure will be discussed with you before you are discharged. Any further testing or procedures will be scheduled for you either before you leave or you will be called with these appointments.       If you should have any questions, concerns, or need to change the date of your procedure, please call  Marian CESAR @ [phone]      Special Instructions:    Do not take your Jardiance the morning of the procedure.    Do not take your Amaryl the morning of the procedure.     Continue to take your other prescribed medications.    Continue to take your blood thinner medication everyday.            THE ABOVE INSTRUCTIONS WERE GIVEN TO THE PATIENT VERBALLY  AND THEY VERBALIZED UNDERSTANDING.  THEY DO NOT REQUIRE ANY SPECIAL NEEDS AND DO NOT HAVE ANY LEARNING BARRIERS.          Directions for Reporting to Cardiology Waiting Area in the Hospital  If you park in the Parking Garage:  Take elevators to the1st floor of the parking garage.  Continue past the gift shop, coffee shop, and piano.  Take a right and go to the gold elevators. (Elevator B)  Take the elevator to the 3rd floor.  Follow the arrow on the sign on the wall that says Cath Lab Registration/EP Lab Registration.  Follow the long hallway all the way around until you come to a big open area.  This is the registration area.  Check in at Reception Desk.    OR    If family is dropping you off:  Have them drop you off at the front of the Hospital under the green overhang.  Enter through the doors and take a right.  Take the E elevators to the 3rd floor Cardiology Waiting Area.  Check in at the Reception Desk in the waiting room.

## 2024-06-06 NOTE — Clinical Note
The transseptal needle was removed intact.  No - the patient is unable to be screened due to medical condition

## 2024-06-06 NOTE — SUBJECTIVE & OBJECTIVE
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    GERD (gastroesophageal reflux disease)     Gout, unspecified     H/O: GI bleed     Hypertension     Liver cancer     PVD (peripheral vascular disease)     Sciatica     SSS (sick sinus syndrome)     Tachy-valentine syndrome     Thyroid disease        Past Surgical History:   Procedure Laterality Date    ANGIOGRAPHY Right 10/11/2021    Procedure: ANGIOGRAM-HEPATIC;  Surgeon: Phil Stuart II, MD;  Location: [facility] [place] CATH LAB;  Service: Interventional Radiology;  Laterality: Right;    APPENDECTOMY      CHOLECYSTECTOMY      CORONARY ARTERY BYPASS GRAFT      ENDOSCOPIC ULTRASOUND OF UPPER GASTROINTESTINAL TRACT N/A 6/17/2020    Procedure: EUS;  Surgeon: Rei Humphrey MD;  Location: [facility] ENDO;  Service: Endoscopy;  Laterality: N/A;    ERCP N/A 6/17/2020    Procedure: ERCP;  Surgeon: Rei Humphrey MD;  Location: [facility];  Service: Endoscopy;  Laterality: N/A;    ERCP N/A 8/31/2020    Procedure: ERCP (ENDOSCOPIC RETROGRADE CHOLANGIOPANCREATOGRAPHY);  Surgeon: Adela Boyle MD;  Location: [facility];  Service: Endoscopy;  Laterality: N/A;    ESOPHAGOGASTRODUODENOSCOPY N/A 10/11/2021    Procedure: EGD (ESOPHAGOGASTRODUODENOSCOPY);  Surgeon: Skip Varela MD;  Location: [facility];  Service: Gastroenterology;  Laterality: N/A;    INSERT / REPLACE / REMOVE PACEMAKER      INSERTION OF TEMPORARY PACEMAKER Left 5/30/2023    Procedure: INSERTION, PACEMAKER, TEMPORARY;  Surgeon: Timi Meadows MD;  Location: [facility] CATH LAB/EP;  Service: Cardiology;  Laterality: Left;    LIVER RESECTION      LUMBAR DISC SURGERY      REPLACEMENT OF PACEMAKER GENERATOR N/A 5/30/2023    Procedure: REPLACEMENT, PACEMAKER GENERATOR;  Surgeon:  Timi Meadows MD;  Location: [facility] CATH LAB/EP;  Service: Cardiology;  Laterality: N/A;       Review of patient's allergies indicates:  No Known Allergies    Current Facility-Administered Medications on File Prior to Encounter   Medication    vancomycin injection     Current Outpatient Medications on File Prior to Encounter   Medication Sig    aspirin 81 MG Chew Take 1 tablet (81 mg total) by mouth once daily.    atorvastatin (LIPITOR) 40 MG tablet Take 40 mg by mouth once daily.    glimepiride (AMARYL) 2 MG tablet Take 1 mg by mouth every morning.    JARDIANCE 25 mg tablet Take 25 mg by mouth once daily.    LORazepam (ATIVAN) 1 MG tablet Take 1 mg by mouth 2 (two) times daily.    metoprolol tartrate (LOPRESSOR) 50 MG tablet Take 0.5 tablets (25 mg total) by mouth 2 (two) times daily.    mirtazapine (REMERON) 15 MG tablet 15 mg once daily.    pantoprazole (PROTONIX) 40 MG tablet Take 1 tablet (40 mg total) by mouth once daily.    SOLUS V2 LANCETS 30 gauge Misc     SOLUS V2 TEST STRIPS Strp      Family History       Problem Relation (Age of Onset)    Heart disease Mother, Father    Pancreatic cancer Mother          Tobacco Use    Smoking status: Some Days     Current packs/day: 0.00     Average packs/day: 0.3 packs/day for 37.0 years (9.3 ttl pk-yrs)     Types: Cigarettes     Start date: 1955     Last attempt to quit: 1992     Years since quittin.5    Smokeless tobacco: Never    Tobacco comments:     Pt declines enrollment in the [employer]. Handout provided for Ambulatory Smoking Cessation program.    Substance and Sexual Activity    Alcohol use: Yes     Alcohol/week: 5.0 standard drinks of alcohol     Types: 5 Cans of beer per week    Drug use: No    Sexual activity: Not Currently     Review of Systems   Constitutional: Negative for diaphoresis, malaise/fatigue, weight gain and weight loss.   HENT:  Negative for nosebleeds.    Eyes:  Negative for vision loss in left eye, vision loss in  right eye and visual disturbance.   Cardiovascular:  Negative for chest pain, claudication, dyspnea on exertion, irregular heartbeat, leg swelling, near-syncope, orthopnea, palpitations, paroxysmal nocturnal dyspnea and syncope.   Respiratory:  Negative for cough, shortness of breath, sleep disturbances due to breathing, snoring and wheezing.    Hematologic/Lymphatic: Negative for bleeding problem. Does not bruise/bleed easily.   Skin:  Negative for poor wound healing and rash.   Musculoskeletal:  Negative for muscle cramps and myalgias.   Gastrointestinal:  Negative for bloating, abdominal pain, diarrhea, heartburn, melena, nausea and vomiting.   Genitourinary:  Negative for hematuria and nocturia.   Neurological:  Negative for brief paralysis, dizziness, headaches, light-headedness, numbness and weakness.   Psychiatric/Behavioral:  Negative for depression.    Allergic/Immunologic: Negative for hives.     Objective:     Vital Signs (Most Recent):    Vital Signs (24h Range):           There is no height or weight on file to calculate BMI.            No intake or output data in the 24 hours ending 06/06/24 1120    Lines/Drains/Airways       None                    Physical Exam  Vitals and nursing note reviewed.   Constitutional:       Appearance: He is well-developed. He is not diaphoretic.   HENT:      Head: Normocephalic and atraumatic.   Eyes:      Conjunctiva/sclera: Conjunctivae normal.   Neck:      Vascular: No carotid bruit or JVD.   Cardiovascular:      Rate and Rhythm: Normal rate and regular rhythm.      Pulses: Normal pulses and intact distal pulses.      Heart sounds: Normal heart sounds. No murmur heard.     No friction rub. No gallop.   Pulmonary:      Effort: Pulmonary effort is normal.      Breath sounds: Normal breath sounds. No rales.   Chest:      Chest wall: No tenderness.   Abdominal:      General: There is no distension.      Palpations: Abdomen is soft. There is no mass.      Tenderness: There  is no abdominal tenderness.   Skin:     General: Skin is warm and dry.      Coloration: Skin is not pale.   Neurological:      Mental Status: He is alert and oriented to person, place, and time.          Significant Labs: All pertinent lab results from the last 24 hours have been reviewed.

## 2024-06-06 NOTE — TRANSFER OF CARE
"Anesthesia Transfer of Care Note    Patient: Venu Lau    Procedure(s) Performed: Procedure(s) (LRB):  Left atrial appendage closure device (N/A)  ECHOCARDIOGRAM, TRANSESOPHAGEAL (N/A)    Patient location: PACU    Anesthesia Type: general    Transport from OR: Transported from OR on 6-10 L/min O2 by face mask with adequate spontaneous ventilation    Post pain: adequate analgesia    Post assessment: no apparent anesthetic complications    Post vital signs: stable    Level of consciousness: awake and alert    Nausea/Vomiting: no nausea/vomiting    Complications: none    Transfer of care protocol was followed      Last vitals: Visit Vitals  BP (!) 178/83 (BP Location: Right arm, Patient Position: Lying)   Pulse 81   Temp 36.4 °C (97.5 °F) (Temporal)   Resp 18   Ht 5' 5" (1.651 m)   Wt 66.2 kg (146 lb)   SpO2 100%   BMI 24.30 kg/m²     "

## 2024-06-06 NOTE — H&P
H&P  [facility]  Interventional Cardiology      Venu Lau  YOB: 1940  Medical Record Number:  [id number]  Attending Physician:  Aren Cortes MD   Date of Admission: 6/6/2024       Hospital Day:  0  Current Principal Problem: ecurrent GI bleeding    HISTORY:     Venu Lau is a 84 y.o. male who presents for left atrial occluder device in the setting of Atrial Fibrillation     Referring physician: Dr. Carlos Quiroga      Atrial Fibrillation  Past medical history includes atrial fibrillation.      Patient is accompanied by his wife who is also assisting with history for which he has a PMH of PAF, Tachy-valentine synd s/p PPM. CAD s/p CABG, embolic stroke, HTN, HLD, chronic anemia and chronic pain. He has a HAS-BLED of 5 and a CHadsVasc 5 but unable to place on blood thinners because of history of recurrent GI bleeding and high risk of falling for which he was referred to us. Patient missed his am appointment for cardiac CT. He denies any chest pain, sob, edema, orthopnea, syncope. He walks with a stick/cane due to unstable gait.          Medications - Outpatient  Prior to Admission medications    Medication Sig Start Date End Date Taking? Authorizing Provider   atorvastatin (LIPITOR) 40 MG tablet Take 40 mg by mouth once daily. 9/29/22  Yes Provider, Historical   JARDIANCE 25 mg tablet Take 25 mg by mouth once daily. 7/19/22  Yes Provider, Historical   LORazepam (ATIVAN) 1 MG tablet Take 1 mg by mouth 2 (two) times daily.   Yes Provider, Historical   metoprolol tartrate (LOPRESSOR) 50 MG tablet Take 0.5 tablets (25 mg total) by mouth 2 (two) times daily. 2/3/20  Yes Carlos Quiroga MD   pantoprazole (PROTONIX) 40 MG tablet Take 1 tablet (40 mg total) by mouth once daily. 10/16/21 6/6/24 Yes Alex Suero MD   aspirin 81 MG Chew Take 1 tablet (81 mg total) by mouth once daily. 10/6/22 4/2/24  Rosalino Arellano MD   glimepiride (AMARYL) 2 MG tablet Take 1 mg by mouth  every morning. 1/2/20   Provider, Historical   mirtazapine (REMERON) 15 MG tablet 15 mg once daily. 1/2/20   Provider, Historical   SOLUS V2 LANCETS 30 gauge Misc  11/6/13   Provider, Historical   SOLUS V2 TEST STRIPS Strp  11/30/13   Provider, Historical         Medications - Current  Scheduled Meds:   acetaminophen  1,000 mg Oral Once Pre-Op    diphenhydrAMINE  50 mg Oral Once     Continuous Infusions:   sodium chloride 0.9%   Intravenous Continuous         PRN Meds:.      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    GERD (gastroesophageal reflux disease)     Gout, unspecified     H/O: GI bleed     Hypertension     Liver cancer     PVD (peripheral vascular disease)     Sciatica     SSS (sick sinus syndrome)     Tachy-valentine syndrome     Thyroid disease          Past Surgical History  Past Surgical History:   Procedure Laterality Date    ANGIOGRAPHY Right 10/11/2021    Procedure: ANGIOGRAM-HEPATIC;  Surgeon: Phil Stuart II, MD;  Location: [facility] CATH LAB;  Service: Interventional Radiology;  Laterality: Right;    APPENDECTOMY      CHOLECYSTECTOMY      CORONARY ARTERY BYPASS GRAFT      ENDOSCOPIC ULTRASOUND OF UPPER GASTROINTESTINAL TRACT N/A 6/17/2020    Procedure: EUS;  Surgeon: Rei Humphrey MD;  Location: [facility] ENDO;  Service: Endoscopy;  Laterality: N/A;    ERCP N/A 6/17/2020    Procedure: ERCP;  Surgeon: Rei Humphrey MD;  Location: [facility] ENDO;  Service: Endoscopy;  Laterality: N/A;    ERCP N/A 8/31/2020    Procedure: ERCP (ENDOSCOPIC RETROGRADE CHOLANGIOPANCREATOGRAPHY);  Surgeon: Adela Boyle MD;  Location: [facility] ENDO;  Service: Endoscopy;  Laterality:  N/A;    ESOPHAGOGASTRODUODENOSCOPY N/A 10/11/2021    Procedure: EGD (ESOPHAGOGASTRODUODENOSCOPY);  Surgeon: Skip Varela MD;  Location: [facility];  Service: Gastroenterology;  Laterality: N/A;    INSERT / REPLACE / REMOVE PACEMAKER      INSERTION OF TEMPORARY PACEMAKER Left 2023    Procedure: INSERTION, PACEMAKER, TEMPORARY;  Surgeon: Timi Meadows MD;  Location: [facility] CATH LAB/EP;  Service: Cardiology;  Laterality: Left;    LIVER RESECTION      LUMBAR DISC SURGERY      REPLACEMENT OF PACEMAKER GENERATOR N/A 2023    Procedure: REPLACEMENT, PACEMAKER GENERATOR;  Surgeon: Timi Meadows MD;  Location: [facility] CATH LAB/EP;  Service: Cardiology;  Laterality: N/A;         Social History  Social History     Socioeconomic History    Marital status:    Tobacco Use    Smoking status: Some Days     Current packs/day: 0.00     Average packs/day: 0.3 packs/day for 37.0 years (9.3 ttl pk-yrs)     Types: Cigarettes     Start date: 1955     Last attempt to quit: 1992     Years since quittin.5    Smokeless tobacco: Never    Tobacco comments:     Pt declines enrollment in the [employer]. Handout provided for Ambulatory Smoking Cessation program.    Substance and Sexual Activity    Alcohol use: Yes     Alcohol/week: 5.0 standard drinks of alcohol     Types: 5 Cans of beer per week    Drug use: No    Sexual activity: Not Currently           PHYSICAL EXAM:     Vital Signs  Vitals  Temp: 97.5 °F (36.4 °C)  Temp Source: Temporal  Pulse: 81  Heart Rate Source: Monitor  Resp: 18  SpO2: 100 %  BP: (!) 178/83  MAP (mmHg): 118  BP Location: Right arm  BP Method: Automatic  Patient Position: Lying          24 Hour VS Range    Temp:  [97.5 °F (36.4 °C)]   Pulse:  [81]   Resp:  [18]   BP: (165-178)/(83)   SpO2:  [100 %]   No intake or output data in the 24 hours ending 24 1153      Constitutional:       General: He is not in acute distress.     Appearance: He is not toxic-appearing or  "diaphoretic.      Comments: frail   HENT:      Head: Normocephalic and atraumatic.      Nose: No congestion.      Mouth/Throat:      Mouth: Mucous membranes are moist.      Pharynx: Oropharynx is clear.   Eyes:      General: No scleral icterus.     Extraocular Movements: Extraocular movements intact.   Cardiovascular:      Rate and Rhythm: Normal rate and regular rhythm.      Pulses:           Carotid pulses are 2+ on the right side and 2+ on the left side.       Radial pulses are 1+ on the right side and 1+ on the left side.        Dorsalis pedis pulses are 1+ on the right side and 1+ on the left side.        Posterior tibial pulses are 1+ on the right side and 1+ on the left side.      Heart sounds: No murmur heard.  Pulmonary:      Effort: Pulmonary effort is normal. No respiratory distress.   Abdominal:      General: Abdomen is flat. There is no distension.   Musculoskeletal:         General: No swelling or tenderness. Normal range of motion.      Cervical back: Normal range of motion and neck supple.   Skin:     General: Skin is warm and dry.      Capillary Refill: Capillary refill takes less than 2 seconds.      Coloration: Skin is pale.      Findings: Bruising and erythema present.   Neurological:      General: No focal deficit present.      Mental Status: He is alert and oriented to person, place, and time.      Gait: Gait abnormal.   Psychiatric:         Behavior: Behavior normal.      Comments: Irritable          DATA:       No results for input(s): "WBC", "HGB", "HCT", "PLT" in the last 168 hours.     No results for input(s): "PROTIME", "INR" in the last 168 hours.     No results for input(s): "NA", "K", "CL", "CO2", "BUN", "CREATININE", "ANIONGAP", "CALCIUM" in the last 168 hours.     No results for input(s): "PROT", "ALBUMIN", "BILITOT", "ALKPHOS", "AST", "ALT" in the last 168 hours.     No results for input(s): "TROPONINI" in the last 168 hours.     BNP (pg/mL)   Date Value   10/29/2016 135 (H) " "      No results for input(s): "LABBLOO" in the last 168 hours.       ASSESSMENT/PLAN:     In summary Mr. Lau is a 83 y/o M who has a history of PAF, tachy-valentine synd s/p pacemaker, hx of embolic cva, mvCAD s/p CABG was referred to see us for left atrial occluder device evaluation in the setting of recurrent GIB and high risk of fall. [id number] score: 5 and HAS-BLED score: 5     - LAAO implant  - Access: R CFV  - Creatinine: 1.0, today's pending  - Allergies: No shellfish / Iodine allergy  - Pre-Hydration: NS  - Pre-Op Med: Bendaryl 50mg pO   - All patient's questions were answered.  -The risks, benefits and alternatives of the procedure were explained to the patient.   -The risks include but are not limited to: bleeding, infection, heart rhythm abnormalities, allergic reactions, kidney injury and potential need for dialysis, stroke and death.   -The risks of sedation include hypotension, respiratory depression, arrhythmias, bronchospasm, and death.   - Informed consent was obtained and the  patient is agreeable to proceed with the procedure.      Signed:  Bladimir Winters M.D. PGY-5  Cardiovascular Fellow  [facility]     "

## 2024-06-06 NOTE — DISCHARGE SUMMARY
Discharge Summary  [facility]  Interventional Cardiology      Admit Date: 6/6/2024    Discharge Date:  6/6/2024    Attending Physician: Aren Cortes MD    Discharge Physician: Bladimir Winters MD    Principal Diagnoses: Paroxysmal atrial fibrillation    Indication for Admission: LAAO device implant    Discharged Condition: Good    Hospital Course:   Patient underwent Amulet LAAO implant with no complications. 150 cc/h x 4 post procedure hydration done.     Access:R CFV  Intervention: Amulet LAAO implant w/ YAN guidance      Outpatient Plan:  DAPT and D/C anticoagulation   YAN in 6 weeks       Disposition: Home     Vitals:  Temp:  [97.5 °F (36.4 °C)]   Pulse:  [81]   Resp:  [18]   BP: (165-178)/(83)   SpO2:  [100 %]   I/O's:  No intake or output data in the 24 hours ending 06/06/24 1512     Labs:  Lab Results   Component Value Date     06/06/2024    K 4.8 06/06/2024     (H) 06/06/2024    CO2 17 (L) 06/06/2024    BUN 17 06/06/2024    CREATININE 1.2 06/06/2024    ANIONGAP 12 06/06/2024     Lab Results   Component Value Date    HGBA1C 7.0 (H) 01/09/2024     Lab Results   Component Value Date     (H) 10/29/2016       Lab Results   Component Value Date    WBC 3.48 (L) 06/06/2024    HGB 12.2 (L) 06/06/2024    HCT 38.4 (L) 06/06/2024    HCT 36 05/26/2021     (L) 06/06/2024    GRAN 2.0 06/06/2024    GRAN 58.0 06/06/2024     Lab Results   Component Value Date    CHOL 100 (L) 10/04/2022    HDL 40 10/04/2022    LDLCALC 41.2 (L) 10/04/2022    TRIG 94 10/04/2022       Lab Results   Component Value Date     06/06/2024    K 4.8 06/06/2024     (H) 06/06/2024    CO2 17 (L) 06/06/2024    BUN 17 06/06/2024    CREATININE 1.2 06/06/2024    ANIONGAP 12 06/06/2024     Lab Results   Component Value Date    HGBA1C 7.0 (H) 01/09/2024     Lab Results   Component Value Date     (H) 10/29/2016    Lab Results   Component Value Date    WBC 3.48 (L) 06/06/2024    HGB 12.2 (L) 06/06/2024    HCT  38.4 (L) 06/06/2024    HCT 36 05/26/2021     (L) 06/06/2024    GRAN 2.0 06/06/2024    GRAN 58.0 06/06/2024     Lab Results   Component Value Date    CHOL 100 (L) 10/04/2022    HDL 40 10/04/2022    LDLCALC 41.2 (L) 10/04/2022    TRIG 94 10/04/2022            Discharge Medications:      Medication List        START taking these medications      clopidogreL 75 mg tablet  Commonly known as: PLAVIX  Take 1 tablet (75 mg total) by mouth once daily.            CONTINUE taking these medications      aspirin 81 MG Chew  Take 1 tablet (81 mg total) by mouth once daily.     atorvastatin 40 MG tablet  Commonly known as: LIPITOR     glimepiride 2 MG tablet  Commonly known as: AMARYL     JARDIANCE 25 mg tablet  Generic drug: empagliflozin     LORazepam 1 MG tablet  Commonly known as: ATIVAN     metoprolol tartrate 50 MG tablet  Commonly known as: LOPRESSOR  Take 0.5 tablets (25 mg total) by mouth 2 (two) times daily.     mirtazapine 15 MG tablet  Commonly known as: REMERON     pantoprazole 40 MG tablet  Commonly known as: PROTONIX  Take 1 tablet (40 mg total) by mouth once daily.     SOLUS V2 LANCETS 30 gauge Misc  Generic drug: lancets     SOLUS V2 TEST STRIPS Strp  Generic drug: blood sugar diagnostic               Where to Get Your Medications        These medications were sent to [facility] [address], [place] [address]      Hours: Always Open Phone: [phone]   clopidogreL 75 mg tablet           Bladimir Winters MD  Cardiology Fellow  6/6/2024 3:12 PM

## 2024-06-06 NOTE — NURSING
Pt able to drink, eat, walk, and used restroom without issues. Pt and pt's daughter given discharge paperwork and education reiterated. All questions and concerns addressed. Pt's right groin site has suture cut with gauze/tegaderm applied to site. Dressing to site is intact and is free from s/s of bleeding. IV and tele removed. Meds delivered to bedside from pharmacy and pt and pt's wife know to begin taking new medication tonight. Pt waiting to wheel out.

## 2024-06-06 NOTE — CONSULTS
[facility] Cardiac [facility]  Cardiology  Consult Note    Patient Name: Venu Lau  MRN: [id number]  Admission Date: 6/6/2024  Hospital Length of Stay: 0 days  Code Status: Prior   Attending Provider: Aren Cortes MD   Consulting Provider: Maral Velasquez PA-C  Primary Care Physician: Michaela Miller MD  Principal Problem:<principal problem not specified>    Patient information was obtained from patient and past medical records.     Consults  Subjective:     Chief Complaint:  Atrial fibrillation     HPI:   Mr. Lau is an 83 y/o man with a PMH of PAF, SSS s/p PPM, CAD s/p CABG, embolic stroke, HTN, HLD, chronic anemia and chronic pain. He has a HAS-BLED of 5 and a CHadsVasc 5 but unable to place on blood thinners because of history of recurrent GI bleeding and high risk of falling. He presents today for LAAO with Amulet device.     TTE 10/4/2022  The left ventricle is normal in size with mild concentric hypertrophy and normal systolic function.  The estimated ejection fraction is 60%.  Mild right ventricular enlargement with normal right ventricular systolic function.  Moderate tricuspid regurgitation.  Mild aortic regurgitation.  Mild mitral regurgitation.  There is moderate aortic valve stenosis.  Aortic valve area is 1.07 cm2; peak velocity is 2.99 m/s; mean gradient is 21 mmHg.  Severe left atrial enlargement.  Intermediate central venous pressure (8 mmHg).  The estimated PA systolic pressure is 33 mmHg.    Anticoagulant/antiplatelets: None  ECG: Pending    Dysphagia or odynophagia:  No  Liver Disease, esophageal disease, or known varices:  No  Upper GI Bleeding:  Hx of recurrent GI bleeding due to hemobilia. Esophagus and stomach normal on EGD  Snoring:  No  Sleep Apnea:  No  Prior neck surgery or radiation:  No  Able to move neck in all directions:  Yes  History of anesthetic difficulties:  No  Family history of anesthetic difficulties:  No  Last oral intake: yesterday before  midnight  GLP-1 use: No    Platelet count: Pending  INR:  Pending    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    GERD (gastroesophageal reflux disease)     Gout, unspecified     H/O: GI bleed     Hypertension     Liver cancer     PVD (peripheral vascular disease)     Sciatica     SSS (sick sinus syndrome)     Tachy-valentine syndrome     Thyroid disease        Past Surgical History:   Procedure Laterality Date    ANGIOGRAPHY Right 10/11/2021    Procedure: ANGIOGRAM-HEPATIC;  Surgeon: Phil Stuart II, MD;  Location: [facility] CATH LAB;  Service: Interventional Radiology;  Laterality: Right;    APPENDECTOMY      CHOLECYSTECTOMY      CORONARY ARTERY BYPASS GRAFT      ENDOSCOPIC ULTRASOUND OF UPPER GASTROINTESTINAL TRACT N/A 6/17/2020    Procedure: EUS;  Surgeon: Rei Humphrey MD;  Location: [facility] ENDO;  Service: Endoscopy;  Laterality: N/A;    ERCP N/A 6/17/2020    Procedure: ERCP;  Surgeon: Rei Humphrey MD;  Location: [facility];  Service: Endoscopy;  Laterality: N/A;    ERCP N/A 8/31/2020    Procedure: ERCP (ENDOSCOPIC RETROGRADE CHOLANGIOPANCREATOGRAPHY);  Surgeon: Adela Boyle MD;  Location: [facility];  Service: Endoscopy;  Laterality: N/A;    ESOPHAGOGASTRODUODENOSCOPY N/A 10/11/2021    Procedure: EGD (ESOPHAGOGASTRODUODENOSCOPY);  Surgeon: Skip Varela MD;  Location: [facility];  Service: Gastroenterology;  Laterality: N/A;    INSERT / REPLACE / REMOVE PACEMAKER      INSERTION OF TEMPORARY PACEMAKER Left 5/30/2023    Procedure: INSERTION, PACEMAKER, TEMPORARY;  Surgeon: Timi Meadows MD;  Location: [facility] CATH LAB/EP;  Service: Cardiology;  Laterality: Left;    LIVER RESECTION      LUMBAR DISC SURGERY      REPLACEMENT OF PACEMAKER GENERATOR N/A  2023    Procedure: REPLACEMENT, PACEMAKER GENERATOR;  Surgeon: Timi Meadows MD;  Location: [facility] CATH LAB/EP;  Service: Cardiology;  Laterality: N/A;       Review of patient's allergies indicates:  No Known Allergies    Current Facility-Administered Medications on File Prior to Encounter   Medication    vancomycin injection     Current Outpatient Medications on File Prior to Encounter   Medication Sig    aspirin 81 MG Chew Take 1 tablet (81 mg total) by mouth once daily.    atorvastatin (LIPITOR) 40 MG tablet Take 40 mg by mouth once daily.    glimepiride (AMARYL) 2 MG tablet Take 1 mg by mouth every morning.    JARDIANCE 25 mg tablet Take 25 mg by mouth once daily.    LORazepam (ATIVAN) 1 MG tablet Take 1 mg by mouth 2 (two) times daily.    metoprolol tartrate (LOPRESSOR) 50 MG tablet Take 0.5 tablets (25 mg total) by mouth 2 (two) times daily.    mirtazapine (REMERON) 15 MG tablet 15 mg once daily.    pantoprazole (PROTONIX) 40 MG tablet Take 1 tablet (40 mg total) by mouth once daily.    SOLUS V2 LANCETS 30 gauge Misc     SOLUS V2 TEST STRIPS Strp      Family History       Problem Relation (Age of Onset)    Heart disease Mother, Father    Pancreatic cancer Mother          Tobacco Use    Smoking status: Some Days     Current packs/day: 0.00     Average packs/day: 0.3 packs/day for 37.0 years (9.3 ttl pk-yrs)     Types: Cigarettes     Start date: 1955     Last attempt to quit: 1992     Years since quittin.5    Smokeless tobacco: Never    Tobacco comments:     Pt declines enrollment in the [employer]. Handout provided for Ambulatory Smoking Cessation program.    Substance and Sexual Activity    Alcohol use: Yes     Alcohol/week: 5.0 standard drinks of alcohol     Types: 5 Cans of beer per week    Drug use: No    Sexual activity: Not Currently     Review of Systems   Constitutional: Negative for diaphoresis, malaise/fatigue, weight gain and weight loss.   HENT:  Negative for  nosebleeds.    Eyes:  Negative for vision loss in left eye, vision loss in right eye and visual disturbance.   Cardiovascular:  Negative for chest pain, claudication, dyspnea on exertion, irregular heartbeat, leg swelling, near-syncope, orthopnea, palpitations, paroxysmal nocturnal dyspnea and syncope.   Respiratory:  Negative for cough, shortness of breath, sleep disturbances due to breathing, snoring and wheezing.    Hematologic/Lymphatic: Negative for bleeding problem. Does not bruise/bleed easily.   Skin:  Negative for poor wound healing and rash.   Musculoskeletal:  Negative for muscle cramps and myalgias.   Gastrointestinal:  Negative for bloating, abdominal pain, diarrhea, heartburn, melena, nausea and vomiting.   Genitourinary:  Negative for hematuria and nocturia.   Neurological:  Negative for brief paralysis, dizziness, headaches, light-headedness, numbness and weakness.   Psychiatric/Behavioral:  Negative for depression.    Allergic/Immunologic: Negative for hives.     Objective:     Vital Signs (Most Recent):    Vital Signs (24h Range):           There is no height or weight on file to calculate BMI.            No intake or output data in the 24 hours ending 06/06/24 1120    Lines/Drains/Airways       None                    Physical Exam  Vitals and nursing note reviewed.   Constitutional:       Appearance: He is well-developed. He is not diaphoretic.   HENT:      Head: Normocephalic and atraumatic.   Eyes:      Conjunctiva/sclera: Conjunctivae normal.   Neck:      Vascular: No carotid bruit or JVD.   Cardiovascular:      Rate and Rhythm: Normal rate and regular rhythm.      Pulses: Normal pulses and intact distal pulses.      Heart sounds: Normal heart sounds. No murmur heard.     No friction rub. No gallop.   Pulmonary:      Effort: Pulmonary effort is normal.      Breath sounds: Normal breath sounds. No rales.   Chest:      Chest wall: No tenderness.   Abdominal:      General: There is no distension.       Palpations: Abdomen is soft. There is no mass.      Tenderness: There is no abdominal tenderness.   Skin:     General: Skin is warm and dry.      Coloration: Skin is not pale.   Neurological:      Mental Status: He is alert and oriented to person, place, and time.          Significant Labs: All pertinent lab results from the last 24 hours have been reviewed.  Assessment and Plan:     Paroxysmal atrial fibrillation  Here today for LAAO with Amulet device with YAN guidance  -No absolute contraindications of esophageal stricture, tumor, perforation, laceration,or diverticulum and/or active GI bleed  -The risks, benefits & alternatives of the procedure were explained to the patient.   -The risks of transesophageal echo include but are not limited to:  Dental trauma, esophageal trauma/perforation, bleeding, laryngospasm/brochospasm, aspiration, sore throat/hoarseness, & dislodgement of the endotracheal tube/nasogastric tube (where applicable).    -The risks of ANES monitored sedation include hypotension, respiratory depression, arrhythmias, bronchospasm, & death.    -Informed consent was obtained. The patient is agreeable to proceed with the procedure and all questions and concerns addressed.    Case discussed with an attending in echocardiography lab.    Further recommendations per attending addendum         VTE Risk Mitigation (From admission, onward)      None            Thank you for your consult. I will sign off. Please contact us if you have any additional questions.    Maral Velasquez PA-C  Cardiology   [facility] Card[facility]

## 2024-06-07 NOTE — ANESTHESIA POSTPROCEDURE EVALUATION
Anesthesia Post Evaluation    Patient: Venu Lau    Procedure(s) Performed: Procedure(s) (LRB):  Left atrial appendage closure device (N/A)  ECHOCARDIOGRAM, TRANSESOPHAGEAL (N/A)    Final Anesthesia Type: general      Patient location during evaluation: PACU  Patient participation: Yes- Able to Participate  Level of consciousness: awake and alert  Post-procedure vital signs: reviewed and stable  Pain management: adequate  Airway patency: patent    PONV status at discharge: No PONV  Anesthetic complications: no      Cardiovascular status: hemodynamically stable  Respiratory status: unassisted  Hydration status: euvolemic  Follow-up not needed.              Vitals Value Taken Time   /68 06/06/24 1915   Temp 36.6 °C (97.8 °F) 06/06/24 1915   Pulse 80 06/06/24 1915   Resp 14 06/06/24 1915   SpO2 100 % 06/06/24 1915         No case tracking events are documented in the log.      Pain/Flory Score: Pain Rating Prior to Med Admin: 0 (6/6/2024  4:30 PM)  Flory Score: 9 (6/6/2024  4:00 PM)

## 2024-06-10 LAB
POC ACTIVATED CLOTTING TIME K: 324 SEC (ref 74–137)
SAMPLE: ABNORMAL

## 2024-06-16 NOTE — PLAN OF CARE
VN cued into room.  Pt resting comfortably in bed, pt currently sleeping, respirations even and unlabored.  Staff in room cleaning.  VN will continue to follow and be available as needed.       100

## 2024-07-23 ENCOUNTER — HOSPITAL ENCOUNTER (OUTPATIENT)
Dept: CARDIOLOGY | Facility: HOSPITAL | Age: 84
Discharge: HOME | End: 2024-07-23
Attending: INTERNAL MEDICINE | Admitting: INTERNAL MEDICINE
Payer: MEDICARE

## 2024-07-23 ENCOUNTER — OFFICE VISIT (OUTPATIENT)
Dept: CARDIOLOGY | Facility: CLINIC | Age: 84
End: 2024-07-23
Payer: MEDICARE

## 2024-07-23 ENCOUNTER — ANESTHESIA EVENT (OUTPATIENT)
Dept: MEDSURG UNIT | Facility: HOSPITAL | Age: 84
End: 2024-07-23
Payer: MEDICARE

## 2024-07-23 ENCOUNTER — HOSPITAL ENCOUNTER (OUTPATIENT)
Facility: HOSPITAL | Age: 84
Discharge: HOME | End: 2024-07-23
Attending: INTERNAL MEDICINE | Admitting: INTERNAL MEDICINE
Payer: MEDICARE

## 2024-07-23 ENCOUNTER — ANESTHESIA (OUTPATIENT)
Dept: MEDSURG UNIT | Facility: HOSPITAL | Age: 84
End: 2024-07-23
Payer: MEDICARE

## 2024-07-23 VITALS
SYSTOLIC BLOOD PRESSURE: 149 MMHG | HEIGHT: 65 IN | OXYGEN SATURATION: 98 % | HEART RATE: 69 BPM | DIASTOLIC BLOOD PRESSURE: 65 MMHG | WEIGHT: 146 LBS | BODY MASS INDEX: 24.32 KG/M2

## 2024-07-23 VITALS
DIASTOLIC BLOOD PRESSURE: 67 MMHG | RESPIRATION RATE: 20 BRPM | HEIGHT: 65 IN | HEART RATE: 60 BPM | SYSTOLIC BLOOD PRESSURE: 142 MMHG | BODY MASS INDEX: 24.32 KG/M2 | WEIGHT: 146 LBS | TEMPERATURE: 98 F | OXYGEN SATURATION: 100 %

## 2024-07-23 VITALS
BODY MASS INDEX: 24.32 KG/M2 | DIASTOLIC BLOOD PRESSURE: 87 MMHG | WEIGHT: 146 LBS | HEIGHT: 65 IN | SYSTOLIC BLOOD PRESSURE: 150 MMHG

## 2024-07-23 DIAGNOSIS — I25.10 CORONARY ARTERY DISEASE INVOLVING NATIVE CORONARY ARTERY OF NATIVE HEART WITHOUT ANGINA PECTORIS: ICD-10-CM

## 2024-07-23 DIAGNOSIS — I48.0 PAROXYSMAL ATRIAL FIBRILLATION: Primary | ICD-10-CM

## 2024-07-23 DIAGNOSIS — E78.5 DYSLIPIDEMIA: ICD-10-CM

## 2024-07-23 DIAGNOSIS — Z95.818 PRESENCE OF AMULET LEFT ATRIAL APPENDAGE CLOSURE DEVICE: ICD-10-CM

## 2024-07-23 DIAGNOSIS — Z95.818 PRESENCE OF LEFT ATRIAL APPENDAGE CLOSURE DEVICE: ICD-10-CM

## 2024-07-23 DIAGNOSIS — I10 ESSENTIAL HYPERTENSION: ICD-10-CM

## 2024-07-23 DIAGNOSIS — Z01.89 ENCOUNTER FOR OTHER SPECIFIED SPECIAL EXAMINATIONS: ICD-10-CM

## 2024-07-23 LAB
BSA FOR ECHO PROCEDURE: 1.74 M2
POCT GLUCOSE: 110 MG/DL (ref 70–110)
POCT GLUCOSE: 96 MG/DL (ref 70–110)

## 2024-07-23 PROCEDURE — 3288F FALL RISK ASSESSMENT DOCD: CPT | Mod: HCNC,CPTII,S$GLB, | Performed by: INTERNAL MEDICINE

## 2024-07-23 PROCEDURE — 25000003 PHARM REV CODE 250: Mod: HCNC | Performed by: NURSE ANESTHETIST, CERTIFIED REGISTERED

## 2024-07-23 PROCEDURE — 37000009 HC ANESTHESIA EA ADD 15 MINS: Mod: HCNC

## 2024-07-23 PROCEDURE — 1159F MED LIST DOCD IN RCRD: CPT | Mod: HCNC,CPTII,S$GLB, | Performed by: INTERNAL MEDICINE

## 2024-07-23 PROCEDURE — 93325 DOPPLER ECHO COLOR FLOW MAPG: CPT | Mod: 26,HCNC,, | Performed by: INTERNAL MEDICINE

## 2024-07-23 PROCEDURE — 93312 ECHO TRANSESOPHAGEAL: CPT | Mod: HCNC

## 2024-07-23 PROCEDURE — 3077F SYST BP >= 140 MM HG: CPT | Mod: HCNC,CPTII,S$GLB, | Performed by: INTERNAL MEDICINE

## 2024-07-23 PROCEDURE — 99999 PR PBB SHADOW E&M-EST. PATIENT-LVL IV: CPT | Mod: PBBFAC,HCNC,, | Performed by: INTERNAL MEDICINE

## 2024-07-23 PROCEDURE — 1160F RVW MEDS BY RX/DR IN RCRD: CPT | Mod: HCNC,CPTII,S$GLB, | Performed by: INTERNAL MEDICINE

## 2024-07-23 PROCEDURE — 99215 OFFICE O/P EST HI 40 MIN: CPT | Mod: HCNC,S$GLB,, | Performed by: INTERNAL MEDICINE

## 2024-07-23 PROCEDURE — 37000008 HC ANESTHESIA 1ST 15 MINUTES: Mod: HCNC

## 2024-07-23 PROCEDURE — 93312 ECHO TRANSESOPHAGEAL: CPT | Mod: 26,HCNC,, | Performed by: INTERNAL MEDICINE

## 2024-07-23 PROCEDURE — 3078F DIAST BP <80 MM HG: CPT | Mod: HCNC,CPTII,S$GLB, | Performed by: INTERNAL MEDICINE

## 2024-07-23 PROCEDURE — 1126F AMNT PAIN NOTED NONE PRSNT: CPT | Mod: HCNC,CPTII,S$GLB, | Performed by: INTERNAL MEDICINE

## 2024-07-23 PROCEDURE — 1101F PT FALLS ASSESS-DOCD LE1/YR: CPT | Mod: HCNC,CPTII,S$GLB, | Performed by: INTERNAL MEDICINE

## 2024-07-23 PROCEDURE — 82962 GLUCOSE BLOOD TEST: CPT | Mod: HCNC

## 2024-07-23 PROCEDURE — 93320 DOPPLER ECHO COMPLETE: CPT | Mod: 26,HCNC,, | Performed by: INTERNAL MEDICINE

## 2024-07-23 PROCEDURE — 63600175 PHARM REV CODE 636 W HCPCS: Mod: HCNC | Performed by: NURSE ANESTHETIST, CERTIFIED REGISTERED

## 2024-07-23 PROCEDURE — 93325 DOPPLER ECHO COLOR FLOW MAPG: CPT | Mod: HCNC

## 2024-07-23 RX ORDER — LIDOCAINE HYDROCHLORIDE 20 MG/ML
INJECTION, SOLUTION EPIDURAL; INFILTRATION; INTRACAUDAL; PERINEURAL
Status: DISCONTINUED | OUTPATIENT
Start: 2024-07-23 | End: 2024-07-23

## 2024-07-23 RX ORDER — GLUCAGON 1 MG
1 KIT INJECTION
OUTPATIENT
Start: 2024-07-23

## 2024-07-23 RX ORDER — PROPOFOL 10 MG/ML
VIAL (ML) INTRAVENOUS
Status: DISCONTINUED | OUTPATIENT
Start: 2024-07-23 | End: 2024-07-23

## 2024-07-23 RX ORDER — SODIUM CHLORIDE 0.9 % (FLUSH) 0.9 %
3 SYRINGE (ML) INJECTION
OUTPATIENT
Start: 2024-07-23

## 2024-07-23 RX ORDER — SODIUM CHLORIDE 0.9 % (FLUSH) 0.9 %
10 SYRINGE (ML) INJECTION
Status: DISCONTINUED | OUTPATIENT
Start: 2024-07-23 | End: 2024-07-23 | Stop reason: HOSPADM

## 2024-07-23 RX ORDER — LIDOCAINE HYDROCHLORIDE 20 MG/ML
SOLUTION OROPHARYNGEAL
Status: DISCONTINUED | OUTPATIENT
Start: 2024-07-23 | End: 2024-07-23

## 2024-07-23 RX ADMIN — PROPOFOL 30 MG: 10 INJECTION, EMULSION INTRAVENOUS at 10:07

## 2024-07-23 RX ADMIN — LIDOCAINE HYDROCHLORIDE 15 ML: 20 SOLUTION ORAL at 10:07

## 2024-07-23 RX ADMIN — SODIUM CHLORIDE: 0.9 INJECTION, SOLUTION INTRAVENOUS at 09:07

## 2024-07-23 RX ADMIN — LIDOCAINE HYDROCHLORIDE 100 MG: 20 INJECTION, SOLUTION EPIDURAL; INFILTRATION; INTRACAUDAL at 10:07

## 2024-07-23 RX ADMIN — PROPOFOL 150 MCG/KG/MIN: 10 INJECTION, EMULSION INTRAVENOUS at 10:07

## 2024-07-23 NOTE — DISCHARGE INSTRUCTIONS
Medications:  -Continue to take your home medications as listed on your medication list after you are discharged.    Diet  -You may resume oral intake after you are discharged, as long you have no swallowing difficulties.    Because you have received sedation for this procedure:  -Limit activity for the remainder of the day.  -Do not smoke for at least 6 hours and until you are fully awake and alert.  -Do not drink alcoholic beverage for 24 hours.  -Do not drive for 24 hours.  -Defer important decision making until the following day.     Go to the Emergency Department if you develop:   -Bleeding  -Weakness or numbness  -Visual, gait or speech disturbance  -New chest pain, palpitations, shortness of breath, rapid heart beat, or fainting  -Fever

## 2024-07-23 NOTE — ANESTHESIA POSTPROCEDURE EVALUATION
Anesthesia Post Evaluation    Patient: Venu Lau    Procedure(s) Performed: Procedure(s) (LRB):  ECHOCARDIOGRAM, TRANSESOPHAGEAL (N/A)    Final Anesthesia Type: general      Patient location during evaluation: PACU  Patient participation: Yes- Able to Participate  Level of consciousness: awake and alert and oriented  Post-procedure vital signs: reviewed and stable  Pain management: adequate  Airway patency: patent    PONV status at discharge: No PONV  Anesthetic complications: no      Cardiovascular status: blood pressure returned to baseline  Respiratory status: unassisted, room air and spontaneous ventilation  Hydration status: euvolemic  Follow-up not needed.              Vitals Value Taken Time   /52 07/23/24 1031   Temp 37 07/23/24 1037   Pulse 60 07/23/24 1036   Resp 54 07/23/24 1036   SpO2 99 % 07/23/24 1036   Vitals shown include unfiled device data.      No case tracking events are documented in the log.      Pain/Flory Score: No data recorded

## 2024-07-23 NOTE — PROGRESS NOTES
Subjective:    Patient ID:  Venu Lau is a 84 y.o. male who presents for follow-up following amulet         Referring physician: No ref. provider found     HPI    Patient is accompanied by his wife and daughter.  84-year-old male with PMH of PAF, Tachy-valentine synd s/p PPM. CAD s/p CABG, embolic stroke, HTN, HLD, chronic anemia and chronic pain.  Which no am going now with recurrent bleeding episodes underwent amulet procedure one-month back.  He comes back here today for one-month follow-up.  Patient currently denies having any palpitations, anymore bleeding episodes.  He is currently on aspirin.  He underwent YAN today that was negative for any periampullary leaks.  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    GERD (gastroesophageal reflux disease)     Gout, unspecified     H/O: GI bleed     Hypertension     Liver cancer     PVD (peripheral vascular disease)     Sciatica     SSS (sick sinus syndrome)     Tachy-valentine syndrome     Thyroid disease        Past Surgical History:   Procedure Laterality Date    ANGIOGRAPHY Right 10/11/2021    Procedure: ANGIOGRAM-HEPATIC;  Surgeon: Phil Stuart II, MD;  Location: [facility] CATH LAB;  Service: Interventional Radiology;  Laterality: Right;    APPENDECTOMY      CHOLECYSTECTOMY      CLOSURE OF LEFT ATRIAL APPENDAGE USING DEVICE N/A 6/6/2024    Procedure: Left atrial appendage closure device;  Surgeon: Aren Cortes MD;  Location: [facility] CATH LAB;  Service: Cardiology;  Laterality: N/A;    CORONARY ARTERY BYPASS GRAFT      ENDOSCOPIC ULTRASOUND OF UPPER GASTROINTESTINAL TRACT N/A 6/17/2020    Procedure: EUS;  Surgeon: Rei Humphrey MD;  Location: [facility] ENDO;  Service: Endoscopy;  Laterality: N/A;    ERCP N/A 6/17/2020     Procedure: ERCP;  Surgeon: Rei Humphrey MD;  Location: [facility] ENDO;  Service: Endoscopy;  Laterality: N/A;    ERCP N/A 8/31/2020    Procedure: ERCP (ENDOSCOPIC RETROGRADE CHOLANGIOPANCREATOGRAPHY);  Surgeon: Adela Boyle MD;  Location: [facility] ENDO;  Service: Endoscopy;  Laterality: N/A;    ESOPHAGOGASTRODUODENOSCOPY N/A 10/11/2021    Procedure: EGD (ESOPHAGOGASTRODUODENOSCOPY);  Surgeon: Skip Varela MD;  Location: [facility] ENDO;  Service: Gastroenterology;  Laterality: N/A;    INSERT / REPLACE / REMOVE PACEMAKER      INSERTION OF TEMPORARY PACEMAKER Left 5/30/2023    Procedure: INSERTION, PACEMAKER, TEMPORARY;  Surgeon: Timi Meadows MD;  Location: [facility] CATH LAB/EP;  Service: Cardiology;  Laterality: Left;    LIVER RESECTION      LUMBAR DISC SURGERY      REPLACEMENT OF PACEMAKER GENERATOR N/A 5/30/2023    Procedure: REPLACEMENT, PACEMAKER GENERATOR;  Surgeon: Timi Meadows MD;  Location: [facility] CATH LAB/EP;  Service: Cardiology;  Laterality: N/A;    TRANSESOPHAGEAL ECHOCARDIOGRAPHY N/A 6/6/2024    Procedure: ECHOCARDIOGRAM, TRANSESOPHAGEAL;  Surgeon: Carlos Manuel Campos III, MD;  Location: [facility] CATH LAB;  Service: Cardiology;  Laterality: N/A;       Current Outpatient Medications on File Prior to Visit   Medication Sig Dispense Refill    aspirin 81 MG Chew Take 1 tablet (81 mg total) by mouth once daily. 90 tablet 3    atorvastatin (LIPITOR) 40 MG tablet Take 40 mg by mouth once daily.      clopidogreL (PLAVIX) 75 mg tablet Take 1 tablet (75 mg total) by mouth once daily. 90 tablet 1    glimepiride (AMARYL) 2 MG tablet Take 1 mg by mouth every morning.      JARDIANCE 25 mg tablet Take 25 mg by mouth once daily.      LORazepam (ATIVAN) 1 MG tablet Take 1 mg by mouth 2 (two) times daily.      metoprolol tartrate (LOPRESSOR) 50 MG tablet Take 0.5 tablets (25 mg total) by mouth 2 (two) times daily. 180 tablet 3    mirtazapine (REMERON) 15 MG tablet 15 mg once daily.      SOLUS V2 LANCETS 30  "gauge Misc       SOLUS V2 TEST STRIPS Strp       pantoprazole (PROTONIX) 40 MG tablet Take 1 tablet (40 mg total) by mouth once daily. 30 tablet 2     Current Facility-Administered Medications on File Prior to Visit   Medication Dose Route Frequency Provider Last Rate Last Admin    0.9%  NaCl infusion   Intravenous Continuous Aren Cortes MD        vancomycin injection    PRN Timi Meadows MD   1 g at 23 0836    [DISCONTINUED] sodium chloride 0.9% flush 10 mL  10 mL Intravenous PRN Daniela Burton PA-C           Review of patient's allergies indicates:  No Known Allergies    Social History     Tobacco Use    Smoking status: Some Days     Current packs/day: 0.00     Average packs/day: 0.3 packs/day for 37.0 years (9.3 ttl pk-yrs)     Types: Cigarettes     Start date: 1955     Last attempt to quit: 1992     Years since quittin.6    Smokeless tobacco: Never    Tobacco comments:     Pt declines enrollment in the [employer]. Handout provided for Ambulatory Smoking Cessation program.    Substance Use Topics    Alcohol use: Yes     Alcohol/week: 5.0 standard drinks of alcohol     Types: 5 Cans of beer per week    Drug use: No       Family History   Problem Relation Name Age of Onset    Heart disease Mother      Pancreatic cancer Mother      Heart disease Father           Review of Systems   Constitutional: Negative.   HENT: Negative.     Eyes: Negative.    Cardiovascular: Negative.    Respiratory: Negative.     Endocrine: Negative.    Skin: Negative.    Musculoskeletal: Negative.    Gastrointestinal: Negative.    Neurological: Negative.         Objective:     Vitals:    24 1305 24 1306   BP: (!) 143/67 (!) 149/65   BP Location: Left arm Right arm   Patient Position: Sitting Sitting   BP Method: Large (Automatic) Large (Automatic)   Pulse: 65 69   SpO2: 98% 98%   Weight: 66.2 kg (146 lb)    Height: 5' 5" (1.651 m)         Physical Exam  HENT:      Head: Normocephalic and " atraumatic.   Eyes:      Pupils: Pupils are equal, round, and reactive to light.   Cardiovascular:      Rate and Rhythm: Normal rate. Rhythm irregular.   Pulmonary:      Effort: Pulmonary effort is normal.      Breath sounds: Normal breath sounds.   Abdominal:      General: Abdomen is flat. Bowel sounds are normal.      Palpations: Abdomen is soft.   Musculoskeletal:         General: Normal range of motion.      Cervical back: Normal range of motion.   Skin:     General: Skin is warm.      Capillary Refill: Capillary refill takes less than 2 seconds.   Neurological:      General: No focal deficit present.      Mental Status: Mental status is at baseline.           Assessmen/Plan   Paroxysmal atrial fibrillation    Patient is status post amulet one-month for a  CHadsVasc score of 5 with recurrent bleeding episodes .  YAN today did not show any ana amulet leak.  He was advised to take only aspirin.    Dyslipidemia  Continue with statins    Essential hypertension  Continue with the current blood pressure medications    Coronary artery disease involving native coronary artery of native heart without angina pectoris  Continue with aspirin and statins        Guzman Maravilla MD, PGY 7   I have seen the patient, reviewed the Fellow's history and physical, assessment and plan. I have personally interviewed and examined the patient and agree with the findings. ANA LUISA is sealed.  Follow up in 6 months with [facility].     EMMA Cortes MD

## 2024-07-23 NOTE — PLAN OF CARE
Patient discharged per MD orders. Instructions given on medications, activity, when to call MD, and follow up appointments. Pt verbalized understanding.  Patient AAOx4, VSS, no c/o pain or discomfort at this time. Telemetry and PIV removed. Patient left unit via wheelchair with his wife.

## 2024-07-23 NOTE — PLAN OF CARE
Patient arrived with spouse and was placed in a room. Patient was prepared for procedure without incident.

## 2024-07-23 NOTE — PLAN OF CARE
Received report from Ijeoma CESAR EP and CRNA. Pt s/p YAN. Pt is lightly sedated. Arouses to voice. Vss. Resp even and unlabored. Bed locked and in low position. side rails raised x2. RN at bedside. Will monitor

## 2024-07-23 NOTE — ASSESSMENT & PLAN NOTE
Patient is status post amulet one-month.  YAN today did not show any ana amulet leak.  He was advised to take only aspirin.

## 2024-07-23 NOTE — DISCHARGE SUMMARY
[facility]  Cardiology  Discharge Summary      Patient Name: Venu Lau  MRN: [id number]  Admission Date: 7/23/2024  Hospital Length of Stay: 0 days  Discharge Date and Time:  07/23/2024 11:22 AM  Attending Physician: Aren Cortes MD    Discharging Provider: Daniela Burton PA-C  Primary Care Physician: Michaela Miller MD    HPI:   85 y/o with PMH of PAF, Tachy-valentine synd s/p PPM. CAD s/p CABG, embolic stroke, HTN, HLD, chronic anemia and chronic pain. He has a HAS-BLED of 5 and a CHadsVasc 5 but unable to place on blood thinners because of history of recurrent GI bleeding and high risk of falling.  On 6/6/24 he underwent Amulet LAAO implant with no complications.      Today, here for 6-week follow-up YAN. No acute complaints. Family at bedside.      Procedure(s) (LRB):  ECHOCARDIOGRAM, TRANSESOPHAGEAL (N/A)       Indwelling Lines/Drains at time of discharge:  none      Hospital Course:  Successful YAN. LAAO device well-seated with no evidence of ana-device leak on YAN. Patient has an appointment with Dr. Cortes today at 1:00 to discuss. Patient tolerated the procedure without any acute complications.  Patient was assessed at bedside prior to discharge, they reported feeling well and denied chest discomfort, shortness of breath, palpitations, lightheadedness, or any other acute symptoms. Discharge instructions were discussed with patient and all questions were answered. Patient was discharged home in stable condition.         Goals of Care Treatment Preferences:  Code Status: Full Code      Significant Diagnostic Studies: YAN - final report pending       Pending Diagnostic Studies:       None            There are no hospital problems to display for this patient.    No new Assessment & Plan notes have been filed under this hospital service since the last note was generated.  Service: Cardiology      Discharged Condition: stable    Disposition: Home or Self Care    Follow Up: Appointment  with Dr. Cortes today at 1:00.    Patient Instructions:   No discharge procedures on file.  Medications:  Reconciled Home Medications:      Medication List        CONTINUE taking these medications      aspirin 81 MG Chew  Take 1 tablet (81 mg total) by mouth once daily.     atorvastatin 40 MG tablet  Commonly known as: LIPITOR  Take 40 mg by mouth once daily.     clopidogreL 75 mg tablet  Commonly known as: PLAVIX  Take 1 tablet (75 mg total) by mouth once daily.     glimepiride 2 MG tablet  Commonly known as: AMARYL  Take 1 mg by mouth every morning.     JARDIANCE 25 mg tablet  Generic drug: empagliflozin  Take 25 mg by mouth once daily.     LORazepam 1 MG tablet  Commonly known as: ATIVAN  Take 1 mg by mouth 2 (two) times daily.     metoprolol tartrate 50 MG tablet  Commonly known as: LOPRESSOR  Take 0.5 tablets (25 mg total) by mouth 2 (two) times daily.     mirtazapine 15 MG tablet  Commonly known as: REMERON  15 mg once daily.     pantoprazole 40 MG tablet  Commonly known as: PROTONIX  Take 1 tablet (40 mg total) by mouth once daily.     SOLUS V2 LANCETS 30 gauge Misc  Generic drug: lancets     SOLUS V2 TEST STRIPS Strp  Generic drug: blood sugar diagnostic              Time spent on the discharge of patient: 35 minutes    Daniela Burton PA-C  Cardiology  [facility]

## 2024-07-23 NOTE — TRANSFER OF CARE
"Anesthesia Transfer of Care Note    Patient: Venu Lau    Procedure(s) Performed: Procedure(s) (LRB):  ECHOCARDIOGRAM, TRANSESOPHAGEAL (N/A)    Patient location: PACU    Anesthesia Type: general    Transport from OR: Transported from OR on 2-3 L/min O2 by NC with adequate spontaneous ventilation    Post pain: adequate analgesia    Post assessment: tolerated procedure well and no apparent anesthetic complications    Post vital signs: stable    Level of consciousness: sedated    Nausea/Vomiting: no nausea/vomiting    Complications: none    Transfer of care protocol was followed    Last vitals: Visit Vitals  BP (!) 150/67 (BP Location: Left arm, Patient Position: Lying)   Pulse 60   Temp 36.5 °C (97.7 °F) (Temporal)   Resp 20   Ht 5' 5" (1.651 m)   Wt 66.2 kg (146 lb)   SpO2 99%   BMI 24.30 kg/m²     "

## 2024-07-23 NOTE — ANESTHESIA PREPROCEDURE EVALUATION
[facility]  Cardiac Anesthesia Pre-Operative Evaluation        Patient Name: Venu Lau  YOB: 1940  MRN: [id number]    SUBJECTIVE:     Pre-operative Evaluation for Procedure(s) (LRB):  ECHOCARDIOGRAM, TRANSESOPHAGEAL (N/A)     07/23/2024    Venu Lau is a 84 y.o. male with a PMHx significant for HTN, CAD (s/p CABG), tachy-valentine syndrome (s/p dual-chamber PPM), T2DM (HgbA1c = 7%), hypothyroidism, and embolic stroke (residual deficits?) with paroxsymal AFib and CHADsVASc of 5, however unable to tolerate anticoagulation due to recurrent GI bleeding for which a left atrial appendage occlusion device was recommended.  Follow up YAN today    He now presents for the above procedure(s) with Interventional Cardiology - Dr. Cortes    Previous Airway: None documented.    Pertinent Cardiac Studies:  TTE (10/04/2022)   Interpretation Summary  · The left ventricle is normal in size with mild concentric hypertrophy and normal systolic function.  · The estimated ejection fraction is 60%.  · Mild right ventricular enlargement with normal right ventricular systolic function.  · Moderate tricuspid regurgitation.  · Mild aortic regurgitation.  · Mild mitral regurgitation.  · There is moderate aortic valve stenosis.  · Aortic valve area is 1.07 cm2; peak velocity is 2.99 m/s; mean gradient is 21 mmHg.  · Severe left atrial enlargement.  · Intermediate central venous pressure (8 mmHg).  · The estimated PA systolic pressure is 33 mmHg.       Patient Active Problem List   Diagnosis    Hx of CABG    Atrial flutter    Pacemaker    Villous adenoma    Coronary artery disease involving native coronary artery of native heart without angina pectoris    Type 2 diabetes mellitus, without long-term current use of insulin    Essential hypertension    Metabolic acidosis    Polypharmacy    Dyslipidemia    Oropharyngeal dysphagia    Anemia, chronic disease    Paroxysmal atrial fibrillation     Tobacco abuse    Subclinical hypothyroidism    Abnormal LFTs    Hemobilia    Hyperbilirubinemia    Other cirrhosis of liver    Aphasia    Embolic stroke involving left posterior cerebral artery    Alcohol use disorder, mild, abuse    Other chest pain    Pancytopenia    Nonrheumatic aortic valve stenosis    Pacemaker generator end of life       Review of patient's allergies indicates:  No Known Allergies    Current Outpatient Medications   Medication Instructions    aspirin 81 mg, Oral, Daily    atorvastatin (LIPITOR) 40 mg, Oral, Daily    clopidogreL (PLAVIX) 75 mg, Oral, Daily    glimepiride (AMARYL) 1 mg, Oral, Every morning    JARDIANCE 25 mg, Oral, Daily    LORazepam (ATIVAN) 1 mg, Oral, 2 times daily    metoprolol tartrate (LOPRESSOR) 25 mg, Oral, 2 times daily    mirtazapine (REMERON) 15 mg, Daily    pantoprazole (PROTONIX) 40 mg, Oral, Daily    SOLUS V2 LANCETS 30 gauge Misc No dose, route, or frequency recorded.    SOLUS V2 TEST STRIPS Strp No dose, route, or frequency recorded.       Past Surgical History:   Procedure Laterality Date    ANGIOGRAPHY Right 10/11/2021    Procedure: ANGIOGRAM-HEPATIC;  Surgeon: Phil Stuart II, MD;  Location: [facility] CATH LAB;  Service: Interventional Radiology;  Laterality: Right;    APPENDECTOMY      CHOLECYSTECTOMY      CLOSURE OF LEFT ATRIAL APPENDAGE USING DEVICE N/A 6/6/2024    Procedure: Left atrial appendage closure device;  Surgeon: Aren Cortes MD;  Location: [facility] CATH LAB;  Service: Cardiology;  Laterality: N/A;    CORONARY ARTERY BYPASS GRAFT      ENDOSCOPIC ULTRASOUND OF UPPER GASTROINTESTINAL TRACT N/A 6/17/2020    Procedure: EUS;  Surgeon: Rei Humphrey MD;  Location: [facility] ENDO;  Service: Endoscopy;  Laterality: N/A;    ERCP N/A 6/17/2020    Procedure: ERCP;  Surgeon: Rei Humphrey MD;  Location: [facility] ENDO;  Service: Endoscopy;  Laterality: N/A;    ERCP N/A 8/31/2020    Procedure: ERCP (ENDOSCOPIC RETROGRADE CHOLANGIOPANCREATOGRAPHY);  Surgeon:  Adela Boyle MD;  Location: [facility] ENDO;  Service: Endoscopy;  Laterality: N/A;    ESOPHAGOGASTRODUODENOSCOPY N/A 10/11/2021    Procedure: EGD (ESOPHAGOGASTRODUODENOSCOPY);  Surgeon: Skip Varela MD;  Location: [facility] ENDO;  Service: Gastroenterology;  Laterality: N/A;    INSERT / REPLACE / REMOVE PACEMAKER      INSERTION OF TEMPORARY PACEMAKER Left 5/30/2023    Procedure: INSERTION, PACEMAKER, TEMPORARY;  Surgeon: Timi Meadows MD;  Location: [facility] CATH LAB/EP;  Service: Cardiology;  Laterality: Left;    LIVER RESECTION      LUMBAR DISC SURGERY      REPLACEMENT OF PACEMAKER GENERATOR N/A 5/30/2023    Procedure: REPLACEMENT, PACEMAKER GENERATOR;  Surgeon: Timi Meadows MD;  Location: [facility] CATH LAB/EP;  Service: Cardiology;  Laterality: N/A;    TRANSESOPHAGEAL ECHOCARDIOGRAPHY N/A 6/6/2024    Procedure: ECHOCARDIOGRAM, TRANSESOPHAGEAL;  Surgeon: Carlos Manuel Campos III, MD;  Location: [facility] CATH LAB;  Service: Cardiology;  Laterality: N/A;       Social History     Substance and Sexual Activity   Drug Use No     Alcohol Use: Not on file     Tobacco Use: High Risk (6/6/2024)    Patient History     Smoking Tobacco Use: Some Days     Smokeless Tobacco Use: Never     Passive Exposure: Not on file       OBJECTIVE:     Vital Signs Range (Last 24H):  BP: ()/()   Arterial Line BP: ()/()       Significant Labs    Heme Profile  Lab Results   Component Value Date    WBC 3.48 (L) 06/06/2024    HGB 12.2 (L) 06/06/2024    HCT 38.4 (L) 06/06/2024     (L) 06/06/2024       Coagulation Studies  Lab Results   Component Value Date    LABPROT 11.7 06/06/2024    INR 1.1 06/06/2024    APTT 29.2 10/27/2021       BMP  Lab Results   Component Value Date     06/06/2024    K 4.8 06/06/2024     (H) 06/06/2024    CO2 17 (L) 06/06/2024    BUN 17 06/06/2024    CREATININE 1.2 06/06/2024    MG 2.1 10/04/2022    PHOS 2.0 (L) 10/11/2021       Liver Function Tests  Lab Results   Component Value Date    AST 38  06/06/2024    ALT 20 06/06/2024    ALKPHOS 164 (H) 06/06/2024    BILITOT 1.1 (H) 06/06/2024    PROT 8.5 (H) 06/06/2024    ALBUMIN 4.2 06/06/2024       Lipid Profile  Lab Results   Component Value Date    CHOL 100 (L) 10/04/2022    HDL 40 10/04/2022    TRIG 94 10/04/2022       Endocrine Profile  Lab Results   Component Value Date    HGBA1C 7.0 (H) 01/09/2024    TSH 4.670 (H) 01/09/2024       Diagnostic Studies    CTA Cardiac TAVR (04/02/2024)  TAVR measurements as above.     Small right pleural effusion.     3.4 cm masslike opacity in the right lung base.  Differential considerations include infection, round atelectasis, and neoplasm.  Follow-up chest CT in 3 months is recommended.     Mediastinal lymphadenopathy, which could be reactive or neoplastic.     Other findings as described.    Cardiac Studies    EKG:   Results for orders placed or performed during the hospital encounter of 06/06/24   EKG 12-lead    Collection Time: 06/06/24 12:15 PM   Result Value Ref Range    QRS Duration 166 ms    [facility] QTC Calculation 530 ms    Narrative    Test Reason : I48.0,Z98.890,    Vent. Rate : 080 BPM     Atrial Rate : 078 BPM     P-R Int : 000 ms          QRS Dur : 166 ms      QT Int : 460 ms       P-R-T Axes : 000 -81 080 degrees     QTc Int : 530 ms    Ventricular-paced rhythm ; possibly AF  Abnormal ECG  When compared with ECG of 30-MAY-2023 07:08,  Vent. rate has increased BY  22 BPM  Apacing not noted  Confirmed by Jerry GROVER MD ([id number]) on 6/6/2024 5:29:55 PM    Referred By: DESTINEY TORRES           Confirmed By:Jerry GROVER MD         Transthoracic Echo (10/04/2022):  Interpretation Summary  · The left ventricle is normal in size with mild concentric hypertrophy and normal systolic function.  · The estimated ejection fraction is 60%.  · Mild right ventricular enlargement with normal right ventricular systolic function.  · Moderate tricuspid regurgitation.  · Mild aortic regurgitation.  · Mild mitral regurgitation.  · There is  moderate aortic valve stenosis.  · Aortic valve area is 1.07 cm2; peak velocity is 2.99 m/s; mean gradient is 21 mmHg.  · Severe left atrial enlargement.  · Intermediate central venous pressure (8 mmHg).  · The estimated PA systolic pressure is 33 mmHg.    ASSESSMENT/PLAN:     Venu Lau is a 84 y.o. male with atrial fibrillation presenting for Watchman procedure.       Pre-op Assessment    I have reviewed the Patient Summary Reports.     I have reviewed the Nursing Notes. I have reviewed the NPO Status.   I have reviewed the Medications.     Review of Systems  Anesthesia Hx:  No problems with previous Anesthesia   History of prior surgery of interest to airway management or planning:          Denies Family Hx of Anesthesia complications.    Denies Personal Hx of Anesthesia complications.                    Social:  Smoker       Hematology/Oncology:       -- Anemia:                  Denies Current/Recent Cancer                Cardiovascular:  Exercise tolerance: poor  Pacemaker Hypertension Valvular problems/Murmurs  CAD   CABG/stent Dysrhythmias atrial fibrillation   Denies CHF.    no hyperlipidemia   ECG has been reviewed.    Coronary Artery Disease:         S/P Aorto-Coronary Bypass Graft Surgery (CABG):    Valvular Heart Disease: Aortic Stenosis (AS), moderate  Tricuspid Regurgitation (TR), moderate     Denies Congestive Heart Failure (CHF)                Hypertension     Disorder of Cardiac Rhythm, Atrial Fibrillation, Paroxysmal Atrial Fibrillation  Disorder of Cardiac Conduction Tachy-valentine syndrome s/p dual-chamber PPM   Pulmonary:    Denies COPD.  Denies Asthma.     Denies Sleep Apnea.                Renal/:   Denies Chronic Renal Disease.                Hepatic/GI:     GERD Liver Disease,            Neurological:   CVA, residual symptoms    Denies Headaches. Denies Seizures.                                Endocrine:  Diabetes, type 2 Hypothyroidism   Diabetes, Type 2 Diabetes     , most recent  HgA1c value was 7.0%               Denies Obesity / BMI > 30  Psych:  Psychiatric History                  Physical Exam  General: Well nourished, Cooperative, Alert and Oriented    Airway:  Mallampati: II / II  Mouth Opening: Normal  TM Distance: Normal  Tongue: Normal  Neck ROM: Normal ROM    Dental:  Edentulous        Anesthesia Plan  Type of Anesthesia, risks & benefits discussed:    Anesthesia Type: Gen Natural Airway  Intra-op Monitoring Plan: Standard ASA Monitors  Post Op Pain Control Plan: multimodal analgesia and IV/PO Opioids PRN  Induction:  IV  Informed Consent: Informed consent signed with the Patient and all parties understand the risks and agree with anesthesia plan.  All questions answered.   ASA Score: 3  Day of Surgery Review of History & Physical: H&P Update referred to the surgeon/provider.    Ready For Surgery From Anesthesia Perspective.     .

## 2024-07-23 NOTE — H&P
[facility]  YAN History and Physical      Venu Lau  YOB: 1940  Medical Record Number:  [id number]  Attending Physician:  Aren Cortes MD   Date of Admission: 7/23/2024       Hospital Day:  0  Current Principal Problem:  Atrial fibrillation      History     Cc: YAN to assess Amulet    HPI  85 y/o with PMH of PAF, Tachy-valentine synd s/p PPM. CAD s/p CABG, embolic stroke, HTN, HLD, chronic anemia and chronic pain. He has a HAS-BLED of 5 and a CHadsVasc 5 but unable to place on blood thinners because of history of recurrent GI bleeding and high risk of falling.  On 6/6/24 he underwent Amulet LAAO implant with no complications.     Today, here for 6-week follow-up YAN. No acute complaints. Family at bedside.    Anticoagulant/antiplatelets: plavix, aspirin  Platelet count: 147  INR: 1.1    History of stroke:  no  Dysphagia or odynophagia:  no  Liver Disease, esophageal disease, or known varices:  no  Upper GI Bleeding:  not currently.  Hx of recurrent GI bleeding due to hemobilia. Esophagus and stomach normal on EGD.  Snoring:  no   Sleep Apnea:  no  Prior neck surgery or radiation:  no  History of anesthetic difficulties:  no  Family history of anesthetic difficulties:  no  Last oral intake: last pm   Able to move neck in all directions:  yes  Use of GLP-1:  no      Medications - Outpatient  Prior to Admission medications    Medication Sig Start Date End Date Taking? Authorizing Provider   atorvastatin (LIPITOR) 40 MG tablet Take 40 mg by mouth once daily. 9/29/22  Yes Provider, Historical   clopidogreL (PLAVIX) 75 mg tablet Take 1 tablet (75 mg total) by mouth once daily. 6/6/24  Yes Bladimir Winters MD   glimepiride (AMARYL) 2 MG tablet Take 1 mg by mouth every morning. 1/2/20  Yes Provider, Historical   JARDIANCE 25 mg tablet Take 25 mg by mouth once daily. 7/19/22  Yes Provider, Historical   LORazepam (ATIVAN) 1 MG tablet Take 1 mg by mouth 2 (two)  times daily.   Yes Provider, Historical   metoprolol tartrate (LOPRESSOR) 50 MG tablet Take 0.5 tablets (25 mg total) by mouth 2 (two) times daily. 2/3/20  Yes Carlos Quiroga MD   mirtazapine (REMERON) 15 MG tablet 15 mg once daily. 1/2/20  Yes Provider, Historical   aspirin 81 MG Chew Take 1 tablet (81 mg total) by mouth once daily. 10/6/22 4/2/24  Rosalino Arellano MD   pantoprazole (PROTONIX) 40 MG tablet Take 1 tablet (40 mg total) by mouth once daily. 10/16/21 6/6/24  Alex Suero MD   SOLUS V2 LANCETS 30 gauge Misc  11/6/13   Provider, Historical   SOLUS V2 TEST STRIPS Strp  11/30/13   Provider, Historical         Medications - Current  Scheduled Meds:  Continuous Infusions:  PRN Meds:.  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    GERD (gastroesophageal reflux disease)     Gout, unspecified     H/O: GI bleed     Hypertension     Liver cancer     PVD (peripheral vascular disease)     Sciatica     SSS (sick sinus syndrome)     Tachy-valentine syndrome     Thyroid disease          Past Surgical History  Past Surgical History:   Procedure Laterality Date    ANGIOGRAPHY Right 10/11/2021    Procedure: ANGIOGRAM-HEPATIC;  Surgeon: Phil Stuart II, MD;  Location: [facility] [place] CATH LAB;  Service: Interventional Radiology;  Laterality: Right;    APPENDECTOMY      CHOLECYSTECTOMY      CLOSURE OF LEFT ATRIAL APPENDAGE USING DEVICE N/A 6/6/2024    Procedure: Left atrial appendage closure device;  Surgeon: Aren Cortes MD;  Location: [facility] CATH LAB;  Service: Cardiology;   Laterality: N/A;    CORONARY ARTERY BYPASS GRAFT      ENDOSCOPIC ULTRASOUND OF UPPER GASTROINTESTINAL TRACT N/A 2020    Procedure: EUS;  Surgeon: Rei Humphrey MD;  Location: [facility] ENDO;  Service: Endoscopy;  Laterality: N/A;    ERCP N/A 2020    Procedure: ERCP;  Surgeon: Rei Humphrey MD;  Location: [facility] ENDO;  Service: Endoscopy;  Laterality: N/A;    ERCP N/A 2020    Procedure: ERCP (ENDOSCOPIC RETROGRADE CHOLANGIOPANCREATOGRAPHY);  Surgeon: Adela Boyle MD;  Location: [facility] ENDO;  Service: Endoscopy;  Laterality: N/A;    ESOPHAGOGASTRODUODENOSCOPY N/A 10/11/2021    Procedure: EGD (ESOPHAGOGASTRODUODENOSCOPY);  Surgeon: Skip Varela MD;  Location: [facility];  Service: Gastroenterology;  Laterality: N/A;    INSERT / REPLACE / REMOVE PACEMAKER      INSERTION OF TEMPORARY PACEMAKER Left 2023    Procedure: INSERTION, PACEMAKER, TEMPORARY;  Surgeon: Timi Meadows MD;  Location: [facility] CATH LAB/EP;  Service: Cardiology;  Laterality: Left;    LIVER RESECTION      LUMBAR DISC SURGERY      REPLACEMENT OF PACEMAKER GENERATOR N/A 2023    Procedure: REPLACEMENT, PACEMAKER GENERATOR;  Surgeon: Timi Meadows MD;  Location: [facility] CATH LAB/EP;  Service: Cardiology;  Laterality: N/A;    TRANSESOPHAGEAL ECHOCARDIOGRAPHY N/A 2024    Procedure: ECHOCARDIOGRAM, TRANSESOPHAGEAL;  Surgeon: Carlos Manuel Campos III, MD;  Location: [facility] CATH LAB;  Service: Cardiology;  Laterality: N/A;         Social History  Social History     Socioeconomic History    Marital status:    Tobacco Use    Smoking status: Some Days     Current packs/day: 0.00     Average packs/day: 0.3 packs/day for 37.0 years (9.3 ttl pk-yrs)     Types: Cigarettes     Start date: 1955     Last attempt to quit: 1992     Years since quittin.6    Smokeless tobacco: Never    Tobacco comments:     Pt declines enrollment in the [employer]. Handout provided for Ambulatory Smoking Cessation  "program.    Substance and Sexual Activity    Alcohol use: Yes     Alcohol/week: 5.0 standard drinks of alcohol     Types: 5 Cans of beer per week    Drug use: No    Sexual activity: Not Currently         ROS  10 point ROS performed and negative except as stated in HPI     Physical Examination     Vital Signs  24 Hour VS Range    Temp:  [97.7 °F (36.5 °C)]   Pulse:  [60]   Resp:  [20]   BP: (150)/(67)   SpO2:  [99 %]       Physical Exam:   Constitutional: no acute distress  Cardiovascular: Regular rate and rhythm. Murmur heard  Pulmonary: Normal respiratory effort   Neuro: alert and oriented, no focal deficits  Extremities: warm, no edema     Data       No results for input(s): "WBC", "HGB", "HCT", "PLT" in the last 168 hours.     No results for input(s): "PROTIME", "INR" in the last 168 hours.     No results for input(s): "NA", "K", "CL", "CO2", "BUN", "CREATININE", "ANIONGAP", "CALCIUM" in the last 168 hours.     No results for input(s): "PROT", "ALBUMIN", "BILITOT", "ALKPHOS", "AST", "ALT" in the last 168 hours.     No results for input(s): "TROPONINI" in the last 168 hours.     BNP (pg/mL)   Date Value   10/29/2016 135 (H)       No results for input(s): "LABBLOO" in the last 168 hours.         Assessment & Plan     #Atrial fibrillation  - s/p Amulet on 6/6/24  - proceed with 6-week YAN today  - appointment with Dr. Cortes to follow    YAN 6/6/24:    YAN for Amulet implantation. s/p implantation of 22mm Amulet device. The device is well seated with no ana-device leak. There is a small iatrogenic atrial septal defect with left to right shunt. No pericardial effusion.    Left Atrium: Left atrium is dilated. The left atrial appendage appears normal. The left atrial appendage has a cauliflower morphology. Appendage velocity is reduced at less than 40 cm/sec.    Left Ventricle: The left ventricle is normal in size. Normal wall thickness. There is normal systolic function. Ejection fraction by visual approximation is " 55%.    Right Ventricle: Right ventricular enlargement. Wall thickness is normal. Systolic function is normal.    Right Atrium: Right atrium is dilated.    Aortic Valve: The aortic valve is a trileaflet valve. There is moderate aortic valve sclerosis. There is annular calcification present. Moderately restricted motion. At least moderate stenosis present. There is mild aortic regurgitation.    Mitral Valve: There is mild regurgitation.    Tricuspid Valve: There is severe regurgitation.      -No absolute contraindications of esophageal stricture, tumor, perforation, laceration,or diverticulum and/or active GI bleed.  -The risks, benefits & alternatives of the procedure were explained to the patient.   -The risks of transesophageal echo include but are not limited to:  Dental trauma, esophageal trauma/perforation, bleeding, laryngospasm/brochospasm, aspiration, sore throat/hoarseness, & dislodgement of the endotracheal tube/nasogastric tube (where applicable).  -The risks of moderate sedation include hypotension, respiratory depression, arrhythmias, bronchospasm, & death.    -Informed consent was obtained. The patient is agreeable to proceed with the procedure and all questions and concerns addressed.    Case was discussed with an attending physician in echocardiography lab prior to procedure.    Daniela Burton PA-C  [facility]

## 2024-08-29 ENCOUNTER — LAB VISIT (OUTPATIENT)
Dept: LAB | Facility: HOSPITAL | Age: 84
End: 2024-08-29
Attending: INTERNAL MEDICINE
Payer: MEDICARE

## 2024-08-29 DIAGNOSIS — E11.49 TYPE 2 DIABETES MELLITUS WITH OTHER DIABETIC NEUROLOGICAL COMPLICATION: Primary | ICD-10-CM

## 2024-08-29 LAB
ALBUMIN SERPL BCP-MCNC: 4.5 G/DL (ref 3.5–5.2)
ALP SERPL-CCNC: 199 U/L (ref 38–126)
ALT SERPL W/O P-5'-P-CCNC: 30 U/L (ref 10–44)
ANION GAP SERPL CALC-SCNC: 16 MMOL/L (ref 8–16)
AST SERPL-CCNC: 42 U/L (ref 15–46)
BILIRUB SERPL-MCNC: 0.8 MG/DL (ref 0.1–1)
CALCIUM SERPL-MCNC: 9.8 MG/DL (ref 8.7–10.5)
CHLORIDE SERPL-SCNC: 111 MMOL/L (ref 95–110)
CHOLEST SERPL-MCNC: 96 MG/DL (ref 120–199)
CHOLEST/HDLC SERPL: 2.2 {RATIO} (ref 2–5)
CO2 SERPL-SCNC: 20 MMOL/L (ref 23–29)
CREAT SERPL-MCNC: 0.95 MG/DL (ref 0.5–1.4)
EST. GFR  (NO RACE VARIABLE): >60 ML/MIN/1.73 M^2
ESTIMATED AVG GLUCOSE: 131 MG/DL (ref 68–131)
GLUCOSE SERPL-MCNC: 115 MG/DL (ref 70–110)
HBA1C MFR BLD: 6.2 % (ref 4–5.6)
HDLC SERPL-MCNC: 43 MG/DL (ref 40–75)
HDLC SERPL: 44.8 % (ref 20–50)
LDLC SERPL CALC-MCNC: 40 MG/DL (ref 63–159)
NONHDLC SERPL-MCNC: 53 MG/DL
POTASSIUM SERPL-SCNC: 4.3 MMOL/L (ref 3.5–5.1)
PROT SERPL-MCNC: 8.2 G/DL (ref 6–8.4)
SODIUM SERPL-SCNC: 147 MMOL/L (ref 136–145)
TRIGL SERPL-MCNC: 65 MG/DL (ref 30–150)
UUN UR-MCNC: 16 MG/DL (ref 2–20)

## 2024-08-29 PROCEDURE — 80061 LIPID PANEL: CPT | Mod: HCNC | Performed by: INTERNAL MEDICINE

## 2024-08-29 PROCEDURE — 83036 HEMOGLOBIN GLYCOSYLATED A1C: CPT | Mod: HCNC | Performed by: INTERNAL MEDICINE

## 2024-08-29 PROCEDURE — 80053 COMPREHEN METABOLIC PANEL: CPT | Mod: HCNC,PN | Performed by: INTERNAL MEDICINE

## 2025-01-16 ENCOUNTER — LAB VISIT (OUTPATIENT)
Dept: LAB | Facility: HOSPITAL | Age: 85
End: 2025-01-16
Attending: INTERNAL MEDICINE
Payer: MEDICARE

## 2025-01-16 DIAGNOSIS — E11.49 TYPE 2 DIABETES MELLITUS WITH OTHER DIABETIC NEUROLOGICAL COMPLICATION: Primary | ICD-10-CM

## 2025-01-16 LAB
ALBUMIN SERPL BCP-MCNC: 4.7 G/DL (ref 3.5–5.2)
ALBUMIN/CREAT UR: 33.3 UG/MG (ref 0–30)
ALP SERPL-CCNC: 172 U/L (ref 38–126)
ALT SERPL W/O P-5'-P-CCNC: 29 U/L (ref 10–44)
ANION GAP SERPL CALC-SCNC: 11 MMOL/L (ref 8–16)
AST SERPL-CCNC: 43 U/L (ref 15–46)
BASOPHILS # BLD AUTO: 0.02 K/UL (ref 0–0.2)
BASOPHILS NFR BLD: 0.9 % (ref 0–1.9)
BILIRUB SERPL-MCNC: 0.9 MG/DL (ref 0.1–1)
CALCIUM SERPL-MCNC: 10.1 MG/DL (ref 8.7–10.5)
CHLORIDE SERPL-SCNC: 107 MMOL/L (ref 95–110)
CHOLEST SERPL-MCNC: 103 MG/DL (ref 120–199)
CHOLEST/HDLC SERPL: 2.2 {RATIO} (ref 2–5)
CO2 SERPL-SCNC: 23 MMOL/L (ref 23–29)
CREAT SERPL-MCNC: 1.04 MG/DL (ref 0.5–1.4)
CREAT UR-MCNC: 69 MG/DL (ref 23–375)
DIFFERENTIAL METHOD BLD: ABNORMAL
EOSINOPHIL # BLD AUTO: 0 K/UL (ref 0–0.5)
EOSINOPHIL NFR BLD: 0.5 % (ref 0–8)
ERYTHROCYTE [DISTWIDTH] IN BLOOD BY AUTOMATED COUNT: 18.6 % (ref 11.5–14.5)
EST. GFR  (NO RACE VARIABLE): >60 ML/MIN/1.73 M^2
ESTIMATED AVG GLUCOSE: 120 MG/DL (ref 68–131)
GLUCOSE SERPL-MCNC: 131 MG/DL (ref 70–110)
HBA1C MFR BLD: 5.8 % (ref 4–5.6)
HCT VFR BLD AUTO: 39.9 % (ref 40–54)
HDLC SERPL-MCNC: 46 MG/DL (ref 40–75)
HDLC SERPL: 44.7 % (ref 20–50)
HGB BLD-MCNC: 12.6 G/DL (ref 14–18)
IMM GRANULOCYTES # BLD AUTO: 0.01 K/UL (ref 0–0.04)
IMM GRANULOCYTES NFR BLD AUTO: 0.5 % (ref 0–0.5)
LDLC SERPL CALC-MCNC: 43.6 MG/DL (ref 63–159)
LYMPHOCYTES # BLD AUTO: 0.6 K/UL (ref 1–4.8)
LYMPHOCYTES NFR BLD: 29.9 % (ref 18–48)
MCH RBC QN AUTO: 26.7 PG (ref 27–31)
MCHC RBC AUTO-ENTMCNC: 31.6 G/DL (ref 32–36)
MCV RBC AUTO: 85 FL (ref 82–98)
MICROALBUMIN UR DL<=1MG/L-MCNC: 23 UG/ML
MONOCYTES # BLD AUTO: 0.3 K/UL (ref 0.3–1)
MONOCYTES NFR BLD: 14.7 % (ref 4–15)
NEUTROPHILS # BLD AUTO: 1.1 K/UL (ref 1.8–7.7)
NEUTROPHILS NFR BLD: 53.5 % (ref 38–73)
NONHDLC SERPL-MCNC: 57 MG/DL
NRBC BLD-RTO: 0 /100 WBC
PLATELET # BLD AUTO: 143 K/UL (ref 150–450)
PMV BLD AUTO: 9.5 FL (ref 9.2–12.9)
POTASSIUM SERPL-SCNC: 4.3 MMOL/L (ref 3.5–5.1)
PROT SERPL-MCNC: 8.5 G/DL (ref 6–8.4)
RBC # BLD AUTO: 4.72 M/UL (ref 4.6–6.2)
SODIUM SERPL-SCNC: 141 MMOL/L (ref 136–145)
T4 FREE SERPL-MCNC: 0.94 NG/DL (ref 0.71–1.51)
TRIGL SERPL-MCNC: 67 MG/DL (ref 30–150)
TSH SERPL DL<=0.005 MIU/L-ACNC: 4.16 UIU/ML (ref 0.4–4)
UUN UR-MCNC: 17 MG/DL (ref 2–20)
WBC # BLD AUTO: 2.11 K/UL (ref 3.9–12.7)

## 2025-01-16 PROCEDURE — 83036 HEMOGLOBIN GLYCOSYLATED A1C: CPT | Performed by: INTERNAL MEDICINE

## 2025-01-16 PROCEDURE — 84443 ASSAY THYROID STIM HORMONE: CPT | Mod: PN | Performed by: INTERNAL MEDICINE

## 2025-01-16 PROCEDURE — 36415 COLL VENOUS BLD VENIPUNCTURE: CPT | Mod: PN | Performed by: INTERNAL MEDICINE

## 2025-01-16 PROCEDURE — 82570 ASSAY OF URINE CREATININE: CPT | Mod: PN | Performed by: INTERNAL MEDICINE

## 2025-01-16 PROCEDURE — 84439 ASSAY OF FREE THYROXINE: CPT | Performed by: INTERNAL MEDICINE

## 2025-01-16 PROCEDURE — 80053 COMPREHEN METABOLIC PANEL: CPT | Mod: PN | Performed by: INTERNAL MEDICINE

## 2025-01-16 PROCEDURE — 85025 COMPLETE CBC W/AUTO DIFF WBC: CPT | Mod: PN | Performed by: INTERNAL MEDICINE

## 2025-01-16 PROCEDURE — 80061 LIPID PANEL: CPT | Performed by: INTERNAL MEDICINE

## 2025-02-17 NOTE — PROGRESS NOTES
"[facility] Hospital Medicine Progress Note    Primary Team: [facility] Hospitalist Team A  Attending Physician: Leidy Starks MD  Resident: Adan  Intern: Radha      Subjective:      NAEO. Patient still complaining of difficulty sleeping even with 50mg trazadone given last night. Patient resting comfortably on interview, denies any chest pain, SOB, N/V/D, fever, chills.    Objective:     Last 24 Hour Vital Signs:  BP  Min: 137/64  Max: 186/77  Temp  Av.8 °F (36.6 °C)  Min: 97.1 °F (36.2 °C)  Max: 98.5 °F (36.9 °C)  Pulse  Av.3  Min: 59  Max: 95  Resp  Av.1  Min: 18  Max: 19  SpO2  Av %  Min: 95 %  Max: 100 %  Height  Av' 5" (165.1 cm)  Min: 5' 5" (165.1 cm)  Max: 5' 5" (165.1 cm)  Weight  Av.3 kg (155 lb)  Min: 70.3 kg (155 lb)  Max: 70.3 kg (155 lb)  I/O last 3 completed shifts:  In: 720 [P.O.:720]  Out: 600 [Urine:600]    Physical Examination:  General: Patient sitting comfortably in NAD  Head: normocephalic, atraumatic  Eyes: EOMI, no conjunctival injections or icterus  Mouth: MMM, posterior oropharynx without erythema  Neck: No thyromegaly or masses, supple, trachea midline   Cardiac: RRR, 3/6 systolic murmur heard throughout  Pulmonary/Chest: CTAB, symmetric chest rise, no wheezing or crackles  GI: Soft, non tender, non distended  Extremities: no edema, clubbing, or cyanosis  Skin: dry, warm, intact.  Well healed surgical scars to midline chest and LLE  Neuro: Alert and oriented, moving all extremities spontaneously. Baseline dysarthria from previous CVA     Laboratory:    Recent Labs   Lab 10/03/22  2302   WBC 2.54*   HGB 11.0*   HCT 35.5*   *   MCV 79*   RDW 18.7*      K 3.5      CO2 17*   BUN 15   CREATININE 1.09   GLU 74   PROT 7.5   ALBUMIN 4.4   BILITOT 0.4   AST 43   ALKPHOS 140*   ALT 37        Microbiology: None    Other Results:  EKG:  Results for orders placed or performed during the hospital encounter of 10/03/22   EKG 12-lead    Collection Time: 10/04/22  " 4:06 AM    Narrative    Test Reason : R07.9,    Vent. Rate : 060 BPM     Atrial Rate : 068 BPM     P-R Int : 000 ms          QRS Dur : 168 ms      QT Int : 502 ms       P-R-T Axes : 000 -82 066 degrees     QTc Int : 502 ms    Electronic ventricular pacemaker  When compared with ECG of 03-OCT-2022 22:14,  Electronic ventricular pacemaker has replaced Wide QRS rhythm  Confirmed by Venu Loco MD ([id number]) on 10/4/2022 6:18:41 PM    Referred By: [email or web address]   SELF           Confirmed By:Venu Loco MD        Radiology  Imaging Results              X-Ray Chest AP Portable (Final result)  Result time 10/03/22 23:17:09      Final result by Everardo Roberts MD (10/03/22 23:17:09)                   Impression:      No acute abnormality.      Electronically signed by: Everardo Roberts  Date:    10/03/2022  Time:    23:17               Narrative:    EXAMINATION:  XR CHEST AP PORTABLE    CLINICAL HISTORY:  Chest Pain;    TECHNIQUE:  Single frontal view of the chest was performed.    COMPARISON:  Multiple priors.    FINDINGS:  Left chest cardiac pacing device.    The lungs are clear, with normal appearance of pulmonary vasculature and no pleural effusion or pneumothorax.    The cardiac silhouette is normal in size. The hilar and mediastinal contours are unremarkable.    Bones are intact.                                     TTE 10/4/22  The left ventricle is normal in size with mild concentric hypertrophy and normal systolic function.  The estimated ejection fraction is 60%.  Mild right ventricular enlargement with normal right ventricular systolic function.  Moderate tricuspid regurgitation.  Mild aortic regurgitation.  Mild mitral regurgitation.  There is moderate aortic valve stenosis.  Aortic valve area is 1.07 cm2; peak velocity is 2.99 m/s; mean gradient is 21 mmHg.  Severe left atrial enlargement.  Intermediate central venous pressure (8 mmHg).  The estimated PA systolic pressure is 33 mmHg.    Current Medications:      Infusions:       Scheduled:   aspirin  81 mg Oral Daily    atorvastatin  40 mg Oral Daily    melatonin  6 mg Oral Nightly    metoprolol tartrate  25 mg Oral BID    OLANZapine  2.5 mg Oral Daily    pantoprazole  40 mg Oral Daily    traZODone  50 mg Oral QHS        PRN:  sodium chloride 0.9%      DVT PPX: SCDs  Anticoagulants   Medication Route Frequency       Antibiotics/Antivirals and Day Number of Therapy:  None    Lines and Day Number of Therapy:  PIV 10/3/22    Assessment:     Venu Lau is an 82 year old male with a PMH of CAD s/p CABG in 1990, Aflutter in 2011 with YAN cardioversion and PPM placement, HTN, well controlled type 2 DM, 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 Troponin flat, EKG without any indication of an acute CV event. Due to story (burning epigastric pain after drinking a beer) and negative ACS workup, pt likely experiencing GERD. Pt is due for remainder of cardiology workup per Dr. Bautista.    Plan:     Chest pain   CAD s/p CABG in 1990  - Reports 2 day history of atypical CP, now resolved without intervention  - EKG Vpaced rhythm without ST segment changes, nonspecific T wave flattening througout all leads  - Troponin 0.019>>0.024>>0.018  - ASA loaded in ED  - Pt follows with Dr. Bautista, most recent visits for pacemaker interrogations, was scheduled for EKG, TTE, and labs 10/4/22   - Dr. Bautista notified, complete EKG, TTE, labs inpatient  - Last TTE in 5/2021 with EF 55%, normal RV fx, severe L atrial enlargement   - TTE 10/4/22 with EF 60%,  severe L atrial enlargement, moderate AV stenosis and TR  - HbA1c 5.6, lipid panel: Chol 100 LDL 41.2 HDL 40 TG 94  - Pt unsure what medications he is currently taking  - Will continue home ASA, Lipitor   - Nuclear stress test today to complete cards  workup     GERD  - Due to story (burning epigastric pain after drinking a beer) and negative ACS workup, pt likely experiencing GERD   - Counseled on diet and exercise  - Continue pantoprazole 40mg qd    HTN  - Metoprolol 25mg BID in pt record, unsure if taking  - Normotensive here, will continue to monitor and add on antihypertensives as needed     Aflutter  PPM 2011  - History of atrial flutter/atrial fibrillation with YAN cardioversion in 2011 with subsequent placement of PPM placed in 2011; last interrogated 4/22  - Follows with Dr. Michele Blevins 7, HAS-BLED 6  - Previously on eliquis, but taken off secondary to repeat GIBs and frequent falls  - Was to be evaluated for ANA LUISA closure at some point     Pancytopenia  - Hgb 11, MCV 79; Baseline Hgb closer to 12-13  - Denies any overt blood loss  - Iron panel: Iron 50 Transferin 287 TIBC 425 Iron sat 12 Ferritin 25 indicative of DONNA  - B12 776  - No history of colonoscopy  - Chronic thrombocytopenia dating back to 2005 in chart review  - Other markers of synthetic liver function intact  - Chronic Leukopenia dating back to 2005 in chart review  - Denies any symptoms of infection  - hematology oncology referral on discharge  - Start iron supplementation     Elevated Alkaline Phosphatase  - History of HCC s/p left lobectomy in 2005  - Hisotry of hemobilia with stent, now removed per chart review  - AST/ALT, Tbili wnl  -      HCC s/p left lobectomy 2005  - No further documentation in chart  - Pt unable to elaborate on history     Recurrent GIB  - Last EGD in 10/2021 with blood seen in duodenum with source believed to be hemobilia, small RHA vessel coil embolized by IR  - Continue home protonix     Bipolar 1 Disorder  - Continue home zyprexa 2.5mg daily  - Reports past of SI secondary to chronic; not currently endorsing.  Will continue to monitor and assess     Daily Alcohol Use  - Cessation encouraged     Tobacco Use  - Cessation encouraged     Hypothyroid -  subclinical   - TSH 5.426, free T4 0.78  - CTM outpatient    Health Care Maintenance  - Influenza and Tdap, up to date; needs COVID booster, pneumovax  - Colonoscopy: not UTD  - Follow up with PCP     Code Status: full  DVT Prophylaxis: SCDs for now in setting of history of GI bleeds  Diet: cardiac  Disposition: Pending nuclear stress test today    Emely aGrcia MD  [facility] IM PGY-1  [facility] Hospitalist Medicine Team A    [facility] Medicine Hospitalist Pager numbers:   [facility] Hospitalist Medicine Team A (Tereza/Flakita): [phone]  [facility] Hospitalist Medicine Team B (Brian/Michaela):  [phone]       While in Labor & Delivery

## 2025-05-09 NOTE — ED PROVIDER NOTES
Chief Complaint  Chief Complaint   Patient presents with    Fall     Pt to ED via EMS for family concerns r/t increased frequency of falls; pt w/ Hx of mobility deficit, ambulation w/ walker but states he prefers cane.  Superficial abrasion to L forehead at hairline noted; MATT, +CMS x 4, denies any add'l c/o.       HPI  Venu Lau is a 81 y.o. male who presents with a fall.  Patient reports that he has a poor gait and does well with a walker but does not like to use it.  He has increased numbers of falls recently.  Would like to be evaluated for other causes.  Patient denies any pain.  No syncope.  No chest pain or shortness of breath palpitations.  No asymmetric leg pain or swelling.    Past medical history  Past Medical History:   Diagnosis Date    *Atrial flutter     Anemia     Anticoagulant long-term use     Arthritis     Bipolar 1 disorder     Coronary artery disease     Diabetes mellitus     Diabetes mellitus, type 2     Embolic stroke involving left posterior cerebral artery 5/27/2021    GERD (gastroesophageal reflux disease)     Gout, unspecified     H/O: GI bleed     Hypertension     Liver cancer     PVD (peripheral vascular disease)     Sciatica     SSS (sick sinus syndrome)     Tachy-valentine syndrome     Thyroid disease        Current Medications  No current facility-administered medications for this encounter.    Current Outpatient Medications:     atorvastatin (LIPITOR) 10 MG tablet, Take 1 tablet (10 mg total) by mouth once daily., Disp: 30 tablet, Rfl: 0    gabapentin (NEURONTIN) 300 MG capsule, Take 300 mg by mouth 2 (two) times daily., Disp: , Rfl:     glimepiride (AMARYL) 2 MG tablet, Take 2 mg by mouth every morning., Disp: , Rfl:     metoprolol tartrate (LOPRESSOR) 50 MG tablet, Take 0.5 tablets (25 mg total) by mouth 2 (two) times daily., Disp: 180 tablet, Rfl: 3    mirtazapine (REMERON) 15 MG tablet, 15 mg once daily., Disp: , Rfl:     OLANZapine (ZYPREXA) 2.5  MG tablet, 2.5 mg once daily., Disp: , Rfl:     oxyCODONE-acetaminophen (PERCOCET)  mg per tablet, SMARTSI.5 Tablet(s) By Mouth Every 6-8 Hours, Disp: , Rfl:     pantoprazole (PROTONIX) 40 MG tablet, Take 1 tablet (40 mg total) by mouth once daily., Disp: 30 tablet, Rfl: 2    SOLUS V2 LANCETS 30 gauge Misc, , Disp: , Rfl:     SOLUS V2 TEST STRIPS Strp, , Disp: , Rfl:     Allergies  Review of patient's allergies indicates:  No Known Allergies    Surgical history  Past Surgical History:   Procedure Laterality Date    ANGIOGRAPHY Right 10/11/2021    Procedure: ANGIOGRAM-HEPATIC;  Surgeon: Phil Stuart II, MD;  Location: [facility] CATH LAB;  Service: Interventional Radiology;  Laterality: Right;    APPENDECTOMY      CHOLECYSTECTOMY      CORONARY ARTERY BYPASS GRAFT      ENDOSCOPIC ULTRASOUND OF UPPER GASTROINTESTINAL TRACT N/A 2020    Procedure: EUS;  Surgeon: Rei Humphrey MD;  Location: [facility] ENDO;  Service: Endoscopy;  Laterality: N/A;    ERCP N/A 2020    Procedure: ERCP;  Surgeon: Rei Humphrey MD;  Location: [facility] ENDO;  Service: Endoscopy;  Laterality: N/A;    ERCP N/A 2020    Procedure: ERCP (ENDOSCOPIC RETROGRADE CHOLANGIOPANCREATOGRAPHY);  Surgeon: Adela Boyle MD;  Location: [facility] ENDO;  Service: Endoscopy;  Laterality: N/A;    ESOPHAGOGASTRODUODENOSCOPY N/A 10/11/2021    Procedure: EGD (ESOPHAGOGASTRODUODENOSCOPY);  Surgeon: Skip Varela MD;  Location: [facility];  Service: Gastroenterology;  Laterality: N/A;    INSERT / REPLACE / REMOVE PACEMAKER      LIVER RESECTION      LUMBAR DISC SURGERY         Social history  Social History     Socioeconomic History    Marital status:    Tobacco Use    Smoking status: Current Some Day Smoker     Packs/day: 0.25     Years: 37.00     Pack years: 9.25     Types: Cigarettes     Last attempt to quit: 1992     Years since quittin.1    Smokeless tobacco: Never Used    Tobacco comment: Pt  "declines enrollment in the [employer]. Handout provided for Ambulatory Smoking Cessation program.    Substance and Sexual Activity    Alcohol use: Yes     Alcohol/week: 5.0 standard drinks     Types: 5 Cans of beer per week    Drug use: No    Sexual activity: Not Currently       Family History  Family History   Problem Relation Age of Onset    Heart disease Mother     Pancreatic cancer Mother     Heart disease Father        Review of systems  Constitutional: No fever or weakness.  Eyes: No redness, pain, or discharge.  Neurologic: No new focal weakness or sensory changes.  All systems otherwise negative except as noted in ROS and HPI    Physical Exam  Vital signs: BP (!) 166/87 (BP Location: Left arm, Patient Position: Sitting)   Pulse 72   Temp 98.2 °F (36.8 °C) (Oral)   Resp 16   Ht 5' 5" (1.651 m)   Wt 78 kg (171 lb 15.3 oz)   SpO2 97%   BMI 28.62 kg/m²   Constitutional: No acute distress.  Well developed, alert, oriented and appropriate.  HENT: Normocephalic, atraumatic. Normal ear, nose, and throat.  Eyes: PERRL, EOMI, normal conjunctiva.  Neck: Normal range of motion, no tenderness; supple.  Respiratory: Nonlabored breathing with normal breath sounds.  Cardiovascular: RRR with no pulse deficit.  GI: Soft, nontender, no rebound or guarding.  Musculoskeletal: Normal ROM, no tenderness, injury, or edema.  Skin: Warm, dry skin without infection or injury.  Neurologic: Normal motor, sensation with no new focal deficit.  Psychiatric: Affect normal, judgement normal, mood normal.  No SI, HI, and not gravely disabled.    Labs  Pertinent labs reviewed (see chart for details)  Labs Reviewed   CBC W/ AUTO DIFFERENTIAL - Abnormal; Notable for the following components:       Result Value    WBC 2.48 (*)     RBC 4.08 (*)     Hemoglobin 9.5 (*)     Hematocrit 32.7 (*)     MCV 80 (*)     MCH 23.3 (*)     MCHC 29.1 (*)     RDW 18.2 (*)     Platelets 137 (*)     Gran # (ANC) 1.4 (*)     Lymph # 0.7 (*)     Mono " % 15.7 (*)     All other components within normal limits   COMPREHENSIVE METABOLIC PANEL - Abnormal; Notable for the following components:    Glucose 172 (*)     AST 54 (*)     All other components within normal limits   URINALYSIS, REFLEX TO URINE CULTURE - Abnormal; Notable for the following components:    Protein, UA Trace (*)     All other components within normal limits    Narrative:     Preferred Collection Type->Urine, Clean Catch                  Specimen Source->Urine                  Collection Type->Urine, Unspecified   TROPONIN I   NT-PRO NATRIURETIC PEPTIDE       ECG  Results for orders placed or performed during the hospital encounter of 10/10/21   EKG 12-lead    Collection Time: 10/10/21  4:25 PM    Narrative    Test Reason :     Vent. Rate : 082 BPM     Atrial Rate : 065 BPM     P-R Int : 000 ms          QRS Dur : 162 ms      QT Int : 480 ms       P-R-T Axes : 000 -79 086 degrees     QTc Int : 560 ms    AV sequential or dual chamber electronic pacemaker  Confirmed by Ruy Mckeon MD ([id number]) on 10/13/2021 7:14:32 AM    Referred By: IRENA COX           Confirmed By:Ruy Mckeon MD     ECG interpreted by ED MD    Radiology  CT Head Without Contrast   Final Result      No acute intracranial injury identified.  Chronic findings, as above.         Electronically signed by: Aren Acosta MD   Date:    01/10/2022   Time:    10:14      X-Ray Chest AP Portable   Final Result      No acute process seen.         Electronically signed by: Rosalino Oreilly MD   Date:    01/10/2022   Time:    10:06          Procedures    Procedures    Medications - No data to display    ED course           ED management:  Patient appears to have a baseline gait instability which may be gradually progressing over time.  No signs of life-threatening condition or surgical or infectious or reversible etiology.  Patient is happy and comfortable plan to go home      Disposition    Patient discharged in stable  condition      Final impression  1. Gait instability        Critical care time spent with this patient was 0 minutes excluding the procedure time.              Anupam Marques MD  01/14/22 0999     Walking

## 2025-05-15 ENCOUNTER — OFFICE VISIT (OUTPATIENT)
Dept: CARDIOLOGY | Facility: CLINIC | Age: 85
End: 2025-05-15
Payer: MEDICARE

## 2025-05-15 VITALS
HEIGHT: 65 IN | DIASTOLIC BLOOD PRESSURE: 67 MMHG | WEIGHT: 138 LBS | OXYGEN SATURATION: 98 % | BODY MASS INDEX: 22.99 KG/M2 | SYSTOLIC BLOOD PRESSURE: 149 MMHG

## 2025-05-15 DIAGNOSIS — E78.5 DYSLIPIDEMIA: Primary | ICD-10-CM

## 2025-05-15 DIAGNOSIS — I48.0 PAROXYSMAL ATRIAL FIBRILLATION: ICD-10-CM

## 2025-05-15 DIAGNOSIS — I35.0 NONRHEUMATIC AORTIC VALVE STENOSIS: ICD-10-CM

## 2025-05-15 DIAGNOSIS — I10 ESSENTIAL HYPERTENSION: ICD-10-CM

## 2025-05-15 PROCEDURE — 3288F FALL RISK ASSESSMENT DOCD: CPT | Mod: CPTII,S$GLB,,

## 2025-05-15 PROCEDURE — 3078F DIAST BP <80 MM HG: CPT | Mod: CPTII,S$GLB,,

## 2025-05-15 PROCEDURE — 1160F RVW MEDS BY RX/DR IN RCRD: CPT | Mod: CPTII,S$GLB,,

## 2025-05-15 PROCEDURE — 99999 PR PBB SHADOW E&M-EST. PATIENT-LVL IV: CPT | Mod: PBBFAC,,,

## 2025-05-15 PROCEDURE — 1101F PT FALLS ASSESS-DOCD LE1/YR: CPT | Mod: CPTII,S$GLB,,

## 2025-05-15 PROCEDURE — 99214 OFFICE O/P EST MOD 30 MIN: CPT | Mod: S$GLB,,,

## 2025-05-15 PROCEDURE — 1126F AMNT PAIN NOTED NONE PRSNT: CPT | Mod: CPTII,S$GLB,,

## 2025-05-15 PROCEDURE — 1159F MED LIST DOCD IN RCRD: CPT | Mod: CPTII,S$GLB,,

## 2025-05-15 PROCEDURE — 3075F SYST BP GE 130 - 139MM HG: CPT | Mod: CPTII,S$GLB,,

## 2025-05-15 NOTE — ASSESSMENT & PLAN NOTE
Patient is 1 year status post amulet procedure.  Follow-up YAN did not show any ana device leak.  He has maintained on aspirin 81 mg daily.  Denies any new symptoms or bleeding concerns.  Recommend continuing aspirin alone.  Patient is able to continue follow-ups with PCP or General Cardiology.  Follow-up with interventional cardiology as needed.

## 2025-05-15 NOTE — ASSESSMENT & PLAN NOTE
Moderate AS on previous echo.  Asymptomatic.  No complaints of dyspnea on exertion or fatigue.  Stable.  Would recommend repeat echo every 1-2 years to monitor for any progression.  No intervention would be recommended until patient has severe progression and symptom development.

## 2025-05-15 NOTE — PROGRESS NOTES
Interventional Cardiology Clinic Note    Reason for Visit: post LAAO implantation    HPI:     86 y/o with PMH of PAF, Tachy-valentine synd s/p PPM, CAD s/p CABG, embolic stroke, HTN, HLD, chronic anemia and chronic pain who presents to clinic for one year Amulet follow-up.     He has a HAS-BLED of 5 and a CHadsVasc 5 but was unable to place on blood thinners because of history of recurrent GI bleeding and high risk of falling. On 6/6/24, he underwent 22mm Amulet LAAO implant by Dr. Cortes with no complications. At his 6-week follow-up, YAN showed well seated device without peridevice leak. Today he notes continuing to do well. Remains on ASA monotherapy without any bleeding issues.  He has no complaints today.  Denies any chest pains, shortness of breath, dyspnea on exertion, PND, leg swelling.  Follows with PCP Dr. Miller.    ROS:    Constitution: Negative for fever, chills, weight loss or gain.   HENT: Negative for sore throat, rhinorrhea, or headache.  Eyes: Negative for blurred or double vision.   Cardiovascular: See above  Pulmonary: Negative for SOB   Gastrointestinal: Negative for abdominal pain, nausea, vomiting, or diarrhea.   : Negative for dysuria.   Neurological: Negative for focal weakness or sensory changes.  PMH:     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    GERD (gastroesophageal reflux disease)     Gout, unspecified     H/O: GI bleed     Hypertension     Liver cancer     PVD (peripheral vascular disease)     Sciatica     SSS (sick sinus syndrome)     Tachy-valentine syndrome     Thyroid disease      Past Surgical History:   Procedure Laterality Date    ANGIOGRAPHY Right 10/11/2021    Procedure: ANGIOGRAM-HEPATIC;  Surgeon: Phil Stuart II, MD;     Date:February 25, 2022      Provider requested that no letter be sent. Do not send.       [facility]       Location: [facility] CATH LAB;  Service: Interventional Radiology;  Laterality: Right;    APPENDECTOMY      CHOLECYSTECTOMY      CLOSURE OF LEFT ATRIAL APPENDAGE USING DEVICE N/A 6/6/2024    Procedure: Left atrial appendage closure device;  Surgeon: Aren Cortes MD;  Location: [facility] CATH LAB;  Service: Cardiology;  Laterality: N/A;    CORONARY ARTERY BYPASS GRAFT      ENDOSCOPIC ULTRASOUND OF UPPER GASTROINTESTINAL TRACT N/A 6/17/2020    Procedure: EUS;  Surgeon: Rei Humphrey MD;  Location: [facility] ENDO;  Service: Endoscopy;  Laterality: N/A;    ERCP N/A 6/17/2020    Procedure: ERCP;  Surgeon: Rei Humphrey MD;  Location: [facility] ENDO;  Service: Endoscopy;  Laterality: N/A;    ERCP N/A 8/31/2020    Procedure: ERCP (ENDOSCOPIC RETROGRADE CHOLANGIOPANCREATOGRAPHY);  Surgeon: Adela Boyle MD;  Location: [facility];  Service: Endoscopy;  Laterality: N/A;    ESOPHAGOGASTRODUODENOSCOPY N/A 10/11/2021    Procedure: EGD (ESOPHAGOGASTRODUODENOSCOPY);  Surgeon: Skip Varela MD;  Location: [facility];  Service: Gastroenterology;  Laterality: N/A;    INSERT / REPLACE / REMOVE PACEMAKER      INSERTION OF TEMPORARY PACEMAKER Left 5/30/2023    Procedure: INSERTION, PACEMAKER, TEMPORARY;  Surgeon: Timi Meadows MD;  Location: [facility] CATH LAB/EP;  Service: Cardiology;  Laterality: Left;    LIVER RESECTION      LUMBAR DISC SURGERY      REPLACEMENT OF PACEMAKER GENERATOR N/A 5/30/2023    Procedure: REPLACEMENT, PACEMAKER GENERATOR;  Surgeon: Timi Meadows MD;  Location: [facility] CATH LAB/EP;  Service: Cardiology;  Laterality: N/A;    TRANSESOPHAGEAL ECHOCARDIOGRAPHY N/A 6/6/2024    Procedure: ECHOCARDIOGRAM, TRANSESOPHAGEAL;  Surgeon: Carlos Manuel Campos III, MD;  Location: [facility] CATH LAB;  Service: Cardiology;  Laterality: N/A;    TRANSESOPHAGEAL ECHOCARDIOGRAPHY N/A 7/23/2024    Procedure: ECHOCARDIOGRAM, TRANSESOPHAGEAL;  Surgeon: Bruno Raymond Diagnostic;  Location: [facility] EP LAB;  Service: Cardiology;   "Laterality: N/A;     Allergies:   Review of patient's allergies indicates:  No Known Allergies  Medications:   Medications Ordered Prior to Encounter[1]  Social History:     Social History     Tobacco Use    Smoking status: Some Days     Current packs/day: 0.00     Average packs/day: 0.3 packs/day for 37.0 years (9.3 ttl pk-yrs)     Types: Cigarettes     Start date: 1955     Last attempt to quit: 1992     Years since quittin.5    Smokeless tobacco: Never    Tobacco comments:     Pt declines enrollment in the [employer]. Handout provided for Ambulatory Smoking Cessation program.    Substance Use Topics    Alcohol use: Yes     Alcohol/week: 5.0 standard drinks of alcohol     Types: 5 Cans of beer per week     Family History:     Family History   Problem Relation Name Age of Onset    Heart disease Mother      Pancreatic cancer Mother      Heart disease Father       Physical Exam:   BP (!) 149/67 (BP Location: Left arm, Patient Position: Sitting)   Ht 5' 5" (1.651 m)   Wt 62.6 kg (138 lb)   SpO2 98%   BMI 22.96 kg/m²    Wt Readings from Last 4 Encounters:   05/15/25 62.6 kg (138 lb)   24 66.2 kg (146 lb)   24 66.2 kg (146 lb)   24 66.2 kg (146 lb)       Physical Exam  Vitals and nursing note reviewed.   Constitutional:       General: He is not in acute distress.     Appearance: Normal appearance. He is not ill-appearing or toxic-appearing.   HENT:      Head: Normocephalic and atraumatic.   Eyes:      Pupils: Pupils are equal, round, and reactive to light.   Cardiovascular:      Rate and Rhythm: Normal rate and regular rhythm.      Pulses: Normal pulses.      Heart sounds: Murmur heard.      Systolic murmur is present with a grade of 2/6.      No friction rub. No gallop.   Pulmonary:      Effort: Pulmonary effort is normal. No respiratory distress.   Musculoskeletal:         General: Normal range of motion.      Cervical back: Normal range of motion.   Skin:     General: Skin is " warm and dry.      Capillary Refill: Capillary refill takes less than 2 seconds.   Neurological:      General: No focal deficit present.      Mental Status: He is alert.          Labs:     Lab Results   Component Value Date     01/16/2025    K 4.3 01/16/2025     01/16/2025    CO2 23 01/16/2025    BUN 17 01/16/2025    CREATININE 1.04 01/16/2025    ANIONGAP 11 01/16/2025     Lab Results   Component Value Date    HGBA1C 5.8 (H) 01/16/2025     Lab Results   Component Value Date     (H) 10/29/2016    Lab Results   Component Value Date    WBC 2.11 (L) 01/16/2025    HGB 12.6 (L) 01/16/2025    HCT 39.9 (L) 01/16/2025    HCT 36 05/26/2021     (L) 01/16/2025    GRAN 1.1 (L) 01/16/2025    GRAN 53.5 01/16/2025     Lab Results   Component Value Date    CHOL 103 (L) 01/16/2025    HDL 46 01/16/2025    LDLCALC 43.6 (L) 01/16/2025    TRIG 67 01/16/2025          Imaging:         EF   Date Value Ref Range Status   06/06/2024 55 % Final   10/04/2022 60 % Final   05/27/2021 55 % Final       TTE:   Transesophageal echo (YAN)  Result Date: 7/23/2024    Amulet device in atrial appendage well seated without ana-device leak   and completely covering left atrial appendage.    Left Ventricle: There is low normal systolic function with a visually   estimated ejection fraction of 50 - 55%.    Right Ventricle: Normal right ventricular cavity size. Systolic   function is normal.    Mitral Valve: Mildly thickened leaflets.    No pericardial effusion, no ASD.        Transesophageal echo (YAN)  Result Date: 6/6/2024    YAN for Amulet implantation. s/p implantation of 22mm Amulet device.   The device is well seated with no ana-device leak. There is a small   iatrogenic atrial septal defect with left to right shunt. No pericardial   effusion.    Left Atrium: Left atrium is dilated. The left atrial appendage appears   normal. The left atrial appendage has a cauliflower morphology. Appendage   velocity is reduced at less than  40 cm/sec.    Left Ventricle: The left ventricle is normal in size. Normal wall   thickness. There is normal systolic function. Ejection fraction by visual   approximation is 55%.    Right Ventricle: Right ventricular enlargement. Wall thickness is   normal. Systolic function is normal.    Right Atrium: Right atrium is dilated.    Aortic Valve: The aortic valve is a trileaflet valve. There is moderate   aortic valve sclerosis. There is annular calcification present. Moderately   restricted motion. At least moderate stenosis present. There is mild   aortic regurgitation.    Mitral Valve: There is mild regurgitation.    Tricuspid Valve: There is severe regurgitation.        Assessment:    85 y/o with PMH of PAF, Tachy-valentine synd s/p PPM. CAD s/p CABG, embolic stroke, HTN, HLD, chronic anemia and chronic pain. He has a HAS-BLED of 5 and a CHadsVasc 5 but unable to place on blood thinners because of history of recurrent GI bleeding and high risk of falling. Now 1 year s/p Amulet procedure.    Plan:     Paroxysmal atrial fibrillation s/p Amulet 6/2024  Patient is 1 year status post amulet procedure.  Follow-up YAN did not show any ana device leak.  He has maintained on aspirin 81 mg daily.  Denies any new symptoms or bleeding concerns.  Recommend continuing aspirin alone.  Patient is able to continue follow-ups with PCP or General Cardiology.  Follow-up with interventional cardiology as needed.    Dyslipidemia  Last lipid panel reviewed.  At goal.  Continue statin therapy as is.    Essential hypertension  Continue current antihypertensive regimen.    Nonrheumatic aortic valve stenosis  Moderate AS on previous echo.  Asymptomatic.  No complaints of dyspnea on exertion or fatigue.  Stable.  Would recommend repeat echo every 1-2 years to monitor for any progression.  No intervention would be recommended until patient has severe progression and symptom development.       Caryl Bowles PA-C  Interventional Cardiology            [1]   Current Outpatient Medications on File Prior to Visit   Medication Sig Dispense Refill    aspirin 81 MG Chew Take 1 tablet (81 mg total) by mouth once daily. 90 tablet 3    atorvastatin (LIPITOR) 40 MG tablet Take 40 mg by mouth once daily.      glimepiride (AMARYL) 2 MG tablet Take 1 mg by mouth every morning.      JARDIANCE 25 mg tablet Take 25 mg by mouth once daily.      LORazepam (ATIVAN) 1 MG tablet Take 1 mg by mouth 2 (two) times daily.      metoprolol tartrate (LOPRESSOR) 50 MG tablet Take 0.5 tablets (25 mg total) by mouth 2 (two) times daily. 180 tablet 3    mirtazapine (REMERON) 15 MG tablet 15 mg once daily.      pantoprazole (PROTONIX) 40 MG tablet Take 1 tablet (40 mg total) by mouth once daily. 30 tablet 2    SOLUS V2 LANCETS 30 gauge Misc       SOLUS V2 TEST STRIPS Strp       [DISCONTINUED] clopidogreL (PLAVIX) 75 mg tablet Take 1 tablet (75 mg total) by mouth once daily. 90 tablet 1     Current Facility-Administered Medications on File Prior to Visit   Medication Dose Route Frequency Provider Last Rate Last Admin    0.9%  NaCl infusion   Intravenous Continuous Aren Cortes MD        vancomycin injection    PRN Timi Meadows MD   1 g at 05/30/23 1439

## (undated) DEVICE — PAD DEFIB CADENCE ADULT R2

## (undated) DEVICE — FLOWSWITCH HP 1-W W/O LL

## (undated) DEVICE — SPIKE SHORT LG BORE 1-WAY 2IN

## (undated) DEVICE — KIT CUSTOM

## (undated) DEVICE — INTRODUCER CATH 5F 11CM

## (undated) DEVICE — GUIDEWIRE EMERALD 150CM PTFE

## (undated) DEVICE — KIT COPILOT VALVE HEMO TOOL

## (undated) DEVICE — BLADE PLASMA WIDE SPATULA TIP

## (undated) DEVICE — SHEATH INTRODUCER 5FR 10CM

## (undated) DEVICE — SHEATH INTRODUCER 8FR 11CM

## (undated) DEVICE — LEFT ATRIAL APPENDAGE OCCLUDER
Type: IMPLANTABLE DEVICE | Site: HEART | Status: NON-FUNCTIONAL
Brand: AMPLATZER™ AMULET™
Removed: 2024-06-06

## (undated) DEVICE — GUIDEWIRE AMPLATZ .035X260

## (undated) DEVICE — INTRODUCER CATH 4F 11CM

## (undated) DEVICE — INTRO FAST-CATH SL1 8.5FR 63CM

## (undated) DEVICE — CATH 5FR BALLOON PT HEART PACE

## (undated) DEVICE — DRAPE INCISE IOBAN 2 23X17IN

## (undated) DEVICE — CATH SUPER TORQUE MP 4FRX80CM

## (undated) DEVICE — CONTROLLER DETACHMENT

## (undated) DEVICE — KIT CUSTOM MANIFOLD

## (undated) DEVICE — CATH GLIDE 4F 65CM

## (undated) DEVICE — SHEATH DEL TORQVUE 12FR 8OCM

## (undated) DEVICE — NDL BROCKENBROUGH CRV 71CM

## (undated) DEVICE — SCALPEL SZ 15 RETRACTABLE

## (undated) DEVICE — CATH IMPULSE PIGTAIL 6F 110CM

## (undated) DEVICE — GLOVE SURGEON SYN PF SZ 9

## (undated) DEVICE — TRANSDUCER ADULT DISP

## (undated) DEVICE — SET MICRO PUNCT 4FR/MPIS-401

## (undated) DEVICE — OMNIPAQUE 350MG 150ML VIAL

## (undated) DEVICE — SLEEVE INTRODUCER CATH 60CM ST

## (undated) DEVICE — OMNIPAQUE 300MG 150ML VIAL

## (undated) DEVICE — SEE MEDLINE ITEM 156918

## (undated) DEVICE — DRAPE ANGIO BRACH 38X44IN

## (undated) DEVICE — TRAY CATH LAB OMC

## (undated) DEVICE — STOPCOCK 3-WAY

## (undated) DEVICE — SUT 2-0 VICRYL / FS-1

## (undated) DEVICE — KIT MICROINTRO 4F .018X40X7CM

## (undated) DEVICE — PROTECTION STATION PLUS

## (undated) DEVICE — CABLE PACER

## (undated) DEVICE — KIT PACEMAKER CUSTOM KENNER